# Patient Record
Sex: MALE | Race: WHITE | NOT HISPANIC OR LATINO | Employment: OTHER | URBAN - METROPOLITAN AREA
[De-identification: names, ages, dates, MRNs, and addresses within clinical notes are randomized per-mention and may not be internally consistent; named-entity substitution may affect disease eponyms.]

---

## 2017-03-01 ENCOUNTER — ALLSCRIPTS OFFICE VISIT (OUTPATIENT)
Dept: OTHER | Facility: OTHER | Age: 62
End: 2017-03-01

## 2017-07-11 ENCOUNTER — GENERIC CONVERSION - ENCOUNTER (OUTPATIENT)
Dept: OTHER | Facility: OTHER | Age: 62
End: 2017-07-11

## 2017-08-22 ENCOUNTER — GENERIC CONVERSION - ENCOUNTER (OUTPATIENT)
Dept: OTHER | Facility: OTHER | Age: 62
End: 2017-08-22

## 2017-08-23 ENCOUNTER — GENERIC CONVERSION - ENCOUNTER (OUTPATIENT)
Dept: OTHER | Facility: OTHER | Age: 62
End: 2017-08-23

## 2017-08-24 ENCOUNTER — ALLSCRIPTS OFFICE VISIT (OUTPATIENT)
Dept: OTHER | Facility: OTHER | Age: 62
End: 2017-08-24

## 2017-08-30 ENCOUNTER — GENERIC CONVERSION - ENCOUNTER (OUTPATIENT)
Dept: OTHER | Facility: OTHER | Age: 62
End: 2017-08-30

## 2017-09-07 ENCOUNTER — GENERIC CONVERSION - ENCOUNTER (OUTPATIENT)
Dept: OTHER | Facility: OTHER | Age: 62
End: 2017-09-07

## 2017-12-01 ENCOUNTER — TRANSCRIBE ORDERS (OUTPATIENT)
Dept: ADMINISTRATIVE | Facility: HOSPITAL | Age: 62
End: 2017-12-01

## 2017-12-01 DIAGNOSIS — Z47.89 ORTHOTIC TRAINING: Primary | ICD-10-CM

## 2017-12-01 DIAGNOSIS — Z47.89 ENCOUNTER FOR OTHER ORTHOPEDIC AFTERCARE (CODE): ICD-10-CM

## 2017-12-28 ENCOUNTER — GENERIC CONVERSION - ENCOUNTER (OUTPATIENT)
Dept: OTHER | Facility: OTHER | Age: 62
End: 2017-12-28

## 2018-01-03 ENCOUNTER — ALLSCRIPTS OFFICE VISIT (OUTPATIENT)
Dept: OTHER | Facility: OTHER | Age: 63
End: 2018-01-03

## 2018-01-10 NOTE — MISCELLANEOUS
Message   Recorded as Task   Date: 03/28/2016 02:47 PM, Created By: Erlin Vicente   Task Name: Medical Complaint Callback   Assigned To: Erlin Vicente   Regarding Patient: Ugo Groves, Status: Active   Comment:    Costenbader,Rondel - 28 Mar 2016 2:47 PM     TASK CREATED  Pt called with c/o R foot "burning and extreme pain", states he was advised by PT to notify office of R foot issues  Pt states since last office visit, R foot pain has increased  Pt notes increased R foot pain upon weight bearing, has been trying to "lay off of walking due to pain and burning"  Occasional n/t of R foot  Pt states he is using vitamin supplements that you suggested, not using narcotics  Please advise, Claudette Harrison - 01 Apr 2016 4:53 PM     TASK REPLIED TO: Previously Assigned To Narayan Mena  Please obtain AP and lateral xrays of the LS spine and schedule a f/u after complted    TY   Costenbader,Rondel - 04 Apr 2016 8:52 AM     TASK EDITED  No voicemail available  Costenbader,Rondel - 04 Apr 2016 9:18 AM     TASK EDITED  Spoke with pt's wife and relayed info  Pt was sleeping  Pt will have xrays done prior to appt scheduled for April 7, 9:30  Script faxed to  [facility] @ [phone]          Signatures   Electronically signed by : Justyna Mooney, ; Apr 4 2016  9:19AM EST                       (Author)

## 2018-01-10 NOTE — MISCELLANEOUS
Message   Recorded as Task   Date: 02/16/2016 02:56 PM, Created By: Hamlet Simon   Task Name: Medical Complaint Callback   Assigned To: Hamlet Simon   Regarding Patient: Abdiel Kincaid, Status: Active   Comment:    Costenbader,Rondel - 16 Feb 2016 2:56 PM     TASK CREATED  Spoke with aMribell Jackson from [facility] stating pt is c/o R knee to R foot pain with pain localized in R foot  Pt notified PCP and was provided with Oxycodone 15 mg, 1 tab q 6 hours and informed to d/c the dilaudid  Pt was on his hands and knees cleaning up the floor after bowel incontinence, when he stood up he developed the pain noted  Pt has f/u 3/3 with xrays to review          Signatures   Electronically signed by : Kelsi Hernandez, ; Feb 16 2016  2:56PM EST                       (Author)

## 2018-01-11 NOTE — MISCELLANEOUS
Message   Recorded as Task   Date: 08/22/2017 07:59 AM, Created By: Jeferson Mejía   Task Name: Follow Up   Assigned To: SPA es clinical,Team   Regarding Patient: Abby Saxena, Status: Active   CommentMeade Last - 22 Aug 2017 7:59 AM     TASK CREATED  Please schedule 4 week f/u OV   Gin Anderson - 22 Aug 2017 11:24 AM     TASK REPLIED TO: Previously Assigned To Ansley Drop  lmovm to cb to sched f-up appt   Kizzy Kilpatrick - 22 Aug 2017 1:16 PM     TASK REASSIGNED: Previously Assigned To Tanya Dukes - 22 Aug 2017 1:17 PM     TASK IN PROGRESS   Vee Meadows - 23 Aug 2017 12:33 PM     TASK EDITED  Pt returned call to schedule appt -- appt scheduled for 8/24/17 at 1:15  Active Problems    1  Aftercare following surgery of the musculoskeletal system (V58 78) (Z47 89)   2  Arthropathy of lumbar facet joint (721 3) (M12 88)   3  BRBPR (bright red blood per rectum) (569 3) (K62 5)   4  Closed fracture of first lumbar vertebra, unspecified fracture morphology, initial   encounter (805 4) (S32 019A)   5  Complex regional pain syndrome type 1 of right lower extremity (337 22) (G90 521)   6  Encounter for screening colonoscopy (V76 51) (Z12 11)   7  Hyperlipidemia (272 4) (E78 5)   8  Hypertension (401 9) (I10)   9  Low back pain (724 2) (M54 5)   10  Lumbar postlaminectomy syndrome (722 83) (M96 1)   11  Need for zoster vaccine (V04 89) (Z23)   12  Pain, neuropathic (729 2) (M79 2)   13  Postoperative examination (V67 00) (Z09)   14  Postoperative pain (338 18) (G89 18)   15  Right lumbar radiculopathy (724 4) (M54 16)   16  Right-sided low back pain with right-sided sciatica (724 3) (M54 41)   17  Screening for prostate cancer (V76 44) (Z12 5)   18  Ulcerative colitis, left sided (556 5) (K51 50)    Current Meds   1  DULoxetine HCl - 30 MG Oral Capsule Delayed Release Particles (Cymbalta); TAKE 1   CAPSULE AT BEDTIME;    Therapy: 24NNE6931 to (Evaluate:01Jun2017)  Requested for: 81GQD9317; Last   Rx:03Mar2017 Ordered   2  Lyrica 75 MG Oral Capsule; TAKE 1 CAPSULE TWICE DAILY; Therapy: 02OUM3434 to (Evaluate:20Oct2017); Last Rx:68Avl0995 Ordered   3  Lyrica 75 MG Oral Capsule; TAKE 1 CAPSULE TWICE DAILY; Therapy: 14WEA4977 to (Evaluate:30Mar2017); Last Rx:23Mar2017 Ordered   4  OxyCODONE HCl - 15 MG Oral Tablet; TAKE 1 TO 2 TABLETS EVERY 6 HOURS AS   NEEDED FOR PAIN;   Therapy: (Recorded:20Mnq5153) to Recorded    Allergies    1   No Known Drug Allergies    Signatures   Electronically signed by : Stephen Pritchett, ; Aug 23 2017 12:45PM EST                       (Author)

## 2018-01-11 NOTE — MISCELLANEOUS
Message   Recorded as Task   Date: 08/29/2017 12:52 PM, Created By: Nory Sandoval   Task Name: Call Back   Assigned To: Nory Sandoval   Regarding Patient: Mamie Boas, Status: Active   CommentKelly Brown - 29 Aug 2017 12:52 PM     TASK CREATED  lmtcb regarding procedure  9-14-17   Paola Fregoso - 29 Aug 2017 1:30 PM     TASK EDITED  pt returning your call  please call pt back at [phone]   Nory Sandoval - 29 Aug 2017 3:49 PM     TASK EDITED  mailbox is full   Nory Sandoval - 29 Aug 2017 3:49 PM     TASK IN PROGRESS   Nory Sandoval - 30 Aug 2017 1:55 PM     TASK EDITED  notified patient as above        Active Problems    1  Aftercare following surgery of the musculoskeletal system (V58 78) (Z47 89)   2  Arthropathy of lumbar facet joint (721 3) (M12 88)   3  BRBPR (bright red blood per rectum) (569 3) (K62 5)   4  Closed fracture of first lumbar vertebra, unspecified fracture morphology, initial   encounter (805 4) (S32 019A)   5  Complex regional pain syndrome type 1 of right lower extremity (337 22) (G90 521)   6  Encounter for screening colonoscopy (V76 51) (Z12 11)   7  Hyperlipidemia (272 4) (E78 5)   8  Hypertension (401 9) (I10)   9  Low back pain (724 2) (M54 5)   10  Lumbar postlaminectomy syndrome (722 83) (M96 1)   11  Need for zoster vaccine (V04 89) (Z23)   12  Pain, neuropathic (729 2) (M79 2)   13  Postoperative examination (V67 00) (Z09)   14  Postoperative pain (338 18) (G89 18)   15  Right lumbar radiculopathy (724 4) (M54 16)   16  Right-sided low back pain with right-sided sciatica (724 3) (M54 41)   17  Screening for prostate cancer (V76 44) (Z12 5)   18  Ulcerative colitis, left sided (556 5) (K51 50)    Current Meds   1  DULoxetine HCl - 30 MG Oral Capsule Delayed Release Particles (Cymbalta); TAKE 1   CAPSULE AT BEDTIME; Therapy: 80HQU1442 to (Evaluate:15Olg4618)  Requested for: 31GEP7319; Last   Rx:03Mar2017 Ordered   2   Lyrica 75 MG Oral Capsule; TAKE 1 CAPSULE TWICE DAILY; Therapy: 35ILI7447 to (Evaluate:20Oct2017); Last Rx:77Kla9598 Ordered   3  Lyrica 75 MG Oral Capsule; TAKE 1 CAPSULE TWICE DAILY; Therapy: 58WIT8302 to (Evaluate:30Mar2017); Last Rx:23Mar2017 Ordered   4  OxyCODONE HCl - 15 MG Oral Tablet; TAKE 1 TO 2 TABLETS EVERY 6 HOURS AS   NEEDED FOR PAIN;   Therapy: (Recorded:06Tqk3200) to Recorded    Allergies    1   No Known Drug Allergies    Signatures   Electronically signed by : Katiuska Flowers, ; Aug 30 2017  1:56PM EST                       (Author)

## 2018-01-11 NOTE — MISCELLANEOUS
Dear  Mr Garay: We missed you for your originally scheduled Neurological Consultation requested by Dr Vidal Killian    Please call at your earliest convenience to reschedule this appointment  Sincerely,     Janelle Benedict      [phone]    cc:  Dr Vidal Killian           Electronically signed Damien Ford   Feb 4 2016 12:11PM EST Author

## 2018-01-12 NOTE — MISCELLANEOUS
Message   Recorded as Task   Date: 11/30/2016 10:06 AM, Created By: Moisés Pedroza   Task Name: Medical Complaint Callback   Assigned To: Moisés Pedroza   Regarding Patient: Vandana Schwartz, Status: Active   Comment:    Erum Ruiz - 30 Nov 2016 10:06 AM     TASK CREATED   Erum Ruiz - 30 Nov 2016 10:10 AM     TASK EDITED  Pt reports continued foot pain and questions coming to see DKO again  Review of last visit indicates no further surgery indicated and other options were discussed such as Dorsal column stim and starting Lyrica  Pt is in the process of trying to have Lyrica approved by his insurance  Will check on the possibility of referral to Pain specialist and contact pt  Erum Ruiz - 01 Dec 2016 11:36 AM     TASK EDITED  Per pt request referral to pain management done          Signatures   Electronically signed by : Amairani Watson, ; Dec  1 2016 11:36AM EST                       (Author)

## 2018-01-13 VITALS
HEART RATE: 76 BPM | BODY MASS INDEX: 32.07 KG/M2 | SYSTOLIC BLOOD PRESSURE: 112 MMHG | HEIGHT: 73 IN | DIASTOLIC BLOOD PRESSURE: 64 MMHG | WEIGHT: 242 LBS

## 2018-01-13 NOTE — MISCELLANEOUS
Message   Recorded as Task   Date: 07/11/2017 12:25 PM, Created By: Clint Stearns   Task Name: Miscellaneous   Assigned To: SPA es clinical,Team   Regarding Patient: Rodrigo Yanes, Status: In Progress   Comment:    Vee Meadows - 11 Jul 2017 12:25 PM     TASK CREATED  Pt called stating he uses a mail order ([facility]) for Lyrica  He said they had a computer mix-up and he only has 2 days of Lyrica left  He is asking if he can have an emergency pack of Lyrica if you have one until he gets them from [facility]  He said it is Lyrica 75mg  Pt can be reached at [phone]  Larisa Frias - 11 Jul 2017 1:35 PM     TASK EDITED    Attempted to contact pt, left message that samples available at office  Will be at    Gogo Fraire to call with any other questions or concerns  **********   Larisa Frias - 11 Jul 2017 1:36 PM     TASK EDITED    Lyrica 75 mg 1 blister pack (14 caps) lot# [id number]   exp date 11/2019  ***********        Active Problems    1  Aftercare following surgery of the musculoskeletal system (V58 78) (Z47 89)   2  Arthropathy of lumbar facet joint (721 3) (M12 88)   3  BRBPR (bright red blood per rectum) (569 3) (K62 5)   4  Closed fracture of first lumbar vertebra, unspecified fracture morphology, initial   encounter (805 4) (S32 019A)   5  Complex regional pain syndrome type 1 of right lower extremity (337 22) (G90 521)   6  Encounter for screening colonoscopy (V76 51) (Z12 11)   7  Hyperlipidemia (272 4) (E78 5)   8  Hypertension (401 9) (I10)   9  Low back pain (724 2) (M54 5)   10  Lumbar postlaminectomy syndrome (722 83) (M96 1)   11  Need for zoster vaccine (V04 89) (Z23)   12  Pain, neuropathic (729 2) (M79 2)   13  Postoperative examination (V67 00) (Z09)   14  Postoperative pain (338 18) (G89 18)   15  Right lumbar radiculopathy (724 4) (M54 16)   16  Right-sided low back pain with right-sided sciatica (724 3) (M54 41)   17   Screening for prostate cancer (V76 44) (Z12 5)   18  Ulcerative colitis, left sided (556 5) (K51 50)    Current Meds   1  DULoxetine HCl - 30 MG Oral Capsule Delayed Release Particles (Cymbalta); TAKE 1   CAPSULE AT BEDTIME; Therapy: 56AGG9782 to (Evaluate:01Jun2017)  Requested for: 97WGW2763; Last   Rx:03Mar2017 Ordered   2  Lyrica 75 MG Oral Capsule; TAKE 1 CAPSULE TWICE DAILY; Therapy: 32INB7026 to (Evaluate:32Dni7137); Last Rx:30May2017 Ordered   3  Lyrica 75 MG Oral Capsule; TAKE 1 CAPSULE TWICE DAILY; Therapy: 48KVT0367 to (Evaluate:30Mar2017); Last Rx:23Mar2017 Ordered   4  OxyCODONE HCl - 15 MG Oral Tablet; TAKE 1 TO 2 TABLETS EVERY 6 HOURS AS   NEEDED FOR PAIN;   Therapy: (Recorded:09Myv9253) to Recorded    Allergies    1  No Known Drug Allergies    Signatures   Electronically signed by :  Madelyn Hale, ; Jul 11 2017  1:37PM EST                       (Author)

## 2018-01-13 NOTE — MISCELLANEOUS
Message   Recorded as Task   Date: 05/19/2016 12:59 PM, Created By: Caroline Mendoza   Task Name: Medical Complaint Callback   Assigned To: Caroline Mendoza   Regarding Patient: Alia Raya, Status: Active   Comment:    CostenmariselJanessa pemberton - 19 May 2016 12:59 PM     TASK CREATED  Spoke with Anna Denton, PT @ [facility]  Pt presented there today with c/o R calf pain, increased upon ambulation  No redness or heat, negative Ruth's  Slight edema present  Pt states he has been laying around due to pain  Anna Denton was advised to have pt see his PCP today or present to the ED today for evaluation of symptoms  She stated an understanding          Signatures   Electronically signed by : Joseph Morton, ; May 19 2016 12:59PM EST                       (Author)

## 2018-01-13 NOTE — MISCELLANEOUS
Message   Recorded as Task   Date: 07/22/2016 08:42 AM, Created By: Chanel Morillo   Task Name: Miscellaneous   Assigned To: Chanel Morillo   Regarding Patient: Raul Rollins, Status: Active   Comment:    AnuradhaemmaErum - 22 Jul 2016 8:42 AM     TASK CREATED  Pt questions an earlier appt  with DKO  He is currently to return early Sept  Pt was to schedule ANIKA which he reports he has not done  He was encouraged to f/u with recommendations from previous visit and keep his current appt  He was informed that here were no earlier appts  at this time          Signatures   Electronically signed by : Mary Tran, ; Jul 22 2016  8:43AM EST                       (Author)

## 2018-01-14 VITALS
HEIGHT: 73 IN | SYSTOLIC BLOOD PRESSURE: 118 MMHG | HEART RATE: 64 BPM | WEIGHT: 250 LBS | BODY MASS INDEX: 33.13 KG/M2 | DIASTOLIC BLOOD PRESSURE: 76 MMHG

## 2018-01-15 NOTE — MISCELLANEOUS
Message   Recorded as Task   Date: 11/30/2016 11:35 AM, Created By: Moisés Yancey   Task Name: Miscellaneous   Assigned To: Erum Ruiz   Regarding Patient: Ugo Groves, Status: Active   Comment:    Erum Ruiz - 30 Nov 2016 11:35 AM     TASK CREATED  Pt requested a copy of the original Lyrica order since he is trying to get approval from his insurance  After review it is noted the Lyrica was not prescribed by this office but at d/c from the [facility]  It was suggested he call the hospital and speak with medical records  Hospital number provided  He then questioned how to stop the gabapentin and start the Lyrica  It was explained that he should not just stop he Gabapentin and he should consult with his PCP to help him wean from the one med in order to start the other  He stated an understanding          Signatures   Electronically signed by : Olga Miller, ; Nov 30 2016 11:35AM EST                       (Author)

## 2018-01-16 NOTE — MISCELLANEOUS
Message   Recorded as Task   Date: 02/19/2016 01:22 PM, Created By: Teresa Brooks   Task Name: Medical Complaint Callback   Assigned To: Teresa Brooks   Regarding Patient: Janusz Busch, Status: Active   Comment:    CostenbadJanessa pemberton - 19 Feb 2016 1:22 PM     TASK CREATED  Spoke with pt who is c/o "throbbing pain in my R foot"  Pt states pain has been present but intensified since Tuesday, pt got down on hands and knees to clean up mess on the floor  Pt states he was out of Lyrica for a day and had it refilled and he believes it is a little better  Reviewed with Ed who advised to continue medications, be cautious with movements  Probably nerve pain from surgery  LMOM explaining the above from Ed and advising pt to contact office if pain increases or if weakness develops          Signatures   Electronically signed by : Leopold Stands, ; Feb 19 2016  1:23PM EST                       (Author)

## 2018-01-17 NOTE — MISCELLANEOUS
Message   Recorded as Task   Date: 02/23/2016 03:28 PM, Created By: Teresa Brooks   Task Name: Medical Complaint Callback   Assigned To: Teresa Brooks   Regarding Patient: Janusz Busch, Status: Active   Comment:    Teresa Brooks - 23 Feb 2016 3:28 PM     TASK CREATED  Spoke with pt who continues with R foot pain, described as burning and throbbing at times  Pt taking Lyrica 75 mg tid and using Oxycodone 15 mg q 4-6 hours prn  Per DAVID Vitale, pt called with above symptoms last evening and she instructed him to use oxy 15 q 4 hours during night and contact office in am     Reviewed with Ed who advised to increase Lyrica to qid and provide Medrol Dosepak  Pt was informed of the above  Medrol dosepak sent to retail  Pt does have f/u with DKO on 3/3  Plan  Postoperative pain    · MethylPREDNISolone (Bala) 4 MG Oral Tablet (Medrol (Bala));  Take as directed on  package until finished    Signatures   Electronically signed by : Leopold Stands, ; Feb 23 2016  3:28PM EST                       (Author)

## 2018-01-17 NOTE — MISCELLANEOUS
Message   Recorded as Task   Date: 08/21/2017 09:24 AM, Created By: Concepcion Byrd   Task Name: Miscellaneous   Assigned To: SPA es clinical,Team   Regarding Patient: Denis Medina, Status: In Progress   Mindi Baez - 21 Aug 2017 9:24 AM     TASK CREATED  Pt called for a refill on his Lyrica 75mg  He gets it from [address] [place] [address] number is [phone]  You may call the pt at [phone]  Bola Lugo - 21 Aug 2017 9:31 AM     TASK EDITED  Last o/v 3/1/17   Usman Chilel - 21 Aug 2017 4:05 PM     TASK REPLIED TO: Previously Assigned To Franklin Pae  can you please call in a month supply to pharmacy  Patient should schedule ov in 4 weeks   Kizzy Kilpatrick - 21 Aug 2017 4:39 PM     TASK EDITED  [facility] states I can't call in a refill  They need an escript from you  Stated you can escribe at [address] - 21 Aug 2017 4:39 PM     TASK EDITED   Franklin Pae - 21 Aug 2017 7:46 PM     TASK REPLIED TO: Previously Assigned To [employer] - can it be faxed over to the pharmacy? Kizzy Kilpatrick - 22 Aug 2017 8:22 AM     TASK IN PROGRESS   Peggy Lugo - 22 Aug 2017 11:17 AM     TASK EDITED  rx faxed        Active Problems    1  Aftercare following surgery of the musculoskeletal system (V58 78) (Z47 89)   2  Arthropathy of lumbar facet joint (721 3) (M12 88)   3  BRBPR (bright red blood per rectum) (569 3) (K62 5)   4  Closed fracture of first lumbar vertebra, unspecified fracture morphology, initial   encounter (805 4) (S32 019A)   5  Complex regional pain syndrome type 1 of right lower extremity (337 22) (G90 521)   6  Encounter for screening colonoscopy (V76 51) (Z12 11)   7  Hyperlipidemia (272 4) (E78 5)   8  Hypertension (401 9) (I10)   9  Low back pain (724 2) (M54 5)   10  Lumbar postlaminectomy syndrome (722 83) (M96 1)   11  Need for zoster vaccine (V04 89) (Z23)   12  Pain, neuropathic (729 2) (M79 2)   13   Postoperative examination (V67 00) (Z09) 14  Postoperative pain (338 18) (G89 18)   15  Right lumbar radiculopathy (724 4) (M54 16)   16  Right-sided low back pain with right-sided sciatica (724 3) (M54 41)   17  Screening for prostate cancer (V76 44) (Z12 5)   18  Ulcerative colitis, left sided (556 5) (K51 50)    Current Meds   1  DULoxetine HCl - 30 MG Oral Capsule Delayed Release Particles (Cymbalta); TAKE 1   CAPSULE AT BEDTIME; Therapy: 71JXS5916 to (Evaluate:01Jun2017)  Requested for: 43AYW2742; Last   Rx:03Mar2017 Ordered   2  Lyrica 75 MG Oral Capsule; TAKE 1 CAPSULE TWICE DAILY; Therapy: 06SBM4371 to (Evaluate:20Oct2017); Last Rx:24Eet3145 Ordered   3  Lyrica 75 MG Oral Capsule; TAKE 1 CAPSULE TWICE DAILY; Therapy: 43OYJ0280 to (Evaluate:30Mar2017); Last Rx:23Mar2017 Ordered   4  OxyCODONE HCl - 15 MG Oral Tablet; TAKE 1 TO 2 TABLETS EVERY 6 HOURS AS   NEEDED FOR PAIN;   Therapy: (Recorded:05Ujw3157) to Recorded    Allergies    1   No Known Drug Allergies    Signatures   Electronically signed by : Severino Holding, ; Aug 22 2017 11:17AM EST                       (Author)

## 2018-01-17 NOTE — MISCELLANEOUS
Message   Recorded as Task   Date: 07/12/2016 04:57 PM, Created By: Bee Marie   Task Name: Medical Complaint Callback   Assigned To: Bee Marie   Regarding Patient: Lovely Villalobos, Status: Active   Comment:    Costenbader,Rondel - 12 Jul 2016 4:57 PM     TASK CREATED  Pt contacted office, refill of gabapentin not at pharmacy from appt on 7/7  Medication was recorded and not sent  Reviewed with DKO, Gabapentin ordered and sent to pharmacy, pt notified  Script for PT to be sent to [facility] in [facility]          Plan  Right lumbar radiculopathy    · Gabapentin 300 MG Oral Capsule; take 2 capsules po tid    Signatures   Electronically signed by : Justine Kitchen, ; Jul 12 2016  4:58PM EST                       (Author)

## 2018-01-18 NOTE — MISCELLANEOUS
Message   Recorded as Task   Date: 2017 01:58 PM, Created By: Reuben Lr   Task Name: Miscellaneous   Assigned To: SPA es clinical,Team   Regarding Patient: Janusz Busch, Status: In Progress   Comment:    Marilyn Mueller - 06 Sep 2017 1:58 PM     TASK CREATED  phone call from patient stating that there is a phone number that needs to be called to refill his Lyrica, the phone number is [phone]  if any questions can reach patient at [phone], or [phone]  Kizzy Kilpatrick - 06 Sep 2017 2:17 PM     TASK IN PROGRESS   Kizzy Kilpatrick - 06 Sep 2017 3:11 PM     TASK EDITED  S/w pt  States his lyrica is usually free but [facility] is telling him $300  Previous tasks show pt is part of [facility]  [facility]  They don't have knowledge of pt getting lyrica thru them or being a member of  that program    Garfield Garcia - 06 Sep 2017 3:30 PM     TASK EDITED  S/w [employer] [facility]  Scripts for lyrica need to be faxed or escribed directly to [address]  Please reorder pts lyrica 75mg 2xdaily  May escribe at [address],   [phone] [address]  Fax # [phone]    Otherwise fax paper script with original signature to [phone]  Must include cover sheet with pt name, address,   [facility] - 06 Sep 2017 4:01 PM     TASK REPLIED TO: Previously Assigned To Kizzy Kilpatrick   pls order thanks   Garfield Garcia - 06 Sep 2017 4:28 PM     TASK REASSIGNED: Previously Assigned To Ted Arroyo - 07 Sep 2017 8:42 AM     TASK EDITED  S/w pt  Informed him med is being ordered and [address] states can take 2 weeks  Pt to come in for samples since he only has a week left  Kizzy Kilpatrick - 07 Sep 2017 9:35 AM     TASK EDITED  Can you print a new script with handwritten signature and I will fax over? Paola Grate states it can take 2 weeks  I have pt coming in for samples to hold him over     [facility] - 07 Sep 2017 11:11 AM     TASK REPLIED TO: Previously Assigned To Miguel Neves - 07 Sep 2017 11:39 AM     TASK IN PROGRESS   Kizzy Kilpatrick - 07 Sep 2017 11:49 AM     TASK EDITED  Script faxed  Active Problems    1  Aftercare following surgery of the musculoskeletal system (V58 78) (Z47 89)   2  Arthropathy of lumbar facet joint (721 3) (M12 88)   3  BRBPR (bright red blood per rectum) (569 3) (K62 5)   4  Closed fracture of first lumbar vertebra, unspecified fracture morphology, initial   encounter (805 4) (S32 019A)   5  Complex regional pain syndrome type 1 of right lower extremity (337 22) (G90 521)   6  Encounter for screening colonoscopy (V76 51) (Z12 11)   7  Hyperlipidemia (272 4) (E78 5)   8  Hypertension (401 9) (I10)   9  Low back pain (724 2) (M54 5)   10  Lumbar postlaminectomy syndrome (722 83) (M96 1)   11  Need for zoster vaccine (V04 89) (Z23)   12  Pain, neuropathic (729 2) (M79 2)   13  Postoperative examination (V67 00) (Z09)   14  Postoperative pain (338 18) (G89 18)   15  Right lumbar radiculopathy (724 4) (M54 16)   16  Right-sided low back pain with right-sided sciatica (724 3) (M54 41)   17  Screening for prostate cancer (V76 44) (Z12 5)   18  Ulcerative colitis, left sided (556 5) (K51 50)    Current Meds   1  DULoxetine HCl - 30 MG Oral Capsule Delayed Release Particles (Cymbalta); TAKE 1   CAPSULE AT BEDTIME; Therapy: 28SYA1968 to (Evaluate:01Jun2017)  Requested for: 37NUV7682; Last   Rx:03Mar2017 Ordered   2  Lyrica 75 MG Oral Capsule; TAKE 1 CAPSULE TWICE DAILY; Therapy: 86WER0967 to (Evaluate:52Nat3356); Last Rx:29Tqr5072; Status: ACTIVE -   Retrospective By Protocol Authorization Ordered   3  Lyrica 75 MG Oral Capsule; TAKE 1 CAPSULE TWICE DAILY; Therapy: 61DEE8201 to (Evaluate:30Mar2017); Last Rx:23Mar2017 Ordered   4  OxyCODONE HCl - 15 MG Oral Tablet; TAKE 1 TO 2 TABLETS EVERY 6 HOURS AS   NEEDED FOR PAIN;   Therapy: (Recorded:50Opb4273) to Recorded    Allergies    1   No Known Drug Allergies    Signatures   Electronically signed by : Tony Rizo, ; Sep  7 2017 11:50AM EST                       (Author)

## 2018-01-23 NOTE — PROCEDURES
Results/Data    Procedure: Electromyogram and Nerve Conduction Study  Indication: Right Lower Extremity   Referred by Dr Cesar Bangura  The procedure's were discussed with the patient  Written consent was obtained prior to the procedure and is detailed in the patient's record  Prior to the start of the procedure a time out was taken and the identity of the patient was confirmed via name and date of birth with the patient  The correct site and the procedure to be performed were confirmed  The correct side was confirmed if applicable  The positioning of the patient was verified  The availability of the correct equipment was verified  Procedure Start Time: 12:30    Technique: A sterile concentric needle electrode was used  The patient tolerated the procedure well  There were no complications  Results :Motor and sensory nerve conduction studies were performed on the peroneal, tibial and sural nerves  The peroneal motor terminal latency was prolonged with a low compound motor action potential amplitude and a slow conduction velocity across the ankle but a normal conduction velocity across the fibular head  The tibial motor terminal latency was prolonged with a low compound motor action potential amplitude and a slow conduction velocity across the ankle  The peroneal F-wave was within normal limits  The tibial F-wave was prolonged  The sural sensory action potential was normal   The bilateral H response was normal     Concentric needle examination was performed on various  proximal and distal muscles including  vastus lateralis, tibialis anterior, medial gastrocnemius, EDB, AH, L4-5 and L5-S1 paraspinal myotomes  There was no evidence of active denervation in any other muscles tested  Mild decreased recruitment of polyphasic motor units was noted in the EDB and AH   Mild decreased recruitment of giant motor units was noted in the vastus lateralis ,  medial gastrocnemius and  tibialis anterior The compound motor unit action potentials were of normal configuration with interference patterns being full or full for effort in the remaining muscles tested  Interpretation: There is electrophysiologic evidence of a:    1  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

## 2018-01-24 VITALS
SYSTOLIC BLOOD PRESSURE: 125 MMHG | DIASTOLIC BLOOD PRESSURE: 82 MMHG | RESPIRATION RATE: 14 BRPM | TEMPERATURE: 97.5 F | WEIGHT: 253 LBS | HEIGHT: 73 IN | HEART RATE: 84 BPM | BODY MASS INDEX: 33.53 KG/M2

## 2018-01-26 ENCOUNTER — DOCUMENTATION (OUTPATIENT)
Dept: NEUROSURGERY | Facility: CLINIC | Age: 63
End: 2018-01-26

## 2018-01-26 ENCOUNTER — TELEPHONE (OUTPATIENT)
Dept: NEUROSURGERY | Facility: CLINIC | Age: 63
End: 2018-01-26

## 2018-01-26 NOTE — TELEPHONE ENCOUNTER
Patient was last seen on 112/28/17 at which point we recommended an EMG nerve conductive study to assess the status of the applicable nerves  I would recommend holding off of an ablative procedure until the completion of the study  Patient called and LM stating he had the EMG but has not heard from Dr Yoav Joy regarding results  I will review with Yoav Joy and have him give patient a call

## 2018-01-31 NOTE — TELEPHONE ENCOUNTER
Results/Data    Procedure: Electromyogram and Nerve Conduction Study  Indication: Right Lower Extremity   Referred by Dr Jaye Keith  The procedure's were discussed with the patient  Written consent was obtained prior to the procedure and is detailed in the patient's record  Prior to the start of the procedure a time out was taken and the identity of the patient was confirmed via name and date of birth with the patient  The correct site and the procedure to be performed were confirmed  The correct side was confirmed if applicable  The positioning of the patient was verified  The availability of the correct equipment was verified  Procedure Start Time: 12:30    Technique: A sterile concentric needle electrode was used  The patient tolerated the procedure well  There were no complications  Results :Motor and sensory nerve conduction studies were performed on the peroneal, tibial and sural nerves  The peroneal motor terminal latency was prolonged with a low compound motor action potential amplitude and a slow conduction velocity across the ankle but a normal conduction velocity across the fibular head  The tibial motor terminal latency was prolonged with a low compound motor action potential amplitude and a slow conduction velocity across the ankle  The peroneal F-wave was within normal limits  The tibial F-wave was prolonged  The sural sensory action potential was normal  The bilateral H response was normal     Concentric needle examination was performed on various proximal and distal muscles including vastus lateralis, tibialis anterior, medial gastrocnemius, EDB, AH, L4-5 and L5-S1 paraspinal myotomes  There was no evidence of active denervation in any other muscles tested  Mild decreased recruitment of polyphasic motor units was noted in the EDB and AH   Mild decreased recruitment of giant motor units was noted in the vastus lateralis , medial gastrocnemius and tibialis anterior The compound motor unit action potentials were of normal configuration with interference patterns being full or full for effort in the remaining muscles tested  Interpretation: There is electrophysiologic evidence of a:    1  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

## 2018-01-31 NOTE — TELEPHONE ENCOUNTER
I called Neurology (dr Eddie Fajardo) asking for another report where there is an impression since the report we have has no description of what the neurologist dictated  They looked for me and he completed a separate dictation from the actual report completed in [facility]  I found it and is now printed to go over it with Dr Edmond Valencia

## 2018-02-06 NOTE — TELEPHONE ENCOUNTER
Patient called stating he is still waiting on phone call from Dr Milton Galdamez regarding name of doctor he is supposed to see  I saw that the last note in this Telephone encounter states that he needs a referral to Neurology  I will speak to Dr Milton Galdamez and get back to the patient

## 2018-02-07 DIAGNOSIS — Z48.89 AFTERCARE FOLLOWING SURGERY: Primary | ICD-10-CM

## 2018-02-07 NOTE — TELEPHONE ENCOUNTER
After reviewing with Dr Grace Leigh he stated that patient would need to return to his original neurologist who completed the EMG for continuation of care  That would be Dr Guy Hernandez  I called and spoke to Jamari Karimi (wife) and explained this information  She verbalized understanding and will relay the message to Hafsa Montalvo

## 2018-05-08 ENCOUNTER — OFFICE VISIT (OUTPATIENT)
Dept: NEUROLOGY | Facility: CLINIC | Age: 63
End: 2018-05-08
Payer: COMMERCIAL

## 2018-05-08 VITALS
DIASTOLIC BLOOD PRESSURE: 72 MMHG | HEART RATE: 90 BPM | SYSTOLIC BLOOD PRESSURE: 118 MMHG | BODY MASS INDEX: 33.43 KG/M2 | WEIGHT: 252.2 LBS | HEIGHT: 73 IN

## 2018-05-08 DIAGNOSIS — G24.9 DYSTONIA OF FOOT: ICD-10-CM

## 2018-05-08 DIAGNOSIS — R20.2 PARESTHESIA: ICD-10-CM

## 2018-05-08 DIAGNOSIS — M79.604 RIGHT LEG PAIN: Primary | ICD-10-CM

## 2018-05-08 PROCEDURE — 99245 OFF/OP CONSLTJ NEW/EST HI 55: CPT | Performed by: PSYCHIATRY & NEUROLOGY

## 2018-05-08 RX ORDER — DULOXETIN HYDROCHLORIDE 60 MG/1
60 CAPSULE, DELAYED RELEASE ORAL DAILY
Refills: 0 | COMMUNITY
Start: 2018-02-14 | End: 2021-11-02

## 2018-05-08 RX ORDER — GABAPENTIN 100 MG/1
CAPSULE ORAL
Qty: 100 CAPSULE | Refills: 5 | Status: ON HOLD | OUTPATIENT
Start: 2018-05-08 | End: 2019-01-24 | Stop reason: ALTCHOICE

## 2018-05-08 RX ORDER — OXYCODONE HYDROCHLORIDE 30 MG/1
30 TABLET ORAL EVERY 6 HOURS
Refills: 0 | COMMUNITY
Start: 2018-04-26 | End: 2019-04-04 | Stop reason: HOSPADM

## 2018-05-08 RX ORDER — ZOLPIDEM TARTRATE 10 MG/1
10 TABLET ORAL
Refills: 0 | Status: ON HOLD | COMMUNITY
Start: 2018-04-13 | End: 2019-01-24 | Stop reason: ALTCHOICE

## 2018-05-08 NOTE — PROGRESS NOTES
Nova Walker is a 63 y o  male who presents with pain in his distal right lower extremity    Assessment:  1  Right leg pain    2  Dystonia of foot    3  Paresthesia        Plan:  EMG bilateral lower extremities  Blood work to rule out treatable etiologies of neuropathy  Trial of gabapentin for neuropathic pain  Follow-up 6 weeks    Discussion:  Kacey Ely has symptoms of hyperpathia and allodynia in the lateral aspect of the distal right lower extremity and dorsal foot which began after back surgery  His exam demonstrates dystonic posturing of the right foot without typical signs of reflex sympathetic dystrophy and demonstrates changes consistent with bilateral peripheral neuropathy  He did have an EMG of the right lower extremity which demonstrated some neuropathic findings  Have recommended EMG of both lower extremities to compare, blood work to rule out treatable etiologies of neuropathy and I have recommended Re initiating gabapentin and hopes to control his neuropathic pain  An MRI of the right foot would be helpful in excluding RSD if it has not yet been done (patient feels that he had it done through way podiatrist in the recent past and will check on this)  I will see him back in follow-up on these are completed      Subjective:    HPI  Kacey Ely presents today with the above complaints  He states that in January of 2016 he had severe pain in his back to the point where he could not walk  He does not recall pain in his right leg but in reviewing neurosurgery note before surgery it does report right radicular pain  Inflammatory changes were noted at L3-4 causing severe neural foraminal narrowing and this was treated surgically  He states that postoperatively has back pain symptoms resolved completely but he recalls waking up postoperatively having pain in his right foot and leg and the pain is progressively gotten worse over time  He reports touching the lateral aspect of his right leg or top of his foot this pain  He also reports that his toes remain curled and his foot turned in  He denies any pain in the proximal right lower extremity or in his back  He has not noticed any color changes to the foot or changes in temperature compared to the other foot  He finds walking on the foot aggravates his pain  He was seen by pain management and thought to possibly have reflex sympathetic dystrophy  He had epidural and sympathetic blocks without relief  He was placed on Lyrica and states this bother to stomach and was too expensive so he discontinued it  He was on gabapentin but does not recall why it was discontinued  He is currently on Cymbalta but does not get adequate relief  Dorsal column stimulator was recommended but he was not sure why this was considered and did not follow through  More recently had an EMG done of his right lower extremity by Dr John Sher which describes peroneal motor neuropathy at the ankle, tibial neuropathy at the ankle and mild chronic L4-5 and L5-S1 radiculopathy changes  Previous records including imaging and neuro surgical records were reviewed as well as pain management records      Past Medical History:   Diagnosis Date    Back pain     Hypertension     Ulcerative colitis ([facility] [address] )        Family History:  Family History   Problem Relation Age of Onset    Parkinsonism Mother     Lung cancer Father        Past Surgical History:  Past Surgical History:   Procedure Laterality Date    LUMBAR FUSION Right 1/20/2016    Procedure: FUSION LUMBAR  POSTERIOR, TLIF L3-4  Right L3-4 Fascet;  Surgeon: Joyce Bal MD;  [facility];  Service:        Social History:   reports that he has never smoked  He has never used smokeless tobacco  He reports that he drinks about 9 6 oz of alcohol per week   He reports that he does not use drugs  Allergies:  Patient has no known allergies        Current Outpatient Prescriptions:     DULoxetine (CYMBALTA) 60 mg delayed release capsule, Take 60 mg by mouth daily, Disp: , Rfl: 0    oxyCODONE (ROXICODONE) 30 MG immediate release tablet, Take 30 mg by mouth every 6 (six) hours, Disp: , Rfl: 0    zolpidem (AMBIEN) 10 mg tablet, Take 10 mg by mouth daily at bedtime as needed, Disp: , Rfl: 0    acetaminophen (TYLENOL) 325 mg tablet, Take 2 tablets (650 mg total) by mouth every 4 (four) hours as needed for mild pain , Disp: 30 tablet, Rfl: 0    gabapentin (NEURONTIN) 100 mg capsule, 1-3 pills 3 times daily, Disp: 100 capsule, Rfl: 5    senna (SENOKOT) 8 6 mg, Take 1 to 2 tablets as needed for constipation  , Disp: 60 tablet, Rfl: 0    I have reviewed the past medical, social and family history, current medications, allergies, vitals, review of systems and updated this information as appropriate today     Objective:    Vitals:  Blood pressure 118/72, pulse 90, height 6' 0 5" (1 842 m), weight 114 kg (252 lb 3 2 oz)  Physical Exam    Neurological Exam    GENERAL:  Cooperative in no acute distress  Well-developed and well-nourished    HEAD and NECK   Head is atraumatic normocephalic with no lesions or masses  Neck is supple with full range of motion    CARDIOVASCULAR  Carotid Arteries-no carotid bruits  NEUROLOGIC:  Mental Status-the patient is awake alert and oriented without aphasia or apraxia  Cranial Nerves: Visual fields are full to confrontation  Discs are flat  Extraocular movements are full without nystagmus  Pupils are 2-1/2 mm and reactive  Face is symmetrical to light touch  Movements of facial expression move symmetrically  Hearing is normal to finger rub bilaterally  Soft palate lifts symmetrically  Shoulder shrug is symmetrical  Tongue is midline without atrophy  Motor: No drift is noted on arm extension  Strength is full in the upper and lower extremities with normal bulk and tone, with exception of inability to dorsiflex the foot completely or extend the toes  His foot is turned in and toes curl down in a dystonic type posture    Sensory: Absent temperature sharp dull and vibratory sensation in the distal lower extremities bilaterally subjectively more prominent on the right than left to the proximal 1/3 of the shin  Cortical function is intact  Coordination: Finger to nose testing is performed accurately  Romberg is negative  Gait reveals a normal base with an antalgic gait pattern  He has difficulty toe walking secondary to pain in his right foot he is able to heel walk with subtle dropping of the right foot  Reflexes:  2/4 in the biceps, triceps, brachioradialis and knee jerk regions, 1 at the ankles  Toes are downgoing  EXTREMITIES:  No obvious temperature change or discoloration noted in the lower extremities  Decreased hair growth is noted in the right shin          ROS:    Review of Systems   Constitutional: Negative for chills, fatigue and fever  HENT: Negative for congestion, ear pain, facial swelling, postnasal drip, sinus pain, sinus pressure, sore throat, tinnitus and trouble swallowing  Eyes: Negative for discharge, redness and visual disturbance  Respiratory: Negative for cough, shortness of breath and wheezing  Cardiovascular: Negative  Gastrointestinal: Negative for abdominal distention, abdominal pain, constipation, nausea and vomiting  Endocrine: Negative for cold intolerance and heat intolerance  Genitourinary: Negative for difficulty urinating, frequency, hematuria and urgency  Musculoskeletal: Positive for arthralgias, back pain and gait problem  Negative for neck pain and neck stiffness  Skin: Negative for rash and wound  Allergic/Immunologic: Negative for environmental allergies and food allergies  Neurological: Negative for dizziness, tremors, seizures, syncope, facial asymmetry, speech difficulty, weakness, light-headedness, numbness and headaches  Hematological: Negative  Psychiatric/Behavioral: Positive for sleep disturbance  Negative for confusion and decreased concentration

## 2018-07-09 ENCOUNTER — TELEPHONE (OUTPATIENT)
Dept: NEUROSURGERY | Facility: CLINIC | Age: 63
End: 2018-07-09

## 2018-07-09 NOTE — TELEPHONE ENCOUNTER
7/6/18 - Patient called stating that he is going to an ortho surgeon and needs all his records sent to them  He will call back with fax #  All his records are in [employer]  - He can summit a request in for records since there is multiple office notes and images  Will call patient to notify  7/9/18 - Called patient at [phone] and [facility] explaining that he can summit a request in to our office and we will have our Medical Records Team work on this for him

## 2018-07-30 ENCOUNTER — TELEPHONE (OUTPATIENT)
Dept: NEUROLOGY | Facility: CLINIC | Age: 63
End: 2018-07-30

## 2018-07-30 NOTE — TELEPHONE ENCOUNTER
Pt calls requesting EMG be faxed to [facility], he has now been referred there Dr Sha Leung in [facility], faxed    Fax: [phone]  Phone: [phone]

## 2018-10-01 ENCOUNTER — TELEPHONE (OUTPATIENT)
Dept: NEUROSURGERY | Facility: CLINIC | Age: 63
End: 2018-10-01

## 2018-10-01 NOTE — TELEPHONE ENCOUNTER
Patient called and LM on my voicemail on 9/28/18 at 12:08pm requesting a copy of his EMG and any information regarding his operation  I called back today and stated I could mail it to him but he is leaving to [place] on Monday 10/10/18  He stated he will come to the office and  records  I will place in  bin for him  Thanks

## 2018-10-12 ENCOUNTER — TELEPHONE (OUTPATIENT)
Dept: PAIN MEDICINE | Facility: CLINIC | Age: 63
End: 2018-10-12

## 2018-10-12 NOTE — TELEPHONE ENCOUNTER
Patient is an established patient with Dr Emily Davis  He was last seen in 2017  He is now calling to ask if he can switch to Dr Sussy Canas? He has been told by friends about him and now he wants to switch  He can be reached at #[phone]

## 2018-12-17 RX ORDER — MORPHINE SULFATE 15 MG/1
15 TABLET, FILM COATED, EXTENDED RELEASE ORAL EVERY 12 HOURS
Refills: 0 | COMMUNITY
Start: 2018-11-21 | End: 2019-04-04 | Stop reason: HOSPADM

## 2018-12-17 RX ORDER — INFLUENZA A VIRUS A/SINGAPORE/GP1908/2015 IVR-180A (H1N1) ANTIGEN (PROPIOLACTONE INACTIVATED), INFLUENZA A VIRUS A/HONG KONG/4801/2014 X-263B (H3N2) ANTIGEN (PROPIOLACTONE INACTIVATED), INFLUENZA B VIRUS B/BRISBANE/46/2015 ANTIGEN (PROPIOLACTONE INACTIVATED), AND INFLUENZA B VIRUS B/PHUKET/3073/2013 BVR-1B ANTIGEN (PROPIOLACTONE INACTIVATED) 15; 15; 15; 15 UG/.5ML; UG/.5ML; UG/.5ML; UG/.5ML
INJECTION, SUSPENSION INTRAMUSCULAR
Refills: 0 | Status: ON HOLD | COMMUNITY
Start: 2018-09-27 | End: 2019-01-24 | Stop reason: ALTCHOICE

## 2018-12-18 ENCOUNTER — TELEPHONE (OUTPATIENT)
Dept: GASTROENTEROLOGY | Facility: CLINIC | Age: 63
End: 2018-12-18

## 2018-12-18 ENCOUNTER — OFFICE VISIT (OUTPATIENT)
Dept: GASTROENTEROLOGY | Facility: CLINIC | Age: 63
End: 2018-12-18
Payer: MEDICARE

## 2018-12-18 VITALS
SYSTOLIC BLOOD PRESSURE: 144 MMHG | HEART RATE: 70 BPM | WEIGHT: 274.4 LBS | BODY MASS INDEX: 36.7 KG/M2 | DIASTOLIC BLOOD PRESSURE: 80 MMHG

## 2018-12-18 DIAGNOSIS — Z11.59 ENCOUNTER FOR SCREENING FOR OTHER VIRAL DISEASES: ICD-10-CM

## 2018-12-18 DIAGNOSIS — Z86.19 HISTORY OF POSITIVE HEPATITIS C: ICD-10-CM

## 2018-12-18 DIAGNOSIS — K51.911 ULCERATIVE COLITIS WITH RECTAL BLEEDING, UNSPECIFIED LOCATION (HCC): Chronic | ICD-10-CM

## 2018-12-18 DIAGNOSIS — R74.8 ELEVATED LIVER ENZYMES: Primary | ICD-10-CM

## 2018-12-18 PROCEDURE — 99204 OFFICE O/P NEW MOD 45 MIN: CPT | Performed by: INTERNAL MEDICINE

## 2018-12-18 NOTE — TELEPHONE ENCOUNTER
----- Message from Nivia Mast PA-C sent at 12/18/2018  9:26 AM EST -----  Reviewed patient's records, and he is due for colonoscopy this year for hx of tubular adenomas in 2015  Please schedule

## 2018-12-18 NOTE — PROGRESS NOTES
[facility]  Kayode Anna 66 y o  male MRN: [de-identified]       CC:  Elevated LFTs    HPI:  Hans Bhandari is a 80-year-old male with history of ulcerative colitis, colon polyps, lumbar radiculopathy on chronic opioids, hepatitis-C and prior history of alcohol abuse  Patient presents today by referral of his PCP for elevated LFTs  Unfortunately, these results are not available to me  Patient tells me that he has known hepatitis C  He was never treated as he was told that this was "dormant " Patient reports that he acquired this from drug use at a young age  Patient reports that there was a time in his life for he was drinking consistently for 15 years  He would have 3-4 beers per day  Patient denies abdominal pain, nausea, vomiting, melena, hematochezia, hematemesis or coffee-ground emesis  Patient's last colonoscopy was in March 2015  At that time, he had several polyps that were removed in the hepatic flexure  As well as a no other small polyp in the rectum  Biopsies consistent with early tubular adenomas  Patient was due for a 3 year recall  He also had a flexible sigmoidoscopy in December of that year for diarrhea and rectal bleeding  He was noted to have severe ulcerative colitis from the rectum to 40 cm  Biopsies at that time consistent with chronic inflammation with cryptitis  Patient reports that he was treated with prednisone and Rowasa enemas at that time  He has had no symptoms of hematochezia or diarrhea  Review of Systems:    CONSTITUTIONAL: Denies any fever, chills, or rigors  Good appetite, and no recent weight loss  HEENT: No earache or tinnitus  Denies hearing loss or visual disturbances  CARDIOVASCULAR: No chest pain or palpitations  RESPIRATORY: Denies any cough, hemoptysis, shortness of breath or dyspnea on exertion  GASTROINTESTINAL: As noted in the History of Present Illness  GENITOURINARY: No problems with urination  Denies any hematuria or dysuria    NEUROLOGIC: No dizziness or vertigo, denies headaches  MUSCULOSKELETAL: Denies any muscle or joint pain  SKIN: Denies skin rashes or itching  ENDOCRINE: Denies excessive thirst  Denies intolerance to heat or cold  PSYCHOSOCIAL: Denies depression or anxiety  Denies any recent memory loss  Current Outpatient Prescriptions   Medication Sig Dispense Refill    DULoxetine (CYMBALTA) 60 mg delayed release capsule Take 60 mg by mouth daily  0    LYRICA 75 MG capsule   0    morphine (MS CONTIN) 15 mg 12 hr tablet Take 15 mg by mouth every 12 (twelve) hours  0    oxyCODONE (ROXICODONE) 30 MG immediate release tablet Take 30 mg by mouth every 6 (six) hours  0    zolpidem (AMBIEN) 10 mg tablet Take 10 mg by mouth daily at bedtime as needed  0    acetaminophen (TYLENOL) 325 mg tablet Take 2 tablets (650 mg total) by mouth every 4 (four) hours as needed for mild pain  (Patient not taking: Reported on 12/18/2018 ) 30 tablet 0    AFLURIA QUADRIVALENT 0 5 ML HAILY As directed  0    gabapentin (NEURONTIN) 100 mg capsule 1-3 pills 3 times daily (Patient not taking: Reported on 12/18/2018 ) 100 capsule 5    senna (SENOKOT) 8 6 mg Take 1 to 2 tablets as needed for constipation  (Patient not taking: Reported on 12/18/2018 ) 60 tablet 0     No current facility-administered medications for this visit        Past Medical History:   Diagnosis Date    Back pain     Hypertension     Previous back surgery     rods in the back    Ulcerative colitis [facility])      Past Surgical History:   Procedure Laterality Date    BACK SURGERY      LUMBAR FUSION Right 1/20/2016    Procedure: FUSION LUMBAR  POSTERIOR, TLIF L3-4  Right L3-4 Fascet;  Surgeon: Sindi Gray MD;  [facility];  Service:      Social History     Social History    Marital status: /Civil Union     Spouse name: N/A    Number of children: N/A    Years of education: N/A     Social History Main Topics    Smoking status: Never Smoker    Smokeless tobacco: Never Used    Alcohol use 9 6 oz/week     2 Cans of beer, 14 Standard drinks or equivalent per week      Comment: 2 drinks/day    Drug use: No    Sexual activity: No     Other Topics Concern    None     Social History Narrative    None     Family History   Problem Relation Age of Onset    Parkinsonism Mother     Lung cancer Father             PHYSICAL EXAM:    Vitals:    12/18/18 0842   BP: 144/80   Pulse: 70   Weight: 124 kg (274 lb 6 4 oz)     General Appearance:   Alert and oriented x 3  Cooperative, and in no respiratory distress  No asterixis  HEENT:   Normocephalic, atraumatic, anicteric      Neck:  Supple, symmetrical, trachea midline   Lungs:   Clear to auscultation bilaterally    Heart[de-identified]   Regular rate and rhythm   Abdomen:   Soft, obese, non-tender, non-distended; normal bowel sounds; no masses, no organomegaly    Genitalia:   Deferred    Rectal:   Deferred    Extremities:  No cyanosis, clubbing or edema    Pulses:  2+ and symmetric all extremities    Skin:  Skin color, texture, turgor normal, no rashes or lesions    Lymph nodes:  No palpable cervical or supraclavicular lymphadenopathy          ASSESSMENT and PLAN:      1) Elevated LFTs - No records of elevated LFTs sent with the patient  Per available blood work, no elevated LFTs appreciated  He does have history of inactive hepatitis C  Patient also reports that he has gained weight recently after having back surgery and being on oxycodone  - Will plan for liver workup ( RUSTY, ASMA, AMA, lipid panel, etc ) with repeat CMP  - Will also check quantitative HCV  - Right upper quadrant ultrasound  - Weight loss      2) Remote history of ulcerative colitis - Patient has not had any symptoms suggestive of UC since he was treated 3 years ago with a prednisone taper and Rowasa enemas  He appears to be in remission  3) History of colon polyps - Tubular adenoma seen in 2015  He was due for 3 year recall    - Colonoscopy as he is due         Follow up in 8 weeks

## 2018-12-18 NOTE — LETTER
December 18, 2018     Kel Denson MD  [address]  [facility]  [address] [place] [id number]    Patient: Mazin Mccauley   YOB: 1955   Date of Visit: 12/18/2018       Dear Dr Ham Scarce: Thank you for referring Mazin Mccauley to me for evaluation  Below are my notes for this consultation  If you have questions, please do not hesitate to call me  I look forward to following your patient along with you  Sincerely,        Manolo Pepe PA-C        CC: No Recipients  Danita Connolly  12/18/2018  9:25 AM  Sign at close encounter  [facility]  Mazin Mccauley 66 y o  male MRN: [de-identified]       CC:  Elevated LFTs    HPI:  Stephy Campbell is a 60-year-old male with history of ulcerative colitis, colon polyps, lumbar radiculopathy on chronic opioids, hepatitis-C and prior history of alcohol abuse  Patient presents today by referral of his PCP for elevated LFTs  Unfortunately, these results are not available to me  Patient tells me that he has known hepatitis C  He was never treated as he was told that this was "dormant " Patient reports that he acquired this from drug use at a young age  Patient reports that there was a time in his life for he was drinking consistently for 15 years  He would have 3-4 beers per day  Patient denies abdominal pain, nausea, vomiting, melena, hematochezia, hematemesis or coffee-ground emesis  Patient's last colonoscopy was in March 2015  At that time, he had several polyps that were removed in the hepatic flexure  As well as a no other small polyp in the rectum  Biopsies consistent with early tubular adenomas  Patient was due for a 3 year recall  He also had a flexible sigmoidoscopy in December of that year for diarrhea and rectal bleeding  He was noted to have severe ulcerative colitis from the rectum to 40 cm  Biopsies at that time consistent with chronic inflammation with cryptitis    Patient reports that he was treated with prednisone and Rowasa enemas at that time  He has had no symptoms of hematochezia or diarrhea  Review of Systems:    CONSTITUTIONAL: Denies any fever, chills, or rigors  Good appetite, and no recent weight loss  HEENT: No earache or tinnitus  Denies hearing loss or visual disturbances  CARDIOVASCULAR: No chest pain or palpitations  RESPIRATORY: Denies any cough, hemoptysis, shortness of breath or dyspnea on exertion  GASTROINTESTINAL: As noted in the History of Present Illness  GENITOURINARY: No problems with urination  Denies any hematuria or dysuria  NEUROLOGIC: No dizziness or vertigo, denies headaches  MUSCULOSKELETAL: Denies any muscle or joint pain  SKIN: Denies skin rashes or itching  ENDOCRINE: Denies excessive thirst  Denies intolerance to heat or cold  PSYCHOSOCIAL: Denies depression or anxiety  Denies any recent memory loss  Current Outpatient Prescriptions   Medication Sig Dispense Refill    DULoxetine (CYMBALTA) 60 mg delayed release capsule Take 60 mg by mouth daily  0    LYRICA 75 MG capsule   0    morphine (MS CONTIN) 15 mg 12 hr tablet Take 15 mg by mouth every 12 (twelve) hours  0    oxyCODONE (ROXICODONE) 30 MG immediate release tablet Take 30 mg by mouth every 6 (six) hours  0    zolpidem (AMBIEN) 10 mg tablet Take 10 mg by mouth daily at bedtime as needed  0    acetaminophen (TYLENOL) 325 mg tablet Take 2 tablets (650 mg total) by mouth every 4 (four) hours as needed for mild pain  (Patient not taking: Reported on 12/18/2018 ) 30 tablet 0    AFLURIA QUADRIVALENT 0 5 ML HAILY As directed  0    gabapentin (NEURONTIN) 100 mg capsule 1-3 pills 3 times daily (Patient not taking: Reported on 12/18/2018 ) 100 capsule 5    senna (SENOKOT) 8 6 mg Take 1 to 2 tablets as needed for constipation  (Patient not taking: Reported on 12/18/2018 ) 60 tablet 0     No current facility-administered medications for this visit        Past Medical History:   Diagnosis Date    Back pain     Hypertension     Previous back surgery     rods in the back    Ulcerative colitis [facility])      Past Surgical History:   Procedure Laterality Date    BACK SURGERY      LUMBAR FUSION Right 1/20/2016    Procedure: FUSION LUMBAR  POSTERIOR, TLIF L3-4  Right L3-4 Fascet;  Surgeon: Kurtis Tong MD;  Location: [facility];  Service:      Social History     Social History    Marital status: /Civil Union     Spouse name: N/A    Number of children: N/A    Years of education: N/A     Social History Main Topics    Smoking status: Never Smoker    Smokeless tobacco: Never Used    Alcohol use 9 6 oz/week     2 Cans of beer, 14 Standard drinks or equivalent per week      Comment: 2 drinks/day    Drug use: No    Sexual activity: No     Other Topics Concern    None     Social History Narrative    None     Family History   Problem Relation Age of Onset    Parkinsonism Mother     Lung cancer Father             PHYSICAL EXAM:    Vitals:    12/18/18 0842   BP: 144/80   Pulse: 70   Weight: 124 kg (274 lb 6 4 oz)     General Appearance:   Alert and oriented x 3  Cooperative, and in no respiratory distress  No asterixis  HEENT:   Normocephalic, atraumatic, anicteric      Neck:  Supple, symmetrical, trachea midline   Lungs:   Clear to auscultation bilaterally    Heart[de-identified]   Regular rate and rhythm   Abdomen:   Soft, obese, non-tender, non-distended; normal bowel sounds; no masses, no organomegaly    Genitalia:   Deferred    Rectal:   Deferred    Extremities:  No cyanosis, clubbing or edema    Pulses:  2+ and symmetric all extremities    Skin:  Skin color, texture, turgor normal, no rashes or lesions    Lymph nodes:  No palpable cervical or supraclavicular lymphadenopathy          ASSESSMENT and PLAN:      1) Elevated LFTs - No records of elevated LFTs sent with the patient  Per available blood work, no elevated LFTs appreciated  He does have history of inactive hepatitis C   Patient also reports that he has gained weight recently after having back surgery and being on oxycodone  - Will plan for liver workup ( RUSTY, ASMA, AMA, lipid panel, etc ) with repeat CMP  - Will also check quantitative HCV  - Right upper quadrant ultrasound  - Weight loss      2) Remote history of ulcerative colitis - Patient has not had any symptoms suggestive of UC since he was treated 3 years ago with a prednisone taper and Rowasa enemas  He appears to be in remission  3) History of colon polyps - Tubular adenoma seen in 2015  He was due for 3 year recall  - Colonoscopy as he is due         Follow up in 8 weeks

## 2018-12-19 PROBLEM — Z86.0101 HX OF ADENOMATOUS POLYP OF COLON: Status: ACTIVE | Noted: 2018-12-19

## 2018-12-19 PROBLEM — Z86.010 HX OF ADENOMATOUS POLYP OF COLON: Status: ACTIVE | Noted: 2018-12-19

## 2018-12-21 ENCOUNTER — HOSPITAL ENCOUNTER (OUTPATIENT)
Dept: ULTRASOUND IMAGING | Facility: HOSPITAL | Age: 63
Discharge: HOME | End: 2018-12-21
Payer: MEDICARE

## 2018-12-21 DIAGNOSIS — R74.8 ELEVATED LIVER ENZYMES: ICD-10-CM

## 2018-12-21 DIAGNOSIS — Z86.19 HISTORY OF POSITIVE HEPATITIS C: ICD-10-CM

## 2018-12-21 PROCEDURE — 76705 ECHO EXAM OF ABDOMEN: CPT

## 2018-12-24 ENCOUNTER — TELEPHONE (OUTPATIENT)
Dept: GASTROENTEROLOGY | Facility: CLINIC | Age: 63
End: 2018-12-24

## 2018-12-24 NOTE — TELEPHONE ENCOUNTER
----- Message from Omar Long PA-C sent at 12/24/2018  8:01 AM EST -----  Please let patient know that his liver is fatty and enlarged  Please remind him to get the blood work we discussed

## 2018-12-26 ENCOUNTER — TELEPHONE (OUTPATIENT)
Dept: GASTROENTEROLOGY | Facility: CLINIC | Age: 63
End: 2018-12-26

## 2018-12-28 ENCOUNTER — TELEPHONE (OUTPATIENT)
Dept: GASTROENTEROLOGY | Facility: CLINIC | Age: 63
End: 2018-12-28

## 2018-12-28 NOTE — TELEPHONE ENCOUNTER
----- Message from Theo Bence, PA-C sent at 12/28/2018 11:26 AM EST -----  Please let patient know that his Hep C is not active  His cholesterol is very high  He needs to follow-up with his PCP regarding treatment for this  Please have him follow-up with me in the next 2-3 weeks

## 2019-01-03 ENCOUNTER — TELEPHONE (OUTPATIENT)
Dept: GASTROENTEROLOGY | Facility: CLINIC | Age: 64
End: 2019-01-03

## 2019-01-03 ENCOUNTER — ANESTHESIA EVENT (OUTPATIENT)
Dept: PERIOP | Facility: HOSPITAL | Age: 64
End: 2019-01-03

## 2019-01-03 NOTE — TELEPHONE ENCOUNTER
Clair Oswald from [facility] called the Hepitis panel for the patient is being denied    Please call Clair Oswald [phone]

## 2019-01-04 ENCOUNTER — ANESTHESIA (OUTPATIENT)
Dept: PERIOP | Facility: HOSPITAL | Age: 64
End: 2019-01-04

## 2019-01-08 ENCOUNTER — TELEPHONE (OUTPATIENT)
Dept: GASTROENTEROLOGY | Facility: CLINIC | Age: 64
End: 2019-01-08

## 2019-01-11 ENCOUNTER — TELEPHONE (OUTPATIENT)
Dept: GASTROENTEROLOGY | Facility: CLINIC | Age: 64
End: 2019-01-11

## 2019-01-14 PROBLEM — K51.911 ULCERATIVE COLITIS WITH RECTAL BLEEDING (HCC): Status: ACTIVE | Noted: 2019-01-14

## 2019-01-22 ENCOUNTER — TELEPHONE (OUTPATIENT)
Dept: GASTROENTEROLOGY | Facility: CLINIC | Age: 64
End: 2019-01-22

## 2019-01-24 ENCOUNTER — ANESTHESIA (OUTPATIENT)
Dept: PERIOP | Facility: HOSPITAL | Age: 64
End: 2019-01-24
Payer: MEDICARE

## 2019-01-24 ENCOUNTER — ANESTHESIA EVENT (OUTPATIENT)
Dept: PERIOP | Facility: HOSPITAL | Age: 64
End: 2019-01-24
Payer: MEDICARE

## 2019-01-24 ENCOUNTER — HOSPITAL ENCOUNTER (OUTPATIENT)
Facility: HOSPITAL | Age: 64
Setting detail: OUTPATIENT SURGERY
Discharge: HOME | End: 2019-01-24
Attending: INTERNAL MEDICINE | Admitting: INTERNAL MEDICINE
Payer: MEDICARE

## 2019-01-24 VITALS
RESPIRATION RATE: 18 BRPM | WEIGHT: 268.1 LBS | HEIGHT: 73 IN | SYSTOLIC BLOOD PRESSURE: 137 MMHG | BODY MASS INDEX: 35.53 KG/M2 | OXYGEN SATURATION: 99 % | DIASTOLIC BLOOD PRESSURE: 80 MMHG | TEMPERATURE: 98.2 F | HEART RATE: 80 BPM

## 2019-01-24 DIAGNOSIS — K51.811 OTHER ULCERATIVE COLITIS WITH RECTAL BLEEDING (HCC): Primary | ICD-10-CM

## 2019-01-24 DIAGNOSIS — K51.911 ULCERATIVE COLITIS WITH RECTAL BLEEDING, UNSPECIFIED LOCATION (HCC): ICD-10-CM

## 2019-01-24 PROCEDURE — 88305 TISSUE EXAM BY PATHOLOGIST: CPT | Performed by: PATHOLOGY

## 2019-01-24 PROCEDURE — 45380 COLONOSCOPY AND BIOPSY: CPT | Performed by: INTERNAL MEDICINE

## 2019-01-24 RX ORDER — MEPERIDINE HYDROCHLORIDE 50 MG/ML
12.5 INJECTION INTRAMUSCULAR; INTRAVENOUS; SUBCUTANEOUS AS NEEDED
Status: DISCONTINUED | OUTPATIENT
Start: 2019-01-24 | End: 2019-01-24 | Stop reason: HOSPADM

## 2019-01-24 RX ORDER — PROPOFOL 10 MG/ML
INJECTION, EMULSION INTRAVENOUS AS NEEDED
Status: DISCONTINUED | OUTPATIENT
Start: 2019-01-24 | End: 2019-01-24 | Stop reason: SURG

## 2019-01-24 RX ORDER — ALBUTEROL SULFATE 2.5 MG/3ML
2.5 SOLUTION RESPIRATORY (INHALATION) ONCE AS NEEDED
Status: DISCONTINUED | OUTPATIENT
Start: 2019-01-24 | End: 2019-01-24 | Stop reason: HOSPADM

## 2019-01-24 RX ORDER — SODIUM CHLORIDE, SODIUM LACTATE, POTASSIUM CHLORIDE, CALCIUM CHLORIDE 600; 310; 30; 20 MG/100ML; MG/100ML; MG/100ML; MG/100ML
125 INJECTION, SOLUTION INTRAVENOUS CONTINUOUS
Status: DISCONTINUED | OUTPATIENT
Start: 2019-01-24 | End: 2019-01-24 | Stop reason: HOSPADM

## 2019-01-24 RX ORDER — MESALAMINE 1.2 G/1
2400 TABLET, DELAYED RELEASE ORAL
Qty: 60 TABLET | Refills: 3 | Status: SHIPPED | OUTPATIENT
Start: 2019-01-24 | End: 2019-01-31

## 2019-01-24 RX ORDER — ONDANSETRON 2 MG/ML
4 INJECTION INTRAMUSCULAR; INTRAVENOUS ONCE AS NEEDED
Status: DISCONTINUED | OUTPATIENT
Start: 2019-01-24 | End: 2019-01-24 | Stop reason: HOSPADM

## 2019-01-24 RX ADMIN — PROPOFOL 50 MG: 10 INJECTION, EMULSION INTRAVENOUS at 12:59

## 2019-01-24 RX ADMIN — PROPOFOL 30 MG: 10 INJECTION, EMULSION INTRAVENOUS at 12:58

## 2019-01-24 RX ADMIN — SODIUM CHLORIDE, SODIUM LACTATE, POTASSIUM CHLORIDE, AND CALCIUM CHLORIDE 125 ML/HR: .6; .31; .03; .02 INJECTION, SOLUTION INTRAVENOUS at 12:29

## 2019-01-24 RX ADMIN — PROPOFOL 50 MG: 10 INJECTION, EMULSION INTRAVENOUS at 13:02

## 2019-01-24 RX ADMIN — PROPOFOL 140 MG: 10 INJECTION, EMULSION INTRAVENOUS at 12:55

## 2019-01-24 RX ADMIN — PROPOFOL 30 MG: 10 INJECTION, EMULSION INTRAVENOUS at 12:57

## 2019-01-24 NOTE — ANESTHESIA PREPROCEDURE EVALUATION
Review of Systems/Medical History  Patient summary reviewed        Cardiovascular  Negative cardio ROS Hypertension ,    Pulmonary  Negative pulmonary ROS        GI/Hepatic  Negative GI/hepatic ROS          Negative  ROS        Endo/Other  Negative endo/other ROS      GYN  Negative gynecology ROS          Hematology  Negative hematology ROS      Musculoskeletal  Negative musculoskeletal ROS Back pain ,        Neurology  Negative neurology ROS      Psychology   Negative psychology ROS              Physical Exam    Airway    Mallampati score: II  TM Distance: >3 FB  Neck ROM: full     Dental   No notable dental hx     Cardiovascular  Comment: Negative ROS,     Pulmonary      Other Findings        Anesthesia Plan  ASA Score- 2     Anesthesia Type- IV sedation with anesthesia with ASA Monitors  Additional Monitors:   Airway Plan:     Comment: I have seen the patient and reviewed the history  Patient to receive IV sedation with full ASA monitors  Risks discussed with the patient, consent signed        Plan Factors-    Induction- intravenous  Postoperative Plan-     Informed Consent- Anesthetic plan and risks discussed with patient  I personally reviewed this patient with the CRNA  Discussed and agreed on the Anesthesia Plan with the CRNA  Sendy Solomon

## 2019-01-24 NOTE — ANESTHESIA POSTPROCEDURE EVALUATION
Post-Op Assessment Note      CV Status:  Stable    Mental Status:  Awake and alert    Hydration Status:  Stable    PONV Controlled:  None    Airway Patency:  Patent    Post Op Vitals Reviewed: Yes          Staff: CRNA           BP   122/74   Temp      Pulse  78   Resp   14   SpO2 93% on 2LNC   Post procedure VS noted above, SV non obstructed

## 2019-01-24 NOTE — DISCHARGE INSTR - AVS FIRST PAGE
COLONOSCOPY    PROCEDURE: Colonoscopy/ Biopsy    INDICATIONS: History of Colon Polyps; last exam in 2015; history of ulcerative colitis    POST-OP DIAGNOSIS: See the impression below    SEDATION: Monitored anesthesia care, check anesthesia records    PHYSICAL EXAM:  Vitals:    01/24/19 1216   BP: 127/79   Pulse: 73   Resp: 16   Temp: 98 2 °F (36 8 °C)   SpO2: 94%     Body mass index is 35 37 kg/m²  General: NAD  Heart: S1 & S2 normal, RRR  Lungs: CTA, No rales or rhonchi  Abdomen: Soft, nontender, nondistended, good bowel sounds    CONSENT:  Informed consent was obtained for the procedure, including sedation after explaining the risks and benefits of the procedure  Risks including but not limited to bleeding, perforation, infection, aspiration were discussed in detail  Also explained about less than 100%$ sensitivity with the exam and other alternatives  PREPARATION:   EKG tracing, pulse oximetry, blood pressure were monitored throughout the procedure  Patient was identified by myself both verbally and by visual inspection of ID band  DESCRIPTION:   Patient was placed in the left lateral decubitus position and was sedated with the above medication  Digital rectal examination was performed  The colonoscope was introduced in to the anal canal and advanced up to terminal ileum  A careful inspection was made as the colonoscope was withdrawn, including a retroflexed view of the rectum; findings and interventions are described below  Appropriate photodocumentation was obtained  The quality of the colonic preparation was excellent  The blood loss was minimal     FINDINGS:  A mild ulcerative colitis Mckenna score 1-2 was noted in the proximal transverse colon  Multiple random biopsies were obtained  Small pseudopolyps were noted in the splenic flexure    Diverticulosis was noted in the ascending colon and sigmoid colon:  The cecum in descending colon and rectum appeared normal          IMPRESSIONS:    Mild ulcerative colitis  Pseudopolyps    RECOMMENDATIONS:  Will give Lialda course 2 pills daily  Await pathology  Repeat colonoscopy in 5 years    COMPLICATIONS:  None; patient tolerated the procedure well    DISPOSITION: PACU  CONDITION: Stable    Jing Keen MD  1/24/2019,1:07 PM

## 2019-01-24 NOTE — OP NOTE
COLONOSCOPY    PROCEDURE: Colonoscopy/ Biopsy    INDICATIONS: History of Colon Polyps; last exam in 2015; history of ulcerative colitis    POST-OP DIAGNOSIS: See the impression below    SEDATION: Monitored anesthesia care, check anesthesia records    PHYSICAL EXAM:  Vitals:    01/24/19 1216   BP: 127/79   Pulse: 73   Resp: 16   Temp: 98 2 °F (36 8 °C)   SpO2: 94%     Body mass index is 35 37 kg/m²  General: NAD  Heart: S1 & S2 normal, RRR  Lungs: CTA, No rales or rhonchi  Abdomen: Soft, nontender, nondistended, good bowel sounds    CONSENT:  Informed consent was obtained for the procedure, including sedation after explaining the risks and benefits of the procedure  Risks including but not limited to bleeding, perforation, infection, aspiration were discussed in detail  Also explained about less than 100%$ sensitivity with the exam and other alternatives  PREPARATION:   EKG tracing, pulse oximetry, blood pressure were monitored throughout the procedure  Patient was identified by myself both verbally and by visual inspection of ID band  DESCRIPTION:   Patient was placed in the left lateral decubitus position and was sedated with the above medication  Digital rectal examination was performed  The colonoscope was introduced in to the anal canal and advanced up to terminal ileum  A careful inspection was made as the colonoscope was withdrawn, including a retroflexed view of the rectum; findings and interventions are described below  Appropriate photodocumentation was obtained  The quality of the colonic preparation was excellent  The blood loss was minimal     FINDINGS:  A mild ulcerative colitis Mckenna score 1-2 was noted in the proximal transverse colon  Multiple random biopsies were obtained  Small pseudopolyps were noted in the splenic flexure    Diverticulosis was noted in the ascending colon and sigmoid colon:  The cecum in descending colon and rectum appeared normal          IMPRESSIONS:    Mild ulcerative colitis  Pseudopolyps    RECOMMENDATIONS:  Will give Lialda course 2 pills daily  Await pathology  Repeat colonoscopy in 5 years    COMPLICATIONS:  None; patient tolerated the procedure well    DISPOSITION: PACU  CONDITION: Stable    Sabiha Cm MD  1/24/2019,1:07 PM

## 2019-01-25 DIAGNOSIS — K51.90 ULCERATIVE COLITIS WITHOUT COMPLICATIONS, UNSPECIFIED LOCATION (HCC): Primary | ICD-10-CM

## 2019-01-25 NOTE — TELEPHONE ENCOUNTER
Pt's insurance doesn't cover the medication that was prescribed for him yesterday after his procedure  The pharmacist said Apriso or Balsalazide is covered  Pt would like to know if one of these could be called in for him

## 2019-01-28 ENCOUNTER — TELEPHONE (OUTPATIENT)
Dept: GASTROENTEROLOGY | Facility: CLINIC | Age: 64
End: 2019-01-28

## 2019-01-28 RX ORDER — BALSALAZIDE DISODIUM 750 MG/1
2250 CAPSULE ORAL 3 TIMES DAILY
Qty: 90 CAPSULE | Refills: 1 | Status: SHIPPED | OUTPATIENT
Start: 2019-01-28 | End: 2019-04-04 | Stop reason: HOSPADM

## 2019-01-28 NOTE — TELEPHONE ENCOUNTER
Patient called  Patient's insurance will cover aprizo and balsalazide only  Can we call in a prescription for either on of these medications    Please call patient at [phone] ty

## 2019-01-28 NOTE — TELEPHONE ENCOUNTER
RX sent to [facility] for Balsalazide, 30 day supply with refills

## 2019-01-28 NOTE — TELEPHONE ENCOUNTER
----- Message from Manish Buenrostro MD sent at 1/28/2019  7:26 AM EST -----  pls tell himm the path shows ulcerative colitis; take the medicine

## 2019-01-28 NOTE — TELEPHONE ENCOUNTER
Have him go on balsalazide 2 25 g TID for now  I believe the tablets come in 750 mg so he will need to take 3 tablets TID

## 2019-01-29 ENCOUNTER — TELEPHONE (OUTPATIENT)
Dept: GASTROENTEROLOGY | Facility: CLINIC | Age: 64
End: 2019-01-29

## 2019-01-29 NOTE — TELEPHONE ENCOUNTER
JAMMIE advising pt that it seemed his wife wanted to cx his appt, and that his medications were sent to the pharmacy  Advised pt to cb if he wanted to keep his appointment  Pt spoke with Wanda after and wanted to keep his OV for 1/31  She advised pt of meds again

## 2019-01-29 NOTE — TELEPHONE ENCOUNTER
Spoke to patients wife to confirm appt on 1/31, she decided to cancel at this time being that they spoke to Dr Harleen Cardenas at length re: dx     She would like either of the approved drugs to be sent to their pharmacy to start and will call for an appointment if necessary after the start of the drugs

## 2019-01-29 NOTE — TELEPHONE ENCOUNTER
Pt called back  hes confused about whats going on with his appointments and what the appointment was for  Can you please call pt to clarify?   [phone]

## 2019-01-30 RX ORDER — ZOLPIDEM TARTRATE 10 MG/1
TABLET ORAL
Status: ON HOLD | COMMUNITY
End: 2019-04-04 | Stop reason: SDUPTHER

## 2019-01-31 ENCOUNTER — TELEPHONE (OUTPATIENT)
Dept: GASTROENTEROLOGY | Facility: CLINIC | Age: 64
End: 2019-01-31

## 2019-01-31 ENCOUNTER — OFFICE VISIT (OUTPATIENT)
Dept: GASTROENTEROLOGY | Facility: CLINIC | Age: 64
End: 2019-01-31
Payer: MEDICARE

## 2019-01-31 VITALS
HEART RATE: 84 BPM | DIASTOLIC BLOOD PRESSURE: 86 MMHG | BODY MASS INDEX: 36.18 KG/M2 | WEIGHT: 274.25 LBS | SYSTOLIC BLOOD PRESSURE: 114 MMHG

## 2019-01-31 DIAGNOSIS — K51.90 ULCERATIVE COLITIS WITHOUT COMPLICATIONS, UNSPECIFIED LOCATION (HCC): Primary | Chronic | ICD-10-CM

## 2019-01-31 DIAGNOSIS — Z09 FOLLOW UP: ICD-10-CM

## 2019-01-31 DIAGNOSIS — R79.89 ELEVATED LFTS: ICD-10-CM

## 2019-01-31 PROCEDURE — 99214 OFFICE O/P EST MOD 30 MIN: CPT | Performed by: PHYSICIAN ASSISTANT

## 2019-01-31 NOTE — TELEPHONE ENCOUNTER
----- Message from Shannan Gorman PA-C sent at 1/31/2019 11:23 AM EST -----  Forgot to include RUSTY  Please mail this to the patient  He needs this done in 3 months

## 2019-01-31 NOTE — PROGRESS NOTES
[facility]  Radha Cuevas 66 y o  male MRN: [de-identified]       CC:  Follow-up after colonoscopy for ulcerative colitis and elevated LFTs    HPI:  Lorin Laughlin is a 45-year-old male with history of ulcerative colitis, colon polyps, lumbar radiculopathy on chronic opioids, hepatitis C and prior history of alcohol abuse  Patient was last seen by me during initial consultation on 12/18  At that time, patient was sent to us for elevated LFTs  Liver workup was performed  This showed markedly elevated lipid panel with a cholesterol 238 and elevated LDL  His RUSTY was very mildly elevated  Because patient was due for colonoscopy, this was performed  Patient was not having symptoms of ulcer colitis  However, mild ulcer colitis was seen in the proximal transverse colon  Biopsies were consistent with this  He was started on Colazal 2 25 g 3 times daily  Patient reports that he has been taking this for about 3 days now  He has intermittent mid abdominal pain which is consistent with his inflammation on colonoscopy  Otherwise, he denies melena or hematochezia  He denies diarrhea or constipation  Review of Systems:    CONSTITUTIONAL: Denies any fever, chills, or rigors  Good appetite, and no recent weight loss  HEENT: No earache or tinnitus  Denies hearing loss or visual disturbances  CARDIOVASCULAR: No chest pain or palpitations  RESPIRATORY: Denies any cough, hemoptysis, shortness of breath or dyspnea on exertion  GASTROINTESTINAL: As noted in the History of Present Illness  GENITOURINARY: No problems with urination  Denies any hematuria or dysuria  NEUROLOGIC: No dizziness or vertigo, denies headaches  MUSCULOSKELETAL: Denies any muscle or joint pain  SKIN: Denies skin rashes or itching  ENDOCRINE: Denies excessive thirst  Denies intolerance to heat or cold  PSYCHOSOCIAL: Denies depression or anxiety  Denies any recent memory loss         Current Outpatient Prescriptions   Medication Sig Dispense Refill    balsalazide (COLAZAL) 750 mg capsule Take 3 capsules (2,250 mg total) by mouth 3 (three) times a day 90 capsule 1    DULoxetine (CYMBALTA) 60 mg delayed release capsule Take 60 mg by mouth daily  0    LYRICA 150 MG capsule Take 150 mg by mouth 3 (three) times a day  0    morphine (MS CONTIN) 15 mg 12 hr tablet Take 15 mg by mouth every 12 (twelve) hours  0    oxyCODONE (ROXICODONE) 30 MG immediate release tablet Take 30 mg by mouth every 6 (six) hours  0    zolpidem (AMBIEN) 10 mg tablet take 1 tablet by mouth at bedtime if needed      acetaminophen (TYLENOL) 325 mg tablet Take 2 tablets (650 mg total) by mouth every 4 (four) hours as needed for mild pain  (Patient not taking: Reported on 12/18/2018 ) 30 tablet 0    LYRICA 75 MG capsule   0    senna (SENOKOT) 8 6 mg Take 1 to 2 tablets as needed for constipation  (Patient not taking: Reported on 12/18/2018 ) 60 tablet 0     No current facility-administered medications for this visit  Past Medical History:   Diagnosis Date    Back pain     Previous back surgery     rods in the back    Ulcerative colitis [facility])      Past Surgical History:   Procedure Laterality Date    BACK SURGERY      LUMBAR FUSION Right 1/20/2016    Procedure: FUSION LUMBAR  POSTERIOR, TLIF L3-4  Right L3-4 Fascet;  Surgeon: Lyn Blanchard MD;  Location:  MAIN OR;  Service:    Michelle Hernandez CO COLONOSCOPY FLX DX W/COLLJ SPEC WHEN PFRMD N/A 1/24/2019    Procedure: COLONOSCOPY;  Surgeon: Zandra Bhatt MD;  Location: [facility];   Service: Gastroenterology     Social History     Social History    Marital status: /Civil Union     Spouse name: N/A    Number of children: N/A    Years of education: N/A     Social History Main Topics    Smoking status: Never Smoker    Smokeless tobacco: Never Used    Alcohol use No      Comment: 2 drinks/day    Drug use: No    Sexual activity: No     Other Topics Concern    None     Social History Narrative    None Family History   Problem Relation Age of Onset    Parkinsonism Mother     Lung cancer Father             PHYSICAL EXAM:    Vitals:    01/31/19 1059   BP: 114/86   Pulse: 84   Weight: 124 kg (274 lb 4 oz)     General Appearance:   Alert and oriented x 3  Cooperative, and in no respiratory distress   HEENT:   Normocephalic, atraumatic, anicteric      Neck:  Supple, symmetrical, trachea midline   Lungs:   Clear to auscultation bilaterally    Heart[de-identified]   Regular rate and rhythm   Abdomen:   Soft, obese, non-tender, non-distended; normal bowel sounds; no masses, no organomegaly    Genitalia:   Deferred    Rectal:   Deferred    Extremities:  No cyanosis, clubbing or edema    Pulses:  2+ and symmetric all extremities    Skin:  Skin color, texture, turgor normal, no rashes or lesions    Lymph nodes:  No palpable cervical or supraclavicular lymphadenopathy            ASSESSMENT and PLAN:      1) Elevated LFTs - AST 60,  on most recent CMP  Likely secondary to NAFLD  He does have markedly elevated cholesterol panel   - Weight loss and diet modification  - He will follow up with his PCP regarding treatment for his cholesterol  - Could also consider vitamin-E supplementation   - Will also repeat RUSTY at the time of follow-up, but likely this is secondary to NAFLD as it was not significantly elevated after discussion with Dr Alexa Sandoval   - If his LFTs do not improve in the future, would consider liver biopsy     2) Mild ulcerative colitis - As noted on colonoscopy  He is on Colazal   He has mild mid abdominal pain as consistent with where he has inflammation in the transverse colon  He denies hematochezia or diarrhea  - Will continue for 12 weeks  - Then, will decrease his dosing to 1 5 g twice daily  - Repeat colonoscopy in 5 years  - Up to date vaccinations         Follow up in 12 weeks

## 2019-02-04 ENCOUNTER — APPOINTMENT (OUTPATIENT)
Dept: LAB | Facility: HOSPITAL | Age: 64
End: 2019-02-04
Payer: MEDICARE

## 2019-02-04 DIAGNOSIS — R79.89 ELEVATED LFTS: ICD-10-CM

## 2019-02-04 LAB
ALBUMIN SERPL BCP-MCNC: 3.5 G/DL (ref 3.5–5)
ALP SERPL-CCNC: 91 U/L (ref 46–116)
ALT SERPL W P-5'-P-CCNC: 82 U/L (ref 12–78)
AST SERPL W P-5'-P-CCNC: 45 U/L (ref 5–45)
BILIRUB DIRECT SERPL-MCNC: 0.05 MG/DL (ref 0–0.2)
BILIRUB SERPL-MCNC: 0.3 MG/DL (ref 0.2–1)
PROT SERPL-MCNC: 7.4 G/DL (ref 6.4–8.2)

## 2019-02-04 PROCEDURE — 36415 COLL VENOUS BLD VENIPUNCTURE: CPT

## 2019-02-04 PROCEDURE — 80076 HEPATIC FUNCTION PANEL: CPT

## 2019-02-05 ENCOUNTER — TELEPHONE (OUTPATIENT)
Dept: GASTROENTEROLOGY | Facility: CLINIC | Age: 64
End: 2019-02-05

## 2019-02-05 NOTE — TELEPHONE ENCOUNTER
----- Message from Berry Zepeda PA-C sent at 2/5/2019 12:26 PM EST -----  Please let patient know that his LFTs are improving as compared to last  Continue to follow-up with PCP regarding cholesterol and focus on diet

## 2019-02-05 NOTE — TELEPHONE ENCOUNTER
Spoke to pt giving LFT results and to contact his PCP  Pt requested copy of LFT to be sent to his PCP  Routed notes to Dr Nga Flannery

## 2019-03-29 ENCOUNTER — APPOINTMENT (EMERGENCY)
Dept: RADIOLOGY | Facility: HOSPITAL | Age: 64
End: 2019-03-29
Payer: MEDICARE

## 2019-03-29 ENCOUNTER — APPOINTMENT (EMERGENCY)
Dept: CT IMAGING | Facility: HOSPITAL | Age: 64
End: 2019-03-29
Payer: MEDICARE

## 2019-03-29 ENCOUNTER — HOSPITAL ENCOUNTER (INPATIENT)
Facility: HOSPITAL | Age: 64
LOS: 6 days | DRG: 565 | End: 2019-04-04
Attending: SURGERY | Admitting: SURGERY
Payer: MEDICARE

## 2019-03-29 ENCOUNTER — HOSPITAL ENCOUNTER (EMERGENCY)
Facility: HOSPITAL | Age: 64
End: 2019-03-29
Attending: EMERGENCY MEDICINE
Payer: MEDICARE

## 2019-03-29 VITALS
DIASTOLIC BLOOD PRESSURE: 94 MMHG | WEIGHT: 279.76 LBS | HEART RATE: 84 BPM | RESPIRATION RATE: 18 BRPM | SYSTOLIC BLOOD PRESSURE: 149 MMHG | OXYGEN SATURATION: 95 % | HEIGHT: 72 IN | BODY MASS INDEX: 37.89 KG/M2 | TEMPERATURE: 99.3 F

## 2019-03-29 DIAGNOSIS — S22.029A CLOSED T2 FRACTURE (HCC): ICD-10-CM

## 2019-03-29 DIAGNOSIS — S42.402A CLOSED FRACTURE OF LEFT ELBOW, INITIAL ENCOUNTER: ICD-10-CM

## 2019-03-29 DIAGNOSIS — S32.020A CLOSED COMPRESSION FRACTURE OF SECOND LUMBAR VERTEBRA, INITIAL ENCOUNTER: ICD-10-CM

## 2019-03-29 DIAGNOSIS — S12.120A CLOSED ODONTOID FRACTURE WITH TYPE III MORPHOLOGY, INITIAL ENCOUNTER (HCC): Primary | ICD-10-CM

## 2019-03-29 DIAGNOSIS — S22.20XA STERNAL FRACTURE: ICD-10-CM

## 2019-03-29 DIAGNOSIS — S42.402A LEFT ELBOW FRACTURE: ICD-10-CM

## 2019-03-29 DIAGNOSIS — S12.100A C2 CERVICAL FRACTURE (HCC): Primary | ICD-10-CM

## 2019-03-29 DIAGNOSIS — S22.020A TRAUMATIC COMPRESSION FRACTURE OF T2 THORACIC VERTEBRA, CLOSED, INITIAL ENCOUNTER (HCC): ICD-10-CM

## 2019-03-29 LAB
ALBUMIN SERPL BCP-MCNC: 3.3 G/DL (ref 3.5–5)
ALP SERPL-CCNC: 70 U/L (ref 46–116)
ALT SERPL W P-5'-P-CCNC: 45 U/L (ref 12–78)
ANION GAP SERPL CALCULATED.3IONS-SCNC: 6 MMOL/L (ref 4–13)
APTT PPP: 25 SECONDS (ref 26–38)
AST SERPL W P-5'-P-CCNC: 54 U/L (ref 5–45)
BILIRUB SERPL-MCNC: 0.67 MG/DL (ref 0.2–1)
BUN SERPL-MCNC: 15 MG/DL (ref 5–25)
CALCIUM SERPL-MCNC: 8.3 MG/DL (ref 8.3–10.1)
CHLORIDE SERPL-SCNC: 103 MMOL/L (ref 100–108)
CO2 SERPL-SCNC: 26 MMOL/L (ref 21–32)
CREAT SERPL-MCNC: 0.87 MG/DL (ref 0.6–1.3)
GFR SERPL CREATININE-BSD FRML MDRD: 93 ML/MIN/1.73SQ M
GLUCOSE SERPL-MCNC: 112 MG/DL (ref 65–140)
INR PPP: 0.99 (ref 0.86–1.17)
PLATELET # BLD AUTO: 212 THOUSANDS/UL (ref 149–390)
PMV BLD AUTO: 10.5 FL (ref 8.9–12.7)
POTASSIUM SERPL-SCNC: 3.9 MMOL/L (ref 3.5–5.3)
PROT SERPL-MCNC: 6.6 G/DL (ref 6.4–8.2)
PROTHROMBIN TIME: 13.2 SECONDS (ref 11.8–14.2)
SODIUM SERPL-SCNC: 135 MMOL/L (ref 136–145)

## 2019-03-29 PROCEDURE — 36415 COLL VENOUS BLD VENIPUNCTURE: CPT | Performed by: EMERGENCY MEDICINE

## 2019-03-29 PROCEDURE — 96374 THER/PROPH/DIAG INJ IV PUSH: CPT

## 2019-03-29 PROCEDURE — 96376 TX/PRO/DX INJ SAME DRUG ADON: CPT

## 2019-03-29 PROCEDURE — 99222 1ST HOSP IP/OBS MODERATE 55: CPT | Performed by: SURGERY

## 2019-03-29 PROCEDURE — 85730 THROMBOPLASTIN TIME PARTIAL: CPT | Performed by: EMERGENCY MEDICINE

## 2019-03-29 PROCEDURE — 96361 HYDRATE IV INFUSION ADD-ON: CPT

## 2019-03-29 PROCEDURE — 93005 ELECTROCARDIOGRAM TRACING: CPT

## 2019-03-29 PROCEDURE — 73080 X-RAY EXAM OF ELBOW: CPT

## 2019-03-29 PROCEDURE — 85610 PROTHROMBIN TIME: CPT | Performed by: EMERGENCY MEDICINE

## 2019-03-29 PROCEDURE — 99285 EMERGENCY DEPT VISIT HI MDM: CPT

## 2019-03-29 PROCEDURE — 72125 CT NECK SPINE W/O DYE: CPT

## 2019-03-29 PROCEDURE — 70450 CT HEAD/BRAIN W/O DYE: CPT

## 2019-03-29 PROCEDURE — 71260 CT THORAX DX C+: CPT

## 2019-03-29 PROCEDURE — 74177 CT ABD & PELVIS W/CONTRAST: CPT

## 2019-03-29 PROCEDURE — 85049 AUTOMATED PLATELET COUNT: CPT | Performed by: EMERGENCY MEDICINE

## 2019-03-29 PROCEDURE — 80053 COMPREHEN METABOLIC PANEL: CPT | Performed by: EMERGENCY MEDICINE

## 2019-03-29 PROCEDURE — 96375 TX/PRO/DX INJ NEW DRUG ADDON: CPT

## 2019-03-29 RX ORDER — FENTANYL CITRATE 50 UG/ML
50 INJECTION, SOLUTION INTRAMUSCULAR; INTRAVENOUS ONCE
Status: COMPLETED | OUTPATIENT
Start: 2019-03-29 | End: 2019-03-29

## 2019-03-29 RX ORDER — FENTANYL CITRATE 50 UG/ML
100 INJECTION, SOLUTION INTRAMUSCULAR; INTRAVENOUS ONCE
Status: COMPLETED | OUTPATIENT
Start: 2019-03-29 | End: 2019-03-29

## 2019-03-29 RX ORDER — OXYCODONE HYDROCHLORIDE 10 MG/1
10 TABLET ORAL EVERY 4 HOURS PRN
Status: DISCONTINUED | OUTPATIENT
Start: 2019-03-29 | End: 2019-03-30

## 2019-03-29 RX ORDER — FENTANYL 50 UG/H
50 PATCH TRANSDERMAL
Status: DISCONTINUED | OUTPATIENT
Start: 2019-03-29 | End: 2019-03-29

## 2019-03-29 RX ORDER — HYDROMORPHONE HCL/PF 1 MG/ML
0.5 SYRINGE (ML) INJECTION
Status: DISCONTINUED | OUTPATIENT
Start: 2019-03-29 | End: 2019-03-30

## 2019-03-29 RX ORDER — OXYCODONE HYDROCHLORIDE 5 MG/1
5 TABLET ORAL EVERY 4 HOURS PRN
Status: DISCONTINUED | OUTPATIENT
Start: 2019-03-29 | End: 2019-03-30

## 2019-03-29 RX ORDER — HYDROMORPHONE HCL/PF 1 MG/ML
1 SYRINGE (ML) INJECTION ONCE
Status: COMPLETED | OUTPATIENT
Start: 2019-03-29 | End: 2019-03-29

## 2019-03-29 RX ORDER — ACETAMINOPHEN 325 MG/1
975 TABLET ORAL EVERY 8 HOURS SCHEDULED
Status: DISCONTINUED | OUTPATIENT
Start: 2019-03-29 | End: 2019-04-01

## 2019-03-29 RX ADMIN — OXYCODONE HYDROCHLORIDE 10 MG: 10 TABLET ORAL at 21:15

## 2019-03-29 RX ADMIN — ACETAMINOPHEN 975 MG: 325 TABLET ORAL at 21:14

## 2019-03-29 RX ADMIN — FENTANYL CITRATE 50 MCG: 50 INJECTION, SOLUTION INTRAMUSCULAR; INTRAVENOUS at 22:29

## 2019-03-29 RX ADMIN — IOHEXOL 100 ML: 350 INJECTION, SOLUTION INTRAVENOUS at 18:00

## 2019-03-29 RX ADMIN — FENTANYL CITRATE 100 MCG: 50 INJECTION, SOLUTION INTRAMUSCULAR; INTRAVENOUS at 17:35

## 2019-03-29 RX ADMIN — FENTANYL CITRATE 100 MCG: 50 INJECTION, SOLUTION INTRAMUSCULAR; INTRAVENOUS at 18:37

## 2019-03-29 RX ADMIN — SODIUM CHLORIDE 1000 ML: 0.9 INJECTION, SOLUTION INTRAVENOUS at 18:19

## 2019-03-29 RX ADMIN — HYDROMORPHONE HYDROCHLORIDE 1 MG: 1 INJECTION, SOLUTION INTRAMUSCULAR; INTRAVENOUS; SUBCUTANEOUS at 19:05

## 2019-03-29 NOTE — ED PROVIDER NOTES
History  Chief Complaint   Patient presents with    Fall     Pt c/o mechanical fall down 11 steps last night  C/o neck pain, lower back pain, chest pain  27-year-old male patient presents for evaluation of fall  Occurred last night  States he was at top of a flight of stairs trying to find a light switch when he stepped on what he thought was flat ground and ended falling down a flight of steps  He fell in the dark  He thinks he struck his head  He is complaining primarily of upper neck pain and headache  He has pain with deep inspiration  He has pain along his sternum  Denies any loss of consciousness  He remembers everything before and afterwards  He was able to get himself up afterwards  He is also complaining of left elbow pain  History provided by:  Patient   used: No    Fall   Mechanism of injury: fall    Injury location: Head, cervical spine, left elbow, chest   Incident location:  Home  Time since incident:  12 hours  Arrived directly from scene: no    Fall:     Fall occurred:  Down stairs    Height of fall:  Eleven steps    Impact surface:  Hard floor and stairs    Point of impact: Head, torso, lower back, left elbow  Entrapped after fall: no    Protective equipment: none    Suspicion of alcohol use: no    Suspicion of drug use: no    Tetanus status:  Up to date  Prior to arrival data:     Bystander interventions:  None  Associated symptoms: back pain, chest pain, headaches and neck pain    Associated symptoms: no abdominal pain, no blindness, no difficulty breathing, no hearing loss, no loss of consciousness, no nausea, no seizures and no vomiting    Risk factors: no anticoagulation therapy      Primary Survey:  A: airway patent  B: breath sounds are present and equal bilaterally throughout all lung fields  C: peripheral pulses intact  D: GCS 15    E: Pt exposed        Prior to Admission Medications   Prescriptions Last Dose Informant Patient Reported? Taking? DULoxetine (CYMBALTA) 60 mg delayed release capsule  Self Yes No   Sig: Take 60 mg by mouth daily   LYRICA 150 MG capsule  Self Yes No   Sig: Take 150 mg by mouth 3 (three) times a day   LYRICA 75 MG capsule  Self Yes No   acetaminophen (TYLENOL) 325 mg tablet  Self No No   Sig: Take 2 tablets (650 mg total) by mouth every 4 (four) hours as needed for mild pain  Patient not taking: Reported on 12/18/2018    balsalazide (COLAZAL) 750 mg capsule  Self No No   Sig: Take 3 capsules (2,250 mg total) by mouth 3 (three) times a day   morphine (MS CONTIN) 15 mg 12 hr tablet  Self Yes No   Sig: Take 15 mg by mouth every 12 (twelve) hours   oxyCODONE (ROXICODONE) 30 MG immediate release tablet  Self Yes No   Sig: Take 30 mg by mouth every 6 (six) hours   senna (SENOKOT) 8 6 mg  Self No No   Sig: Take 1 to 2 tablets as needed for constipation  Patient not taking: Reported on 12/18/2018    zolpidem (AMBIEN) 10 mg tablet  Self Yes No   Sig: take 1 tablet by mouth at bedtime if needed      Facility-Administered Medications: None       Past Medical History:   Diagnosis Date    Back pain     Previous back surgery     rods in the back    Ulcerative colitis [facility])        Past Surgical History:   Procedure Laterality Date    BACK SURGERY      LUMBAR FUSION Right 1/20/2016    Procedure: FUSION LUMBAR  POSTERIOR, TLIF L3-4  Right L3-4 Fascet;  Surgeon: Kwasi Paredes MD;  Location: [facility] OR;  Service:    Eugene LEON COLONOSCOPY FLX DX W/COLLJ SPEC WHEN PFRMD N/A 1/24/2019    Procedure: COLONOSCOPY;  Surgeon: Tammy Graves MD;  Location: [facility]; Service: Gastroenterology       Family History   Problem Relation Age of Onset    Parkinsonism Mother     Lung cancer Father      I have reviewed and agree with the history as documented      Social History     Tobacco Use    Smoking status: Never Smoker    Smokeless tobacco: Never Used   Substance Use Topics    Alcohol use: No     Alcohol/week: 9 6 oz     Types: 2 Cans of beer, 14 Standard drinks or equivalent per week     Comment: 2 drinks/day    Drug use: No        Review of Systems   Constitutional: Negative for activity change, appetite change, chills, diaphoresis, fatigue, fever and unexpected weight change  HENT: Negative for congestion, hearing loss, rhinorrhea, sinus pressure, sore throat and trouble swallowing  Eyes: Negative for blindness, photophobia and visual disturbance  Respiratory: Negative for apnea, cough, choking, chest tightness, shortness of breath, wheezing and stridor  Cardiovascular: Positive for chest pain  Negative for palpitations and leg swelling  Gastrointestinal: Negative for abdominal distention, abdominal pain, blood in stool, constipation, diarrhea, nausea and vomiting  Genitourinary: Negative for decreased urine volume, difficulty urinating, dysuria, enuresis, flank pain, frequency, hematuria and urgency  Musculoskeletal: Positive for back pain and neck pain  Negative for arthralgias, myalgias and neck stiffness  Skin: Negative for color change, pallor, rash and wound  Allergic/Immunologic: Negative  Neurological: Positive for headaches  Negative for dizziness, tremors, seizures, loss of consciousness, syncope, weakness, light-headedness and numbness  Hematological: Negative  Psychiatric/Behavioral: Negative  All other systems reviewed and are negative  Physical Exam  Physical Exam   Constitutional: He is oriented to person, place, and time  He appears well-developed and well-nourished  Non-toxic appearance  He does not have a sickly appearance  He does not appear ill  No distress  Cervical collar in place  HENT:   Head: Normocephalic and atraumatic  Eyes: Pupils are equal, round, and reactive to light  EOM and lids are normal    Neck: Normal range of motion  Neck supple  Spinous process tenderness and muscular tenderness present     Cardiovascular: Normal rate, regular rhythm, S1 normal, S2 normal, normal heart sounds, intact distal pulses and normal pulses  Exam reveals no gallop, no distant heart sounds, no friction rub and no decreased pulses  No murmur heard  Pulses:       Radial pulses are 2+ on the right side, and 2+ on the left side  Pulmonary/Chest: Effort normal and breath sounds normal  No accessory muscle usage  No apnea, no tachypnea and no bradypnea  No respiratory distress  He has no decreased breath sounds  He has no wheezes  He has no rhonchi  He has no rales  Abdominal: Soft  Normal appearance  He exhibits no distension  There is no tenderness  There is no rigidity, no rebound and no guarding  Musculoskeletal: Normal range of motion  He exhibits no edema or deformity  Right shoulder: Normal         Left shoulder: Normal         Right elbow: Normal        Left elbow: He exhibits laceration (superficial abrasion)  He exhibits normal range of motion, no swelling, no effusion and no deformity  Tenderness found  Medial epicondyle, lateral epicondyle and olecranon process tenderness noted  No radial head tenderness noted  Right wrist: Normal         Left wrist: Normal         Right hip: Normal         Left hip: Normal         Right knee: Normal         Left knee: Normal         Cervical back: He exhibits tenderness and bony tenderness  He exhibits normal range of motion, no swelling and no edema  Back:         Left forearm: Normal         Left hand: Normal    Neurological: He is alert and oriented to person, place, and time  No cranial nerve deficit  GCS eye subscore is 4  GCS verbal subscore is 5  GCS motor subscore is 6  GCS 15  AAOx3  No focal neuro deficits  CN II-XII grossly intact  Speech normal, no aphasia or dysarthria  No pronator drift  Cerebellar function normal  Finger to nose normal  PERRL  EOMI  Peripheral vision intact  No nystagmus  Upper and lower extremity strength 5/5 through   strength 5/5 b/l  Gross sensation intact b/l        Skin: Skin is warm, dry and intact  No rash noted  He is not diaphoretic  No erythema  No pallor  Psychiatric: His speech is normal    Nursing note and vitals reviewed  Vital Signs  ED Triage Vitals   Temperature Pulse Respirations Blood Pressure SpO2   03/29/19 1731 03/29/19 1729 03/29/19 1729 03/29/19 1729 03/29/19 1729   99 3 °F (37 4 °C) 104 18 162/92 100 %      Temp Source Heart Rate Source Patient Position - Orthostatic VS BP Location FiO2 (%)   03/29/19 1731 03/29/19 1729 03/29/19 1729 03/29/19 1729 --   Oral Monitor Sitting Left arm       Pain Score       03/29/19 1729       Worst Possible Pain           Vitals:    03/29/19 1729 03/29/19 1741 03/29/19 1812 03/29/19 1839   BP: 162/92 170/99 156/94 149/94   Pulse: 104 87 86 84   Patient Position - Orthostatic VS: Sitting Lying Lying          Visual Acuity  Visual Acuity      Most Recent Value   L Pupil Size (mm)  3   R Pupil Size (mm)  3          ED Medications  Medications   fentanyl citrate (PF) 100 MCG/2ML 100 mcg (100 mcg Intravenous Given 3/29/19 1735)   sodium chloride 0 9 % bolus 1,000 mL (1,000 mL Intravenous New Bag 3/29/19 1819)   iohexol (OMNIPAQUE) 350 MG/ML injection (MULTI-DOSE) 100 mL (100 mL Intravenous Given 3/29/19 1800)   fentanyl citrate (PF) 100 MCG/2ML 100 mcg (100 mcg Intravenous Given 3/29/19 1837)   HYDROmorphone (DILAUDID) injection 1 mg (1 mg Intravenous Given 3/29/19 1905)       Diagnostic Studies  Results Reviewed     Procedure Component Value Units Date/Time    CBC and differential [[id number]] Collected:  03/29/19 1738    Lab Status:  No result Specimen:  Blood from Arm, Left     Comprehensive metabolic panel [[id number]] Collected:  03/29/19 1738    Lab Status:  No result Specimen:  Blood from Arm, Left                  CT head without contrast   Final Result by Marianela Valdes MD (03/29 1836)         1  No acute intracranial hemorrhage, mass effect or extra-axial collection     2   Right frontoparietal and left posterior parietal scalp swelling and contusion  No acute calvarial fracture  Workstation performed: [id number]         CT spine cervical without contrast   Final Result by Jeffry Lowry MD (03/29 1948)      Type III dens fracture  Fracture extends into the left foramen transversarium, raising concern for vertebral artery injury  Recommend CT angiogram of the neck for further evaluation  I personally discussed this study with Armaan Robertson on 3/29/2019 at 6:41 PM                             Workstation performed: [id number]         CT chest abdomen pelvis w contrast   Final Result by Addie Davis MD (03/29 1848)      Acute anterior superior endplate T2 compression fracture deformity  Questionable nondisplaced fracture of the sternal manubrium, correlate clinically  Hepatic steatosis and hepatomegaly  Workstation performed: [id number]         XR elbow 3+ vw LEFT    (Results Pending)              Procedures  CriticalCare Time  Performed by: Missy Green PA-C  Authorized by: Missy Green PA-C     Critical care provider statement:     Critical care time (minutes):  42    Critical care was necessary to treat or prevent imminent or life-threatening deterioration of the following conditions:  Trauma  Comments:      Trauma             Phone Contacts  ED Phone Contact    ED Course  ED Course as of Mar 29 2025   Fri Mar 29, 2019   [address] Spoke with trauma, Dr Joanna Monroe  Requests to await official read of Cspine and transfer if ED wet read of dens fracture is confirmed  [id number] Requested radiology read CT c spine for concern of dens fracture                                    MDM  Number of Diagnoses or Management Options  C2 cervical fracture [facility]): new and requires workup  Closed T2 fracture [facility]): new and requires workup  Left elbow fracture: new and requires workup  Sternal fracture: new and requires workup  Diagnosis management comments: DDx including but not limited to: intracranial injury, concussion, cervical injury, intrathoracic injury, intraabdominal injury, extremity injury  Plan: CT C/A/P  Patient was placed in C-collar upon helping patient out of car  XR left elbow  dispo pending  Amount and/or Complexity of Data Reviewed  Clinical lab tests: reviewed and ordered  Tests in the radiology section of CPT®: ordered and reviewed  Independent visualization of images, tracings, or specimens: yes    Risk of Complications, Morbidity, and/or Mortality  Presenting problems: moderate  Management options: low  General comments: 79-year-old male status post fall down a flight of steps  Found have C2 fracture  He has been in a C-collar from the moment he was taken out of his car and handled with C-spine precautions     He has no neurologic deficit  Also found to have fracture left elbow, fracture sternum, fracture T2  Given the multiple injuries and his primary injury of a C2 fracture we will transfer to trauma center  Discussed with Dr Ramon Hobbs, who agrees with transfer  Patient is stable at the time of transfer  He is in no distress  Patient Progress  Patient progress: stable      Disposition  Final diagnoses:   C2 cervical fracture ([address] )   Sternal fracture   Closed T2 fracture ([address] )   Left elbow fracture     Time reflects when diagnosis was documented in both MDM as applicable and the Disposition within this note     Time User Action Codes Description Comment    3/29/2019  6:58 PM Tavon Auburn L Add [S12 100A] C2 cervical fracture ([address] )     3/29/2019  6:58 PM Pratik Doss Add [S22 20XA] Sternal fracture     3/29/2019  6:58 PM Tavon Auburn L Add [B96 045Y] Closed T2 fracture ([facility] [address] [facility] )     3/29/2019  7:03 PM Pratik Doss Add [S42 402A] Left elbow fracture       ED Disposition     ED Disposition Condition Date/Time Comment    Transfer to Another Facility-In Network  Fri Mar 29, 2019  6:58 PM Tim Kitchen should be transferred out to [facility]          MD Documentation      Most Recent Value   Patient Condition  The patient has been stabilized such that within reasonable medical probability, no material deterioration of the patient condition or the condition of the unborn child(michelle) is likely to result from the transfer   Reason for Transfer  Level of Care needed not available at this facility   Benefits of Transfer  Specialized equipment and/or services available at the receiving facility (Include comment)________________________   Risks of Transfer  Potential for delay in receiving treatment, Potential deterioration of medical condition, Loss of IV   Accepting Physician  Dr Magdalena Almonte Name, [facility]    (Name & Tel number)  [facility]   Sending MD Dr Estrella/Ariel Galaviz PA-C      RN Documentation      Most [facility] Name, [address] [facility]    (Name & Tel number)  [facility]      Follow-up Information    None         Discharge Medication List as of 3/29/2019  7:18 PM      CONTINUE these medications which have NOT CHANGED    Details   acetaminophen (TYLENOL) 325 mg tablet Take 2 tablets (650 mg total) by mouth every 4 (four) hours as needed for mild pain  , Starting Mon 2/8/2016, No Print      balsalazide (COLAZAL) 750 mg capsule Take 3 capsules (2,250 mg total) by mouth 3 (three) times a day, Starting Mon 1/28/2019, Normal      DULoxetine (CYMBALTA) 60 mg delayed release capsule Take 60 mg by mouth daily, Starting Wed 2/14/2018, Historical Med      !! LYRICA 150 MG capsule Take 150 mg by mouth 3 (three) times a day, Starting Thu 1/17/2019, Historical Med      !!  LYRICA 75 MG capsule Starting Thu 12/13/2018, Historical Med      morphine (MS CONTIN) 15 mg 12 hr tablet Take 15 mg by mouth every 12 (twelve) hours, Starting Wed 11/21/2018, Historical Med      oxyCODONE (ROXICODONE) 30 MG immediate release tablet Take 30 mg by mouth every 6 (six) hours, Starting Thu 4/26/2018, Historical Med      senna ([address]) 8 6 mg Take 1 to 2 tablets as needed for constipation  , Normal      zolpidem (AMBIEN) 10 mg tablet take 1 tablet by mouth at bedtime if needed, Historical Med       !! - Potential duplicate medications found  Please discuss with provider  No discharge procedures on file      ED Provider  Electronically Signed by           Vishnu Li PA-C  03/29/19 2024       Vishnu Li PA-C  03/29/19 2026

## 2019-03-29 NOTE — EMTALA/ACUTE CARE TRANSFER
[address] [address]  Dept: [phone]      EMTALA TRANSFER CONSENT    NAME Bhavesh Hu                                         1955                              MRN [id number]    I have been informed of my rights regarding examination, treatment, and transfer   by Dr Joao Doip, *    Benefits: Specialized equipment and/or services available at the receiving facility (Include comment)________________________    Risks: Potential for delay in receiving treatment, Potential deterioration of medical condition, Loss of IV      Consent for Transfer:  I acknowledge that my medical condition has been evaluated and explained to me by the emergency department physician or other qualified medical person and/or my attending physician, who has recommended that I be transferred to the service of  Accepting Physician: Dr Tara Silvestre at [address] [facility] Name, [address] : [facility]  The above potential benefits of such transfer, the potential risks associated with such transfer, and the probable risks of not being transferred have been explained to me, and I fully understand them  The doctor has explained that, in my case, the benefits of transfer outweigh the risks  I agree to be transferred  I authorize the performance of emergency medical procedures and treatments upon me in both transit and upon arrival at the receiving facility  Additionally, I authorize the release of any and all medical records to the receiving facility and request they be transported with me, if possible  I understand that the safest mode of transportation during a medical emergency is an ambulance and that the Hospital advocates the use of this mode of transport   Risks of traveling to the receiving facility by car, including absence of medical control, life sustaining equipment, such as oxygen, and medical personnel has been explained to me and I fully understand them     ([facility])  [ X ]  I consent to the stated transfer and to be transported by ambulance/helicopter  [  ]  I consent to the stated transfer, but refuse transportation by ambulance and accept full responsibility for my transportation by car  I understand the risks of non-ambulance transfers and I exonerate the Hospital and its staff from any deterioration in my condition that results from this refusal     X___________________________________________    DATE  19  TIME________  Signature of patient or legally responsible individual signing on patient behalf           RELATIONSHIP TO PATIENT_________________________          Provider Certification    NAME Lucretia Gonzalez                                         1955                              MRN [id number]    A medical screening exam was performed on the above named patient  Based on the examination:    Condition Necessitating Transfer The primary encounter diagnosis was C2 cervical fracture ([address] )  Diagnoses of Sternal fracture and Closed T2 fracture ([facility] [address] [facility] ) were also pertinent to this visit      Patient Condition: The patient has been stabilized such that within reasonable medical probability, no material deterioration of the patient condition or the condition of the unborn child(michelle) is likely to result from the transfer    Reason for Transfer: Level of Care needed not available at this facility    Transfer Requirements: Facility [facility]   · Space available and qualified personnel available for treatment as acknowledged by [facility]  · Agreed to accept transfer and to provide appropriate medical treatment as acknowledged by       Dr Carmen Nyhan  · Appropriate medical records of the examination and treatment of the patient are provided at the time of transfer   [address] _______  · Transfer will be performed by qualified personnel from    and appropriate transfer equipment as required, including the use of necessary and appropriate life support measures  Provider Certification: I have examined the patient and explained the following risks and benefits of being transferred/refusing transfer to the patient/family:         Based on these reasonable risks and benefits to the patient and/or the unborn child(michelle), and based upon the information available at the time of the patients examination, I certify that the medical benefits reasonably to be expected from the provision of appropriate medical treatments at another medical facility outweigh the increasing risks, if any, to the individuals medical condition, and in the case of labor to the unborn child, from effecting the transfer      X____________________________________________ DATE 03/29/19        TIME_______      ORIGINAL - SEND TO MEDICAL RECORDS   COPY - SEND WITH PATIENT DURING TRANSFER

## 2019-03-29 NOTE — ED NOTES
Pt's wife checked pt into department  Pt's wife requested assistance removing pt from vehicle  Pt to be found sitting upright in back passenger seat of vehicle  Pt c/o neck pain, prior to moving pt c-collar was placed on pt  Pt experienced longer extraction time d/t increased pain  Provider made aware, provider met pt at bedside upon entrance to department        Maribel Banegas RN  03/29/19 7623

## 2019-03-30 ENCOUNTER — APPOINTMENT (INPATIENT)
Dept: RADIOLOGY | Facility: HOSPITAL | Age: 64
DRG: 565 | End: 2019-03-30
Payer: MEDICARE

## 2019-03-30 LAB
ATRIAL RATE: 79 BPM
ATRIAL RATE: 91 BPM
BASOPHILS # BLD AUTO: 0.02 THOUSANDS/ΜL (ref 0–0.1)
BASOPHILS NFR BLD AUTO: 0 % (ref 0–1)
EOSINOPHIL # BLD AUTO: 0.16 THOUSAND/ΜL (ref 0–0.61)
EOSINOPHIL NFR BLD AUTO: 2 % (ref 0–6)
ERYTHROCYTE [DISTWIDTH] IN BLOOD BY AUTOMATED COUNT: 13.5 % (ref 11.6–15.1)
HCT VFR BLD AUTO: 39.5 % (ref 36.5–49.3)
HGB BLD-MCNC: 12.3 G/DL (ref 12–17)
IMM GRANULOCYTES # BLD AUTO: 0.04 THOUSAND/UL (ref 0–0.2)
IMM GRANULOCYTES NFR BLD AUTO: 1 % (ref 0–2)
LYMPHOCYTES # BLD AUTO: 1.31 THOUSANDS/ΜL (ref 0.6–4.47)
LYMPHOCYTES NFR BLD AUTO: 18 % (ref 14–44)
MCH RBC QN AUTO: 27.5 PG (ref 26.8–34.3)
MCHC RBC AUTO-ENTMCNC: 31.1 G/DL (ref 31.4–37.4)
MCV RBC AUTO: 88 FL (ref 82–98)
MONOCYTES # BLD AUTO: 0.76 THOUSAND/ΜL (ref 0.17–1.22)
MONOCYTES NFR BLD AUTO: 10 % (ref 4–12)
NEUTROPHILS # BLD AUTO: 5.15 THOUSANDS/ΜL (ref 1.85–7.62)
NEUTS SEG NFR BLD AUTO: 69 % (ref 43–75)
NRBC BLD AUTO-RTO: 0 /100 WBCS
P AXIS: 22 DEGREES
P AXIS: 31 DEGREES
PLATELET # BLD AUTO: 197 THOUSANDS/UL (ref 149–390)
PMV BLD AUTO: 10.7 FL (ref 8.9–12.7)
PR INTERVAL: 154 MS
PR INTERVAL: 156 MS
QRS AXIS: 41 DEGREES
QRS AXIS: 61 DEGREES
QRSD INTERVAL: 88 MS
QRSD INTERVAL: 90 MS
QT INTERVAL: 332 MS
QT INTERVAL: 352 MS
QTC INTERVAL: 403 MS
QTC INTERVAL: 408 MS
RBC # BLD AUTO: 4.48 MILLION/UL (ref 3.88–5.62)
T WAVE AXIS: -58 DEGREES
T WAVE AXIS: 267 DEGREES
VENTRICULAR RATE: 79 BPM
VENTRICULAR RATE: 91 BPM
WBC # BLD AUTO: 7.44 THOUSAND/UL (ref 4.31–10.16)

## 2019-03-30 PROCEDURE — 93010 ELECTROCARDIOGRAM REPORT: CPT | Performed by: INTERNAL MEDICINE

## 2019-03-30 PROCEDURE — 73560 X-RAY EXAM OF KNEE 1 OR 2: CPT

## 2019-03-30 PROCEDURE — 85025 COMPLETE CBC W/AUTO DIFF WBC: CPT | Performed by: EMERGENCY MEDICINE

## 2019-03-30 PROCEDURE — 73630 X-RAY EXAM OF FOOT: CPT

## 2019-03-30 PROCEDURE — 36415 COLL VENOUS BLD VENIPUNCTURE: CPT | Performed by: EMERGENCY MEDICINE

## 2019-03-30 PROCEDURE — 70498 CT ANGIOGRAPHY NECK: CPT

## 2019-03-30 PROCEDURE — 70496 CT ANGIOGRAPHY HEAD: CPT

## 2019-03-30 PROCEDURE — 99232 SBSQ HOSP IP/OBS MODERATE 35: CPT | Performed by: SURGERY

## 2019-03-30 PROCEDURE — 99222 1ST HOSP IP/OBS MODERATE 55: CPT | Performed by: ORTHOPAEDIC SURGERY

## 2019-03-30 RX ORDER — BALSALAZIDE DISODIUM 750 MG/1
2250 CAPSULE ORAL 3 TIMES DAILY
Status: DISCONTINUED | OUTPATIENT
Start: 2019-03-30 | End: 2019-04-04

## 2019-03-30 RX ORDER — PREGABALIN 75 MG/1
150 CAPSULE ORAL EVERY 12 HOURS
Status: DISCONTINUED | OUTPATIENT
Start: 2019-03-30 | End: 2019-04-04 | Stop reason: HOSPADM

## 2019-03-30 RX ORDER — DULOXETIN HYDROCHLORIDE 60 MG/1
60 CAPSULE, DELAYED RELEASE ORAL DAILY
Status: DISCONTINUED | OUTPATIENT
Start: 2019-03-30 | End: 2019-04-04 | Stop reason: HOSPADM

## 2019-03-30 RX ORDER — HYDROMORPHONE HCL/PF 1 MG/ML
1 SYRINGE (ML) INJECTION
Status: DISCONTINUED | OUTPATIENT
Start: 2019-03-30 | End: 2019-04-01

## 2019-03-30 RX ORDER — SENNOSIDES 8.6 MG
1 TABLET ORAL
Status: DISCONTINUED | OUTPATIENT
Start: 2019-03-30 | End: 2019-04-04 | Stop reason: HOSPADM

## 2019-03-30 RX ORDER — PREGABALIN 75 MG/1
150 CAPSULE ORAL 3 TIMES DAILY
Status: DISCONTINUED | OUTPATIENT
Start: 2019-03-30 | End: 2019-03-30

## 2019-03-30 RX ORDER — PREGABALIN 75 MG/1
150 CAPSULE ORAL 2 TIMES DAILY
Status: DISCONTINUED | OUTPATIENT
Start: 2019-03-31 | End: 2019-03-30

## 2019-03-30 RX ORDER — LANOLIN ALCOHOL/MO/W.PET/CERES
6 CREAM (GRAM) TOPICAL
Status: DISCONTINUED | OUTPATIENT
Start: 2019-03-30 | End: 2019-04-04 | Stop reason: HOSPADM

## 2019-03-30 RX ORDER — ZOLPIDEM TARTRATE 5 MG/1
10 TABLET ORAL
Status: DISCONTINUED | OUTPATIENT
Start: 2019-03-30 | End: 2019-04-04 | Stop reason: HOSPADM

## 2019-03-30 RX ORDER — OXYCODONE HYDROCHLORIDE 10 MG/1
20 TABLET ORAL EVERY 4 HOURS PRN
Status: DISCONTINUED | OUTPATIENT
Start: 2019-03-30 | End: 2019-04-01

## 2019-03-30 RX ORDER — MORPHINE SULFATE 15 MG/1
15 TABLET, FILM COATED, EXTENDED RELEASE ORAL EVERY 12 HOURS
Status: DISCONTINUED | OUTPATIENT
Start: 2019-03-30 | End: 2019-04-03

## 2019-03-30 RX ORDER — OXYCODONE HYDROCHLORIDE 10 MG/1
10 TABLET ORAL EVERY 4 HOURS PRN
Status: DISCONTINUED | OUTPATIENT
Start: 2019-03-30 | End: 2019-03-30

## 2019-03-30 RX ADMIN — MORPHINE SULFATE 15 MG: 15 TABLET, FILM COATED, EXTENDED RELEASE ORAL at 23:26

## 2019-03-30 RX ADMIN — ZOLPIDEM TARTRATE 10 MG: 5 TABLET, COATED ORAL at 23:27

## 2019-03-30 RX ADMIN — SENNOSIDES 8.6 MG: 8.6 TABLET, FILM COATED ORAL at 22:10

## 2019-03-30 RX ADMIN — MELATONIN 6 MG: at 22:10

## 2019-03-30 RX ADMIN — HYDROMORPHONE HYDROCHLORIDE 1 MG: 1 INJECTION, SOLUTION INTRAMUSCULAR; INTRAVENOUS; SUBCUTANEOUS at 13:11

## 2019-03-30 RX ADMIN — HYDROMORPHONE HYDROCHLORIDE 1 MG: 1 INJECTION, SOLUTION INTRAMUSCULAR; INTRAVENOUS; SUBCUTANEOUS at 19:18

## 2019-03-30 RX ADMIN — ACETAMINOPHEN 975 MG: 325 TABLET ORAL at 06:20

## 2019-03-30 RX ADMIN — MORPHINE SULFATE 15 MG: 15 TABLET, FILM COATED, EXTENDED RELEASE ORAL at 11:22

## 2019-03-30 RX ADMIN — PREGABALIN 150 MG: 75 CAPSULE ORAL at 22:10

## 2019-03-30 RX ADMIN — OXYCODONE HYDROCHLORIDE 10 MG: 10 TABLET ORAL at 01:19

## 2019-03-30 RX ADMIN — ACETAMINOPHEN 975 MG: 325 TABLET ORAL at 22:10

## 2019-03-30 RX ADMIN — MELATONIN 6 MG: at 03:27

## 2019-03-30 RX ADMIN — HYDROMORPHONE HYDROCHLORIDE 0.5 MG: 1 INJECTION, SOLUTION INTRAMUSCULAR; INTRAVENOUS; SUBCUTANEOUS at 07:56

## 2019-03-30 RX ADMIN — PREGABALIN 150 MG: 75 CAPSULE ORAL at 11:22

## 2019-03-30 RX ADMIN — IOHEXOL 85 ML: 350 INJECTION, SOLUTION INTRAVENOUS at 12:57

## 2019-03-30 RX ADMIN — DULOXETINE HYDROCHLORIDE 60 MG: 60 CAPSULE, DELAYED RELEASE ORAL at 12:10

## 2019-03-30 RX ADMIN — OXYCODONE HYDROCHLORIDE 10 MG: 10 TABLET ORAL at 06:20

## 2019-03-30 RX ADMIN — OXYCODONE HYDROCHLORIDE 20 MG: 10 TABLET ORAL at 15:44

## 2019-03-30 RX ADMIN — ACETAMINOPHEN 975 MG: 325 TABLET ORAL at 15:43

## 2019-03-30 RX ADMIN — HYDROMORPHONE HYDROCHLORIDE 0.5 MG: 1 INJECTION, SOLUTION INTRAMUSCULAR; INTRAVENOUS; SUBCUTANEOUS at 02:50

## 2019-03-30 RX ADMIN — ENOXAPARIN SODIUM 30 MG: 30 INJECTION SUBCUTANEOUS at 22:10

## 2019-03-31 PROCEDURE — 97163 PT EVAL HIGH COMPLEX 45 MIN: CPT

## 2019-03-31 PROCEDURE — G8978 MOBILITY CURRENT STATUS: HCPCS

## 2019-03-31 PROCEDURE — G8987 SELF CARE CURRENT STATUS: HCPCS

## 2019-03-31 PROCEDURE — 99232 SBSQ HOSP IP/OBS MODERATE 35: CPT | Performed by: SURGERY

## 2019-03-31 PROCEDURE — G8979 MOBILITY GOAL STATUS: HCPCS

## 2019-03-31 PROCEDURE — 97167 OT EVAL HIGH COMPLEX 60 MIN: CPT

## 2019-03-31 PROCEDURE — G8988 SELF CARE GOAL STATUS: HCPCS

## 2019-03-31 RX ADMIN — HYDROMORPHONE HYDROCHLORIDE 1 MG: 1 INJECTION, SOLUTION INTRAMUSCULAR; INTRAVENOUS; SUBCUTANEOUS at 01:50

## 2019-03-31 RX ADMIN — MELATONIN 6 MG: at 21:23

## 2019-03-31 RX ADMIN — ACETAMINOPHEN 975 MG: 325 TABLET ORAL at 21:22

## 2019-03-31 RX ADMIN — ZOLPIDEM TARTRATE 10 MG: 5 TABLET, COATED ORAL at 22:41

## 2019-03-31 RX ADMIN — DULOXETINE HYDROCHLORIDE 60 MG: 60 CAPSULE, DELAYED RELEASE ORAL at 08:30

## 2019-03-31 RX ADMIN — BALSALAZIDE DISODIUM 2250 MG: 750 CAPSULE ORAL at 19:32

## 2019-03-31 RX ADMIN — ENOXAPARIN SODIUM 30 MG: 30 INJECTION SUBCUTANEOUS at 08:31

## 2019-03-31 RX ADMIN — OXYCODONE HYDROCHLORIDE 20 MG: 10 TABLET ORAL at 08:30

## 2019-03-31 RX ADMIN — ACETAMINOPHEN 975 MG: 325 TABLET ORAL at 05:59

## 2019-03-31 RX ADMIN — OXYCODONE HYDROCHLORIDE 20 MG: 10 TABLET ORAL at 13:04

## 2019-03-31 RX ADMIN — SENNOSIDES 8.6 MG: 8.6 TABLET, FILM COATED ORAL at 21:23

## 2019-03-31 RX ADMIN — HYDROMORPHONE HYDROCHLORIDE 1 MG: 1 INJECTION, SOLUTION INTRAMUSCULAR; INTRAVENOUS; SUBCUTANEOUS at 19:32

## 2019-03-31 RX ADMIN — HYDROMORPHONE HYDROCHLORIDE 1 MG: 1 INJECTION, SOLUTION INTRAMUSCULAR; INTRAVENOUS; SUBCUTANEOUS at 06:21

## 2019-03-31 RX ADMIN — HYDROMORPHONE HYDROCHLORIDE 1 MG: 1 INJECTION, SOLUTION INTRAMUSCULAR; INTRAVENOUS; SUBCUTANEOUS at 14:02

## 2019-03-31 RX ADMIN — OXYCODONE HYDROCHLORIDE 20 MG: 10 TABLET ORAL at 17:16

## 2019-03-31 RX ADMIN — ENOXAPARIN SODIUM 30 MG: 30 INJECTION SUBCUTANEOUS at 21:23

## 2019-03-31 RX ADMIN — OXYCODONE HYDROCHLORIDE 20 MG: 10 TABLET ORAL at 22:41

## 2019-03-31 RX ADMIN — PREGABALIN 150 MG: 75 CAPSULE ORAL at 21:22

## 2019-03-31 RX ADMIN — OXYCODONE HYDROCHLORIDE 20 MG: 10 TABLET ORAL at 04:40

## 2019-03-31 RX ADMIN — HYDROMORPHONE HYDROCHLORIDE 1 MG: 1 INJECTION, SOLUTION INTRAMUSCULAR; INTRAVENOUS; SUBCUTANEOUS at 10:34

## 2019-03-31 RX ADMIN — MORPHINE SULFATE 15 MG: 15 TABLET, FILM COATED, EXTENDED RELEASE ORAL at 21:22

## 2019-03-31 RX ADMIN — MORPHINE SULFATE 15 MG: 15 TABLET, FILM COATED, EXTENDED RELEASE ORAL at 11:11

## 2019-03-31 RX ADMIN — PREGABALIN 150 MG: 75 CAPSULE ORAL at 08:30

## 2019-04-01 PROCEDURE — 99232 SBSQ HOSP IP/OBS MODERATE 35: CPT | Performed by: SURGERY

## 2019-04-01 RX ORDER — LORAZEPAM 2 MG/ML
0.5 INJECTION INTRAMUSCULAR EVERY 4 HOURS PRN
Status: DISCONTINUED | OUTPATIENT
Start: 2019-04-01 | End: 2019-04-03

## 2019-04-01 RX ORDER — METHOCARBAMOL 750 MG/1
750 TABLET, FILM COATED ORAL EVERY 6 HOURS SCHEDULED
Status: DISCONTINUED | OUTPATIENT
Start: 2019-04-01 | End: 2019-04-04 | Stop reason: HOSPADM

## 2019-04-01 RX ORDER — ACETAMINOPHEN 325 MG/1
975 TABLET ORAL EVERY 8 HOURS PRN
Status: DISCONTINUED | OUTPATIENT
Start: 2019-04-01 | End: 2019-04-01

## 2019-04-01 RX ORDER — HYDROMORPHONE HCL/PF 1 MG/ML
0.5 SYRINGE (ML) INJECTION
Status: DISCONTINUED | OUTPATIENT
Start: 2019-04-01 | End: 2019-04-03

## 2019-04-01 RX ORDER — LIDOCAINE 50 MG/G
1 PATCH TOPICAL DAILY
Status: DISCONTINUED | OUTPATIENT
Start: 2019-04-01 | End: 2019-04-04 | Stop reason: HOSPADM

## 2019-04-01 RX ORDER — GLYCOPYRROLATE 0.2 MG/ML
0.2 INJECTION INTRAMUSCULAR; INTRAVENOUS EVERY 4 HOURS PRN
Status: DISCONTINUED | OUTPATIENT
Start: 2019-04-01 | End: 2019-04-03

## 2019-04-01 RX ORDER — OXYCODONE HYDROCHLORIDE 10 MG/1
30 TABLET ORAL EVERY 6 HOURS PRN
Status: DISCONTINUED | OUTPATIENT
Start: 2019-04-01 | End: 2019-04-03

## 2019-04-01 RX ORDER — ACETAMINOPHEN 325 MG/1
975 TABLET ORAL EVERY 8 HOURS PRN
Status: DISCONTINUED | OUTPATIENT
Start: 2019-04-01 | End: 2019-04-02

## 2019-04-01 RX ORDER — HALOPERIDOL 5 MG/ML
2 INJECTION INTRAMUSCULAR
Status: DISCONTINUED | OUTPATIENT
Start: 2019-04-01 | End: 2019-04-03

## 2019-04-01 RX ADMIN — BALSALAZIDE DISODIUM 2250 MG: 750 CAPSULE ORAL at 08:31

## 2019-04-01 RX ADMIN — SODIUM CHLORIDE 0.1 MG/KG/HR: 0.9 INJECTION, SOLUTION INTRAVENOUS at 14:21

## 2019-04-01 RX ADMIN — OXYCODONE HYDROCHLORIDE 20 MG: 10 TABLET ORAL at 02:45

## 2019-04-01 RX ADMIN — LIDOCAINE 1 PATCH: 50 PATCH CUTANEOUS at 17:20

## 2019-04-01 RX ADMIN — ACETAMINOPHEN 975 MG: 325 TABLET ORAL at 06:10

## 2019-04-01 RX ADMIN — HYDROMORPHONE HYDROCHLORIDE 0.5 MG: 1 INJECTION, SOLUTION INTRAMUSCULAR; INTRAVENOUS; SUBCUTANEOUS at 23:35

## 2019-04-01 RX ADMIN — MELATONIN 6 MG: at 21:43

## 2019-04-01 RX ADMIN — SENNOSIDES 8.6 MG: 8.6 TABLET, FILM COATED ORAL at 21:43

## 2019-04-01 RX ADMIN — MORPHINE SULFATE 15 MG: 15 TABLET, FILM COATED, EXTENDED RELEASE ORAL at 09:51

## 2019-04-01 RX ADMIN — DULOXETINE HYDROCHLORIDE 60 MG: 60 CAPSULE, DELAYED RELEASE ORAL at 08:30

## 2019-04-01 RX ADMIN — OXYCODONE HYDROCHLORIDE 20 MG: 10 TABLET ORAL at 08:30

## 2019-04-01 RX ADMIN — HYDROMORPHONE HYDROCHLORIDE 1 MG: 1 INJECTION, SOLUTION INTRAMUSCULAR; INTRAVENOUS; SUBCUTANEOUS at 11:40

## 2019-04-01 RX ADMIN — HYDROMORPHONE HYDROCHLORIDE 1 MG: 1 INJECTION, SOLUTION INTRAMUSCULAR; INTRAVENOUS; SUBCUTANEOUS at 06:11

## 2019-04-01 RX ADMIN — BALSALAZIDE DISODIUM 2250 MG: 750 CAPSULE ORAL at 17:19

## 2019-04-01 RX ADMIN — BALSALAZIDE DISODIUM 2250 MG: 750 CAPSULE ORAL at 21:44

## 2019-04-01 RX ADMIN — METHOCARBAMOL 750 MG: 750 TABLET, FILM COATED ORAL at 17:19

## 2019-04-01 RX ADMIN — ENOXAPARIN SODIUM 30 MG: 30 INJECTION SUBCUTANEOUS at 08:31

## 2019-04-01 RX ADMIN — MORPHINE SULFATE 15 MG: 15 TABLET, FILM COATED, EXTENDED RELEASE ORAL at 21:43

## 2019-04-01 RX ADMIN — ACETAMINOPHEN 975 MG: 325 TABLET ORAL at 13:24

## 2019-04-01 RX ADMIN — METHOCARBAMOL 750 MG: 750 TABLET, FILM COATED ORAL at 23:34

## 2019-04-01 RX ADMIN — OXYCODONE HYDROCHLORIDE 30 MG: 10 TABLET ORAL at 18:37

## 2019-04-01 RX ADMIN — PREGABALIN 150 MG: 75 CAPSULE ORAL at 21:44

## 2019-04-01 RX ADMIN — PREGABALIN 150 MG: 75 CAPSULE ORAL at 08:31

## 2019-04-01 RX ADMIN — ENOXAPARIN SODIUM 30 MG: 30 INJECTION SUBCUTANEOUS at 21:43

## 2019-04-01 RX ADMIN — ZOLPIDEM TARTRATE 10 MG: 5 TABLET, COATED ORAL at 23:34

## 2019-04-02 PROCEDURE — 97530 THERAPEUTIC ACTIVITIES: CPT

## 2019-04-02 PROCEDURE — 97116 GAIT TRAINING THERAPY: CPT

## 2019-04-02 PROCEDURE — 97110 THERAPEUTIC EXERCISES: CPT

## 2019-04-02 PROCEDURE — NC001 PR NO CHARGE: Performed by: SURGERY

## 2019-04-02 RX ORDER — ACETAMINOPHEN 325 MG/1
975 TABLET ORAL EVERY 8 HOURS SCHEDULED
Status: DISCONTINUED | OUTPATIENT
Start: 2019-04-02 | End: 2019-04-04 | Stop reason: HOSPADM

## 2019-04-02 RX ORDER — DOCUSATE SODIUM 100 MG/1
100 CAPSULE, LIQUID FILLED ORAL 2 TIMES DAILY
Status: DISCONTINUED | OUTPATIENT
Start: 2019-04-02 | End: 2019-04-04 | Stop reason: HOSPADM

## 2019-04-02 RX ORDER — POLYETHYLENE GLYCOL 3350 17 G/17G
17 POWDER, FOR SOLUTION ORAL DAILY
Status: DISCONTINUED | OUTPATIENT
Start: 2019-04-02 | End: 2019-04-04 | Stop reason: HOSPADM

## 2019-04-02 RX ORDER — POLYETHYLENE GLYCOL 3350 17 G/17G
17 POWDER, FOR SOLUTION ORAL DAILY
Status: DISCONTINUED | OUTPATIENT
Start: 2019-04-03 | End: 2019-04-02

## 2019-04-02 RX ADMIN — MORPHINE SULFATE 15 MG: 15 TABLET, FILM COATED, EXTENDED RELEASE ORAL at 21:15

## 2019-04-02 RX ADMIN — METHOCARBAMOL 750 MG: 750 TABLET, FILM COATED ORAL at 18:58

## 2019-04-02 RX ADMIN — METHOCARBAMOL 750 MG: 750 TABLET, FILM COATED ORAL at 23:23

## 2019-04-02 RX ADMIN — ACETAMINOPHEN 975 MG: 325 TABLET ORAL at 08:03

## 2019-04-02 RX ADMIN — DULOXETINE HYDROCHLORIDE 60 MG: 60 CAPSULE, DELAYED RELEASE ORAL at 08:04

## 2019-04-02 RX ADMIN — LIDOCAINE 1 PATCH: 50 PATCH CUTANEOUS at 08:04

## 2019-04-02 RX ADMIN — HYDROMORPHONE HYDROCHLORIDE 0.5 MG: 1 INJECTION, SOLUTION INTRAMUSCULAR; INTRAVENOUS; SUBCUTANEOUS at 12:13

## 2019-04-02 RX ADMIN — BALSALAZIDE DISODIUM 2250 MG: 750 CAPSULE ORAL at 17:12

## 2019-04-02 RX ADMIN — ACETAMINOPHEN 975 MG: 325 TABLET ORAL at 14:17

## 2019-04-02 RX ADMIN — OXYCODONE HYDROCHLORIDE 30 MG: 10 TABLET ORAL at 23:23

## 2019-04-02 RX ADMIN — MORPHINE SULFATE 15 MG: 15 TABLET, FILM COATED, EXTENDED RELEASE ORAL at 10:00

## 2019-04-02 RX ADMIN — ACETAMINOPHEN 975 MG: 325 TABLET ORAL at 21:15

## 2019-04-02 RX ADMIN — DOCUSATE SODIUM 100 MG: 100 CAPSULE, LIQUID FILLED ORAL at 17:11

## 2019-04-02 RX ADMIN — BALSALAZIDE DISODIUM 2250 MG: 750 CAPSULE ORAL at 21:16

## 2019-04-02 RX ADMIN — ZOLPIDEM TARTRATE 10 MG: 5 TABLET, COATED ORAL at 23:24

## 2019-04-02 RX ADMIN — MELATONIN 6 MG: at 21:16

## 2019-04-02 RX ADMIN — HYDROMORPHONE HYDROCHLORIDE 0.5 MG: 1 INJECTION, SOLUTION INTRAMUSCULAR; INTRAVENOUS; SUBCUTANEOUS at 06:03

## 2019-04-02 RX ADMIN — PREGABALIN 150 MG: 75 CAPSULE ORAL at 21:15

## 2019-04-02 RX ADMIN — METHOCARBAMOL 750 MG: 750 TABLET, FILM COATED ORAL at 12:13

## 2019-04-02 RX ADMIN — OXYCODONE HYDROCHLORIDE 30 MG: 10 TABLET ORAL at 04:43

## 2019-04-02 RX ADMIN — OXYCODONE HYDROCHLORIDE 30 MG: 10 TABLET ORAL at 17:20

## 2019-04-02 RX ADMIN — SODIUM CHLORIDE 0.1 MG/KG/HR: 0.9 INJECTION, SOLUTION INTRAVENOUS at 08:56

## 2019-04-02 RX ADMIN — ENOXAPARIN SODIUM 30 MG: 30 INJECTION SUBCUTANEOUS at 21:15

## 2019-04-02 RX ADMIN — HYDROMORPHONE HYDROCHLORIDE 0.5 MG: 1 INJECTION, SOLUTION INTRAMUSCULAR; INTRAVENOUS; SUBCUTANEOUS at 19:03

## 2019-04-02 RX ADMIN — POLYETHYLENE GLYCOL 3350 17 G: 17 POWDER, FOR SOLUTION ORAL at 17:12

## 2019-04-02 RX ADMIN — PREGABALIN 150 MG: 75 CAPSULE ORAL at 08:04

## 2019-04-02 RX ADMIN — HYDROMORPHONE HYDROCHLORIDE 0.5 MG: 1 INJECTION, SOLUTION INTRAMUSCULAR; INTRAVENOUS; SUBCUTANEOUS at 08:56

## 2019-04-02 RX ADMIN — ENOXAPARIN SODIUM 30 MG: 30 INJECTION SUBCUTANEOUS at 08:05

## 2019-04-02 RX ADMIN — OXYCODONE HYDROCHLORIDE 30 MG: 10 TABLET ORAL at 10:55

## 2019-04-02 RX ADMIN — SENNOSIDES 8.6 MG: 8.6 TABLET, FILM COATED ORAL at 21:15

## 2019-04-02 RX ADMIN — METHOCARBAMOL 750 MG: 750 TABLET, FILM COATED ORAL at 06:02

## 2019-04-02 RX ADMIN — BALSALAZIDE DISODIUM 2250 MG: 750 CAPSULE ORAL at 08:05

## 2019-04-03 PROCEDURE — 99232 SBSQ HOSP IP/OBS MODERATE 35: CPT | Performed by: NURSE PRACTITIONER

## 2019-04-03 PROCEDURE — 99232 SBSQ HOSP IP/OBS MODERATE 35: CPT | Performed by: SURGERY

## 2019-04-03 RX ORDER — OXYCODONE HYDROCHLORIDE 10 MG/1
30 TABLET ORAL EVERY 4 HOURS PRN
Status: DISCONTINUED | OUTPATIENT
Start: 2019-04-03 | End: 2019-04-04 | Stop reason: HOSPADM

## 2019-04-03 RX ORDER — MORPHINE SULFATE 30 MG/1
30 TABLET, FILM COATED, EXTENDED RELEASE ORAL EVERY 12 HOURS
Status: DISCONTINUED | OUTPATIENT
Start: 2019-04-03 | End: 2019-04-04 | Stop reason: HOSPADM

## 2019-04-03 RX ORDER — HYDROMORPHONE HCL/PF 1 MG/ML
0.5 SYRINGE (ML) INJECTION EVERY 6 HOURS PRN
Status: DISCONTINUED | OUTPATIENT
Start: 2019-04-03 | End: 2019-04-03

## 2019-04-03 RX ADMIN — OXYCODONE HYDROCHLORIDE 30 MG: 10 TABLET ORAL at 17:38

## 2019-04-03 RX ADMIN — LIDOCAINE 1 PATCH: 50 PATCH CUTANEOUS at 08:28

## 2019-04-03 RX ADMIN — METHOCARBAMOL 750 MG: 750 TABLET, FILM COATED ORAL at 23:15

## 2019-04-03 RX ADMIN — BALSALAZIDE DISODIUM 2250 MG: 750 CAPSULE ORAL at 08:28

## 2019-04-03 RX ADMIN — MORPHINE SULFATE 30 MG: 30 TABLET, EXTENDED RELEASE ORAL at 21:54

## 2019-04-03 RX ADMIN — OXYCODONE HYDROCHLORIDE 30 MG: 10 TABLET ORAL at 06:09

## 2019-04-03 RX ADMIN — OXYCODONE HYDROCHLORIDE 30 MG: 10 TABLET ORAL at 23:15

## 2019-04-03 RX ADMIN — PREGABALIN 150 MG: 75 CAPSULE ORAL at 08:28

## 2019-04-03 RX ADMIN — BALSALAZIDE DISODIUM 2250 MG: 750 CAPSULE ORAL at 17:33

## 2019-04-03 RX ADMIN — ENOXAPARIN SODIUM 30 MG: 30 INJECTION SUBCUTANEOUS at 08:27

## 2019-04-03 RX ADMIN — MELATONIN 6 MG: at 21:54

## 2019-04-03 RX ADMIN — ACETAMINOPHEN 975 MG: 325 TABLET ORAL at 06:09

## 2019-04-03 RX ADMIN — METHOCARBAMOL 750 MG: 750 TABLET, FILM COATED ORAL at 17:34

## 2019-04-03 RX ADMIN — ACETAMINOPHEN 975 MG: 325 TABLET ORAL at 21:53

## 2019-04-03 RX ADMIN — ZOLPIDEM TARTRATE 10 MG: 5 TABLET, COATED ORAL at 23:15

## 2019-04-03 RX ADMIN — DOCUSATE SODIUM 100 MG: 100 CAPSULE, LIQUID FILLED ORAL at 17:34

## 2019-04-03 RX ADMIN — SENNOSIDES 8.6 MG: 8.6 TABLET, FILM COATED ORAL at 21:54

## 2019-04-03 RX ADMIN — HYDROMORPHONE HYDROCHLORIDE 0.5 MG: 1 INJECTION, SOLUTION INTRAMUSCULAR; INTRAVENOUS; SUBCUTANEOUS at 03:37

## 2019-04-03 RX ADMIN — ACETAMINOPHEN 975 MG: 325 TABLET ORAL at 14:24

## 2019-04-03 RX ADMIN — METHOCARBAMOL 750 MG: 750 TABLET, FILM COATED ORAL at 06:09

## 2019-04-03 RX ADMIN — METHOCARBAMOL 750 MG: 750 TABLET, FILM COATED ORAL at 11:18

## 2019-04-03 RX ADMIN — DULOXETINE HYDROCHLORIDE 60 MG: 60 CAPSULE, DELAYED RELEASE ORAL at 08:28

## 2019-04-03 RX ADMIN — ENOXAPARIN SODIUM 30 MG: 30 INJECTION SUBCUTANEOUS at 21:53

## 2019-04-03 RX ADMIN — OXYCODONE HYDROCHLORIDE 30 MG: 10 TABLET ORAL at 12:27

## 2019-04-03 RX ADMIN — DOCUSATE SODIUM 100 MG: 100 CAPSULE, LIQUID FILLED ORAL at 08:28

## 2019-04-03 RX ADMIN — SODIUM CHLORIDE 0.1 MG/KG/HR: 0.9 INJECTION, SOLUTION INTRAVENOUS at 04:14

## 2019-04-03 RX ADMIN — PREGABALIN 150 MG: 75 CAPSULE ORAL at 21:53

## 2019-04-03 RX ADMIN — MORPHINE SULFATE 15 MG: 15 TABLET, FILM COATED, EXTENDED RELEASE ORAL at 11:18

## 2019-04-03 RX ADMIN — POLYETHYLENE GLYCOL 3350 17 G: 17 POWDER, FOR SOLUTION ORAL at 08:28

## 2019-04-04 ENCOUNTER — HOSPITAL ENCOUNTER (OUTPATIENT)
Facility: HOSPITAL | Age: 64
Discharge: HOME HEALTH | End: 2019-04-12
Attending: PHYSICAL MEDICINE & REHABILITATION | Admitting: PHYSICAL MEDICINE & REHABILITATION

## 2019-04-04 VITALS
HEIGHT: 72 IN | OXYGEN SATURATION: 95 % | DIASTOLIC BLOOD PRESSURE: 88 MMHG | BODY MASS INDEX: 37.89 KG/M2 | HEART RATE: 61 BPM | SYSTOLIC BLOOD PRESSURE: 136 MMHG | WEIGHT: 279.76 LBS | RESPIRATION RATE: 19 BRPM | TEMPERATURE: 98.6 F

## 2019-04-04 PROBLEM — G89.11 ACUTE PAIN DUE TO TRAUMA: Status: ACTIVE | Noted: 2019-04-04
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
## 2019-04-04 PROCEDURE — 99238 HOSP IP/OBS DSCHRG MGMT 30/<: CPT | Performed by: PHYSICIAN ASSISTANT

## 2019-04-04 PROCEDURE — ERR1 ERRONEOUS ENCOUNTER-DISREGARD: Performed by: ORTHOPAEDIC SURGERY

## 2019-04-04 PROCEDURE — 97535 SELF CARE MNGMENT TRAINING: CPT

## 2019-04-04 RX ORDER — LANOLIN ALCOHOL/MO/W.PET/CERES
6 CREAM (GRAM) TOPICAL
Refills: 0
Start: 2019-04-04 | End: 2021-11-02

## 2019-04-04 RX ORDER — ZOLPIDEM TARTRATE 10 MG/1
10 TABLET ORAL
Qty: 3 TABLET | Refills: 0 | Status: SHIPPED | OUTPATIENT
Start: 2019-04-04 | End: 2022-06-29

## 2019-04-04 RX ORDER — METHOCARBAMOL 750 MG/1
750 TABLET, FILM COATED ORAL EVERY 6 HOURS SCHEDULED
Refills: 0
Start: 2019-04-04 | End: 2019-07-16 | Stop reason: ALTCHOICE

## 2019-04-04 RX ORDER — MORPHINE SULFATE 30 MG/1
30 TABLET, FILM COATED, EXTENDED RELEASE ORAL EVERY 12 HOURS
Qty: 6 TABLET | Refills: 0 | Status: SHIPPED | OUTPATIENT
Start: 2019-04-04 | End: 2019-04-14

## 2019-04-04 RX ORDER — ACETAMINOPHEN 325 MG/1
975 TABLET ORAL EVERY 8 HOURS SCHEDULED
Qty: 30 TABLET | Refills: 0 | Status: SHIPPED | OUTPATIENT
Start: 2019-04-04 | End: 2021-11-02

## 2019-04-04 RX ORDER — PREGABALIN 150 MG/1
150 CAPSULE ORAL EVERY 12 HOURS
Qty: 6 CAPSULE | Refills: 0 | Status: SHIPPED | OUTPATIENT
Start: 2019-04-04 | End: 2019-04-14

## 2019-04-04 RX ORDER — POLYETHYLENE GLYCOL 3350 17 G/17G
17 POWDER, FOR SOLUTION ORAL DAILY
Qty: 14 EACH | Refills: 0 | Status: SHIPPED | OUTPATIENT
Start: 2019-04-05 | End: 2021-11-02

## 2019-04-04 RX ORDER — LIDOCAINE 50 MG/G
1 PATCH TOPICAL DAILY
Qty: 30 PATCH | Refills: 0 | Status: SHIPPED | OUTPATIENT
Start: 2019-04-05 | End: 2021-11-02

## 2019-04-04 RX ORDER — DOCUSATE SODIUM 100 MG/1
100 CAPSULE, LIQUID FILLED ORAL 2 TIMES DAILY
Qty: 10 CAPSULE | Refills: 0
Start: 2019-04-04 | End: 2021-11-02

## 2019-04-04 RX ORDER — OXYCODONE HYDROCHLORIDE 30 MG/1
30 TABLET ORAL EVERY 4 HOURS PRN
Qty: 18 TABLET | Refills: 0 | Status: SHIPPED | OUTPATIENT
Start: 2019-04-04 | End: 2019-04-14

## 2019-04-04 RX ORDER — EZETIMIBE 10 MG/1
10 TABLET ORAL DAILY
COMMUNITY

## 2019-04-04 RX ORDER — SENNOSIDES 8.6 MG
1 TABLET ORAL
Qty: 120 EACH | Refills: 0
Start: 2019-04-04 | End: 2021-11-02

## 2019-04-04 RX ORDER — EZETIMIBE 10 MG/1
10 TABLET ORAL DAILY
Status: DISCONTINUED | OUTPATIENT
Start: 2019-04-04 | End: 2019-04-04 | Stop reason: HOSPADM

## 2019-04-04 RX ADMIN — ENOXAPARIN SODIUM 30 MG: 30 INJECTION SUBCUTANEOUS at 08:41

## 2019-04-04 RX ADMIN — ACETAMINOPHEN 975 MG: 325 TABLET ORAL at 05:32

## 2019-04-04 RX ADMIN — ACETAMINOPHEN 975 MG: 325 TABLET ORAL at 13:08

## 2019-04-04 RX ADMIN — EZETIMIBE 10 MG: 10 TABLET ORAL at 11:14

## 2019-04-04 RX ADMIN — OXYCODONE HYDROCHLORIDE 30 MG: 10 TABLET ORAL at 13:08

## 2019-04-04 RX ADMIN — DULOXETINE HYDROCHLORIDE 60 MG: 60 CAPSULE, DELAYED RELEASE ORAL at 08:41

## 2019-04-04 RX ADMIN — MORPHINE SULFATE 30 MG: 30 TABLET, EXTENDED RELEASE ORAL at 10:01

## 2019-04-04 RX ADMIN — METHOCARBAMOL 750 MG: 750 TABLET, FILM COATED ORAL at 05:32

## 2019-04-04 RX ADMIN — METHOCARBAMOL 750 MG: 750 TABLET, FILM COATED ORAL at 11:15

## 2019-04-04 RX ADMIN — POLYETHYLENE GLYCOL 3350 17 G: 17 POWDER, FOR SOLUTION ORAL at 08:45

## 2019-04-04 RX ADMIN — LIDOCAINE 1 PATCH: 50 PATCH CUTANEOUS at 08:43

## 2019-04-04 RX ADMIN — OXYCODONE HYDROCHLORIDE 30 MG: 10 TABLET ORAL at 04:27

## 2019-04-04 RX ADMIN — PREGABALIN 150 MG: 75 CAPSULE ORAL at 08:41

## 2019-04-04 RX ADMIN — DOCUSATE SODIUM 100 MG: 100 CAPSULE, LIQUID FILLED ORAL at 08:41

## 2019-04-04 RX ADMIN — OXYCODONE HYDROCHLORIDE 30 MG: 10 TABLET ORAL at 08:42

## 2019-04-05 LAB
ALBUMIN SERPL BCP-MCNC: 3 G/DL (ref 3.5–5)
ALP SERPL-CCNC: 80 U/L (ref 46–116)
ALT SERPL W P-5'-P-CCNC: 63 U/L (ref 12–78)
ANION GAP SERPL CALCULATED.3IONS-SCNC: 6 MMOL/L (ref 4–13)
AST SERPL W P-5'-P-CCNC: 42 U/L (ref 5–45)
BASOPHILS # BLD AUTO: 0.05 THOUSANDS/ΜL (ref 0–0.1)
BASOPHILS NFR BLD AUTO: 1 % (ref 0–1)
BILIRUB SERPL-MCNC: 0.5 MG/DL (ref 0.2–1)
BUN SERPL-MCNC: 22 MG/DL (ref 5–25)
CALCIUM SERPL-MCNC: 8.8 MG/DL (ref 8.3–10.1)
CHLORIDE SERPL-SCNC: 105 MMOL/L (ref 100–108)
CO2 SERPL-SCNC: 31 MMOL/L (ref 21–32)
CREAT SERPL-MCNC: 0.93 MG/DL (ref 0.6–1.3)
EOSINOPHIL # BLD AUTO: 0.38 THOUSAND/ΜL (ref 0–0.61)
EOSINOPHIL NFR BLD AUTO: 6 % (ref 0–6)
ERYTHROCYTE [DISTWIDTH] IN BLOOD BY AUTOMATED COUNT: 13.8 % (ref 11.6–15.1)
GFR SERPL CREATININE-BSD FRML MDRD: 88 ML/MIN/1.73SQ M
GLUCOSE SERPL-MCNC: 102 MG/DL (ref 65–140)
HCT VFR BLD AUTO: 40.4 % (ref 36.5–49.3)
HGB BLD-MCNC: 13.1 G/DL (ref 12–17)
IMM GRANULOCYTES # BLD AUTO: 0.07 THOUSAND/UL (ref 0–0.2)
IMM GRANULOCYTES NFR BLD AUTO: 1 % (ref 0–2)
LYMPHOCYTES # BLD AUTO: 1.64 THOUSANDS/ΜL (ref 0.6–4.47)
LYMPHOCYTES NFR BLD AUTO: 25 % (ref 14–44)
MCH RBC QN AUTO: 27.8 PG (ref 26.8–34.3)
MCHC RBC AUTO-ENTMCNC: 32.4 G/DL (ref 31.4–37.4)
MCV RBC AUTO: 86 FL (ref 82–98)
MONOCYTES # BLD AUTO: 0.61 THOUSAND/ΜL (ref 0.17–1.22)
MONOCYTES NFR BLD AUTO: 9 % (ref 4–12)
NEUTROPHILS # BLD AUTO: 3.89 THOUSANDS/ΜL (ref 1.85–7.62)
NEUTS SEG NFR BLD AUTO: 58 % (ref 43–75)
NRBC BLD AUTO-RTO: 0 /100 WBCS
PLATELET # BLD AUTO: 269 THOUSANDS/UL (ref 149–390)
PMV BLD AUTO: 10.2 FL (ref 8.9–12.7)
POTASSIUM SERPL-SCNC: 4.4 MMOL/L (ref 3.5–5.3)
PREALB SERPL-MCNC: 25 MG/DL (ref 18–40)
PROT SERPL-MCNC: 6.7 G/DL (ref 6.4–8.2)
RBC # BLD AUTO: 4.71 MILLION/UL (ref 3.88–5.62)
SODIUM SERPL-SCNC: 142 MMOL/L (ref 136–145)
WBC # BLD AUTO: 6.64 THOUSAND/UL (ref 4.31–10.16)

## 2019-04-05 PROCEDURE — 85025 COMPLETE CBC W/AUTO DIFF WBC: CPT | Performed by: PHYSICAL MEDICINE & REHABILITATION

## 2019-04-05 PROCEDURE — 84134 ASSAY OF PREALBUMIN: CPT | Performed by: PHYSICAL MEDICINE & REHABILITATION

## 2019-04-05 PROCEDURE — 80053 COMPREHEN METABOLIC PANEL: CPT | Performed by: PHYSICAL MEDICINE & REHABILITATION

## 2019-04-08 LAB
ALBUMIN SERPL BCP-MCNC: 3.3 G/DL (ref 3.5–5)
ALP SERPL-CCNC: 102 U/L (ref 46–116)
ALT SERPL W P-5'-P-CCNC: 72 U/L (ref 12–78)
ANION GAP SERPL CALCULATED.3IONS-SCNC: 2 MMOL/L (ref 4–13)
AST SERPL W P-5'-P-CCNC: 37 U/L (ref 5–45)
BASOPHILS # BLD AUTO: 0.04 THOUSANDS/ΜL (ref 0–0.1)
BASOPHILS NFR BLD AUTO: 1 % (ref 0–1)
BILIRUB SERPL-MCNC: 0.4 MG/DL (ref 0.2–1)
BUN SERPL-MCNC: 16 MG/DL (ref 5–25)
CALCIUM SERPL-MCNC: 9.1 MG/DL (ref 8.3–10.1)
CHLORIDE SERPL-SCNC: 99 MMOL/L (ref 100–108)
CO2 SERPL-SCNC: 30 MMOL/L (ref 21–32)
CREAT SERPL-MCNC: 0.96 MG/DL (ref 0.6–1.3)
EOSINOPHIL # BLD AUTO: 0.32 THOUSAND/ΜL (ref 0–0.61)
EOSINOPHIL NFR BLD AUTO: 6 % (ref 0–6)
ERYTHROCYTE [DISTWIDTH] IN BLOOD BY AUTOMATED COUNT: 13.7 % (ref 11.6–15.1)
GFR SERPL CREATININE-BSD FRML MDRD: 85 ML/MIN/1.73SQ M
GLUCOSE P FAST SERPL-MCNC: 102 MG/DL (ref 65–99)
GLUCOSE SERPL-MCNC: 102 MG/DL (ref 65–140)
HCT VFR BLD AUTO: 40.9 % (ref 36.5–49.3)
HGB BLD-MCNC: 13.3 G/DL (ref 12–17)
IMM GRANULOCYTES # BLD AUTO: 0.04 THOUSAND/UL (ref 0–0.2)
IMM GRANULOCYTES NFR BLD AUTO: 1 % (ref 0–2)
LYMPHOCYTES # BLD AUTO: 1.54 THOUSANDS/ΜL (ref 0.6–4.47)
LYMPHOCYTES NFR BLD AUTO: 28 % (ref 14–44)
MCH RBC QN AUTO: 27.9 PG (ref 26.8–34.3)
MCHC RBC AUTO-ENTMCNC: 32.5 G/DL (ref 31.4–37.4)
MCV RBC AUTO: 86 FL (ref 82–98)
MONOCYTES # BLD AUTO: 0.64 THOUSAND/ΜL (ref 0.17–1.22)
MONOCYTES NFR BLD AUTO: 12 % (ref 4–12)
NEUTROPHILS # BLD AUTO: 2.99 THOUSANDS/ΜL (ref 1.85–7.62)
NEUTS SEG NFR BLD AUTO: 52 % (ref 43–75)
NRBC BLD AUTO-RTO: 0 /100 WBCS
PLATELET # BLD AUTO: 290 THOUSANDS/UL (ref 149–390)
PMV BLD AUTO: 9.9 FL (ref 8.9–12.7)
POTASSIUM SERPL-SCNC: 4 MMOL/L (ref 3.5–5.3)
PROT SERPL-MCNC: 7 G/DL (ref 6.4–8.2)
RBC # BLD AUTO: 4.76 MILLION/UL (ref 3.88–5.62)
SODIUM SERPL-SCNC: 131 MMOL/L (ref 136–145)
WBC # BLD AUTO: 5.57 THOUSAND/UL (ref 4.31–10.16)

## 2019-04-08 PROCEDURE — 80053 COMPREHEN METABOLIC PANEL: CPT | Performed by: PHYSICAL MEDICINE & REHABILITATION

## 2019-04-08 PROCEDURE — 85025 COMPLETE CBC W/AUTO DIFF WBC: CPT | Performed by: PHYSICAL MEDICINE & REHABILITATION

## 2019-04-11 LAB
ALBUMIN SERPL BCP-MCNC: 3.2 G/DL (ref 3.5–5)
ALP SERPL-CCNC: 114 U/L (ref 46–116)
ALT SERPL W P-5'-P-CCNC: 71 U/L (ref 12–78)
ANION GAP SERPL CALCULATED.3IONS-SCNC: 7 MMOL/L (ref 4–13)
AST SERPL W P-5'-P-CCNC: 33 U/L (ref 5–45)
BASOPHILS # BLD AUTO: 0.04 THOUSANDS/ΜL (ref 0–0.1)
BASOPHILS NFR BLD AUTO: 1 % (ref 0–1)
BILIRUB SERPL-MCNC: 0.3 MG/DL (ref 0.2–1)
BUN SERPL-MCNC: 21 MG/DL (ref 5–25)
CALCIUM SERPL-MCNC: 8.9 MG/DL (ref 8.3–10.1)
CHLORIDE SERPL-SCNC: 107 MMOL/L (ref 100–108)
CO2 SERPL-SCNC: 27 MMOL/L (ref 21–32)
CREAT SERPL-MCNC: 0.94 MG/DL (ref 0.6–1.3)
EOSINOPHIL # BLD AUTO: 0.35 THOUSAND/ΜL (ref 0–0.61)
EOSINOPHIL NFR BLD AUTO: 6 % (ref 0–6)
ERYTHROCYTE [DISTWIDTH] IN BLOOD BY AUTOMATED COUNT: 14.1 % (ref 11.6–15.1)
GFR SERPL CREATININE-BSD FRML MDRD: 87 ML/MIN/1.73SQ M
GLUCOSE P FAST SERPL-MCNC: 101 MG/DL (ref 65–99)
GLUCOSE SERPL-MCNC: 101 MG/DL (ref 65–140)
HCT VFR BLD AUTO: 38.9 % (ref 36.5–49.3)
HGB BLD-MCNC: 12.7 G/DL (ref 12–17)
IMM GRANULOCYTES # BLD AUTO: 0.03 THOUSAND/UL (ref 0–0.2)
IMM GRANULOCYTES NFR BLD AUTO: 1 % (ref 0–2)
LYMPHOCYTES # BLD AUTO: 2.08 THOUSANDS/ΜL (ref 0.6–4.47)
LYMPHOCYTES NFR BLD AUTO: 36 % (ref 14–44)
MCH RBC QN AUTO: 27.9 PG (ref 26.8–34.3)
MCHC RBC AUTO-ENTMCNC: 32.6 G/DL (ref 31.4–37.4)
MCV RBC AUTO: 86 FL (ref 82–98)
MONOCYTES # BLD AUTO: 0.62 THOUSAND/ΜL (ref 0.17–1.22)
MONOCYTES NFR BLD AUTO: 11 % (ref 4–12)
NEUTROPHILS # BLD AUTO: 2.6 THOUSANDS/ΜL (ref 1.85–7.62)
NEUTS SEG NFR BLD AUTO: 45 % (ref 43–75)
NRBC BLD AUTO-RTO: 0 /100 WBCS
PLATELET # BLD AUTO: 303 THOUSANDS/UL (ref 149–390)
PMV BLD AUTO: 10.2 FL (ref 8.9–12.7)
POTASSIUM SERPL-SCNC: 4.3 MMOL/L (ref 3.5–5.3)
PROT SERPL-MCNC: 7 G/DL (ref 6.4–8.2)
RBC # BLD AUTO: 4.55 MILLION/UL (ref 3.88–5.62)
SODIUM SERPL-SCNC: 141 MMOL/L (ref 136–145)
WBC # BLD AUTO: 5.72 THOUSAND/UL (ref 4.31–10.16)

## 2019-04-11 PROCEDURE — 85025 COMPLETE CBC W/AUTO DIFF WBC: CPT | Performed by: PHYSICAL MEDICINE & REHABILITATION

## 2019-04-11 PROCEDURE — 80053 COMPREHEN METABOLIC PANEL: CPT | Performed by: PHYSICAL MEDICINE & REHABILITATION

## 2019-04-17 ENCOUNTER — OFFICE VISIT (OUTPATIENT)
Dept: SURGERY | Facility: CLINIC | Age: 64
End: 2019-04-17
Payer: MEDICARE

## 2019-04-17 VITALS
WEIGHT: 276 LBS | DIASTOLIC BLOOD PRESSURE: 88 MMHG | SYSTOLIC BLOOD PRESSURE: 138 MMHG | TEMPERATURE: 98.5 F | BODY MASS INDEX: 37.38 KG/M2 | RESPIRATION RATE: 17 BRPM | HEIGHT: 72 IN | HEART RATE: 72 BPM

## 2019-04-17 DIAGNOSIS — S22.020A TRAUMATIC COMPRESSION FRACTURE OF T2 THORACIC VERTEBRA, CLOSED, INITIAL ENCOUNTER (HCC): ICD-10-CM

## 2019-04-17 DIAGNOSIS — S12.120A CLOSED ODONTOID FRACTURE WITH TYPE III MORPHOLOGY, INITIAL ENCOUNTER (HCC): ICD-10-CM

## 2019-04-17 DIAGNOSIS — S22.21XD CLOSED FRACTURE OF MANUBRIUM WITH ROUTINE HEALING, SUBSEQUENT ENCOUNTER: ICD-10-CM

## 2019-04-17 DIAGNOSIS — S42.402D CLOSED FRACTURE OF LEFT ELBOW WITH ROUTINE HEALING, SUBSEQUENT ENCOUNTER: ICD-10-CM

## 2019-04-17 DIAGNOSIS — G89.11 ACUTE PAIN DUE TO TRAUMA: ICD-10-CM
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
## 2019-04-17 PROCEDURE — 99214 OFFICE O/P EST MOD 30 MIN: CPT | Performed by: SURGERY

## 2019-04-18 ENCOUNTER — TELEPHONE (OUTPATIENT)
Dept: SURGERY | Facility: CLINIC | Age: 64
End: 2019-04-18

## 2019-04-22 ENCOUNTER — HOSPITAL ENCOUNTER (OUTPATIENT)
Dept: RADIOLOGY | Facility: HOSPITAL | Age: 64
Discharge: HOME | End: 2019-04-22
Attending: ORTHOPAEDIC SURGERY
Payer: MEDICARE

## 2019-04-22 ENCOUNTER — OFFICE VISIT (OUTPATIENT)
Dept: OBGYN CLINIC | Facility: HOSPITAL | Age: 64
End: 2019-04-22

## 2019-04-22 VITALS
BODY MASS INDEX: 37.52 KG/M2 | SYSTOLIC BLOOD PRESSURE: 132 MMHG | WEIGHT: 277 LBS | HEART RATE: 80 BPM | DIASTOLIC BLOOD PRESSURE: 77 MMHG | HEIGHT: 72 IN

## 2019-04-22 DIAGNOSIS — M54.12 CERVICAL RADICULOPATHY: Primary | ICD-10-CM

## 2019-04-22 DIAGNOSIS — S22.020A TRAUMATIC COMPRESSION FRACTURE OF T2 THORACIC VERTEBRA, CLOSED, INITIAL ENCOUNTER (HCC): ICD-10-CM

## 2019-04-22 DIAGNOSIS — M54.12 CERVICAL RADICULOPATHY: ICD-10-CM

## 2019-04-22 DIAGNOSIS — S12.120A CLOSED ODONTOID FRACTURE WITH TYPE III MORPHOLOGY, INITIAL ENCOUNTER (HCC): ICD-10-CM

## 2019-04-22 PROCEDURE — 72070 X-RAY EXAM THORAC SPINE 2VWS: CPT

## 2019-04-22 PROCEDURE — 99024 POSTOP FOLLOW-UP VISIT: CPT | Performed by: ORTHOPAEDIC SURGERY

## 2019-04-22 PROCEDURE — 72040 X-RAY EXAM NECK SPINE 2-3 VW: CPT

## 2019-04-24 ENCOUNTER — TELEPHONE (OUTPATIENT)
Dept: OBGYN CLINIC | Facility: HOSPITAL | Age: 64
End: 2019-04-24

## 2019-04-26 ENCOUNTER — TELEPHONE (OUTPATIENT)
Dept: OBGYN CLINIC | Facility: HOSPITAL | Age: 64
End: 2019-04-26

## 2019-05-02 ENCOUNTER — TELEPHONE (OUTPATIENT)
Dept: OBGYN CLINIC | Facility: HOSPITAL | Age: 64
End: 2019-05-02

## 2019-05-09 ENCOUNTER — TELEPHONE (OUTPATIENT)
Dept: GASTROENTEROLOGY | Facility: CLINIC | Age: 64
End: 2019-05-09

## 2019-05-09 DIAGNOSIS — K76.0 NAFLD (NONALCOHOLIC FATTY LIVER DISEASE): ICD-10-CM

## 2019-05-09 DIAGNOSIS — K51.90 ULCERATIVE COLITIS WITHOUT COMPLICATIONS, UNSPECIFIED LOCATION (HCC): Primary | Chronic | ICD-10-CM

## 2019-05-09 DIAGNOSIS — R79.89 ELEVATED LFTS: Primary | ICD-10-CM

## 2019-05-13 ENCOUNTER — APPOINTMENT (OUTPATIENT)
Dept: LAB | Facility: HOSPITAL | Age: 64
End: 2019-05-13
Payer: MEDICARE

## 2019-05-13 DIAGNOSIS — K51.90 ULCERATIVE COLITIS WITHOUT COMPLICATIONS, UNSPECIFIED LOCATION (HCC): ICD-10-CM

## 2019-05-13 LAB
ALBUMIN SERPL BCP-MCNC: 3.6 G/DL (ref 3.5–5)
ALP SERPL-CCNC: 96 U/L (ref 46–116)
ALT SERPL W P-5'-P-CCNC: 51 U/L (ref 12–78)
ANION GAP SERPL CALCULATED.3IONS-SCNC: 9 MMOL/L (ref 4–13)
AST SERPL W P-5'-P-CCNC: 23 U/L (ref 5–45)
BILIRUB SERPL-MCNC: 0.5 MG/DL (ref 0.2–1)
BUN SERPL-MCNC: 13 MG/DL (ref 5–25)
CALCIUM SERPL-MCNC: 9.2 MG/DL (ref 8.3–10.1)
CHLORIDE SERPL-SCNC: 105 MMOL/L (ref 100–108)
CO2 SERPL-SCNC: 28 MMOL/L (ref 21–32)
CREAT SERPL-MCNC: 0.97 MG/DL (ref 0.6–1.3)
GFR SERPL CREATININE-BSD FRML MDRD: 84 ML/MIN/1.73SQ M
GLUCOSE SERPL-MCNC: 100 MG/DL (ref 65–140)
POTASSIUM SERPL-SCNC: 4.5 MMOL/L (ref 3.5–5.3)
PROT SERPL-MCNC: 7.7 G/DL (ref 6.4–8.2)
SODIUM SERPL-SCNC: 142 MMOL/L (ref 136–145)

## 2019-05-13 PROCEDURE — 80053 COMPREHEN METABOLIC PANEL: CPT

## 2019-05-13 PROCEDURE — 86038 ANTINUCLEAR ANTIBODIES: CPT

## 2019-05-13 PROCEDURE — 36415 COLL VENOUS BLD VENIPUNCTURE: CPT

## 2019-05-16 ENCOUNTER — TELEPHONE (OUTPATIENT)
Dept: GASTROENTEROLOGY | Facility: CLINIC | Age: 64
End: 2019-05-16

## 2019-05-16 LAB — RYE IGE QN: NEGATIVE

## 2019-05-20 ENCOUNTER — TELEPHONE (OUTPATIENT)
Dept: GASTROENTEROLOGY | Facility: CLINIC | Age: 64
End: 2019-05-20

## 2019-05-23 ENCOUNTER — HOSPITAL ENCOUNTER (OUTPATIENT)
Dept: RADIOLOGY | Facility: HOSPITAL | Age: 64
Discharge: HOME | End: 2019-05-23
Attending: ORTHOPAEDIC SURGERY
Payer: MEDICARE

## 2019-05-23 ENCOUNTER — OFFICE VISIT (OUTPATIENT)
Dept: OBGYN CLINIC | Facility: HOSPITAL | Age: 64
End: 2019-05-23
Payer: MEDICARE

## 2019-05-23 VITALS
HEART RATE: 80 BPM | BODY MASS INDEX: 36.44 KG/M2 | DIASTOLIC BLOOD PRESSURE: 75 MMHG | WEIGHT: 269 LBS | SYSTOLIC BLOOD PRESSURE: 148 MMHG | HEIGHT: 72 IN

## 2019-05-23 DIAGNOSIS — S22.020A TRAUMATIC COMPRESSION FRACTURE OF T2 THORACIC VERTEBRA, CLOSED, INITIAL ENCOUNTER (HCC): ICD-10-CM

## 2019-05-23 DIAGNOSIS — M54.12 CERVICAL RADICULOPATHY: ICD-10-CM

## 2019-05-23 DIAGNOSIS — S12.120A CLOSED ODONTOID FRACTURE WITH TYPE III MORPHOLOGY, INITIAL ENCOUNTER (HCC): Primary | ICD-10-CM

## 2019-05-23 PROCEDURE — 72070 X-RAY EXAM THORAC SPINE 2VWS: CPT

## 2019-05-23 PROCEDURE — 99213 OFFICE O/P EST LOW 20 MIN: CPT | Performed by: ORTHOPAEDIC SURGERY

## 2019-05-23 PROCEDURE — 72040 X-RAY EXAM NECK SPINE 2-3 VW: CPT

## 2019-06-24 ENCOUNTER — HOSPITAL ENCOUNTER (OUTPATIENT)
Dept: RADIOLOGY | Facility: HOSPITAL | Age: 64
Discharge: HOME | End: 2019-06-24
Attending: ORTHOPAEDIC SURGERY
Payer: MEDICARE

## 2019-06-24 ENCOUNTER — OFFICE VISIT (OUTPATIENT)
Dept: OBGYN CLINIC | Facility: HOSPITAL | Age: 64
End: 2019-06-24
Payer: MEDICARE

## 2019-06-24 VITALS
WEIGHT: 269 LBS | HEART RATE: 88 BPM | HEIGHT: 72 IN | SYSTOLIC BLOOD PRESSURE: 135 MMHG | BODY MASS INDEX: 36.44 KG/M2 | DIASTOLIC BLOOD PRESSURE: 85 MMHG

## 2019-06-24 DIAGNOSIS — S12.120A CLOSED ODONTOID FRACTURE WITH TYPE III MORPHOLOGY, INITIAL ENCOUNTER (HCC): ICD-10-CM

## 2019-06-24 DIAGNOSIS — S12.120A CLOSED ODONTOID FRACTURE WITH TYPE III MORPHOLOGY, INITIAL ENCOUNTER (HCC): Primary | ICD-10-CM

## 2019-06-24 PROCEDURE — 99213 OFFICE O/P EST LOW 20 MIN: CPT | Performed by: ORTHOPAEDIC SURGERY

## 2019-06-24 PROCEDURE — 72040 X-RAY EXAM NECK SPINE 2-3 VW: CPT

## 2019-06-28 ENCOUNTER — EVALUATION (OUTPATIENT)
Dept: PHYSICAL THERAPY | Facility: CLINIC | Age: 64
End: 2019-06-28
Payer: MEDICARE

## 2019-06-28 DIAGNOSIS — S12.100S CLOSED ODONTOID FRACTURE, SEQUELA: Primary | ICD-10-CM

## 2019-06-28 DIAGNOSIS — S12.120A CLOSED ODONTOID FRACTURE WITH TYPE III MORPHOLOGY, INITIAL ENCOUNTER (HCC): ICD-10-CM

## 2019-06-28 PROCEDURE — 97110 THERAPEUTIC EXERCISES: CPT

## 2019-06-28 PROCEDURE — 97162 PT EVAL MOD COMPLEX 30 MIN: CPT

## 2019-07-03 ENCOUNTER — OFFICE VISIT (OUTPATIENT)
Dept: PHYSICAL THERAPY | Facility: CLINIC | Age: 64
End: 2019-07-03
Payer: MEDICARE

## 2019-07-03 DIAGNOSIS — S12.100S CLOSED ODONTOID FRACTURE, SEQUELA: Primary | ICD-10-CM

## 2019-07-03 PROCEDURE — 97112 NEUROMUSCULAR REEDUCATION: CPT | Performed by: PHYSICAL THERAPIST

## 2019-07-03 PROCEDURE — 97140 MANUAL THERAPY 1/> REGIONS: CPT | Performed by: PHYSICAL THERAPIST

## 2019-07-03 PROCEDURE — 97110 THERAPEUTIC EXERCISES: CPT | Performed by: PHYSICAL THERAPIST

## 2019-07-03 NOTE — PROGRESS NOTES
Daily Note     Today's date: 7/3/2019  Patient name: Nessa Goodwin  : 1955  MRN: [id number]  Referring provider: Chanel Longoria MD  Dx:   Encounter Diagnosis     ICD-10-CM    1  Closed odontoid fracture, sequela S12 100S                   Subjective: Patient states he was stiff following IE, but he notes improved motion  He states he just d/c-ed collar full time on 19  Objective: See treatment diary below      Manual   6/28  7/3                                          Upper trap, scalene/rhomb/supraspina DTMassage KS                     occipital soft tissue release  KS                      STR UT/CSP   15'                                               Exercise Diary  6/28   7/3      C/s flex 10x :05 10x :05      C/s ext 10 x :05 10x :05      C/s Lateral flex 10 x :05 ea 10x :05      C/s Rotation 10 x :05 ea 10x :05      Shoulder shrugs  10 x :05 10x :05      Shoulder circles 10 x :05 PSR x 30      Scapular add 10 x :05 x30      Cervical retraction- seated   x20      Cervical retraction supine  5"x10      Pt Edu- posture   KS                                                                                           Modalities  7/3      HP CSP   5+10'                                  Assessment: Tolerated treatment fair  Difficulty performing AROM in sitting, most likely due to muscular tightness  However, improved ROM in supine  Patient advised to perform exercises through painfree range  Moist hot pack post session successful in partially reducing muscular soreness  Patient would benefit from continued PT      Plan: Continue per plan of care

## 2019-07-10 ENCOUNTER — APPOINTMENT (OUTPATIENT)
Dept: PHYSICAL THERAPY | Facility: CLINIC | Age: 64
End: 2019-07-10
Payer: MEDICARE

## 2019-07-12 ENCOUNTER — OFFICE VISIT (OUTPATIENT)
Dept: PHYSICAL THERAPY | Facility: CLINIC | Age: 64
End: 2019-07-12
Payer: MEDICARE

## 2019-07-12 DIAGNOSIS — S12.100D CLOSED ODONTOID FRACTURE WITH ROUTINE HEALING, SUBSEQUENT ENCOUNTER: Primary | ICD-10-CM

## 2019-07-12 PROCEDURE — 97014 ELECTRIC STIMULATION THERAPY: CPT | Performed by: PHYSICAL THERAPIST

## 2019-07-12 PROCEDURE — 97140 MANUAL THERAPY 1/> REGIONS: CPT | Performed by: PHYSICAL THERAPIST

## 2019-07-12 NOTE — PROGRESS NOTES
Daily Note     Today's date: 2019  Patient name: Shaka Scott  : 1955  MRN: [id number]  Referring provider: Byron Kawasaki, MD  Dx:   Encounter Diagnosis     ICD-10-CM    1  Closed odontoid fracture, sequela S12 100S    2  Closed odontoid fracture with type III morphology, [facility] S12 120A                   Subjective: Patient states he has been more sore since d/c-ing collar, driving, and doing exercises  Objective: See treatment diary below      Manual   6/28  7/3  7/12                                        Upper trap, scalene/rhomb/supraspina DTMassage KS                     occipital soft tissue release  KS                      STR UT/CSP   15'  15'                                             Exercise Diary  6/28   7/3 7/12     C/s flex 10x :05 10x :05      C/s ext 10 x :05 10x :05      C/s Lateral flex 10 x :05 ea 10x :05      C/s Rotation 10 x :05 ea 10x :05      Shoulder shrugs  10 x :05 10x :05      Shoulder circles 10 x :05 PSR x 30      Scapular add 10 x :05 x30      Cervical retraction- seated   x20      Cervical retraction supine  5"x10      Pt Edu- posture   KS                                                                                           Modalities  7/3 7/12     HP CSP   5+10' Pre- 10'     Hwave e-stim B UT   Pre- 10'                         Assessment: Tolerated treatment fair  Inclusion of heat/stim and only soft tissue release this visit with a goal of pain reduction  Held on exercises and patient encouraged to reduce painful activity at home (driving, sitting in recliner with his head pushed forward, etc)  Patient advised to call physician if pain persists; he may benefit from soft collar  Patient would benefit from continued PT  Plan: Continue per plan of care

## 2019-07-16 ENCOUNTER — TELEPHONE (OUTPATIENT)
Dept: OBGYN CLINIC | Facility: CLINIC | Age: 64
End: 2019-07-16

## 2019-07-16 DIAGNOSIS — S12.120A CLOSED ODONTOID FRACTURE WITH TYPE III MORPHOLOGY, INITIAL ENCOUNTER (HCC): Primary | ICD-10-CM

## 2019-07-16 RX ORDER — METHOCARBAMOL 500 MG/1
500 TABLET, FILM COATED ORAL 4 TIMES DAILY PRN
Qty: 30 TABLET | Refills: 0 | Status: SHIPPED | OUTPATIENT
Start: 2019-07-16 | End: 2020-12-24 | Stop reason: ALTCHOICE

## 2019-07-16 NOTE — TELEPHONE ENCOUNTER
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  Best contact # [phone]    Thank you

## 2019-07-16 NOTE — TELEPHONE ENCOUNTER
Spoke with the patient  He states the symptoms feel like muscle spasms in his neck that is limiting his motion  He denies any fall or trauma - he denies any upper extremity radicular symptoms  He will continue with heat as well as OTC anti-inflammatory medication  I also sent Robaxin to his pharmacy to help with the spasms  He is aware - he will keep us updated if this helps   Thanks, no need to call him

## 2019-07-17 ENCOUNTER — OFFICE VISIT (OUTPATIENT)
Dept: PHYSICAL THERAPY | Facility: CLINIC | Age: 64
End: 2019-07-17
Payer: MEDICARE

## 2019-07-17 ENCOUNTER — OFFICE VISIT (OUTPATIENT)
Dept: GASTROENTEROLOGY | Facility: CLINIC | Age: 64
End: 2019-07-17
Payer: MEDICARE

## 2019-07-17 VITALS
WEIGHT: 265.25 LBS | BODY MASS INDEX: 35.97 KG/M2 | SYSTOLIC BLOOD PRESSURE: 128 MMHG | HEART RATE: 79 BPM | DIASTOLIC BLOOD PRESSURE: 88 MMHG

## 2019-07-17 DIAGNOSIS — K76.0 NAFLD (NONALCOHOLIC FATTY LIVER DISEASE): ICD-10-CM

## 2019-07-17 DIAGNOSIS — S12.100D CLOSED ODONTOID FRACTURE WITH ROUTINE HEALING, SUBSEQUENT ENCOUNTER: Primary | ICD-10-CM

## 2019-07-17 DIAGNOSIS — K51.819 OTHER ULCERATIVE COLITIS WITH COMPLICATION (HCC): Primary | ICD-10-CM

## 2019-07-17 PROCEDURE — 97014 ELECTRIC STIMULATION THERAPY: CPT | Performed by: PHYSICAL THERAPIST

## 2019-07-17 PROCEDURE — 99214 OFFICE O/P EST MOD 30 MIN: CPT | Performed by: PHYSICIAN ASSISTANT

## 2019-07-17 PROCEDURE — 97110 THERAPEUTIC EXERCISES: CPT | Performed by: PHYSICAL THERAPIST

## 2019-07-17 PROCEDURE — 97140 MANUAL THERAPY 1/> REGIONS: CPT | Performed by: PHYSICAL THERAPIST

## 2019-07-17 RX ORDER — BALSALAZIDE DISODIUM 750 MG/1
2250 CAPSULE ORAL 3 TIMES DAILY
Qty: 270 CAPSULE | Refills: 2 | Status: SHIPPED | OUTPATIENT
Start: 2019-07-17 | End: 2020-07-16 | Stop reason: SDUPTHER

## 2019-07-17 NOTE — PROGRESS NOTES
Daily Note     Today's date: 2019  Patient name: Nelson Matute  : 1955  MRN: [id number]  Referring provider: Freddie Joel MD  Dx:   Encounter Diagnosis     ICD-10-CM    1  Closed odontoid fracture with routine healing, subsequent encounter S12 100D                   Subjective: Patient states he spoke with referring physician's office due to persistent pain  He was prescribed a muscle relaxer and told to "take it easy"  He does not have a follow up with physician, but one may be scheduled if pain persists following use of muscle relaxer  Objective: See treatment diary below      Manual   6/28  7/3  7/12  7/17                                      Upper trap, scalene/rhomb/supraspina DTMassage KS                     occipital soft tissue release  KS                      STR UT/CSP   15'  15'  15'                                         Increased time spent on patient education and activity modification  Exercise Diary  6/28   7/3 7/12 7/17    C/s flex 10x :05 10x :05  HOLD    C/s ext 10 x :05 10x :05  HOLD    C/s Lateral flex 10 x :05 ea 10x :05  HOLD    C/s Rotation 10 x :05 ea 10x :05  HOLD    Shoulder shrugs  10 x :05 10x :05      Shoulder circles 10 x :05 PSR x 30      Scapular add 10 x :05 x30  x20    Cervical retraction- seated   x20      Cervical retraction supine  5"x10      Pt Edu- posture   KS   KS    Light cervical flexion/extension isometrics    5" x 5 ea    RetroUBE (no resistance)    x2 min                                                                        Modalities  7/3 7/12 7/17    HP CSP   5+10' Pre- 10' Post 10'    Hwave e-stim B UT   Pre- 10' Post 10'                        Assessment: Tolerated treatment fair  Patient is most likely experiencing pain due to too much activity upon coming out of the collar  He has been educated in only painfree activity  He is advised not to drive due to not having enough cervical rotation   He has been educated in posture, as he exhibits forward head/forward shoulder positioning which is increasing cervical stresses  No home exercises prescribed to allow patient to rest  No complaints post session  Patient would benefit from continued PT  Plan: Continue per plan of care  Will re-assess at next visit

## 2019-07-17 NOTE — PROGRESS NOTES
[facility]  Sydnee Orozco 60 y o  male MRN: [de-identified]       CC:  Follow-up for elevated LFTs and mild ulcerative colitis    HPI:  Marva Briggs is a 81-year-old male with history of ulcerative colitis, colon polyps, lumbar radiculopathy on chronic opioids, and hepatitis-C with no viral load  Patient was seen by us for liver workup  At that time, he was found to have hyperlipidemia  Patient did also have a positive RUSTY, which was repeated and was negative  Patient reports that he has lost weight since our last follow-up in January  He has lost about 10 lb since January  His liver enzymes were normal in May  In terms as ulcerative colitis, the patient reports that he has had no hematochezia or diarrhea  He denies abdominal pain  He reports constipation at times since being on chronic opioids  He recently suffered a fall and sustained a cervical spine fracture  Patient's last colonoscopy was in January 2019  Patient was reported to have mild ulcerative colitis in the proximal colon  Otherwise, the remaining colon appeared normal   Diverticulosis noted as well  Review of Systems:    CONSTITUTIONAL: Denies any fever, chills, or rigors  Good appetite, and no recent weight loss  HEENT: No earache or tinnitus  Denies hearing loss or visual disturbances  CARDIOVASCULAR: No chest pain or palpitations  RESPIRATORY: Denies any cough, hemoptysis, shortness of breath or dyspnea on exertion  GASTROINTESTINAL: As noted in the History of Present Illness  GENITOURINARY: No problems with urination  Denies any hematuria or dysuria  NEUROLOGIC: No dizziness or vertigo, denies headaches  MUSCULOSKELETAL: Denies any muscle or joint pain  SKIN: Denies skin rashes or itching  ENDOCRINE: Denies excessive thirst  Denies intolerance to heat or cold  PSYCHOSOCIAL: Denies depression or anxiety  Denies any recent memory loss         Current Outpatient Medications   Medication Sig Dispense Refill    DULoxetine (CYMBALTA) 60 mg delayed release capsule Take 60 mg by mouth daily  0    ezetimibe (ZETIA) 10 mg tablet Take 10 mg by mouth daily      LYRICA 150 MG capsule   0    oxyCODONE (ROXICODONE) 30 MG immediate release tablet oxycodone 30 mg tablet      acetaminophen (TYLENOL) 325 mg tablet Take 3 tablets (975 mg total) by mouth every 8 (eight) hours (Patient not taking: Reported on 7/17/2019) 30 tablet 0    balsalazide (COLAZAL) 750 mg capsule Take 3 capsules (2,250 mg total) by mouth 3 (three) times a day for 60 days 270 capsule 2    docusate sodium (COLACE) 100 mg capsule Take 1 capsule (100 mg total) by mouth 2 (two) times a day (Patient not taking: Reported on 7/17/2019) 10 capsule 0    lidocaine (LIDODERM) 5 % Apply 1 patch topically daily Remove & Discard patch within 12 hours or as directed by MD (Patient not taking: Reported on 7/17/2019) 30 patch 0    melatonin 3 mg Take 2 tablets (6 mg total) by mouth daily at bedtime (Patient not taking: Reported on 7/17/2019)  0    methocarbamol (ROBAXIN) 500 mg tablet Take 1 tablet (500 mg total) by mouth 4 (four) times a day as needed for muscle spasms (Patient not taking: Reported on 7/17/2019) 30 tablet 0    morphine (MS CONTIN) 30 mg 12 hr tablet Take 30 mg by mouth every 12 (twelve) hours  0    polyethylene glycol (MIRALAX) 17 g packet Take 17 g by mouth daily (Patient not taking: Reported on 7/17/2019) 14 each 0    pregabalin (LYRICA) 150 mg capsule Take 1 capsule (150 mg total) by mouth every 12 (twelve) hours for 10 days 6 capsule 0    senna (SENOKOT) 8 6 mg Take 1 tablet (8 6 mg total) by mouth daily at bedtime (Patient not taking: Reported on 7/17/2019) 120 each 0    zolpidem (AMBIEN) 10 mg tablet Take 1 tablet (10 mg total) by mouth daily at bedtime as needed for sleep for up to 10 days 3 tablet 0     No current facility-administered medications for this visit        Past Medical History:   Diagnosis Date    Back pain     Previous back surgery     rods in the back    Ulcerative colitis [facility])      Past Surgical History:   Procedure Laterality Date    BACK SURGERY      LUMBAR FUSION Right 1/20/2016    Procedure: FUSION LUMBAR  POSTERIOR, TLIF L3-4  Right L3-4 Fascet;  Surgeon: Ronald Valle MD;  Location:  MAIN OR;  Service:    [facility] COLONOSCOPY FLX DX W/COLLJ SPEC WHEN PFRMD N/A 1/24/2019    Procedure: COLONOSCOPY;  Surgeon: Sharona Holm MD;  Location: [facility];   Service: Gastroenterology     Social History     Socioeconomic History    Marital status: /Civil Union     Spouse name: None    Number of children: None    Years of education: None    Highest education level: None   Occupational History    None   Social Needs    Financial resource strain: None    Food insecurity:     Worry: None     Inability: None    Transportation needs:     Medical: None     Non-medical: None   Tobacco Use    Smoking status: Never Smoker    Smokeless tobacco: Never Used   Substance and Sexual Activity    Alcohol use: Never     Frequency: Never     Binge frequency: Never     Comment: pt denies    Drug use: No    Sexual activity: Never   Lifestyle    Physical activity:     Days per week: None     Minutes per session: None    Stress: None   Relationships    Social connections:     Talks on phone: None     Gets together: None     Attends Sikh service: None     Active member of club or organization: None     Attends meetings of clubs or organizations: None     Relationship status: None    Intimate partner violence:     Fear of current or ex partner: None     Emotionally abused: None     Physically abused: None     Forced sexual activity: None   Other Topics Concern    None   Social History Narrative    None     Family History   Problem Relation Age of Onset    Parkinsonism Mother     Lung cancer Father             PHYSICAL EXAM:    Vitals:    07/17/19 1040   BP: 128/88   Pulse: 79   Weight: 120 kg (265 lb 4 oz)     General Appearance:   Alert and oriented x 3  Cooperative, and in no respiratory distress   HEENT:   Normocephalic, atraumatic, anicteric      Neck:  Supple, symmetrical, trachea midline   Lungs:   Clear to auscultation bilaterally    Heart[de-identified]   Regular rate and rhythm   Abdomen:   Soft, non-tender, non-distended; normal bowel sounds; no masses, no organomegaly    Genitalia:   Deferred    Rectal:   Deferred    Extremities:  No cyanosis, clubbing or edema    Pulses:  2+ and symmetric all extremities    Skin:  Skin color, texture, turgor normal, no rashes or lesions    Lymph nodes:  No palpable cervical or supraclavicular lymphadenopathy        Lab Results:   Results from last 6 Months   Lab Units 04/11/19  0709   WBC Thousand/uL 5 72   HEMOGLOBIN g/dL 12 7   HEMATOCRIT % 38 9   PLATELETS Thousands/uL 303   NEUTROS PCT % 45   LYMPHS PCT % 36   MONOS PCT % 11   EOS PCT % 6     Results from last 6 Months   Lab Units 05/13/19  1553   POTASSIUM mmol/L 4 5   CHLORIDE mmol/L 105   CO2 mmol/L 28   BUN mg/dL 13   CREATININE mg/dL 0 97   CALCIUM mg/dL 9 2   ALK PHOS U/L 96   ALT U/L 51   AST U/L 23     Results from last 6 Months   Lab Units 03/29/19  2136   INR  0 99           Imaging Studies: I have personally reviewed pertinent imaging studies  Xr Spine Cervical 2 Or 3 Vw Injury    Result Date: 6/30/2019  Impression: In this patient with known type III dens fracture, there is no evidence of associated malalignment or instability in flexion and extension  Workstation performed: [id number]     Xr Spine Cervical 2 Or 3 Vw Injury    Result Date: 6/30/2019  Impression: Type III dens fracture seen on prior CT is not clearly visible on plain films  There is no malalignment  Workstation performed: [id number]       ASSESSMENT and PLAN:      1) NAFLD - Patient with markedly elevated cholesterol panel during liver workup  Ultrasound also revealing hepatic steatosis    Since then, the patient has lost weight and is paying attention to his diet   He now has normal liver enzymes as of May  Repeat RUSTY was negative  - Continue weight loss and diet modification  - Continued follow-up with his PCP for hyperlipidemia  - Repeat LFTs in 6 months    2) Mild ulcerative colitis - Stable at this time  He was noted to have mild disease in the transverse colon  - Given location, would continue Colazal TID  - Repeat colonoscopy in 5 years  - Patient will follow up sooner if he were to develop symptoms of flare, which we went over        Follow up in 6-12 months

## 2019-07-19 ENCOUNTER — OFFICE VISIT (OUTPATIENT)
Dept: PHYSICAL THERAPY | Facility: CLINIC | Age: 64
End: 2019-07-19
Payer: MEDICARE

## 2019-07-19 DIAGNOSIS — S12.100D CLOSED ODONTOID FRACTURE WITH ROUTINE HEALING, SUBSEQUENT ENCOUNTER: Primary | ICD-10-CM

## 2019-07-19 PROCEDURE — 97140 MANUAL THERAPY 1/> REGIONS: CPT | Performed by: PHYSICAL THERAPIST

## 2019-07-19 PROCEDURE — 97014 ELECTRIC STIMULATION THERAPY: CPT | Performed by: PHYSICAL THERAPIST

## 2019-07-19 NOTE — PROGRESS NOTES
Daily Note     Today's date: 2019  Patient name: Noemy Bernal  : 1955  MRN: [id number]  Referring provider: Latoya Ma MD  Dx:   Encounter Diagnosis     ICD-10-CM    1  Closed odontoid fracture with routine healing, subsequent encounter S12 100D                   Subjective: Patient states he is feeling better  "This is the best I've felt today "       Objective: See treatment diary below      Manual   6/28  7/3  7/12  7/17  7/19                                    Upper trap, scalene/rhomb/supraspina DTMassage KS                     occipital soft tissue release  KS                      STR UT/CSP   15'  15'  15'  10'                                       Increased time spent on patient education and activity modification  Exercise Diary  6/28   7/3 7/12 7/17 7/19    C/s flex 10x :05 10x :05  HOLD     C/s ext 10 x :05 10x :05  HOLD     C/s Lateral flex 10 x :05 ea 10x :05  HOLD     C/s Rotation 10 x :05 ea 10x :05  HOLD     Shoulder shrugs  10 x :05 10x :05       Shoulder circles 10 x :05 PSR x 30   x10    Scapular add 10 x :05 x30  x20     Cervical retraction- seated   x20       Cervical retraction supine  5"x10       Pt Edu- posture   KS   KS KS    Light cervical flexion/extension isometrics    5" x 5 ea 5"x 10 ea    RetroUBE (no resistance)    x2 min X2 5 min                                                                                Modalities  7/3 7/12 7/17 7/19   HP CSP   5+10' Pre- 10' Post 10' 10'   Hwave e-stim B UT   Pre- 10' Post 10' 10'                       Assessment: Tolerated treatment fair  Following light exercise, he does note exacerbation of symptoms  Good response to heat/stim and soft tissue release  No complaints post session  Continued to educate patient on activity modification and restriction and performing all ADLs through painfree range  Patient would benefit from continued PT  Plan: Continue per plan of care

## 2019-07-24 ENCOUNTER — OFFICE VISIT (OUTPATIENT)
Dept: PHYSICAL THERAPY | Facility: CLINIC | Age: 64
End: 2019-07-24
Payer: MEDICARE

## 2019-07-24 DIAGNOSIS — S12.100D CLOSED ODONTOID FRACTURE WITH ROUTINE HEALING, SUBSEQUENT ENCOUNTER: Primary | ICD-10-CM

## 2019-07-24 PROCEDURE — 97014 ELECTRIC STIMULATION THERAPY: CPT | Performed by: PHYSICAL THERAPIST

## 2019-07-24 PROCEDURE — 97110 THERAPEUTIC EXERCISES: CPT | Performed by: PHYSICAL THERAPIST

## 2019-07-24 PROCEDURE — 97140 MANUAL THERAPY 1/> REGIONS: CPT | Performed by: PHYSICAL THERAPIST

## 2019-07-24 NOTE — PROGRESS NOTES
Daily Note     Today's date: 2019  Patient name: Guillermo Alcaraz  : 1955  MRN: [id number]  Referring provider: Macey Stevens MD  Dx:   Encounter Diagnosis     ICD-10-CM    1  Closed odontoid fracture with routine healing, subsequent encounter S12 100D                   Subjective: Patient reports, "I've been doing good the past few days " He reports minimal pain and improved cervical ROM  Objective: See treatment diary below      Manual   6/28  7/3  7/12  7/17  7/19  7/24                                  Upper trap, scalene/rhomb/supraspina DTMassage KS                     occipital soft tissue release  KS                      STR UT/CSP   15'  15'  15'  10  15'                                     Increased time spent on patient education and activity modification  Exercise Diary  6/28   7/3 7/12 7/17 7/19 7/24   C/s flex 10x :05 10x :05  HOLD     C/s ext 10 x :05 10x :05  HOLD     C/s Lateral flex 10 x :05 ea 10x :05  HOLD     C/s Rotation 10 x :05 ea 10x :05  HOLD     Shoulder shrugs  10 x :05 10x :05       Shoulder circles 10 x :05 PSR x 30   x10    Scapular add 10 x :05 x30  x20     Cervical retraction- seated   x20       Cervical retraction supine  5"x10       Pt Edu- posture   KS   KS KS KS   Light cervical flexion/extension isometrics    5" x 5 ea 5"x 10 ea 5"x10 ea   RetroUBE (no resistance)    x2 min X2 5 min x2 min                                                                               Modalities  7/3 7/12 7/17 7/19 7/24    HP CSP   5+10' Pre- 10' Post 10' 10' 10'    Hwave e-stim B UT   Pre- 10' Post 10' 10' 10'                          Assessment: Tolerated treatment fair  Continued poor tolerance to light there ex, but STR performed post session to decrease pain and tightness, which was beneficial   Patient continued to be educated in activity modification and performance of ADLs only through painfree range  Patient would benefit from continued PT          Plan: Continue per plan of care

## 2019-07-26 ENCOUNTER — APPOINTMENT (OUTPATIENT)
Dept: PHYSICAL THERAPY | Facility: CLINIC | Age: 64
End: 2019-07-26
Payer: MEDICARE

## 2019-07-31 ENCOUNTER — APPOINTMENT (OUTPATIENT)
Dept: PHYSICAL THERAPY | Facility: CLINIC | Age: 64
End: 2019-07-31
Payer: MEDICARE

## 2019-08-02 ENCOUNTER — TELEPHONE (OUTPATIENT)
Dept: OBGYN CLINIC | Facility: HOSPITAL | Age: 64
End: 2019-08-02

## 2019-08-02 NOTE — TELEPHONE ENCOUNTER
Patient sees Dr Curiel Pulling  He is calling stating that physical therapy is causing more pain to his neck  He took a few weeks off and is feeling better   He is asking if he can stop the PT?

## 2019-08-05 ENCOUNTER — TELEPHONE (OUTPATIENT)
Dept: GASTROENTEROLOGY | Facility: CLINIC | Age: 64
End: 2019-08-05

## 2019-08-05 NOTE — TELEPHONE ENCOUNTER
Tung Cee - Patient called - medication Balsalazide, has given patient a stomach ache since he started   Please call [phone] or [phone] ty

## 2019-08-06 NOTE — TELEPHONE ENCOUNTER
JULIANN: Spoke with patient  H/O UC    Patient c/o "stomach ache" all day  He has been taking pepto with mild relief  He is eating and drinking well  Denies fever, chills, n/v, change in bowels  Advised patient to start zantac 150mg BID, hold off on using pepto, and okay to use mylanta PRN  He will call us in two weeks if his symptoms continue

## 2019-08-22 ENCOUNTER — TELEPHONE (OUTPATIENT)
Dept: GASTROENTEROLOGY | Facility: CLINIC | Age: 64
End: 2019-08-22

## 2019-08-22 NOTE — TELEPHONE ENCOUNTER
Pt  Angela    Pt called his pcp gave him a script for a PPI  Because the zantac did not Clintry Bamberger is concerned because it states that this is something that he should not be taking for long periods of time

## 2019-08-22 NOTE — TELEPHONE ENCOUNTER
Called and spoke to patient he is going to call later today with the medication name, dose and directions      Stated his pcp said his current acid meds have a lot of aspirin which is possibly causing the symptom flares

## 2019-09-30 NOTE — PROGRESS NOTES
PT DISCHARGE:     Patient has stopped attending physical therapy appointments and has failed to reschedule  At this time, patient to be discharged from current treatment cycle

## 2020-01-06 ENCOUNTER — APPOINTMENT (OUTPATIENT)
Dept: LAB | Facility: HOSPITAL | Age: 65
End: 2020-01-06
Payer: MEDICARE

## 2020-01-06 ENCOUNTER — TRANSCRIBE ORDERS (OUTPATIENT)
Dept: ADMINISTRATIVE | Facility: HOSPITAL | Age: 65
End: 2020-01-06

## 2020-01-06 DIAGNOSIS — E78.5 HYPERLIPIDEMIA, UNSPECIFIED HYPERLIPIDEMIA TYPE: ICD-10-CM

## 2020-01-06 DIAGNOSIS — E78.5 HYPERLIPIDEMIA, UNSPECIFIED HYPERLIPIDEMIA TYPE: Primary | ICD-10-CM

## 2020-01-06 LAB
ALBUMIN SERPL BCP-MCNC: 3.5 G/DL (ref 3.5–5)
ALP SERPL-CCNC: 77 U/L (ref 46–116)
ALT SERPL W P-5'-P-CCNC: 38 U/L (ref 12–78)
ANION GAP SERPL CALCULATED.3IONS-SCNC: 8 MMOL/L (ref 4–13)
AST SERPL W P-5'-P-CCNC: 24 U/L (ref 5–45)
BASOPHILS # BLD AUTO: 0.03 THOUSANDS/ΜL (ref 0–0.1)
BASOPHILS NFR BLD AUTO: 0 % (ref 0–1)
BILIRUB SERPL-MCNC: 0.3 MG/DL (ref 0.2–1)
BUN SERPL-MCNC: 19 MG/DL (ref 5–25)
CALCIUM SERPL-MCNC: 8.8 MG/DL (ref 8.3–10.1)
CHLORIDE SERPL-SCNC: 104 MMOL/L (ref 100–108)
CHOLEST SERPL-MCNC: 194 MG/DL (ref 50–200)
CO2 SERPL-SCNC: 30 MMOL/L (ref 21–32)
CREAT SERPL-MCNC: 0.93 MG/DL (ref 0.6–1.3)
EOSINOPHIL # BLD AUTO: 0.26 THOUSAND/ΜL (ref 0–0.61)
EOSINOPHIL NFR BLD AUTO: 4 % (ref 0–6)
ERYTHROCYTE [DISTWIDTH] IN BLOOD BY AUTOMATED COUNT: 13.7 % (ref 11.6–15.1)
GFR SERPL CREATININE-BSD FRML MDRD: 88 ML/MIN/1.73SQ M
GLUCOSE P FAST SERPL-MCNC: 98 MG/DL (ref 65–99)
HCT VFR BLD AUTO: 49.2 % (ref 36.5–49.3)
HDLC SERPL-MCNC: 55 MG/DL
HGB BLD-MCNC: 15.4 G/DL (ref 12–17)
IMM GRANULOCYTES # BLD AUTO: 0.03 THOUSAND/UL (ref 0–0.2)
IMM GRANULOCYTES NFR BLD AUTO: 0 % (ref 0–2)
LDLC SERPL CALC-MCNC: 116 MG/DL (ref 0–100)
LYMPHOCYTES # BLD AUTO: 1.61 THOUSANDS/ΜL (ref 0.6–4.47)
LYMPHOCYTES NFR BLD AUTO: 24 % (ref 14–44)
MCH RBC QN AUTO: 26.8 PG (ref 26.8–34.3)
MCHC RBC AUTO-ENTMCNC: 31.3 G/DL (ref 31.4–37.4)
MCV RBC AUTO: 86 FL (ref 82–98)
MONOCYTES # BLD AUTO: 0.56 THOUSAND/ΜL (ref 0.17–1.22)
MONOCYTES NFR BLD AUTO: 8 % (ref 4–12)
NEUTROPHILS # BLD AUTO: 4.2 THOUSANDS/ΜL (ref 1.85–7.62)
NEUTS SEG NFR BLD AUTO: 64 % (ref 43–75)
NONHDLC SERPL-MCNC: 139 MG/DL
NRBC BLD AUTO-RTO: 0 /100 WBCS
PLATELET # BLD AUTO: 256 THOUSANDS/UL (ref 149–390)
PMV BLD AUTO: 10.6 FL (ref 8.9–12.7)
POTASSIUM SERPL-SCNC: 4.9 MMOL/L (ref 3.5–5.3)
PROT SERPL-MCNC: 7.7 G/DL (ref 6.4–8.2)
PSA SERPL-MCNC: 1.6 NG/ML (ref 0–4)
RBC # BLD AUTO: 5.75 MILLION/UL (ref 3.88–5.62)
SODIUM SERPL-SCNC: 142 MMOL/L (ref 136–145)
TRIGL SERPL-MCNC: 113 MG/DL
WBC # BLD AUTO: 6.69 THOUSAND/UL (ref 4.31–10.16)

## 2020-01-06 PROCEDURE — 80061 LIPID PANEL: CPT

## 2020-01-06 PROCEDURE — 85025 COMPLETE CBC W/AUTO DIFF WBC: CPT

## 2020-01-06 PROCEDURE — 36415 COLL VENOUS BLD VENIPUNCTURE: CPT

## 2020-01-06 PROCEDURE — 80053 COMPREHEN METABOLIC PANEL: CPT

## 2020-01-06 PROCEDURE — 84153 ASSAY OF PSA TOTAL: CPT

## 2020-06-15 ENCOUNTER — OFFICE VISIT (OUTPATIENT)
Dept: OBGYN CLINIC | Facility: HOSPITAL | Age: 65
End: 2020-06-15
Payer: MEDICARE

## 2020-06-15 ENCOUNTER — HOSPITAL ENCOUNTER (OUTPATIENT)
Dept: RADIOLOGY | Facility: HOSPITAL | Age: 65
Discharge: HOME | End: 2020-06-15
Attending: ORTHOPAEDIC SURGERY
Payer: MEDICARE

## 2020-06-15 VITALS
BODY MASS INDEX: 32.23 KG/M2 | DIASTOLIC BLOOD PRESSURE: 91 MMHG | WEIGHT: 238 LBS | HEART RATE: 94 BPM | SYSTOLIC BLOOD PRESSURE: 151 MMHG | HEIGHT: 72 IN

## 2020-06-15 DIAGNOSIS — M54.16 LUMBAR RADICULOPATHY: ICD-10-CM

## 2020-06-15 DIAGNOSIS — M54.42 CHRONIC BILATERAL LOW BACK PAIN WITH BILATERAL SCIATICA: ICD-10-CM

## 2020-06-15 DIAGNOSIS — G89.29 CHRONIC BILATERAL LOW BACK PAIN WITH BILATERAL SCIATICA: ICD-10-CM

## 2020-06-15 DIAGNOSIS — M54.41 CHRONIC BILATERAL LOW BACK PAIN WITH BILATERAL SCIATICA: ICD-10-CM

## 2020-06-15 DIAGNOSIS — S22.020A TRAUMATIC COMPRESSION FRACTURE OF T2 THORACIC VERTEBRA, CLOSED, INITIAL ENCOUNTER (HCC): ICD-10-CM

## 2020-06-15 DIAGNOSIS — S22.020A TRAUMATIC COMPRESSION FRACTURE OF T2 THORACIC VERTEBRA, CLOSED, INITIAL ENCOUNTER (HCC): Primary | ICD-10-CM

## 2020-06-15 PROCEDURE — 72110 X-RAY EXAM L-2 SPINE 4/>VWS: CPT

## 2020-06-15 PROCEDURE — 99214 OFFICE O/P EST MOD 30 MIN: CPT | Performed by: ORTHOPAEDIC SURGERY

## 2020-06-18 ENCOUNTER — APPOINTMENT (OUTPATIENT)
Dept: LAB | Facility: HOSPITAL | Age: 65
End: 2020-06-18
Attending: ORTHOPAEDIC SURGERY
Payer: MEDICARE

## 2020-06-18 DIAGNOSIS — M54.41 CHRONIC BILATERAL LOW BACK PAIN WITH BILATERAL SCIATICA: ICD-10-CM

## 2020-06-18 DIAGNOSIS — S22.020A TRAUMATIC COMPRESSION FRACTURE OF T2 THORACIC VERTEBRA, CLOSED, INITIAL ENCOUNTER (HCC): ICD-10-CM

## 2020-06-18 DIAGNOSIS — M54.42 CHRONIC BILATERAL LOW BACK PAIN WITH BILATERAL SCIATICA: ICD-10-CM

## 2020-06-18 DIAGNOSIS — G89.29 CHRONIC BILATERAL LOW BACK PAIN WITH BILATERAL SCIATICA: ICD-10-CM

## 2020-06-18 LAB
BUN SERPL-MCNC: 15 MG/DL (ref 5–25)
CREAT SERPL-MCNC: 1.04 MG/DL (ref 0.6–1.3)
GFR SERPL CREATININE-BSD FRML MDRD: 76 ML/MIN/1.73SQ M

## 2020-06-18 PROCEDURE — 84520 ASSAY OF UREA NITROGEN: CPT

## 2020-06-18 PROCEDURE — 82565 ASSAY OF CREATININE: CPT

## 2020-06-18 PROCEDURE — 36415 COLL VENOUS BLD VENIPUNCTURE: CPT

## 2020-06-23 ENCOUNTER — HOSPITAL ENCOUNTER (OUTPATIENT)
Dept: MRI IMAGING | Facility: HOSPITAL | Age: 65
Discharge: HOME | End: 2020-06-23
Attending: ORTHOPAEDIC SURGERY
Payer: MEDICARE

## 2020-06-23 DIAGNOSIS — S22.020A TRAUMATIC COMPRESSION FRACTURE OF T2 THORACIC VERTEBRA, CLOSED, INITIAL ENCOUNTER (HCC): ICD-10-CM

## 2020-06-23 DIAGNOSIS — M54.41 CHRONIC BILATERAL LOW BACK PAIN WITH BILATERAL SCIATICA: ICD-10-CM

## 2020-06-23 DIAGNOSIS — G89.29 CHRONIC BILATERAL LOW BACK PAIN WITH BILATERAL SCIATICA: ICD-10-CM

## 2020-06-23 DIAGNOSIS — M54.42 CHRONIC BILATERAL LOW BACK PAIN WITH BILATERAL SCIATICA: ICD-10-CM

## 2020-06-23 PROCEDURE — A9585 GADOBUTROL INJECTION: HCPCS | Performed by: ORTHOPAEDIC SURGERY

## 2020-06-23 PROCEDURE — 72158 MRI LUMBAR SPINE W/O & W/DYE: CPT

## 2020-06-23 RX ADMIN — GADOBUTROL 12 ML: 604.72 INJECTION INTRAVENOUS at 15:53

## 2020-06-25 ENCOUNTER — HOSPITAL ENCOUNTER (OUTPATIENT)
Dept: ULTRASOUND IMAGING | Facility: HOSPITAL | Age: 65
Discharge: HOME | End: 2020-06-25
Payer: MEDICARE

## 2020-06-25 ENCOUNTER — HOSPITAL ENCOUNTER (EMERGENCY)
Facility: HOSPITAL | Age: 65
Discharge: HOME | End: 2020-06-25
Attending: EMERGENCY MEDICINE | Admitting: EMERGENCY MEDICINE
Payer: MEDICARE

## 2020-06-25 ENCOUNTER — TRANSCRIBE ORDERS (OUTPATIENT)
Dept: ADMINISTRATIVE | Facility: HOSPITAL | Age: 65
End: 2020-06-25

## 2020-06-25 VITALS
DIASTOLIC BLOOD PRESSURE: 95 MMHG | TEMPERATURE: 98.9 F | BODY MASS INDEX: 32.25 KG/M2 | SYSTOLIC BLOOD PRESSURE: 184 MMHG | OXYGEN SATURATION: 93 % | HEIGHT: 72 IN | HEART RATE: 96 BPM | RESPIRATION RATE: 18 BRPM | WEIGHT: 238.1 LBS

## 2020-06-25 DIAGNOSIS — I82.409 DVT (DEEP VENOUS THROMBOSIS) (HCC): Primary | ICD-10-CM

## 2020-06-25 DIAGNOSIS — I82.4Y2 DEEP VEIN THROMBOSIS (DVT) OF PROXIMAL VEIN OF LEFT LOWER EXTREMITY, UNSPECIFIED CHRONICITY (HCC): Primary | ICD-10-CM

## 2020-06-25 DIAGNOSIS — I82.4Y2 DEEP VEIN THROMBOSIS (DVT) OF PROXIMAL VEIN OF LEFT LOWER EXTREMITY, UNSPECIFIED CHRONICITY (HCC): ICD-10-CM

## 2020-06-25 LAB
ALBUMIN SERPL BCP-MCNC: 3.2 G/DL (ref 3.5–5)
ALP SERPL-CCNC: 88 U/L (ref 46–116)
ALT SERPL W P-5'-P-CCNC: 60 U/L (ref 12–78)
ANION GAP SERPL CALCULATED.3IONS-SCNC: 6 MMOL/L (ref 4–13)
APTT PPP: 28 SECONDS (ref 23–37)
AST SERPL W P-5'-P-CCNC: 35 U/L (ref 5–45)
BASOPHILS # BLD AUTO: 0.04 THOUSANDS/ΜL (ref 0–0.1)
BASOPHILS NFR BLD AUTO: 0 % (ref 0–1)
BILIRUB DIRECT SERPL-MCNC: 0.17 MG/DL (ref 0–0.2)
BILIRUB SERPL-MCNC: 0.7 MG/DL (ref 0.2–1)
BUN SERPL-MCNC: 17 MG/DL (ref 5–25)
CALCIUM SERPL-MCNC: 8.6 MG/DL (ref 8.3–10.1)
CHLORIDE SERPL-SCNC: 103 MMOL/L (ref 100–108)
CO2 SERPL-SCNC: 29 MMOL/L (ref 21–32)
CREAT SERPL-MCNC: 1.09 MG/DL (ref 0.6–1.3)
EOSINOPHIL # BLD AUTO: 0.38 THOUSAND/ΜL (ref 0–0.61)
EOSINOPHIL NFR BLD AUTO: 4 % (ref 0–6)
ERYTHROCYTE [DISTWIDTH] IN BLOOD BY AUTOMATED COUNT: 13.4 % (ref 11.6–15.1)
GFR SERPL CREATININE-BSD FRML MDRD: 72 ML/MIN/1.73SQ M
GLUCOSE SERPL-MCNC: 103 MG/DL (ref 65–140)
HCT VFR BLD AUTO: 43.7 % (ref 36.5–49.3)
HGB BLD-MCNC: 13.9 G/DL (ref 12–17)
IMM GRANULOCYTES # BLD AUTO: 0.03 THOUSAND/UL (ref 0–0.2)
IMM GRANULOCYTES NFR BLD AUTO: 0 % (ref 0–2)
INR PPP: 0.99 (ref 0.84–1.19)
LYMPHOCYTES # BLD AUTO: 1.47 THOUSANDS/ΜL (ref 0.6–4.47)
LYMPHOCYTES NFR BLD AUTO: 14 % (ref 14–44)
MCH RBC QN AUTO: 27.4 PG (ref 26.8–34.3)
MCHC RBC AUTO-ENTMCNC: 31.8 G/DL (ref 31.4–37.4)
MCV RBC AUTO: 86 FL (ref 82–98)
MONOCYTES # BLD AUTO: 1.16 THOUSAND/ΜL (ref 0.17–1.22)
MONOCYTES NFR BLD AUTO: 11 % (ref 4–12)
NEUTROPHILS # BLD AUTO: 7.64 THOUSANDS/ΜL (ref 1.85–7.62)
NEUTS SEG NFR BLD AUTO: 71 % (ref 43–75)
NRBC BLD AUTO-RTO: 0 /100 WBCS
PLATELET # BLD AUTO: 253 THOUSANDS/UL (ref 149–390)
PMV BLD AUTO: 10.1 FL (ref 8.9–12.7)
POTASSIUM SERPL-SCNC: 4.2 MMOL/L (ref 3.5–5.3)
PROT SERPL-MCNC: 7.3 G/DL (ref 6.4–8.2)
PROTHROMBIN TIME: 13 SECONDS (ref 11.6–14.5)
RBC # BLD AUTO: 5.08 MILLION/UL (ref 3.88–5.62)
SODIUM SERPL-SCNC: 138 MMOL/L (ref 136–145)
TROPONIN I SERPL-MCNC: <0.02 NG/ML
WBC # BLD AUTO: 10.72 THOUSAND/UL (ref 4.31–10.16)

## 2020-06-25 PROCEDURE — 36415 COLL VENOUS BLD VENIPUNCTURE: CPT | Performed by: EMERGENCY MEDICINE

## 2020-06-25 PROCEDURE — 80076 HEPATIC FUNCTION PANEL: CPT | Performed by: EMERGENCY MEDICINE

## 2020-06-25 PROCEDURE — 99285 EMERGENCY DEPT VISIT HI MDM: CPT | Performed by: EMERGENCY MEDICINE

## 2020-06-25 PROCEDURE — 85025 COMPLETE CBC W/AUTO DIFF WBC: CPT | Performed by: EMERGENCY MEDICINE

## 2020-06-25 PROCEDURE — 96374 THER/PROPH/DIAG INJ IV PUSH: CPT

## 2020-06-25 PROCEDURE — 85730 THROMBOPLASTIN TIME PARTIAL: CPT | Performed by: EMERGENCY MEDICINE

## 2020-06-25 PROCEDURE — 99284 EMERGENCY DEPT VISIT MOD MDM: CPT

## 2020-06-25 PROCEDURE — 80048 BASIC METABOLIC PNL TOTAL CA: CPT | Performed by: EMERGENCY MEDICINE

## 2020-06-25 PROCEDURE — 84484 ASSAY OF TROPONIN QUANT: CPT | Performed by: EMERGENCY MEDICINE

## 2020-06-25 PROCEDURE — 93005 ELECTROCARDIOGRAM TRACING: CPT

## 2020-06-25 PROCEDURE — 93970 EXTREMITY STUDY: CPT

## 2020-06-25 PROCEDURE — 85610 PROTHROMBIN TIME: CPT | Performed by: EMERGENCY MEDICINE

## 2020-06-25 RX ORDER — HYDROMORPHONE HCL/PF 1 MG/ML
1 SYRINGE (ML) INJECTION ONCE
Status: COMPLETED | OUTPATIENT
Start: 2020-06-25 | End: 2020-06-25

## 2020-06-25 RX ADMIN — HYDROMORPHONE HYDROCHLORIDE 1 MG: 1 INJECTION, SOLUTION INTRAMUSCULAR; INTRAVENOUS; SUBCUTANEOUS at 20:09

## 2020-06-25 RX ADMIN — APIXABAN 10 MG: 5 TABLET, FILM COATED ORAL at 20:14

## 2020-06-26 PROCEDURE — 93970 EXTREMITY STUDY: CPT | Performed by: SURGERY

## 2020-06-29 ENCOUNTER — TELEPHONE (OUTPATIENT)
Dept: OBGYN CLINIC | Facility: CLINIC | Age: 65
End: 2020-06-29

## 2020-06-29 NOTE — TELEPHONE ENCOUNTER
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

## 2020-07-01 LAB
ATRIAL RATE: 97 BPM
P AXIS: 24 DEGREES
PR INTERVAL: 150 MS
QRS AXIS: 72 DEGREES
QRSD INTERVAL: 88 MS
QT INTERVAL: 358 MS
QTC INTERVAL: 454 MS
T WAVE AXIS: -29 DEGREES
VENTRICULAR RATE: 97 BPM

## 2020-07-01 PROCEDURE — 93010 ELECTROCARDIOGRAM REPORT: CPT | Performed by: INTERNAL MEDICINE

## 2020-07-16 DIAGNOSIS — K51.819 OTHER ULCERATIVE COLITIS WITH COMPLICATION (HCC): Primary | ICD-10-CM

## 2020-07-16 RX ORDER — BALSALAZIDE DISODIUM 750 MG/1
2250 CAPSULE ORAL 3 TIMES DAILY
Qty: 270 CAPSULE | Refills: 2 | Status: SHIPPED | OUTPATIENT
Start: 2020-07-16 | End: 2021-09-10

## 2020-07-16 RX ORDER — BALSALAZIDE DISODIUM 750 MG/1
2250 CAPSULE ORAL 3 TIMES DAILY
Qty: 270 CAPSULE | Refills: 2 | Status: CANCELLED | OUTPATIENT
Start: 2020-07-16 | End: 2020-09-14

## 2020-07-16 NOTE — TELEPHONE ENCOUNTER
GI Physician: DAVID Guidry      Refill Request for Medication: Balsalazid 750 mg     Dose: 3 times a day     Quantity: 270 capsules     Pharmacy and Location: [facility] on [address] in [address] [place] [address]

## 2020-10-19 ENCOUNTER — OFFICE VISIT (OUTPATIENT)
Dept: GASTROENTEROLOGY | Facility: CLINIC | Age: 65
End: 2020-10-19
Payer: MEDICARE

## 2020-10-19 VITALS
HEART RATE: 83 BPM | BODY MASS INDEX: 39.31 KG/M2 | HEIGHT: 72 IN | WEIGHT: 290.2 LBS | TEMPERATURE: 96.8 F | DIASTOLIC BLOOD PRESSURE: 90 MMHG | SYSTOLIC BLOOD PRESSURE: 148 MMHG

## 2020-10-19 DIAGNOSIS — R11.2 NAUSEA AND VOMITING, INTRACTABILITY OF VOMITING NOT SPECIFIED, UNSPECIFIED VOMITING TYPE: Primary | ICD-10-CM

## 2020-10-19 DIAGNOSIS — T40.2X5A THERAPEUTIC OPIOID INDUCED CONSTIPATION: ICD-10-CM

## 2020-10-19 DIAGNOSIS — K59.03 THERAPEUTIC OPIOID INDUCED CONSTIPATION: ICD-10-CM

## 2020-10-19 DIAGNOSIS — K51.90 ULCERATIVE COLITIS WITHOUT COMPLICATIONS, UNSPECIFIED LOCATION (HCC): Chronic | ICD-10-CM

## 2020-10-19 PROCEDURE — 99214 OFFICE O/P EST MOD 30 MIN: CPT | Performed by: PHYSICIAN ASSISTANT

## 2020-10-19 RX ORDER — ONDANSETRON 4 MG/1
4 TABLET, ORALLY DISINTEGRATING ORAL EVERY 6 HOURS PRN
Qty: 60 TABLET | Refills: 2 | Status: SHIPPED | OUTPATIENT
Start: 2020-10-19 | End: 2021-04-19 | Stop reason: SDUPTHER

## 2020-10-19 RX ORDER — PANTOPRAZOLE SODIUM 40 MG/1
40 TABLET, DELAYED RELEASE ORAL DAILY
COMMUNITY
End: 2021-04-19 | Stop reason: SDUPTHER

## 2020-12-14 ENCOUNTER — OFFICE VISIT (OUTPATIENT)
Dept: GASTROENTEROLOGY | Facility: CLINIC | Age: 65
End: 2020-12-14
Payer: MEDICARE

## 2020-12-14 VITALS
DIASTOLIC BLOOD PRESSURE: 94 MMHG | BODY MASS INDEX: 38.95 KG/M2 | WEIGHT: 287.6 LBS | HEIGHT: 72 IN | HEART RATE: 52 BPM | SYSTOLIC BLOOD PRESSURE: 138 MMHG

## 2020-12-14 DIAGNOSIS — K51.90 ULCERATIVE COLITIS WITHOUT COMPLICATIONS, UNSPECIFIED LOCATION (HCC): ICD-10-CM

## 2020-12-14 DIAGNOSIS — R10.13 EPIGASTRIC PAIN: Primary | ICD-10-CM

## 2020-12-14 PROCEDURE — 99214 OFFICE O/P EST MOD 30 MIN: CPT | Performed by: PHYSICIAN ASSISTANT

## 2020-12-14 RX ORDER — PANTOPRAZOLE SODIUM 40 MG/1
40 TABLET, DELAYED RELEASE ORAL DAILY
Qty: 30 TABLET | Refills: 2 | Status: SHIPPED | OUTPATIENT
Start: 2020-12-14 | End: 2020-12-24 | Stop reason: SDUPTHER

## 2020-12-15 ENCOUNTER — TELEPHONE (OUTPATIENT)
Dept: GASTROENTEROLOGY | Facility: CLINIC | Age: 65
End: 2020-12-15

## 2020-12-23 ENCOUNTER — ANESTHESIA EVENT (OUTPATIENT)
Dept: PERIOP | Facility: HOSPITAL | Age: 65
End: 2020-12-23

## 2020-12-24 ENCOUNTER — ANESTHESIA (OUTPATIENT)
Dept: PERIOP | Facility: HOSPITAL | Age: 65
End: 2020-12-24

## 2020-12-24 ENCOUNTER — HOSPITAL ENCOUNTER (OUTPATIENT)
Dept: PERIOP | Facility: HOSPITAL | Age: 65
Setting detail: OUTPATIENT SURGERY
Discharge: HOME | End: 2020-12-24
Attending: INTERNAL MEDICINE
Payer: MEDICARE

## 2020-12-24 VITALS
SYSTOLIC BLOOD PRESSURE: 117 MMHG | TEMPERATURE: 97.8 F | OXYGEN SATURATION: 94 % | BODY MASS INDEX: 38.97 KG/M2 | WEIGHT: 287.7 LBS | RESPIRATION RATE: 20 BRPM | HEIGHT: 72 IN | DIASTOLIC BLOOD PRESSURE: 80 MMHG | HEART RATE: 55 BPM

## 2020-12-24 VITALS — HEART RATE: 61 BPM

## 2020-12-24 DIAGNOSIS — R10.13 EPIGASTRIC PAIN: ICD-10-CM

## 2020-12-24 PROCEDURE — 43239 EGD BIOPSY SINGLE/MULTIPLE: CPT | Performed by: INTERNAL MEDICINE

## 2020-12-24 PROCEDURE — 88305 TISSUE EXAM BY PATHOLOGIST: CPT | Performed by: PATHOLOGY

## 2020-12-24 RX ORDER — SODIUM CHLORIDE, SODIUM LACTATE, POTASSIUM CHLORIDE, CALCIUM CHLORIDE 600; 310; 30; 20 MG/100ML; MG/100ML; MG/100ML; MG/100ML
INJECTION, SOLUTION INTRAVENOUS CONTINUOUS PRN
Status: DISCONTINUED | OUTPATIENT
Start: 2020-12-24 | End: 2020-12-24

## 2020-12-24 RX ORDER — PROPOFOL 10 MG/ML
INJECTION, EMULSION INTRAVENOUS AS NEEDED
Status: DISCONTINUED | OUTPATIENT
Start: 2020-12-24 | End: 2020-12-24

## 2020-12-24 RX ORDER — SODIUM CHLORIDE, SODIUM LACTATE, POTASSIUM CHLORIDE, CALCIUM CHLORIDE 600; 310; 30; 20 MG/100ML; MG/100ML; MG/100ML; MG/100ML
125 INJECTION, SOLUTION INTRAVENOUS CONTINUOUS
Status: DISCONTINUED | OUTPATIENT
Start: 2020-12-24 | End: 2020-12-28 | Stop reason: HOSPADM

## 2020-12-24 RX ORDER — LIDOCAINE HYDROCHLORIDE 10 MG/ML
0.5 INJECTION, SOLUTION EPIDURAL; INFILTRATION; INTRACAUDAL; PERINEURAL ONCE AS NEEDED
Status: DISCONTINUED | OUTPATIENT
Start: 2020-12-24 | End: 2020-12-28 | Stop reason: HOSPADM

## 2020-12-24 RX ORDER — LIDOCAINE HYDROCHLORIDE 10 MG/ML
INJECTION, SOLUTION EPIDURAL; INFILTRATION; INTRACAUDAL; PERINEURAL AS NEEDED
Status: DISCONTINUED | OUTPATIENT
Start: 2020-12-24 | End: 2020-12-24

## 2020-12-24 RX ORDER — DICYCLOMINE HCL 20 MG
20 TABLET ORAL 3 TIMES DAILY PRN
Qty: 60 TABLET | Refills: 2 | Status: SHIPPED | OUTPATIENT
Start: 2020-12-24 | End: 2021-07-21 | Stop reason: SDUPTHER

## 2020-12-24 RX ADMIN — LIDOCAINE HYDROCHLORIDE 50 MG: 10 INJECTION, SOLUTION EPIDURAL; INFILTRATION; INTRACAUDAL; PERINEURAL at 11:50

## 2020-12-24 RX ADMIN — PROPOFOL 100 MG: 10 INJECTION, EMULSION INTRAVENOUS at 11:50

## 2020-12-24 RX ADMIN — PROPOFOL 20 MG: 10 INJECTION, EMULSION INTRAVENOUS at 11:52

## 2020-12-24 RX ADMIN — PROPOFOL 20 MG: 10 INJECTION, EMULSION INTRAVENOUS at 11:54

## 2020-12-24 RX ADMIN — SODIUM CHLORIDE, SODIUM LACTATE, POTASSIUM CHLORIDE, AND CALCIUM CHLORIDE: .6; .31; .03; .02 INJECTION, SOLUTION INTRAVENOUS at 11:45

## 2020-12-24 RX ADMIN — SODIUM CHLORIDE, SODIUM LACTATE, POTASSIUM CHLORIDE, AND CALCIUM CHLORIDE 125 ML/HR: .6; .31; .03; .02 INJECTION, SOLUTION INTRAVENOUS at 10:48

## 2021-01-04 ENCOUNTER — OFFICE VISIT (OUTPATIENT)
Dept: OBGYN CLINIC | Facility: HOSPITAL | Age: 66
End: 2021-01-04
Payer: MEDICARE

## 2021-01-04 VITALS
WEIGHT: 287 LBS | SYSTOLIC BLOOD PRESSURE: 119 MMHG | HEIGHT: 72 IN | DIASTOLIC BLOOD PRESSURE: 76 MMHG | BODY MASS INDEX: 38.87 KG/M2 | HEART RATE: 70 BPM

## 2021-01-04 DIAGNOSIS — M54.16 LUMBAR RADICULOPATHY: Primary | ICD-10-CM

## 2021-01-04 DIAGNOSIS — G89.29 CHRONIC BILATERAL LOW BACK PAIN WITH RIGHT-SIDED SCIATICA: ICD-10-CM

## 2021-01-04 DIAGNOSIS — M54.41 CHRONIC BILATERAL LOW BACK PAIN WITH RIGHT-SIDED SCIATICA: ICD-10-CM

## 2021-01-04 PROCEDURE — 99214 OFFICE O/P EST MOD 30 MIN: CPT | Performed by: ORTHOPAEDIC SURGERY

## 2021-01-04 NOTE — PROGRESS NOTES
Assessment/Plan:    Pain appears to be neuropathic to the right leg  Patient reports that it started after his TLIF surgery    Recent MRI of the lumbar spine demonstrates no significant stenosis on the right side and fact improved status post laminectomy decompression at L3-4    Recommend continued conservative management  No problem-specific Assessment & Plan notes found for this encounter  Chronic low back pain  · S/p TLIF at L3-L4 in 2016, Dr Nielson  · Lumbar MRI reviewed  · Right foot pain does not correlate with recent lumbar MRI  · Surgery is not recommended at this time  · Follow up as needed        Problem List Items Addressed This Visit        Nervous and Auditory    Lumbar radiculopathy - Primary      Other Visit Diagnoses     Chronic bilateral low back pain with right-sided sciatica                Subjective:      Patient ID: Mark Woods is a 63 y o  male  HPI   The patient presents for follow up of lumbar spine and lumbar MRI review  Today he complains of low back pain with right foot pain  He rates his pain at 8/10  Walking aggravates while rest alleviates  He last had lumbar injections 2+ years ago  He has history of TLIF at L3-L4 in 2016, Dr Nielson  The following portions of the patient's history were reviewed and updated as appropriate: allergies, current medications, past family history, past medical history, past social history, past surgical history and problem list     Review of Systems   Constitutional: Negative for chills, fever and unexpected weight change  HENT: Negative for hearing loss, nosebleeds and sore throat  Eyes: Negative for pain, redness and visual disturbance  Respiratory: Negative for cough, shortness of breath and wheezing  Cardiovascular: Negative for chest pain, palpitations and leg swelling  Gastrointestinal: Negative for abdominal pain, nausea and vomiting  Endocrine: Negative for polydipsia and polyuria     Genitourinary: Negative for difficulty urinating and hematuria  Skin: Negative for rash and wound  Psychiatric/Behavioral: Negative for decreased concentration and suicidal ideas  The patient is not nervous/anxious  Objective:      /76   Pulse 70   Ht 6' (1 829 m)   Wt 130 kg (287 lb)   BMI 38 92 kg/m²          Physical Exam  Constitutional:       Appearance: He is well-developed  HENT:      Right Ear: External ear normal       Left Ear: External ear normal       Nose: Nose normal    Eyes:      Conjunctiva/sclera: Conjunctivae normal    Neck:      Musculoskeletal: Normal range of motion  Pulmonary:      Effort: Pulmonary effort is normal    Skin:     General: Skin is warm and dry  Neurological:      Mental Status: He is alert and oriented to person, place, and time  Psychiatric:         Behavior: Behavior normal          Thought Content: Thought content normal          Judgment: Judgment normal          Patient ambulates without assistance   Modified straight leg raise negative bilaterall   Strength L2-S1 5/5 bilaterally   Sensation L2-S1 intact bilaterally     Imaging:  Lumbar MRI:  S/p L3-L4 fusion  Mild multilevel degenerative changes        Scribe Attestation    I,:  Jaiden Aden am acting as a scribe while in the presence of the attending physician :       I,:  Arvind Mujica MD personally performed the services described in this documentation    as scribed in my presence :

## 2021-01-07 ENCOUNTER — TELEPHONE (OUTPATIENT)
Dept: GASTROENTEROLOGY | Facility: CLINIC | Age: 66
End: 2021-01-07

## 2021-01-07 NOTE — TELEPHONE ENCOUNTER
Esteban/Angela - patient called to find out what is the next test patient should have  Patient thinks it is to be an Ultra Sound?  Please call Toney at [phone] ty

## 2021-01-13 ENCOUNTER — HOSPITAL ENCOUNTER (OUTPATIENT)
Dept: ULTRASOUND IMAGING | Facility: HOSPITAL | Age: 66
Discharge: HOME | End: 2021-01-13
Attending: INTERNAL MEDICINE
Payer: MEDICARE

## 2021-01-13 DIAGNOSIS — R10.13 EPIGASTRIC PAIN: ICD-10-CM

## 2021-01-13 PROCEDURE — 76705 ECHO EXAM OF ABDOMEN: CPT

## 2021-01-14 ENCOUNTER — TELEPHONE (OUTPATIENT)
Dept: GASTROENTEROLOGY | Facility: CLINIC | Age: 66
End: 2021-01-14

## 2021-01-14 NOTE — TELEPHONE ENCOUNTER
----- Message from Maria C Nguyễn MD sent at 1/14/2021  9:29 AM EST -----  Please tell him that the gallbladder normal   Please tell him that he has fatty liver and he needs to lose weight and exercise more

## 2021-01-15 ENCOUNTER — LAB (OUTPATIENT)
Dept: LAB | Facility: HOSPITAL | Age: 66
End: 2021-01-15
Payer: MEDICARE

## 2021-01-15 ENCOUNTER — TRANSCRIBE ORDERS (OUTPATIENT)
Dept: ADMINISTRATIVE | Facility: HOSPITAL | Age: 66
End: 2021-01-15

## 2021-01-15 DIAGNOSIS — G89.29 OTHER CHRONIC PAIN: Primary | ICD-10-CM

## 2021-01-15 DIAGNOSIS — G89.29 OTHER CHRONIC PAIN: ICD-10-CM

## 2021-01-15 LAB
ALBUMIN SERPL BCP-MCNC: 3.5 G/DL (ref 3.5–5)
ALP SERPL-CCNC: 61 U/L (ref 46–116)
ALT SERPL W P-5'-P-CCNC: 28 U/L (ref 12–78)
ANION GAP SERPL CALCULATED.3IONS-SCNC: 6 MMOL/L (ref 4–13)
AST SERPL W P-5'-P-CCNC: 17 U/L (ref 5–45)
BILIRUB SERPL-MCNC: 0.6 MG/DL (ref 0.2–1)
BUN SERPL-MCNC: 19 MG/DL (ref 5–25)
CALCIUM SERPL-MCNC: 8.9 MG/DL (ref 8.3–10.1)
CHLORIDE SERPL-SCNC: 104 MMOL/L (ref 100–108)
CHOLEST SERPL-MCNC: 176 MG/DL (ref 50–200)
CO2 SERPL-SCNC: 29 MMOL/L (ref 21–32)
CREAT SERPL-MCNC: 1.14 MG/DL (ref 0.6–1.3)
GFR SERPL CREATININE-BSD FRML MDRD: 68 ML/MIN/1.73SQ M
GLUCOSE P FAST SERPL-MCNC: 103 MG/DL (ref 65–99)
HDLC SERPL-MCNC: 52 MG/DL
LDLC SERPL CALC-MCNC: 103 MG/DL (ref 0–100)
NONHDLC SERPL-MCNC: 124 MG/DL
POTASSIUM SERPL-SCNC: 4.7 MMOL/L (ref 3.5–5.3)
PROT SERPL-MCNC: 7.4 G/DL (ref 6.4–8.2)
SODIUM SERPL-SCNC: 139 MMOL/L (ref 136–145)
TRIGL SERPL-MCNC: 105 MG/DL

## 2021-01-15 PROCEDURE — 36415 COLL VENOUS BLD VENIPUNCTURE: CPT

## 2021-01-15 PROCEDURE — 80053 COMPREHEN METABOLIC PANEL: CPT

## 2021-01-15 PROCEDURE — 80061 LIPID PANEL: CPT

## 2021-01-26 ENCOUNTER — TELEPHONE (OUTPATIENT)
Dept: GASTROENTEROLOGY | Facility: CLINIC | Age: 66
End: 2021-01-26

## 2021-01-26 NOTE — TELEPHONE ENCOUNTER
Left a voicemail advising of normal liver numbers and kidney fuctions and if he has a question he should contact the office

## 2021-01-28 ENCOUNTER — OFFICE VISIT (OUTPATIENT)
Dept: GASTROENTEROLOGY | Facility: CLINIC | Age: 66
End: 2021-01-28
Payer: MEDICARE

## 2021-01-28 VITALS
DIASTOLIC BLOOD PRESSURE: 78 MMHG | SYSTOLIC BLOOD PRESSURE: 119 MMHG | HEIGHT: 72 IN | WEIGHT: 283.2 LBS | HEART RATE: 77 BPM | BODY MASS INDEX: 38.36 KG/M2

## 2021-01-28 DIAGNOSIS — K62.5 BRBPR (BRIGHT RED BLOOD PER RECTUM): Primary | ICD-10-CM

## 2021-01-28 DIAGNOSIS — K76.0 NAFLD (NONALCOHOLIC FATTY LIVER DISEASE): ICD-10-CM

## 2021-01-28 DIAGNOSIS — K51.90 ULCERATIVE COLITIS WITHOUT COMPLICATIONS, UNSPECIFIED LOCATION (HCC): ICD-10-CM

## 2021-01-28 PROCEDURE — 99214 OFFICE O/P EST MOD 30 MIN: CPT | Performed by: PHYSICIAN ASSISTANT

## 2021-01-28 RX ORDER — HYDROCORTISONE 25 MG/G
CREAM TOPICAL 2 TIMES DAILY
Qty: 28 G | Refills: 0 | Status: SHIPPED | OUTPATIENT
Start: 2021-01-28 | End: 2021-11-02

## 2021-01-28 NOTE — PROGRESS NOTES
[facility]  Brianna Sprain 63 y o  male MRN: [de-identified]       CC:   Follow-up after EGD    HPI:  Mathew Shah is a 63-year-old male with history of ulcerative colitis, colon polyps, lumbar radiculopathy previous on higher dose of opioids, non alcoholic fatty liver disease and hepatitis-C with negative viral load  patient is here to follow-up after he had EGD in December for persistent epigastric pain and nausea  EGD was normal   He had a repeat right upper quadrant ultrasound that was also normal, did not show any gallstones  He has gastric emptying study scheduled for early February  He reports that he has been having less symptoms after he discontinued some of his other maintenance medications as directed by his PCP  He has been utilizing Bentyl for discomfort, and reports that this has been helping him  He is still on pain medication, but has decreased his dosing compared to when I 1st met the patient  He had recent labs including CMP and lipid panel that were within normal limits  Last colonoscopy was in January 2019 revealing mild ulcerative colitis in the proximal colon        Review of Systems:    CONSTITUTIONAL: Denies any fever, chills, or rigors  Good appetite, and no recent weight loss  HEENT: No earache or tinnitus  Denies hearing loss or visual disturbances  CARDIOVASCULAR: No chest pain or palpitations  RESPIRATORY: Denies any cough, hemoptysis, shortness of breath or dyspnea on exertion  GASTROINTESTINAL: As noted in the History of Present Illness  GENITOURINARY: No problems with urination  Denies any hematuria or dysuria  NEUROLOGIC: No dizziness or vertigo, denies headaches  MUSCULOSKELETAL: Denies any muscle or joint pain  SKIN: Denies skin rashes or itching  ENDOCRINE: Denies excessive thirst  Denies intolerance to heat or cold  PSYCHOSOCIAL: Denies depression or anxiety  Denies any recent memory loss         Current Outpatient Medications   Medication Sig Dispense Refill    balsalazide (COLAZAL) 750 mg capsule Take 3 capsules (2,250 mg total) by mouth 3 (three) times a day (Patient taking differently: Take 2,250 mg by mouth 2 (two) times a day ) 270 capsule 2    dicyclomine (BENTYL) 20 mg tablet Take 1 tablet (20 mg total) by mouth 3 (three) times a day as needed (abdominal pain) 60 tablet 2    docusate sodium (COLACE) 100 mg capsule Take 1 capsule (100 mg total) by mouth 2 (two) times a day 10 capsule 0    ezetimibe (ZETIA) 10 mg tablet Take 10 mg by mouth daily      oxyCODONE (ROXICODONE) 30 MG immediate release tablet oxycodone 30 mg tablet      zolpidem (AMBIEN) 10 mg tablet Take 1 tablet (10 mg total) by mouth daily at bedtime as needed for sleep for up to 10 days 3 tablet 0    acetaminophen (TYLENOL) 325 mg tablet Take 3 tablets (975 mg total) by mouth every 8 (eight) hours (Patient not taking: Reported on 1/28/2021) 30 tablet 0    apixaban (Eliquis DVT/PE Starter Pack) 5 mg Take 2 tablets (10 mg total) by mouth 2 (two) times a day for 7 days, THEN 1 tablet (5 mg total) 2 (two) times a day for 23 days   74 tablet 0    DULoxetine (CYMBALTA) 60 mg delayed release capsule Take 60 mg by mouth daily  0    hydrocortisone (ANUSOL-HC) 2 5 % rectal cream Apply topically 2 (two) times a day 28 g 0    lidocaine (LIDODERM) 5 % Apply 1 patch topically daily Remove & Discard patch within 12 hours or as directed by MD (Patient not taking: Reported on 1/28/2021) 30 patch 0    melatonin 3 mg Take 2 tablets (6 mg total) by mouth daily at bedtime (Patient not taking: Reported on 1/28/2021)  0    ondansetron (ZOFRAN-ODT) 4 mg disintegrating tablet Take 1 tablet (4 mg total) by mouth every 6 (six) hours as needed for nausea or vomiting (Patient not taking: Reported on 1/28/2021) 60 tablet 2    pantoprazole (PROTONIX) 40 mg tablet Take 40 mg by mouth daily      polyethylene glycol (MIRALAX) 17 g packet Take 17 g by mouth daily (Patient not taking: Reported on 1/28/2021) 14 each 0    senna (SENOKOT) 8 6 mg Take 1 tablet (8 6 mg total) by mouth daily at bedtime (Patient not taking: Reported on 1/28/2021) 120 each 0     No current facility-administered medications for this visit  Past Medical History:   Diagnosis Date    Back pain     Colon polyp     History of blood clots     Hyperlipidemia     Previous back surgery     rods in the back    Ulcerative colitis [facility])      Past Surgical History:   Procedure Laterality Date    BACK SURGERY      LUMBAR FUSION Right 1/20/2016    Procedure: FUSION LUMBAR  POSTERIOR, TLIF L3-4  Right L3-4 Fascet;  Surgeon: Rogers Boone MD;  Location:  MAIN OR;  Service:    [facility] [place] COLONOSCOPY FLX DX W/COLLJ SPEC WHEN PFRMD N/A 1/24/2019    Procedure: COLONOSCOPY;  Surgeon: Ayde Samuel MD;  [facility];   Service: Gastroenterology    TONSILLECTOMY       Social History     Socioeconomic History    Marital status: /Civil Union     Spouse name: None    Number of children: None    Years of education: None    Highest education level: None   Occupational History    None   Social Needs    Financial resource strain: None    Food insecurity     Worry: None     Inability: None    Transportation needs     Medical: None     Non-medical: None   Tobacco Use    Smoking status: Never Smoker    Smokeless tobacco: Never Used   Substance and Sexual Activity    Alcohol use: Never     Frequency: Never     Binge frequency: Never     Comment: pt denies    Drug use: No    Sexual activity: Never   Lifestyle    Physical activity     Days per week: None     Minutes per session: None    Stress: None   Relationships    Social connections     Talks on phone: None     Gets together: None     Attends Scientologist service: None     Active member of club or organization: None     Attends meetings of clubs or organizations: None     Relationship status: None    Intimate partner violence     Fear of current or ex partner: None Emotionally abused: None     Physically abused: None     Forced sexual activity: None   Other Topics Concern    None   Social History Narrative    None     Family History   Problem Relation Age of Onset    Parkinsonism Mother     Lung cancer Father             PHYSICAL EXAM:    Vitals:    01/28/21 1516   BP: 119/78   Pulse: 77   Weight: 128 kg (283 lb 3 2 oz)   Height: 6' (1 829 m)     General Appearance:   Alert and oriented x 3  Cooperative, and in no respiratory distress   HEENT:   Normocephalic, atraumatic, anicteric      Neck:  Supple, symmetrical, trachea midline   Lungs:   Clear to auscultation bilaterally    Heart[de-identified]   Regular rate and rhythm   Abdomen:   Soft, non-tender, non-distended; normal bowel sounds; no masses, no organomegaly    Genitalia:   Deferred    Rectal:   Deferred    Extremities:  No cyanosis, clubbing or edema    Pulses:  2+ and symmetric all extremities    Skin:  Skin color, texture, turgor normal, no rashes or lesions    Lymph nodes:  No palpable cervical or supraclavicular lymphadenopathy        Lab Results:       Results from last 6 Months   Lab Units 01/15/21  1213   POTASSIUM mmol/L 4 7   CHLORIDE mmol/L 104   CO2 mmol/L 29   BUN mg/dL 19   CREATININE mg/dL 1 14   CALCIUM mg/dL 8 9   ALK PHOS U/L 61   ALT U/L 28   AST U/L 17               Imaging Studies: I have personally reviewed pertinent imaging studies  Us Right Upper Quadrant    Result Date: 1/14/2021  Impression: Hepatomegaly Moderate hepatic steatosis Workstation performed: [id number]       ASSESSMENT and PLAN:      1) Nausea and epigastric pain -  Patient reports that this has slightly improved  No nausea currently  He may have underlying gastroparesis given his history of opioid use  - Will continue to monitor symptoms  - He has gastric emptying study scheduled in February, and I advised the patient to stop his pain medication at least 2 days prior to test if he can    - Continue Bentyl as this has been helping him    2) NAFLD - Continue Vitamin E  Weight loss  Otherwise, LFTs and lipid panel were recently normal      3) UC - Continue Colazal  No diarrhea currently  Scant BRBPR likely hemorrhoidal   - Will given Anusol rectal cream course        Follow up after GES

## 2021-03-24 ENCOUNTER — TELEPHONE (OUTPATIENT)
Dept: OTHER | Facility: OTHER | Age: 66
End: 2021-03-24

## 2021-04-19 ENCOUNTER — OFFICE VISIT (OUTPATIENT)
Dept: GASTROENTEROLOGY | Facility: CLINIC | Age: 66
End: 2021-04-19
Payer: MEDICARE

## 2021-04-19 VITALS
HEART RATE: 74 BPM | WEIGHT: 266.4 LBS | SYSTOLIC BLOOD PRESSURE: 120 MMHG | BODY MASS INDEX: 36.08 KG/M2 | DIASTOLIC BLOOD PRESSURE: 80 MMHG | HEIGHT: 72 IN

## 2021-04-19 DIAGNOSIS — R11.2 NAUSEA AND VOMITING, INTRACTABILITY OF VOMITING NOT SPECIFIED, UNSPECIFIED VOMITING TYPE: ICD-10-CM

## 2021-04-19 DIAGNOSIS — K21.9 GASTROESOPHAGEAL REFLUX DISEASE, UNSPECIFIED WHETHER ESOPHAGITIS PRESENT: Primary | ICD-10-CM

## 2021-04-19 PROCEDURE — 99214 OFFICE O/P EST MOD 30 MIN: CPT | Performed by: PHYSICIAN ASSISTANT

## 2021-04-19 RX ORDER — ONDANSETRON 4 MG/1
4 TABLET, ORALLY DISINTEGRATING ORAL EVERY 6 HOURS PRN
Qty: 60 TABLET | Refills: 2 | Status: SHIPPED | OUTPATIENT
Start: 2021-04-19 | End: 2021-11-02

## 2021-04-19 RX ORDER — PANTOPRAZOLE SODIUM 40 MG/1
40 TABLET, DELAYED RELEASE ORAL DAILY
Qty: 60 TABLET | Refills: 2 | Status: SHIPPED | OUTPATIENT
Start: 2021-04-19 | End: 2022-02-26

## 2021-04-19 NOTE — PROGRESS NOTES
[facility]  Herlinda De Paz 63 y o  male MRN: [de-identified]       CC: Follow-up    HPI:  Saba Mcgovern is a pleasant 77-year-old male with history of ulcerative colitis maintained on Colazal, colon polyps, lumbar radiculopathy previously on higher dose of opioids, ZAMUDIO, hepatitis C with negative viral load and nausea  Patient presents for follow-up  He had recent right upper quadrant ultrasound for nausea that was negative for gallstones  He has known fatty liver disease  He continues to take vitamin E for this  Over the past several months, he has been losing weight both intentionally unintentionally  He continues to have nausea and epigastric pain after meals  Unfortunately, his gastric emptying study was scheduled during a snowstorm  He has a new date on May 10th  Last EGD was in December 2020, which was normal  Last colonoscopy was in January 2019 revealing mild ulcerative colitis in the proximal colon        Review of Systems:    CONSTITUTIONAL: Denies any fever, chills, or rigors  Good appetite, and no recent weight loss  HEENT: No earache or tinnitus  Denies hearing loss or visual disturbances  CARDIOVASCULAR: No chest pain or palpitations  RESPIRATORY: Denies any cough, hemoptysis, shortness of breath or dyspnea on exertion  GASTROINTESTINAL: As noted in the History of Present Illness  GENITOURINARY: No problems with urination  Denies any hematuria or dysuria  NEUROLOGIC: No dizziness or vertigo, denies headaches  MUSCULOSKELETAL: Denies any muscle or joint pain  SKIN: Denies skin rashes or itching  ENDOCRINE: Denies excessive thirst  Denies intolerance to heat or cold  PSYCHOSOCIAL: Denies depression or anxiety  Denies any recent memory loss         Current Outpatient Medications   Medication Sig Dispense Refill    acetaminophen (TYLENOL) 325 mg tablet Take 3 tablets (975 mg total) by mouth every 8 (eight) hours 30 tablet 0    balsalazide (COLAZAL) 750 mg capsule Take 3 capsules (2,250 mg total) by mouth 3 (three) times a day (Patient taking differently: Take 2,250 mg by mouth 2 (two) times a day ) 270 capsule 2    dicyclomine (BENTYL) 20 mg tablet Take 1 tablet (20 mg total) by mouth 3 (three) times a day as needed (abdominal pain) 60 tablet 2    docusate sodium (COLACE) 100 mg capsule Take 1 capsule (100 mg total) by mouth 2 (two) times a day 10 capsule 0    ezetimibe (ZETIA) 10 mg tablet Take 10 mg by mouth daily      hydrocortisone (ANUSOL-HC) 2 5 % rectal cream Apply topically 2 (two) times a day 28 g 0    lidocaine (LIDODERM) 5 % Apply 1 patch topically daily Remove & Discard patch within 12 hours or as directed by MD 30 patch 0    melatonin 3 mg Take 2 tablets (6 mg total) by mouth daily at bedtime  0    ondansetron (ZOFRAN-ODT) 4 mg disintegrating tablet Take 1 tablet (4 mg total) by mouth every 6 (six) hours as needed for nausea or vomiting 60 tablet 2    oxyCODONE (ROXICODONE) 30 MG immediate release tablet oxycodone 30 mg tablet      pantoprazole (PROTONIX) 40 mg tablet Take 1 tablet (40 mg total) by mouth daily 30-60 minutes before breakfast 60 tablet 2    polyethylene glycol (MIRALAX) 17 g packet Take 17 g by mouth daily 14 each 0    senna (SENOKOT) 8 6 mg Take 1 tablet (8 6 mg total) by mouth daily at bedtime 120 each 0    apixaban (Eliquis DVT/PE Starter Pack) 5 mg Take 2 tablets (10 mg total) by mouth 2 (two) times a day for 7 days, THEN 1 tablet (5 mg total) 2 (two) times a day for 23 days  74 tablet 0    DULoxetine (CYMBALTA) 60 mg delayed release capsule Take 60 mg by mouth daily  0    zolpidem (AMBIEN) 10 mg tablet Take 1 tablet (10 mg total) by mouth daily at bedtime as needed for sleep for up to 10 days 3 tablet 0     No current facility-administered medications for this visit        Past Medical History:   Diagnosis Date    Back pain     Colon polyp     History of blood clots     Hyperlipidemia     Previous back surgery     rods in the back  Ulcerative colitis ([facility] [address] )      Past Surgical History:   Procedure Laterality Date    BACK SURGERY      LUMBAR FUSION Right 1/20/2016    Procedure: FUSION LUMBAR  POSTERIOR, TLIF L3-4  Right L3-4 Fascet;  Surgeon: Sindi Gray MD;  Location:  MAIN OR;  Service:    [facility] [place] COLONOSCOPY FLX DX W/COLLJ SPEC WHEN PFRMD N/A 1/24/2019    Procedure: COLONOSCOPY;  Surgeon: Erik Diop MD;  [facility];   Service: Gastroenterology    TONSILLECTOMY       Social History     Socioeconomic History    Marital status: /Civil Union     Spouse name: None    Number of children: None    Years of education: None    Highest education level: None   Occupational History    None   Social Needs    Financial resource strain: None    Food insecurity     Worry: None     Inability: None    Transportation needs     Medical: None     Non-medical: None   Tobacco Use    Smoking status: Never Smoker    Smokeless tobacco: Never Used   Substance and Sexual Activity    Alcohol use: Never     Frequency: Never     Binge frequency: Never     Comment: pt denies    Drug use: No    Sexual activity: Never   Lifestyle    Physical activity     Days per week: None     Minutes per session: None    Stress: None   Relationships    Social connections     Talks on phone: None     Gets together: None     Attends Adventist service: None     Active member of club or organization: None     Attends meetings of clubs or organizations: None     Relationship status: None    Intimate partner violence     Fear of current or ex partner: None     Emotionally abused: None     Physically abused: None     Forced sexual activity: None   Other Topics Concern    None   Social History Narrative    None     Family History   Problem Relation Age of Onset    Parkinsonism Mother     Lung cancer Father             PHYSICAL EXAM:    Vitals:    04/19/21 1509   BP: 120/80   Pulse: 74   Weight: 121 kg (266 lb 6 4 oz)   Height: 6' (1 829 m) General Appearance:   Alert and oriented x 3  Cooperative, and in no respiratory distress   HEENT:   Normocephalic, atraumatic, anicteric      Neck:  Supple, symmetrical, trachea midline   Lungs:   Clear to auscultation bilaterally    Heart[de-identified]   Regular rate and rhythm   Abdomen:   Soft, non-tender, non-distended; normal bowel sounds; no masses, no organomegaly    Genitalia:   Deferred    Rectal:   Deferred    Extremities:  No cyanosis, clubbing or edema    Pulses:  2+ and symmetric all extremities    Skin:  Skin color, texture, turgor normal, no rashes or lesions    Lymph nodes:  No palpable cervical or supraclavicular lymphadenopathy        Lab Results:       Results from last 6 Months   Lab Units 01/15/21  1213   POTASSIUM mmol/L 4 7   CHLORIDE mmol/L 104   CO2 mmol/L 29   BUN mg/dL 19   CREATININE mg/dL 1 14   CALCIUM mg/dL 8 9   ALK PHOS U/L 61   ALT U/L 28   AST U/L 17               Imaging Studies: I have personally reviewed pertinent imaging studies  Us Right Upper Quadrant    Result Date: 1/14/2021  Impression: Hepatomegaly Moderate hepatic steatosis Workstation performed: [id number]       ASSESSMENT and PLAN:      1) Nausea and epigastric pain -  May be secondary to underlying gastroparesis given history of opioid use  For chronic back pain  Unfortunately, he had gastric emptying study scheduled in February but was canceled secondary to snow storm  -  Encourage small frequent meals and avoiding fatty meats and roughage  -  Gastric emptying study scheduled for May 10th  -  Continue PPI once daily  -  Represcribed Zofran to be taken before each meal    2) UC -  In remission  Continue Colazal         Follow up after GES

## 2021-05-18 ENCOUNTER — TELEPHONE (OUTPATIENT)
Dept: GASTROENTEROLOGY | Facility: CLINIC | Age: 66
End: 2021-05-18

## 2021-05-18 NOTE — TELEPHONE ENCOUNTER
Advised pt    He rescheduled his GS in July and will still keep appt since he is having constipation issues'

## 2021-05-18 NOTE — TELEPHONE ENCOUNTER
Sebastian Briceno - patient keeps cancelling his gastric Emptying - Did you still want to see him tomorrow, 05/19/21   Please call Claudia Byrd at [phone]

## 2021-05-18 NOTE — TELEPHONE ENCOUNTER
If he is doing well, I do not need to see him  However, he should still have gastric emptying study done at some point  Thank you

## 2021-05-19 ENCOUNTER — OFFICE VISIT (OUTPATIENT)
Dept: GASTROENTEROLOGY | Facility: CLINIC | Age: 66
End: 2021-05-19
Payer: MEDICARE

## 2021-05-19 VITALS
HEART RATE: 62 BPM | RESPIRATION RATE: 16 BRPM | HEIGHT: 72 IN | BODY MASS INDEX: 36.03 KG/M2 | WEIGHT: 266 LBS | DIASTOLIC BLOOD PRESSURE: 82 MMHG | SYSTOLIC BLOOD PRESSURE: 118 MMHG

## 2021-05-19 DIAGNOSIS — T40.2X5A THERAPEUTIC OPIOID INDUCED CONSTIPATION: ICD-10-CM

## 2021-05-19 DIAGNOSIS — K51.90 ULCERATIVE COLITIS WITHOUT COMPLICATIONS, UNSPECIFIED LOCATION (HCC): Primary | ICD-10-CM

## 2021-05-19 DIAGNOSIS — K51.811 OTHER ULCERATIVE COLITIS WITH RECTAL BLEEDING (HCC): ICD-10-CM

## 2021-05-19 DIAGNOSIS — K59.03 THERAPEUTIC OPIOID INDUCED CONSTIPATION: ICD-10-CM

## 2021-05-19 PROCEDURE — 99214 OFFICE O/P EST MOD 30 MIN: CPT | Performed by: PHYSICIAN ASSISTANT

## 2021-05-19 NOTE — PROGRESS NOTES
[facility]  Eitan Angie 63 y o  male MRN: [de-identified]       CC:    HPI: Dewayne Nicholas is a pleasant 82-year-old male with history of ulcerative colitis maintained on Colazal, colon polyps, lumbar radiculopathy previously on higher dose of opioids, ZAMUDIO, hepatitis C with negative viral load  Patient is here for follow-up, but reports that he has been having worsening constipation for the past 2 weeks  He has been losing weight, and reports that when he increase his exercise he requires more pain medication  Patient reports that previous to this he was going every 3 days, and now even more sparse  Last EGD was in December 2020, which was normal  Last colonoscopy was in January 2019 revealing mild ulcerative colitis in the proximal colon  Review of Systems:    CONSTITUTIONAL: Denies any fever, chills, or rigors  Good appetite, and no recent weight loss  HEENT: No earache or tinnitus  Denies hearing loss or visual disturbances  CARDIOVASCULAR: No chest pain or palpitations  RESPIRATORY: Denies any cough, hemoptysis, shortness of breath or dyspnea on exertion  GASTROINTESTINAL: As noted in the History of Present Illness  GENITOURINARY: No problems with urination  Denies any hematuria or dysuria  NEUROLOGIC: No dizziness or vertigo, denies headaches  MUSCULOSKELETAL: Denies any muscle or joint pain  SKIN: Denies skin rashes or itching  ENDOCRINE: Denies excessive thirst  Denies intolerance to heat or cold  PSYCHOSOCIAL: Denies depression or anxiety  Denies any recent memory loss         Current Outpatient Medications   Medication Sig Dispense Refill    acetaminophen (TYLENOL) 325 mg tablet Take 3 tablets (975 mg total) by mouth every 8 (eight) hours 30 tablet 0    balsalazide (COLAZAL) 750 mg capsule Take 3 capsules (2,250 mg total) by mouth 3 (three) times a day (Patient taking differently: Take 2,250 mg by mouth 2 (two) times a day ) 270 capsule 2    dicyclomine (BENTYL) 20 mg tablet Take 1 tablet (20 mg total) by mouth 3 (three) times a day as needed (abdominal pain) 60 tablet 2    docusate sodium (COLACE) 100 mg capsule Take 1 capsule (100 mg total) by mouth 2 (two) times a day 10 capsule 0    DULoxetine (CYMBALTA) 60 mg delayed release capsule Take 60 mg by mouth daily  0    ezetimibe (ZETIA) 10 mg tablet Take 10 mg by mouth daily      oxyCODONE (ROXICODONE) 30 MG immediate release tablet oxycodone 30 mg tablet      pantoprazole (PROTONIX) 40 mg tablet Take 1 tablet (40 mg total) by mouth daily 30-60 minutes before breakfast 60 tablet 2    polyethylene glycol (MIRALAX) 17 g packet Take 17 g by mouth daily 14 each 0    senna (SENOKOT) 8 6 mg Take 1 tablet (8 6 mg total) by mouth daily at bedtime 120 each 0    apixaban (Eliquis DVT/PE Starter Pack) 5 mg Take 2 tablets (10 mg total) by mouth 2 (two) times a day for 7 days, THEN 1 tablet (5 mg total) 2 (two) times a day for 23 days  (Patient not taking: Reported on 5/19/2021) 74 tablet 0    hydrocortisone (ANUSOL-HC) 2 5 % rectal cream Apply topically 2 (two) times a day (Patient not taking: Reported on 5/19/2021) 28 g 0    lidocaine (LIDODERM) 5 % Apply 1 patch topically daily Remove & Discard patch within 12 hours or as directed by MD (Patient not taking: Reported on 5/19/2021) 30 patch 0    melatonin 3 mg Take 2 tablets (6 mg total) by mouth daily at bedtime (Patient not taking: Reported on 5/19/2021)  0    ondansetron (ZOFRAN-ODT) 4 mg disintegrating tablet Take 1 tablet (4 mg total) by mouth every 6 (six) hours as needed for nausea or vomiting (Patient not taking: Reported on 5/19/2021) 60 tablet 2    zolpidem (AMBIEN) 10 mg tablet Take 1 tablet (10 mg total) by mouth daily at bedtime as needed for sleep for up to 10 days (Patient not taking: Reported on 5/19/2021) 3 tablet 0     No current facility-administered medications for this visit        Past Medical History:   Diagnosis Date    Back pain     Colon polyp     History of blood clots     Hyperlipidemia     Previous back surgery     rods in the back    Ulcerative colitis [facility])      Past Surgical History:   Procedure Laterality Date    BACK SURGERY      LUMBAR FUSION Right 1/20/2016    Procedure: FUSION LUMBAR  POSTERIOR, TLIF L3-4  Right L3-4 Fascet;  Surgeon: Radha Ward MD;  [facility];  Service:    [facility] [place] COLONOSCOPY FLX DX W/COLLJ SPEC WHEN PFRMD N/A 1/24/2019    Procedure: COLONOSCOPY;  Surgeon: Jing Keen MD;  [facility];   Service: Gastroenterology    TONSILLECTOMY       Social History     Socioeconomic History    Marital status: /Civil Union     Spouse name: None    Number of children: None    Years of education: None    Highest education level: None   Occupational History    None   Social Needs    Financial resource strain: None    Food insecurity     Worry: None     Inability: None    Transportation needs     Medical: None     Non-medical: None   Tobacco Use    Smoking status: Never Smoker    Smokeless tobacco: Never Used   Substance and Sexual Activity    Alcohol use: Never     Frequency: Never     Binge frequency: Never     Comment: pt denies    Drug use: No    Sexual activity: Never   Lifestyle    Physical activity     Days per week: None     Minutes per session: None    Stress: None   Relationships    Social connections     Talks on phone: None     Gets together: None     Attends Yarsanism service: None     Active member of club or organization: None     Attends meetings of clubs or organizations: None     Relationship status: None    Intimate partner violence     Fear of current or ex partner: None     Emotionally abused: None     Physically abused: None     Forced sexual activity: None   Other Topics Concern    None   Social History Narrative    None     Family History   Problem Relation Age of Onset    Parkinsonism Mother     Lung cancer Father             PHYSICAL EXAM:    Vitals:    05/19/21 1409 BP: 118/82   BP Location: Left arm   Patient Position: Sitting   Cuff Size: Standard   Pulse: 62   Resp: 16   Weight: 121 kg (266 lb)   Height: 6' (1 829 m)     General Appearance:   Alert and oriented x 3  Cooperative, and in no respiratory distress   HEENT:   Normocephalic, atraumatic, anicteric      Neck:  Supple, symmetrical, trachea midline   Lungs:   Clear to auscultation bilaterally    Heart[de-identified]   Regular rate and rhythm   Abdomen:   Soft, non-tender, non-distended; normal bowel sounds; no masses, no organomegaly    Genitalia:   Deferred    Rectal:   Deferred    Extremities:  No cyanosis, clubbing or edema    Pulses:  2+ and symmetric all extremities    Skin:  Skin color, texture, turgor normal, no rashes or lesions    Lymph nodes:  No palpable cervical or supraclavicular lymphadenopathy        Lab Results:       Results from last 6 Months   Lab Units 01/15/21  1213   POTASSIUM mmol/L 4 7   CHLORIDE mmol/L 104   CO2 mmol/L 29   BUN mg/dL 19   CREATININE mg/dL 1 14   CALCIUM mg/dL 8 9   ALK PHOS U/L 61   ALT U/L 28   AST U/L 17               Imaging Studies: I have personally reviewed pertinent imaging studies  Us Right Upper Quadrant    Result Date: 1/14/2021  Impression: Hepatomegaly Moderate hepatic steatosis Workstation performed: [id number]       ASSESSMENT and PLAN:      1)  Likely opioid induced constipation -  Patient is going every 3 days normally, but now worsened he has required more pain medication   -   Will try Movantik   25 mg daily, patient will call me if he would like a prescription    2)  UC -  Stable on Colazal     3)   Nausea, epigastric pain -  Constipation may be contributing to above  However, I wanted him to have gastric emptying study done  He needed to reschedule this, will be going over the summer  He will continue PPI and Zofran  Follow up after GES

## 2021-07-06 ENCOUNTER — HOSPITAL ENCOUNTER (OUTPATIENT)
Dept: NUCLEAR MEDICINE | Facility: HOSPITAL | Age: 66
Discharge: HOME | End: 2021-07-06
Attending: INTERNAL MEDICINE
Payer: MEDICARE

## 2021-07-06 DIAGNOSIS — R10.13 EPIGASTRIC PAIN: ICD-10-CM

## 2021-07-06 PROCEDURE — 78264 GASTRIC EMPTYING IMG STUDY: CPT

## 2021-07-06 PROCEDURE — A9541 TC99M SULFUR COLLOID: HCPCS

## 2021-07-06 PROCEDURE — G1004 CDSM NDSC: HCPCS

## 2021-07-07 ENCOUNTER — TELEPHONE (OUTPATIENT)
Dept: GASTROENTEROLOGY | Facility: CLINIC | Age: 66
End: 2021-07-07

## 2021-07-19 RX ORDER — CLONAZEPAM 0.5 MG/1
TABLET ORAL
COMMUNITY
Start: 2021-05-27 | End: 2021-11-02

## 2021-07-19 RX ORDER — OXYCODONE 9 MG/1
9 CAPSULE, EXTENDED RELEASE ORAL EVERY 12 HOURS
COMMUNITY
Start: 2021-06-25 | End: 2021-11-02

## 2021-07-21 ENCOUNTER — OFFICE VISIT (OUTPATIENT)
Dept: GASTROENTEROLOGY | Facility: CLINIC | Age: 66
End: 2021-07-21
Payer: MEDICARE

## 2021-07-21 VITALS
SYSTOLIC BLOOD PRESSURE: 126 MMHG | WEIGHT: 264 LBS | BODY MASS INDEX: 35.76 KG/M2 | DIASTOLIC BLOOD PRESSURE: 78 MMHG | RESPIRATION RATE: 16 BRPM | HEART RATE: 74 BPM | HEIGHT: 72 IN

## 2021-07-21 DIAGNOSIS — K51.90 ULCERATIVE COLITIS WITHOUT COMPLICATIONS, UNSPECIFIED LOCATION (HCC): ICD-10-CM

## 2021-07-21 DIAGNOSIS — T40.2X5A THERAPEUTIC OPIOID INDUCED CONSTIPATION: ICD-10-CM

## 2021-07-21 DIAGNOSIS — R10.13 EPIGASTRIC PAIN: ICD-10-CM

## 2021-07-21 DIAGNOSIS — K59.03 THERAPEUTIC OPIOID INDUCED CONSTIPATION: ICD-10-CM

## 2021-07-21 DIAGNOSIS — K76.0 NAFLD (NONALCOHOLIC FATTY LIVER DISEASE): Primary | ICD-10-CM

## 2021-07-21 PROCEDURE — 99214 OFFICE O/P EST MOD 30 MIN: CPT | Performed by: PHYSICIAN ASSISTANT

## 2021-07-21 RX ORDER — DICYCLOMINE HCL 20 MG
20 TABLET ORAL 3 TIMES DAILY PRN
Qty: 60 TABLET | Refills: 3 | Status: SHIPPED | OUTPATIENT
Start: 2021-07-21

## 2021-07-21 NOTE — PROGRESS NOTES
[facility]  Aminta Garcia 61 y o  male MRN: [de-identified]       CC: Continued follow-up    HPI:  Ally Montano is a 81-year-old male known to us for history of ulcerative colitis maintained on Colazal, colon polyps, lumbar radiculopathy on opioids, nonalcoholic fatty liver disease, and hepatitis-C with negative viral load  Patient is here to follow-up, I last saw him in May  He had gastric emptying study for epigastric pain and nausea  Interestingly, his gastric emptying study was normal, actually showed rapid emptying  In terms of his bowel movements, I wanted the patient to trial Movantik, but instead he has been taking MiraLax on a daily basis which has been regulating his bowel movements  Last EGD was in December 2020, which was normal  Last colonoscopy was in January 2019 revealing mild ulcerative colitis in the proximal colon  Review of Systems:    CONSTITUTIONAL: Denies any fever, chills, or rigors  Good appetite, and no recent weight loss  HEENT: No earache or tinnitus  Denies hearing loss or visual disturbances  CARDIOVASCULAR: No chest pain or palpitations  RESPIRATORY: Denies any cough, hemoptysis, shortness of breath or dyspnea on exertion  GASTROINTESTINAL: As noted in the History of Present Illness  GENITOURINARY: No problems with urination  Denies any hematuria or dysuria  NEUROLOGIC: No dizziness or vertigo, denies headaches  MUSCULOSKELETAL: Denies any muscle or joint pain  SKIN: Denies skin rashes or itching  ENDOCRINE: Denies excessive thirst  Denies intolerance to heat or cold  PSYCHOSOCIAL: Denies depression or anxiety  Denies any recent memory loss         Current Outpatient Medications   Medication Sig Dispense Refill    balsalazide (COLAZAL) 750 mg capsule Take 3 capsules (2,250 mg total) by mouth 3 (three) times a day (Patient taking differently: Take 2,250 mg by mouth 2 (two) times a day ) 270 capsule 2    dicyclomine (BENTYL) 20 mg tablet Take 1 tablet (20 mg total) by mouth 3 (three) times a day as needed (abdominal pain) 60 tablet 3    ezetimibe (ZETIA) 10 mg tablet Take 10 mg by mouth daily      oxyCODONE ER 9 MG C12A Take 9 mg by mouth every 12 (twelve) hours      pantoprazole (PROTONIX) 40 mg tablet Take 1 tablet (40 mg total) by mouth daily 30-60 minutes before breakfast 60 tablet 2    Xtampza ER 9 MG C12A Take 9 mg by mouth every 12 (twelve) hours      acetaminophen (TYLENOL) 325 mg tablet Take 3 tablets (975 mg total) by mouth every 8 (eight) hours (Patient not taking: Reported on 7/21/2021) 30 tablet 0    apixaban (Eliquis DVT/PE Starter Pack) 5 mg Take 2 tablets (10 mg total) by mouth 2 (two) times a day for 7 days, THEN 1 tablet (5 mg total) 2 (two) times a day for 23 days   (Patient not taking: Reported on 5/19/2021) 74 tablet 0    clonazePAM (KlonoPIN) 0 5 mg tablet take 1 tablet by mouth nightly if needed for anxiety (Patient not taking: Reported on 7/21/2021)      docusate sodium (COLACE) 100 mg capsule Take 1 capsule (100 mg total) by mouth 2 (two) times a day (Patient not taking: Reported on 7/21/2021) 10 capsule 0    DULoxetine (CYMBALTA) 60 mg delayed release capsule Take 60 mg by mouth daily (Patient not taking: Reported on 7/21/2021)  0    hydrocortisone (ANUSOL-HC) 2 5 % rectal cream Apply topically 2 (two) times a day (Patient not taking: Reported on 5/19/2021) 28 g 0    lidocaine (LIDODERM) 5 % Apply 1 patch topically daily Remove & Discard patch within 12 hours or as directed by MD (Patient not taking: Reported on 5/19/2021) 30 patch 0    melatonin 3 mg Take 2 tablets (6 mg total) by mouth daily at bedtime (Patient not taking: Reported on 5/19/2021)  0    ondansetron (ZOFRAN-ODT) 4 mg disintegrating tablet Take 1 tablet (4 mg total) by mouth every 6 (six) hours as needed for nausea or vomiting (Patient not taking: Reported on 5/19/2021) 60 tablet 2    oxyCODONE (ROXICODONE) 30 MG immediate release tablet oxycodone 30 mg tablet (Patient not taking: Reported on 7/21/2021)      polyethylene glycol (MIRALAX) 17 g packet Take 17 g by mouth daily (Patient not taking: Reported on 7/21/2021) 14 each 0    senna (SENOKOT) 8 6 mg Take 1 tablet (8 6 mg total) by mouth daily at bedtime (Patient not taking: Reported on 7/21/2021) 120 each 0    zolpidem (AMBIEN) 10 mg tablet Take 1 tablet (10 mg total) by mouth daily at bedtime as needed for sleep for up to 10 days (Patient not taking: Reported on 5/19/2021) 3 tablet 0     No current facility-administered medications for this visit  Past Medical History:   Diagnosis Date    Back pain     Colon polyp     History of blood clots     Hyperlipidemia     Previous back surgery     rods in the back    Ulcerative colitis [facility])      Past Surgical History:   Procedure Laterality Date    BACK SURGERY      LUMBAR FUSION Right 1/20/2016    Procedure: FUSION LUMBAR  POSTERIOR, TLIF L3-4  Right L3-4 Fascet;  Surgeon: Boy Sewell MD;  Location:  MAIN OR;  Service:    Travis LEON COLONOSCOPY FLX DX W/COLLJ SPEC WHEN PFRMD N/A 1/24/2019    Procedure: COLONOSCOPY;  Surgeon: Mariano Brooks MD;  Location: [facility];   Service: Gastroenterology    TONSILLECTOMY       Social History     Socioeconomic History    Marital status: /Civil Union     Spouse name: None    Number of children: None    Years of education: None    Highest education level: None   Occupational History    None   Tobacco Use    Smoking status: Never Smoker    Smokeless tobacco: Never Used   Vaping Use    Vaping Use: Never used   Substance and Sexual Activity    Alcohol use: Never     Comment: pt denies    Drug use: No    Sexual activity: Never   Other Topics Concern    None   Social History Narrative    None     Social Determinants of Health     Financial Resource Strain:     Difficulty of Paying Living Expenses:    Food Insecurity:     Worried About Running Out of Food in the Last Year:     Trinity of Food in the Last Year:    Transportation Needs:     Lack of Transportation (Medical):  Lack of Transportation (Non-Medical):    Physical Activity:     Days of Exercise per Week:     Minutes of Exercise per Session:    Stress:     Feeling of Stress :    Social Connections:     Frequency of Communication with Friends and Family:     Frequency of Social Gatherings with Friends and Family:     Attends [employer] Services:     Active Member of Clubs or Organizations:     Attends Club or Organization Meetings:     Marital Status:    Intimate Partner Violence:     Fear of Current or Ex-Partner:     Emotionally Abused:     Physically Abused:     Sexually Abused:      Family History   Problem Relation Age of Onset    Parkinsonism Mother     Lung cancer Father             PHYSICAL EXAM:    Vitals:    07/21/21 1432   BP: 126/78   BP Location: Left arm   Patient Position: Sitting   Cuff Size: Standard   Pulse: 74   Resp: 16   Weight: 120 kg (264 lb)   Height: 6' (1 829 m)     General Appearance:   Alert and oriented x 3  Cooperative, and in no respiratory distress   HEENT:   Normocephalic, atraumatic, anicteric      Neck:  Supple, symmetrical, trachea midline   Lungs:   Clear to auscultation bilaterally    Heart[de-identified]   Regular rate and rhythm   Abdomen:   Soft, non-tender, non-distended; normal bowel sounds; no masses, no organomegaly    Genitalia:   Deferred    Rectal:   Deferred    Extremities:  No cyanosis, clubbing or edema    Pulses:  2+ and symmetric all extremities    Skin:  Skin color, texture, turgor normal, no rashes or lesions    Lymph nodes:  No palpable cervical or supraclavicular lymphadenopathy        Lab Results:             Invalid input(s): LABALBU            Imaging Studies: I have personally reviewed pertinent imaging studies  NM gastric emptying    Result Date: 7/6/2021  Impression: Increased rate of gastric emptying   Workstation performed: [id number]       ASSESSMENT and PLAN:      1)   Likely opioid induced constipation -  Patient is currently doing well on MiraLax on a daily basis, which he may continue to take  If his constipation becomes worse while on MiraLax, I would recommend trialing Movantik  Patient agrees with this plan  2) UC - Continue Colazal      3) NAFLD - Check LFTs  Otherwise, has been normal since he lost 20 pounds this year  Follow up in  6 months

## 2021-07-27 ENCOUNTER — APPOINTMENT (OUTPATIENT)
Dept: LAB | Facility: HOSPITAL | Age: 66
End: 2021-07-27
Payer: MEDICARE

## 2021-07-27 DIAGNOSIS — K76.0 NAFLD (NONALCOHOLIC FATTY LIVER DISEASE): ICD-10-CM

## 2021-07-27 LAB
ALBUMIN SERPL BCP-MCNC: 3.6 G/DL (ref 3.5–5)
ALP SERPL-CCNC: 66 U/L (ref 46–116)
ALT SERPL W P-5'-P-CCNC: 18 U/L (ref 12–78)
AST SERPL W P-5'-P-CCNC: 18 U/L (ref 5–45)
BILIRUB DIRECT SERPL-MCNC: 0.06 MG/DL (ref 0–0.2)
BILIRUB SERPL-MCNC: 0.45 MG/DL (ref 0.2–1)
PROT SERPL-MCNC: 7.6 G/DL (ref 6.4–8.2)

## 2021-07-27 PROCEDURE — 36415 COLL VENOUS BLD VENIPUNCTURE: CPT

## 2021-07-27 PROCEDURE — 80076 HEPATIC FUNCTION PANEL: CPT

## 2021-07-28 ENCOUNTER — TELEPHONE (OUTPATIENT)
Dept: GASTROENTEROLOGY | Facility: CLINIC | Age: 66
End: 2021-07-28

## 2021-07-28 NOTE — TELEPHONE ENCOUNTER
----- Message from Veronica Mar PA-C sent at 7/28/2021  7:58 AM EDT -----  Please let patient know that his liver enzymes are completely normal,  And to keep up the good work  Thank you  Metered blood glucose: 59

## 2021-08-24 ENCOUNTER — APPOINTMENT (OUTPATIENT)
Dept: LAB | Facility: HOSPITAL | Age: 66
End: 2021-08-24
Payer: MEDICARE

## 2021-08-24 ENCOUNTER — HOSPITAL ENCOUNTER (OUTPATIENT)
Dept: RADIOLOGY | Facility: HOSPITAL | Age: 66
Discharge: HOME | End: 2021-08-24
Payer: MEDICARE

## 2021-08-24 DIAGNOSIS — M25.572 ARTHRALGIA OF LEFT FOOT: ICD-10-CM

## 2021-08-24 LAB — URATE SERPL-MCNC: 8.5 MG/DL (ref 4.2–8)

## 2021-08-24 PROCEDURE — 73630 X-RAY EXAM OF FOOT: CPT

## 2021-08-24 PROCEDURE — 36415 COLL VENOUS BLD VENIPUNCTURE: CPT

## 2021-08-24 PROCEDURE — 84550 ASSAY OF BLOOD/URIC ACID: CPT

## 2021-09-27 ENCOUNTER — OFFICE VISIT (OUTPATIENT)
Dept: NEUROSURGERY | Facility: CLINIC | Age: 66
End: 2021-09-27
Payer: MEDICARE

## 2021-09-27 VITALS
SYSTOLIC BLOOD PRESSURE: 119 MMHG | WEIGHT: 239 LBS | DIASTOLIC BLOOD PRESSURE: 80 MMHG | HEART RATE: 70 BPM | RESPIRATION RATE: 16 BRPM | TEMPERATURE: 96.7 F | BODY MASS INDEX: 32.37 KG/M2 | HEIGHT: 72 IN

## 2021-09-27 DIAGNOSIS — G89.4 CHRONIC PAIN SYNDROME: ICD-10-CM

## 2021-09-27 DIAGNOSIS — M54.16 LUMBAR RADICULOPATHY: Primary | ICD-10-CM

## 2021-09-27 DIAGNOSIS — M79.604 RIGHT LEG PAIN: ICD-10-CM

## 2021-09-27 DIAGNOSIS — Z98.1 STATUS POST LUMBAR SPINAL FUSION: ICD-10-CM

## 2021-09-27 DIAGNOSIS — M25.551 PAIN IN RIGHT HIP: ICD-10-CM

## 2021-09-27 PROBLEM — M79.671 CHRONIC FOOT PAIN, RIGHT: Status: ACTIVE | Noted: 2021-09-27

## 2021-09-27 PROBLEM — G89.29 CHRONIC FOOT PAIN, RIGHT: Status: ACTIVE | Noted: 2021-09-27

## 2021-09-27 PROCEDURE — 99203 OFFICE O/P NEW LOW 30 MIN: CPT | Performed by: PHYSICIAN ASSISTANT

## 2021-09-27 RX ORDER — ASPIRIN 81 MG/1
81 TABLET, CHEWABLE ORAL DAILY
COMMUNITY

## 2021-09-27 RX ORDER — FENTANYL 25 UG/H
1 PATCH TRANSDERMAL
COMMUNITY

## 2021-09-27 NOTE — PROGRESS NOTES
Patient ID: Josephine Boyce is a 61 y o  male  Diagnoses and all orders for this visit:    Lumbar radiculopathy  -     MRI lumbar spine with and without contrast; Future  -     XR spine lumbar complete w bending minimum 6 views; Future    Right leg pain  -     MRI lumbar spine with and without contrast; Future  -     XR spine lumbar complete w bending minimum 6 views; Future    Pain in right hip  -     MRI lumbar spine with and without contrast; Future  -     XR spine lumbar complete w bending minimum 6 views; Future    Status post lumbar spinal fusion  -     MRI lumbar spine with and without contrast; Future  -     XR spine lumbar complete w bending minimum 6 views; Future    Chronic pain syndrome  -     XR spine lumbar complete w bending minimum 6 views; Future          Assessment/Plan:    Pleasant 72-year-old male, self-referral, for chronic low back pain, chronic right leg pain and in particular chronic right foot pain  He reports the pain is ongoing since his surgery, L3-L4 TLIF 1/20/16 by Dr Jennifer Baker, although he reports it is worsening, in particular in his right foot  He reports his back pain is from 6-10 on a 1-10, depending on activity, his right leg pain is 6-10, and his right foot pain is 410 with any activity particularly walking, and standing his pain is significantly increased  He does report is better at night when he sleeping  He denies bowel or bladder incontinence, motor or sensory difficulties in the lower extremities, gait or balance issues although he reports he has shortened ambulatory distance now secondary to his right foot pain in particular    He does follow with PM&R at [facility], he has had a spinal cord stimulator trial which he reported gave him no significant relief relief (PM&R note, 2/13/20 reports 50% improvement with trial)  He continues on oxycodone and fentanyl  He has also had consultations relative to the symptoms with Dr Prudence High 1/4/21      He was also seen at the [facility] in [place] relative to his right foot symptoms  There is no recent imaging for review,  he had MRI of the lumbar spine 6/23/20, as well as lumbar spine study 6/15/20  On examination today he is awake, alert, oriented x3, in a standing position he can easily lift on his heel and toes, he can bend at the waist to greater than 90°  Seated straight leg raise is 90° bilaterally, Tino's test is negative bilaterally  Motor exam of the lower extremities is 5 x 5 for power, reflexes patella Achilles are +2 and symmetric, sensation is grossly intact throughout  There is no ankle clonus appreciated  At this juncture updated imaging as requested, including MRI of the lumbar spine with without contrast as well as flexion-extension films of the lumbar spine  Further follow-up is planned after imaging is complete with Dr Sweta Callejas  He understands to continue to follow with pain management per their protocols  These findings, impressions and recommendations were reviewed in great detail with the patient his questions are answered complained to his satisfaction  Return in about 6 weeks (around 11/8/2021) for Review MRI lumbar spine and flexion-extension studies       Chief Complaint  Low back pain, right leg pain, right foot pain  HPI       The following portions of the patient's history were reviewed and updated as appropriate: allergies, current medications, past family history, past medical history, past social history, past surgical history and problem list     Review of Systems   Constitutional: Negative  HENT: Negative  Eyes: Negative  Respiratory: Negative  Cardiovascular: Negative for leg swelling (hx of leg sweeling 8 months ago but managed with medication in the past)  Gastrointestinal: Negative for constipation (due to medication)  Endocrine: Negative for heat intolerance     Genitourinary: Positive for urgency (occasional mild bladder incontinence )  Musculoskeletal: Positive for arthralgias (R>L foot joint pain), back pain (constant and occasionally becomes severe LBP, located midline of lower back and radiates to right leg to the top of the right foot; pain is alleviated with prolong sitting) and gait problem (limps when walking and has a cane but refuses to use it)  Skin: Negative  Allergic/Immunologic: Negative  Neurological: Negative for dizziness (side effect from medication) and light-headedness (side effect from medication)  Hematological: Negative  Psychiatric/Behavioral: Positive for sleep disturbance (due to pain)  Objective:    Physical Exam  Constitutional:       Appearance: He is well-developed  HENT:      Head: Normocephalic and atraumatic  Eyes:      Pupils: Pupils are equal, round, and reactive to light  Cardiovascular:      Rate and Rhythm: Normal rate  Pulmonary:      Effort: Pulmonary effort is normal       Breath sounds: Normal breath sounds  Skin:     General: Skin is warm and dry  Neurological:      Mental Status: He is alert and oriented to person, place, and time  Neurologic Exam     Mental Status   Oriented to person, place, and time  Cranial Nerves     CN III, IV, VI   Pupils are equal, round, and reactive to light

## 2021-09-27 NOTE — PATIENT INSTRUCTIONS
Proceed for MRI lumbar spine with without contrast as discussed  Proceed for flexion-extension films lumbar spine as discussed  Continue to follow with pain management per their protocols  Further follow-up with Neurosurgery after imaging to complete with Dr Coarl Black

## 2021-10-14 ENCOUNTER — HOSPITAL ENCOUNTER (OUTPATIENT)
Dept: MRI IMAGING | Facility: HOSPITAL | Age: 66
Discharge: HOME | End: 2021-10-14
Payer: MEDICARE

## 2021-10-14 DIAGNOSIS — M25.551 PAIN IN RIGHT HIP: ICD-10-CM

## 2021-10-14 DIAGNOSIS — M79.604 RIGHT LEG PAIN: ICD-10-CM

## 2021-10-14 DIAGNOSIS — Z98.1 STATUS POST LUMBAR SPINAL FUSION: ICD-10-CM

## 2021-10-14 DIAGNOSIS — M54.16 LUMBAR RADICULOPATHY: ICD-10-CM

## 2021-10-14 PROCEDURE — 72158 MRI LUMBAR SPINE W/O & W/DYE: CPT

## 2021-10-14 PROCEDURE — A9585 GADOBUTROL INJECTION: HCPCS | Performed by: PHYSICIAN ASSISTANT

## 2021-10-14 RX ADMIN — GADOBUTROL 10 ML: 604.72 INJECTION INTRAVENOUS at 14:58

## 2021-10-28 ENCOUNTER — HOSPITAL ENCOUNTER (OUTPATIENT)
Dept: RADIOLOGY | Facility: HOSPITAL | Age: 66
Discharge: HOME | End: 2021-10-28
Payer: MEDICARE

## 2021-10-28 ENCOUNTER — TELEPHONE (OUTPATIENT)
Dept: NEUROSURGERY | Facility: CLINIC | Age: 66
End: 2021-10-28

## 2021-10-28 DIAGNOSIS — M54.16 LUMBAR RADICULOPATHY: ICD-10-CM

## 2021-10-28 DIAGNOSIS — G89.4 CHRONIC PAIN SYNDROME: ICD-10-CM

## 2021-10-28 DIAGNOSIS — Z98.1 STATUS POST LUMBAR SPINAL FUSION: ICD-10-CM

## 2021-10-28 DIAGNOSIS — M79.604 RIGHT LEG PAIN: ICD-10-CM

## 2021-10-28 DIAGNOSIS — M25.551 PAIN IN RIGHT HIP: ICD-10-CM

## 2021-10-28 PROCEDURE — 72114 X-RAY EXAM L-S SPINE BENDING: CPT

## 2021-11-02 ENCOUNTER — OFFICE VISIT (OUTPATIENT)
Dept: NEUROSURGERY | Facility: CLINIC | Age: 66
End: 2021-11-02
Payer: MEDICARE

## 2021-11-02 VITALS
RESPIRATION RATE: 16 BRPM | HEIGHT: 72 IN | WEIGHT: 264 LBS | DIASTOLIC BLOOD PRESSURE: 80 MMHG | BODY MASS INDEX: 35.76 KG/M2 | SYSTOLIC BLOOD PRESSURE: 128 MMHG | TEMPERATURE: 98.1 F | HEART RATE: 73 BPM

## 2021-11-02 DIAGNOSIS — G57.41 RIGHT TIBIAL NEUROPATHY: ICD-10-CM

## 2021-11-02 DIAGNOSIS — G62.9 NEUROPATHY: Primary | ICD-10-CM

## 2021-11-02 DIAGNOSIS — G57.30 PERONEAL NEUROPATHY: ICD-10-CM

## 2021-11-02 PROCEDURE — 99213 OFFICE O/P EST LOW 20 MIN: CPT | Performed by: NEUROLOGICAL SURGERY

## 2021-11-04 ENCOUNTER — TELEPHONE (OUTPATIENT)
Dept: NEUROSURGERY | Facility: CLINIC | Age: 66
End: 2021-11-04

## 2022-01-27 ENCOUNTER — TELEPHONE (OUTPATIENT)
Dept: NEUROLOGY | Facility: CLINIC | Age: 67
End: 2022-01-27

## 2022-01-27 NOTE — TELEPHONE ENCOUNTER
Called and spoke to patient  Patient confirmed upcoming apt with Dr Argenis Prather on 02/07/22 @ 9:30 am   Patient has no issues or concerns at this time

## 2022-01-28 NOTE — TELEPHONE ENCOUNTER
Called and spoke with patient all of his testing was done at [facility] which will be in [address]  He never saw any other Neurologist of any other physician's   He confirmed his appt for 2/7/22

## 2022-02-07 ENCOUNTER — CONSULT (OUTPATIENT)
Dept: NEUROLOGY | Facility: CLINIC | Age: 67
End: 2022-02-07
Payer: MEDICARE

## 2022-02-07 VITALS
BODY MASS INDEX: 35.89 KG/M2 | HEIGHT: 72 IN | RESPIRATION RATE: 18 BRPM | WEIGHT: 265 LBS | TEMPERATURE: 96.2 F | DIASTOLIC BLOOD PRESSURE: 86 MMHG | OXYGEN SATURATION: 98 % | HEART RATE: 76 BPM | SYSTOLIC BLOOD PRESSURE: 132 MMHG

## 2022-02-07 DIAGNOSIS — G57.30 PERONEAL NEUROPATHY: ICD-10-CM

## 2022-02-07 DIAGNOSIS — G62.9 NEUROPATHY: ICD-10-CM

## 2022-02-07 DIAGNOSIS — M21.371 RIGHT FOOT DROP: Primary | ICD-10-CM

## 2022-02-07 DIAGNOSIS — G57.41 RIGHT TIBIAL NEUROPATHY: ICD-10-CM

## 2022-02-07 PROCEDURE — 99204 OFFICE O/P NEW MOD 45 MIN: CPT | Performed by: PSYCHIATRY & NEUROLOGY

## 2022-02-07 NOTE — ASSESSMENT & PLAN NOTE
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

## 2022-02-07 NOTE — ASSESSMENT & PLAN NOTE
Oliver Dickinson is a 33-year-old patient who underwent surgery in 2016  He reports since surgery developing right footdrop as well as pain in the right foot  Today's examination does demonstrate a Tinel sign at the right fibular head with that dysesthesias radiating down from the fibular head to the foot  He has weakness of the EHL, the eversion at  dysesthesias in the right foot with pain  He did undergo an EMG study in 2018 but the symptoms have been persistent  This EMG did show abnormalities in the right peroneal nerve  Plan    He does require repeat EMG study of the right lower extremity with comparison to the left  Localization of the peroneal nerve is required  The tibial nerve was abnormal slightly the popliteal fossa but this can be seen in patients with a radicular pattern and were as a normal variant  2  I have provided him with a prescription for diclofenac cream tibial placed on that area  He has tried oral agents such as gabapentin and Lyrica which have been ineffective 3  He is seen by pain management who treats him with pain meds  3  I have ordered a physical therapy for his right footdrop  Explained to him that this can cause of falls which he  has experienced    He may need to be fitted for a  afo brace     He can return to our offices in several months after the study

## 2022-02-07 NOTE — PROGRESS NOTES
Patient ID: Shena Florez is a 61 y o  male  Assessment/Plan:    Right foot drop  Deepali Garcia is a 73-year-old patient who underwent surgery in 2016  He reports since surgery developing right footdrop as well as pain in the right foot  Today's examination does demonstrate a Tinel sign at the right fibular head with that dysesthesias radiating down from the fibular head to the foot  He has weakness of the EHL, the eversion at  dysesthesias in the right foot with pain  He did undergo an EMG study in 2018 but the symptoms have been persistent  This EMG did show abnormalities in the right peroneal nerve  Plan    He does require repeat EMG study of the right lower extremity with comparison to the left  Localization of the peroneal nerve is required  The tibial nerve was abnormal slightly the popliteal fossa but this can be seen in patients with a radicular pattern and were as a normal variant  2  I have provided him with a prescription for diclofenac cream tibial placed on that area  He has tried oral agents such as gabapentin and Lyrica which have been ineffective 3  He is seen by pain management who treats him with pain meds  3  I have ordered a physical therapy for his right footdrop  Explained to him that this can cause of falls which he  has experienced  He may need to be fitted for a  afo brace     He can return to our offices in several months after the study      Peroneal neuropathy  Today's examination is suggestive of a peroneal neuropathy  This does require EMG confirmation which he did agree to       Diagnoses and all orders for this visit:    Right foot drop    Neuropathy  -     Ambulatory referral to Neurology  -     EMG 2 limb lower extremity; Future  -     Ambulatory Referral to Physical Therapy; Future  -     Diclofenac Sodium (VOLTAREN) 1 %; Apply to right foot and leg twice a day    Peroneal neuropathy  -     Ambulatory referral to Neurology  -     EMG 2 limb lower extremity;  Future  - Ambulatory Referral to Physical Therapy; Future  -     Diclofenac Sodium (VOLTAREN) 1 %; Apply to right foot and leg twice a day    Right tibial neuropathy  -     Ambulatory referral to Neurology         Subjective:        HPI  this is a 74-year-old male who underwent a transverse lumbar interbody fusion at L3-4 in 2016  He was improved after this operation but complained of pain in his right foot  He has tried gabapentin and Lyrica neither of which worked  He has tried a dorsal column stimulator trial which did not produce numbness in this distribution   He complains of intermittent back pain  Reports the back the foot pain occurs on the right foot  This occurs in the dorsum of the right foot he also notices dysesthesias on the lateral aspect of the right foot  He reports a throbbing pain  There is no tingling or numbness in the foot but on further questioning he does describe it on the lateral aspect of the right leg  He has no radicular pain but reports chronic lower back pain  He is followed by pain management     He does utilize a cane to ambulate  He did undergo EMG testing in 2018 which did demonstrate 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  However since that time his symptoms have progressed  He does report a right foot drop  He has fallen and did fracture several  bones last year    He denies previously having any physical therapy or any  braces in his right foot          He denies any use of tobacco or alcohol  He is retired  He previously was a printer         10/31/21 - Lumbar XR  Stable postoperative appearance after L3-4 discectomy with posterior fusion without hardware complication   Degenerative change and levoscoliosis noted  10/19/21 - Lumbar MRI  Postoperative and degenerative changes of the lumbar spine, as described above  Moderate right foraminal narrowing is noted at the L2-L3 transitional level, progressed since the prior examination       Severe left foraminal narrowing at L5-S1 results in impingement of the exiting left L5 nerve root  The following portions of the patient's history were reviewed and updated as appropriate:   He  has a past medical history of Back pain, Colon polyp, History of blood clots, Hyperlipidemia, Lumbar fracture with cord injury ([facility] [address] ), Neck fracture ([facility] [address] ), Previous back surgery, and Ulcerative colitis ([facility] [address] )  He  has a past surgical history that includes Lumbar fusion (Right, 1/20/2016); Back surgery; pr colonoscopy flx dx w/collj spec when pfrmd (N/A, 1/24/2019); and Tonsillectomy  His family history includes Lung cancer in his father; Parkinsonism in his mother  He  reports that he has never smoked  He has never used smokeless tobacco  He reports current alcohol use  He reports that he does not use drugs  Current Outpatient Medications   Medication Sig Dispense Refill    aspirin 81 mg chewable tablet Chew 81 mg daily      balsalazide (COLAZAL) 750 mg capsule Take 3 capsules (2,250 mg total) by mouth 2 (two) times a day 180 capsule 5    ezetimibe (ZETIA) 10 mg tablet Take 10 mg by mouth daily      oxyCODONE (ROXICODONE) 30 MG immediate release tablet oxycodone 30 mg tablet      pantoprazole (PROTONIX) 40 mg tablet Take 1 tablet (40 mg total) by mouth daily 30-60 minutes before breakfast 60 tablet 2    zolpidem (AMBIEN) 10 mg tablet Take 1 tablet (10 mg total) by mouth daily at bedtime as needed for sleep for up to 10 days 3 tablet 0    Diclofenac Sodium (VOLTAREN) 1 % Apply to right foot and leg twice a day 4 g 1    dicyclomine (BENTYL) 20 mg tablet Take 1 tablet (20 mg total) by mouth 3 (three) times a day as needed (abdominal pain) (Patient not taking: Reported on 2/7/2022 ) 60 tablet 3    fentaNYL (DURAGESIC) 25 mcg/hr Place 1 patch on the skin every third day (Patient not taking: Reported on 2/7/2022 )       No current facility-administered medications for this visit       He is allergic to no active allergies            Objective:    Blood pressure 132/86, pulse 76, temperature (!) 96 2 °F (35 7 °C), temperature source Tympanic, resp  rate 18, height 6' (1 829 m), weight 120 kg (265 lb), SpO2 98 %  Physical Exam  Eyes:      General: Lids are normal       Extraocular Movements: Extraocular movements intact  Pupils: Pupils are equal, round, and reactive to light  Cardiovascular:      Rate and Rhythm: Normal rate and regular rhythm  Pulses: Normal pulses  Heart sounds: Normal heart sounds  Pulmonary:      Effort: Pulmonary effort is normal       Breath sounds: Normal breath sounds  Musculoskeletal:      Cervical back: Normal range of motion  Neurological:      Mental Status: He is alert  Deep Tendon Reflexes:      Reflex Scores:       Tricep reflexes are 1+ on the right side and 1+ on the left side  Bicep reflexes are 2+ on the right side and 2+ on the left side  Patellar reflexes are 2+ on the right side and 2+ on the left side  Achilles reflexes are 1+ on the right side and 1+ on the left side  Neurological Exam  Mental Status  Alert  Oriented to person, place and time  Language is fluent with no aphasia  Cranial Nerves  CN II: Visual acuity is normal  Visual fields full to confrontation  CN III, IV, VI: Extraocular movements intact bilaterally  Normal lids and orbits bilaterally  Pupils equal round and reactive to light bilaterally  CN V: Facial sensation is normal   CN VII: Full and symmetric facial movement  CN VIII: Hearing is normal   CN IX, X: Palate elevates symmetrically  Normal gag reflex  CN XI: Shoulder shrug strength is normal   CN XII: Tongue midline without atrophy or fasciculations  Motor    He had normal strength in the upper extremities  The left lower extremity had normal strength and normal tone and bulk  In the right lower extremity hip flexion was a 5-knee flexion and knee extension were 5-    He had a Tinel sign at the right fibular head  And it dorsiflexion was a three he had atrophy of the ED B on the right foot  Inversion was a three eversion was a four  Toe flexion was a three  There is no atrophy of the ED B on the left       Sensory  Positive Tinel sign at the right fibular head  There is also dysesthesias and tingling in the right side of his leg  On the left side it was normal   The right lateral sensation was 20% compared to 100% medially  He had normal sensation  in the left lower extremity and in the bilateral upper extremities  Reflexes                                           Right                      Left  Biceps                                 2+                         2+  Triceps                                1+                         1+  Patellar                                2+                         2+  Achilles                                1+                         1+  Plantar                           Downgoing                Downgoing    Coordination  Right: Finger-to-nose normal   Left: Finger-to-nose normal     Gait Able to rise from chair without using arms  Did however have an antilagic  gait  He was unable to tandem       Review of systems obtained from the medical assistant as below was reviewed with the patient at today's visit  ROS:    Review of Systems   Constitutional: Negative  Negative for appetite change and fever  HENT: Negative  Negative for hearing loss, tinnitus, trouble swallowing and voice change  Eyes: Negative  Negative for photophobia and pain  Respiratory: Negative  Negative for shortness of breath  Cardiovascular: Negative  Negative for palpitations  Gastrointestinal: Negative  Negative for nausea and vomiting  Endocrine: Negative  Negative for cold intolerance  Genitourinary: Negative  Negative for dysuria, frequency and urgency     Musculoskeletal: Positive for back pain (lower back pain - right foot pain - walk nwith a cane for balance issue and safety reasons  ) and gait problem  Negative for myalgias and neck pain  Skin: Negative  Negative for rash  Neurological: Negative for dizziness, tremors, seizures, syncope, facial asymmetry, speech difficulty, weakness, light-headedness, numbness and headaches  Hematological: Negative  Does not bruise/bleed easily  Psychiatric/Behavioral: Positive for sleep disturbance  Negative for confusion and hallucinations

## 2022-02-10 NOTE — ED PROVIDER NOTES
History  Chief Complaint   Patient presents with    Leg Pain     PT sent from outpatient radiology for Positive DVT     62 yo male who presents to ED for evaluation of L leg pain and swelling x 2 weeks  Had LLE DVT done today which revealed extensive clot therefore pt was sent to ED for further evaluation  He denies fever, chills, cough, sob, and hemoptysis  He denies chest pain  He denies a h/o DVT but reports that he has limited mobility on account of prior failed back syndrome and is generally sedentary  Pain in LLE constant, aching, worse with ambulation, alleviated w rest            Prior to Admission Medications   Prescriptions Last Dose Informant Patient Reported? Taking?    DULoxetine (CYMBALTA) 60 mg delayed release capsule  Self Yes No   Sig: Take 60 mg by mouth daily   LYRICA 150 MG capsule  Self Yes No   acetaminophen (TYLENOL) 325 mg tablet  Self No No   Sig: Take 3 tablets (975 mg total) by mouth every 8 (eight) hours   balsalazide (COLAZAL) 750 mg capsule   No No   Sig: Take 3 capsules (2,250 mg total) by mouth 3 (three) times a day for 60 days   docusate sodium (COLACE) 100 mg capsule  Self No No   Sig: Take 1 capsule (100 mg total) by mouth 2 (two) times a day   ezetimibe (ZETIA) 10 mg tablet  Self Yes No   Sig: Take 10 mg by mouth daily   lidocaine (LIDODERM) 5 %  Self No No   Sig: Apply 1 patch topically daily Remove & Discard patch within 12 hours or as directed by MD   melatonin 3 mg  Self No No   Sig: Take 2 tablets (6 mg total) by mouth daily at bedtime   methocarbamol (ROBAXIN) 500 mg tablet  Self No No   Sig: Take 1 tablet (500 mg total) by mouth 4 (four) times a day as needed for muscle spasms   morphine (MS CONTIN) 30 mg 12 hr tablet  Self Yes No   Sig: Take 30 mg by mouth every 12 (twelve) hours   oxyCODONE (ROXICODONE) 30 MG immediate release tablet  Self Yes No   Sig: oxycodone 30 mg tablet   polyethylene glycol (MIRALAX) 17 g packet  Self No No   Sig: Take 17 g by mouth daily   senna Requested Prescriptions     Signed Prescriptions Disp Refills    cefdinir (OMNICEF) 300 MG capsule 14 capsule 0     Sig: Take 1 capsule by mouth 2 times daily for 7 days     Authorizing Provider: Ingrid Parker to pharmacy (SENOKOT) 8 6 mg  Self No No   Sig: Take 1 tablet (8 6 mg total) by mouth daily at bedtime   zolpidem (AMBIEN) 10 mg tablet   No No   Sig: Take 1 tablet (10 mg total) by mouth daily at bedtime as needed for sleep for up to 10 days      Facility-Administered Medications: None       Past Medical History:   Diagnosis Date    Back pain     Previous back surgery     rods in the back    Ulcerative colitis [facility])        Past Surgical History:   Procedure Laterality Date    BACK SURGERY      LUMBAR FUSION Right 1/20/2016    Procedure: FUSION LUMBAR  POSTERIOR, TLIF L3-4  Right L3-4 Fascet;  Surgeon: Maura Bermudez MD;  Location:  MAIN OR;  Service:    Rene Hinds [place] COLONOSCOPY FLX DX W/COLLJ SPEC WHEN PFRMD N/A 1/24/2019    Procedure: COLONOSCOPY;  Surgeon: Chanel Beckham MD;  Location: [facility]; Service: Gastroenterology       Family History   Problem Relation Age of Onset    Parkinsonism Mother     Lung cancer Father      I have reviewed and agree with the history as documented  E-Cigarette/Vaping    E-Cigarette Use Never User      E-Cigarette/Vaping Substances     Social History     Tobacco Use    Smoking status: Never Smoker    Smokeless tobacco: Never Used   Substance Use Topics    Alcohol use: Never     Frequency: Never     Binge frequency: Never     Comment: pt denies    Drug use: No       Review of Systems   Respiratory: Negative for chest tightness and shortness of breath  Cardiovascular: Positive for leg swelling  Negative for chest pain  All other systems reviewed and are negative  Physical Exam  Physical Exam   Constitutional: He is oriented to person, place, and time  He appears well-developed and well-nourished  No distress  HENT:   Head: Normocephalic and atraumatic  Eyes: Pupils are equal, round, and reactive to light  Conjunctivae and EOM are normal    Neck: Normal range of motion  Neck supple  No JVD present     Cardiovascular: Normal rate, regular rhythm, normal heart sounds and intact distal pulses  Exam reveals no gallop and no friction rub  No murmur heard  Pulmonary/Chest: Effort normal and breath sounds normal  No stridor  No respiratory distress  He has no wheezes  He has no rales  He exhibits no tenderness  Abdominal: Soft  He exhibits no distension  There is no tenderness  Musculoskeletal: Normal range of motion  He exhibits edema and tenderness  He exhibits no deformity  Mild edema and tenderness L leg  Normal distal perfusion and sensation LLE  Neurological: He is alert and oriented to person, place, and time  No cranial nerve deficit or sensory deficit  He exhibits normal muscle tone  Coordination normal    Skin: Skin is warm and dry  Capillary refill takes less than 2 seconds  He is not diaphoretic  Nursing note and vitals reviewed        Vital Signs  ED Triage Vitals   Temperature Pulse Respirations Blood Pressure SpO2   06/25/20 1853 06/25/20 1847 06/25/20 1853 06/25/20 1853 06/25/20 1853   98 9 °F (37 2 °C) 101 18 (!) 184/95 93 %      Temp Source Heart Rate Source Patient Position - Orthostatic VS BP Location FiO2 (%)   06/25/20 1853 06/25/20 1847 06/25/20 1853 06/25/20 1853 --   Oral Monitor Lying Right arm       Pain Score       06/25/20 1853       9           Vitals:    06/25/20 1847 06/25/20 1853   BP:  (!) 184/95   Pulse: 101 96   Patient Position - Orthostatic VS:  Lying         Visual Acuity      ED Medications  Medications   apixaban (ELIQUIS) tablet 10 mg (10 mg Oral Given 6/25/20 2014)   HYDROmorphone (DILAUDID) injection 1 mg (1 mg Intravenous Given 6/25/20 2009)       Diagnostic Studies  Results Reviewed     Procedure Component Value Units Date/Time    Troponin I [[id number]]  (Normal) Collected:  06/25/20 1921    Lab Status:  Final result Specimen:  Blood from Arm, Left Updated:  06/25/20 1951     Troponin I <0 02 ng/mL     Basic metabolic panel [[id number]] Collected:  06/25/20 1921    Lab Status:  Final result Specimen:  Blood from Arm, Left Updated:  06/25/20 1950     Sodium 138 mmol/L      Potassium 4 2 mmol/L      Chloride 103 mmol/L      CO2 29 mmol/L      ANION GAP 6 mmol/L      BUN 17 mg/dL      Creatinine 1 09 mg/dL      Glucose 103 mg/dL      Calcium 8 6 mg/dL      eGFR 72 ml/min/1 73sq m     Narrative:       [place] guidelines for Chronic Kidney Disease (CKD):     Stage 1 with normal or high GFR (GFR > 90 mL/min/1 73 square meters)    Stage 2 Mild CKD (GFR = 60-89 mL/min/1 73 square meters)    Stage 3A Moderate CKD (GFR = 45-59 mL/min/1 73 square meters)    Stage 3B Moderate CKD (GFR = 30-44 mL/min/1 73 square meters)    Stage 4 Severe CKD (GFR = 15-29 mL/min/1 73 square meters)    Stage 5 End Stage CKD (GFR <15 mL/min/1 73 square meters)  Note: GFR calculation is accurate only with a steady state creatinine    Hepatic function panel [[id number]]  (Abnormal) Collected:  06/25/20 1921    Lab Status:  Final result Specimen:  Blood from Arm, Left Updated:  06/25/20 1950     Total Bilirubin 0 70 mg/dL      Bilirubin, Direct 0 17 mg/dL      Alkaline Phosphatase 88 U/L      AST 35 U/L      ALT 60 U/L      Total Protein 7 3 g/dL      Albumin 3 2 g/dL     Protime-INR [[id number]]  (Normal) Collected:  06/25/20 1921    Lab Status:  Final result Specimen:  Blood from Arm, Left Updated:  06/25/20 1944     Protime 13 0 seconds      INR 0 99    APTT [[id number]]  (Normal) Collected:  06/25/20 1921    Lab Status:  Final result Specimen:  Blood from Arm, Left Updated:  06/25/20 1944     PTT 28 seconds     CBC and differential [[id number]]  (Abnormal) Collected:  06/25/20 1921    Lab Status:  Final result Specimen:  Blood from Arm, Left Updated:  06/25/20 1939     WBC 10 72 Thousand/uL      RBC 5 08 Million/uL      Hemoglobin 13 9 g/dL      Hematocrit 43 7 %      MCV 86 fL      MCH 27 4 pg      MCHC 31 8 g/dL      RDW 13 4 %      MPV 10 1 fL      Platelets 636 Thousands/uL      nRBC 0 /100 WBCs      Neutrophils Relative 71 % Immat GRANS % 0 %      Lymphocytes Relative 14 %      Monocytes Relative 11 %      Eosinophils Relative 4 %      Basophils Relative 0 %      Neutrophils Absolute 7 64 Thousands/µL      Immature Grans Absolute 0 03 Thousand/uL      Lymphocytes Absolute 1 47 Thousands/µL      Monocytes Absolute 1 16 Thousand/µL      Eosinophils Absolute 0 38 Thousand/µL      Basophils Absolute 0 04 Thousands/µL                  No orders to display              Procedures  ECG 12 Lead Documentation Only  Date/Time: 6/25/2020 7:33 PM  Performed by: Eileen Houser MD  Authorized by: Eileen Houser MD     Indications / Diagnosis:  Dvt  ECG reviewed by me, the ED Provider: yes    Patient location:  ED  Previous ECG:     Previous ECG:  Compared to current    Comparison ECG info:  29 mar 2019    Similarity:  No change  Interpretation:     Interpretation: abnormal    Rate:     ECG rate:  97  Comments:      NSR  Anterior ST depression and T wave abnormality, not significantly changed as compared to prior  Impression - abnormal ekg unchanged from baseline  ED Course       US AUDIT      Most Recent Value   Initial Alcohol Screen: US AUDIT-C    1  How often do you have a drink containing alcohol? 2 Filed at: 06/25/2020 1848   2  How many drinks containing alcohol do you have on a typical day you are drinking? 1 Filed at: 06/25/2020 1848   3a  Male UNDER 65: How often do you have five or more drinks on one occasion? 1 Filed at: 06/25/2020 1848   3b  FEMALE Any Age, or MALE 65+: How often do you have 4 or more drinks on one occassion? 1 Filed at: 06/25/2020 1848   Audit-C Score  5 Filed at: 06/25/2020 1848                  AYESHA/DAST-10      Most Recent Value   How many times in the past year have you    Used an illegal drug or used a prescription medication for non-medical reasons?   Never Filed at: 06/25/2020 1849                                MDM      Disposition  Final diagnoses:   DVT (deep venous thrombosis) ([facility] [address] )     Time reflects when diagnosis was documented in both MDM as applicable and the Disposition within this note     Time User Action Codes Description Comment    6/25/2020  8:02 PM Fawn Sandoval Add [I82 [id number]] DVT (deep venous thrombosis) [facility])       ED Disposition     ED Disposition Condition Date/Time Comment    Discharge Stable Thu Jun 25, 2020  8:02 PM Clayton Wiley discharge to home/self care  Follow-up Information     Follow up With Specialties Details Why Contact Info    Catalina Romero MD Family Medicine In 1 week  [address]  [address] [facility] [address]  [phone]            Discharge Medication List as of 6/25/2020  8:03 PM      START taking these medications    Details   apixaban (Eliquis DVT/PE Starter Pack) 5 mg Multiple Dosages:Starting Thu 6/25/2020, Last dose on Wed 7/1/2020, THEN Starting Thu 7/2/2020, Last dose on Fri 7/24/2020Take 2 tablets (10 mg total) by mouth 2 (two) times a day for 7 days, THEN 1 tablet (5 mg total) 2 (two) times a day for 23 days   , Print         CONTINUE these medications which have NOT CHANGED    Details   acetaminophen (TYLENOL) 325 mg tablet Take 3 tablets (975 mg total) by mouth every 8 (eight) hours, Starting Thu 4/4/2019, Normal      balsalazide (COLAZAL) 750 mg capsule Take 3 capsules (2,250 mg total) by mouth 3 (three) times a day for 60 days, Starting Wed 7/17/2019, Until Sun 9/15/2019, Normal      docusate sodium (COLACE) 100 mg capsule Take 1 capsule (100 mg total) by mouth 2 (two) times a day, Starting Thu 4/4/2019, No Print      DULoxetine (CYMBALTA) 60 mg delayed release capsule Take 60 mg by mouth daily, Starting Wed 2/14/2018, Historical Med      ezetimibe (ZETIA) 10 mg tablet Take 10 mg by mouth daily, Historical Med      lidocaine (LIDODERM) 5 % Apply 1 patch topically daily Remove & Discard patch within 12 hours or as directed by MD, Starting Fri 4/5/2019, Normal      LYRICA 150 MG capsule Starting Fri 5/10/2019, Historical Med      melatonin 3 mg Take 2 tablets (6 mg total) by mouth daily at bedtime, Starting Thu 4/4/2019, No Print      methocarbamol (ROBAXIN) 500 mg tablet Take 1 tablet (500 mg total) by mouth 4 (four) times a day as needed for muscle spasms, Starting Tue 7/16/2019, Normal      morphine (MS CONTIN) 30 mg 12 hr tablet Take 30 mg by mouth every 12 (twelve) hours, Starting Mon 5/6/2019, Historical Med      oxyCODONE (ROXICODONE) 30 MG immediate release tablet oxycodone 30 mg tablet, Historical Med      polyethylene glycol (MIRALAX) 17 g packet Take 17 g by mouth daily, Starting Fri 4/5/2019, Normal      senna (SENOKOT) 8 6 mg Take 1 tablet (8 6 mg total) by mouth daily at bedtime, Starting Thu 4/4/2019, No Print      zolpidem (AMBIEN) 10 mg tablet Take 1 tablet (10 mg total) by mouth daily at bedtime as needed for sleep for up to 10 days, Starting Thu 4/4/2019, Until Sun 4/14/2019, Print           No discharge procedures on file      PDMP Review     None          ED Provider  Electronically Signed by           Mylene Romo MD  06/25/20 7713

## 2022-02-14 ENCOUNTER — EVALUATION (OUTPATIENT)
Dept: PHYSICAL THERAPY | Facility: CLINIC | Age: 67
End: 2022-02-14
Payer: MEDICARE

## 2022-02-14 DIAGNOSIS — G62.9 NEUROPATHY: ICD-10-CM

## 2022-02-14 DIAGNOSIS — G57.31 NEUROPATHY OF RIGHT PERONEAL NERVE: ICD-10-CM

## 2022-02-14 PROCEDURE — 97112 NEUROMUSCULAR REEDUCATION: CPT

## 2022-02-14 PROCEDURE — 97161 PT EVAL LOW COMPLEX 20 MIN: CPT

## 2022-02-14 NOTE — PROGRESS NOTES
PT Evaluation     Today's date: 2022  Patient name: Karissa Overton  : 1955  MRN: [id number]  Referring provider: Arleth Oleary DO  Dx:   Encounter Diagnosis     ICD-10-CM    1  Neuropathy  G62 9 Ambulatory Referral to Physical Therapy   2  Neuropathy of right peroneal nerve  G57 31 Ambulatory Referral to Physical Therapy       Start Time: 1632  Stop Time: 1710  Total time in clinic (min): 38 minutes    Assessment  Assessment details: Karissa Overton is a pleasant 61 y o  male who was referred for R foot drop and peroneal neuropathy that started following a lumbar spinal fusion  He presents with the chief complaint along all 5 MTP joints with no movement of the lateral 4 toes  He notes that he does trip on his toes and has resulted in falls in the past, but not recently  PT examination findings include: observed atrophy of all ankle muscles and foot intrinsics; slight decrease in R ankle ROM of DF, inversion, and eversion; no lateral 4 digit active motion with maintained PROM; decreased ankle strength in all R muscles; and hypersensitivity and allodynia to light touch  He was educated on passively stretching his toes to maintain PROM as well as begin desensitizing his foot by rubbing the skin with various textures a few minutes each day  He verbalized understanding  The patient would benefit from skilled physical therapy to provide exercises, neuromuscular reeducation, manual techniques, and modalities including NMES to ankle musculature as deemed necessary in order to help him achieve him goals and maximize his safety and decrease pain with function  Impairments: abnormal or restricted ROM, impaired balance, impaired physical strength, lacks appropriate home exercise program and pain with function  Other impairment: abnormal sensation  Understanding of Dx/Px/POC: good   Prognosis: fair  Prognosis details: Chronic nerve damage    Goals  STGs in 4 weeks  1  Patient will be independent with HEP    2  Patient will increase R ankle ROM by at least 5 degrees in all deficient planes  3  Patient will report no more than 7/10 pain in R toes  LTGs in 8 weeks  1  Patient will report tripping over his toes at least 25% less than initially  2  Patient will increase his R ankle strength to at least 4+/5 in all muscle groups  3  Patient will report being able to wear socks and shoes without as much discomfort as on evaluation by at least 50%  Plan  Patient would benefit from: skilled physical therapy and PT eval  Planned modality interventions: unattended electrical stimulation  Planned therapy interventions: joint mobilization, manual therapy, activity modification, neuromuscular re-education, body mechanics training, patient education, strengthening, stretching, therapeutic activities, therapeutic exercise, gait training and home exercise program  Frequency: 2x week  Duration in weeks: 8  Plan of Care beginning date: 2/14/2022  Plan of Care expiration date: 4/11/2022  Treatment plan discussed with: patient        Subjective Evaluation    History of Present Illness  Mechanism of injury: [place] states that he had a fusion in his lower back about 5-6 years ago  He notes that since then, he has been having issues with the R foot  He says that he is experiencing a lot of pain in the joint of his R big toe, going all the way across  He reports that he does find it to be weak and drag his toes at time while walking as he finds that his toes want to curl, interfering with his push off  He has been to all different neurosurgeons and they all think that it is a result of nerve damage after the surgery  He reports balance issues as well which has resulted in multiple falls over the years  He denies any falls over the past year, but did have a substantial fall down the stairs 2 years ago, resulting in broken neck, back, and sternum  He does use a SPC if he knows that he is going to do a lot of walking   He does try not to use it around the house if he can avoid  The increased sensitivity of his foot makes it difficult to wear socks and shoes comfortably  Pain  Current pain ratin  At best pain ratin  At worst pain rating: 10  Location: R toes  Quality: dull ache and sharp  Relieving factors: rest and medications  Aggravating factors: standing, walking and stair climbing  Progression: worsening    Social Support  Steps to enter house: no  Stairs in house: yes (14 w/ L HR going up)   Lives in: multiple-level home  Lives with: spouse      Diagnostic Tests  EMG: abnormal  Patient Goals  Patient goals for therapy: decreased pain          Objective     Observations     Right Ankle/Foot   Positive for atrophy  Neurological Testing     Sensation     Ankle/Foot   Left Ankle/Foot   Intact: light touch    Right Ankle/Foot   Hypersensation: light touch    Comments   Right light touch: allodynia distal to malleoli       Active Range of Motion   Left Ankle/Foot   Dorsiflexion (ke): 12 degrees   Plantar flexion: 35 degrees   Inversion: 18 degrees   Eversion: 10 degrees     Right Ankle/Foot   Dorsiflexion (ke): 5 degrees   Plantar flexion: 28 degrees   Inversion: 15 degrees   Eversion: 5 degrees     Additional Active Range of Motion Details  No muscle activation of lesser toes    Strength/Myotome Testing     Left Ankle/Foot   Dorsiflexion: 5  Plantar flexion: 5  Inversion: 5  Eversion: 5    Right Ankle/Foot   Dorsiflexion: 4  Plantar flexion: 4  Inversion: 4  Eversion: 4      Flowsheet Rows      Most Recent Value   PT/OT G-Codes    Current Score 45   Projected Score 54             Precautions: hx of lumbar fusion with nerve damage      Manuals             Desensitization Edu                                                   Neuro Re-Ed             NMES DF             Toe scrunches             Ankle 4 way ecc                                                                 Ther Ex             Bike              Ankle 4 way TB Ankle/toe PROM             Toe ext stretch                                                                 Ther Activity                                       Gait Training                                       Modalities

## 2022-02-14 NOTE — LETTER
February 15, 2022    Nick Nelson DO  [address], [address] [place] [address],[address]    Patient: Leann Almonte   YOB: 1955   Date of Visit: 2022     Encounter Diagnosis     ICD-10-CM    1  Neuropathy  G62 9 Ambulatory Referral to Physical Therapy   2  Neuropathy of right peroneal nerve  G57 31 Ambulatory Referral to Physical Therapy       Dear Dr Morgan Mahoney:    Thank you for your recent referral of Leann Almonte  Please review the attached evaluation summary from [facility] recent visit  Please verify that you agree with the plan of care by signing the attached order  If you have any questions or concerns, please do not hesitate to call  I sincerely appreciate the opportunity to share in the care of one of your patients and hope to have another opportunity to work with you in the near future  Sincerely,    Serafin Phelan, PT      Referring Provider:      I certify that I have read the below Plan of Care and certify the need for these services furnished under this plan of treatment while under my care  Nick Nelson DO  [address], [address] [place] [address]  Via Fax: [phone]          PT Evaluation     Today's date: 2022  Patient name: Leann Almonte  : 1955  MRN: [id number]  Referring provider: Cait Deluna DO  Dx:   Encounter Diagnosis     ICD-10-CM    1  Neuropathy  G62 9 Ambulatory Referral to Physical Therapy   2  Neuropathy of right peroneal nerve  G57 31 Ambulatory Referral to Physical Therapy       Start Time: 1632  Stop Time: 1710  Total time in clinic (min): 38 minutes    Assessment  Assessment details: Leann Almonte is a pleasant 61 y o  male who was referred for R foot drop and peroneal neuropathy that started following a lumbar spinal fusion  He presents with the chief complaint along all 5 MTP joints with no movement of the lateral 4 toes   He notes that he does trip on his toes and has resulted in falls in the past, but not recently  PT examination findings include: observed atrophy of all ankle muscles and foot intrinsics; slight decrease in R ankle ROM of DF, inversion, and eversion; no lateral 4 digit active motion with maintained PROM; decreased ankle strength in all R muscles; and hypersensitivity and allodynia to light touch  He was educated on passively stretching his toes to maintain PROM as well as begin desensitizing his foot by rubbing the skin with various textures a few minutes each day  He verbalized understanding  The patient would benefit from skilled physical therapy to provide exercises, neuromuscular reeducation, manual techniques, and modalities including NMES to ankle musculature as deemed necessary in order to help him achieve him goals and maximize his safety and decrease pain with function  Impairments: abnormal or restricted ROM, impaired balance, impaired physical strength, lacks appropriate home exercise program and pain with function  Other impairment: abnormal sensation  Understanding of Dx/Px/POC: good   Prognosis: fair  Prognosis details: Chronic nerve damage    Goals  STGs in 4 weeks  1  Patient will be independent with HEP  2  Patient will increase R ankle ROM by at least 5 degrees in all deficient planes  3  Patient will report no more than 7/10 pain in R toes  LTGs in 8 weeks  1  Patient will report tripping over his toes at least 25% less than initially  2  Patient will increase his R ankle strength to at least 4+/5 in all muscle groups  3  Patient will report being able to wear socks and shoes without as much discomfort as on evaluation by at least 50%        Plan  Patient would benefit from: skilled physical therapy and PT eval  Planned modality interventions: unattended electrical stimulation  Planned therapy interventions: joint mobilization, manual therapy, activity modification, neuromuscular re-education, body mechanics training, patient education, strengthening, stretching, therapeutic activities, therapeutic exercise, gait training and home exercise program  Frequency: 2x week  Duration in weeks: 8  Plan of Care beginning date: 2022  Plan of Care expiration date: 2022  Treatment plan discussed with: patient        Subjective Evaluation    History of Present Illness  Mechanism of injury: Donya Luis states that he had a fusion in his lower back about 5-6 years ago  He notes that since then, he has been having issues with the R foot  He says that he is experiencing a lot of pain in the joint of his R big toe, going all the way across  He reports that he does find it to be weak and drag his toes at time while walking as he finds that his toes want to curl, interfering with his push off  He has been to all different neurosurgeons and they all think that it is a result of nerve damage after the surgery  He reports balance issues as well which has resulted in multiple falls over the years  He denies any falls over the past year, but did have a substantial fall down the stairs 2 years ago, resulting in broken neck, back, and sternum  He does use a SPC if he knows that he is going to do a lot of walking  He does try not to use it around the house if he can avoid  The increased sensitivity of his foot makes it difficult to wear socks and shoes comfortably  Pain  Current pain ratin  At best pain ratin  At worst pain rating: 10  Location: R toes  Quality: dull ache and sharp  Relieving factors: rest and medications  Aggravating factors: standing, walking and stair climbing  Progression: worsening    Social Support  Steps to enter house: no  Stairs in house: yes (14 w/ L HR going up)   Lives in: multiple-level home  Lives with: spouse      Diagnostic Tests  EMG: abnormal  Patient Goals  Patient goals for therapy: decreased pain          Objective     Observations     Right Ankle/Foot   Positive for atrophy       Neurological Testing     Sensation Ankle/Foot   Left Ankle/Foot   Intact: light touch    Right Ankle/Foot   Hypersensation: light touch    Comments   Right light touch: allodynia distal to malleoli       Active Range of Motion   Left Ankle/Foot   Dorsiflexion (ke): 12 degrees   Plantar flexion: 35 degrees   Inversion: 18 degrees   Eversion: 10 degrees     Right Ankle/Foot   Dorsiflexion (ke): 5 degrees   Plantar flexion: 28 degrees   Inversion: 15 degrees   Eversion: 5 degrees     Additional Active Range of Motion Details  No muscle activation of lesser toes    Strength/Myotome Testing     Left Ankle/Foot   Dorsiflexion: 5  Plantar flexion: 5  Inversion: 5  Eversion: 5    Right Ankle/Foot   Dorsiflexion: 4  Plantar flexion: 4  Inversion: 4  Eversion: 4      Flowsheet Rows      Most Recent Value   PT/OT G-Codes    Current Score 45   Projected Score 54             Precautions: hx of lumbar fusion with nerve damage      Manuals 2/14            Desensitization Edu                                                   Neuro Re-Ed             NMES DF             Toe scrunches             Ankle 4 way ecc                                                                 Ther Ex             Bike              Ankle 4 way TB             Ankle/toe PROM             Toe ext stretch                                                                 Ther Activity                                       Gait Training                                       Modalities

## 2022-02-16 ENCOUNTER — APPOINTMENT (OUTPATIENT)
Dept: LAB | Facility: HOSPITAL | Age: 67
End: 2022-02-16
Payer: MEDICARE

## 2022-02-16 DIAGNOSIS — E78.5 HYPERLIPIDEMIA, UNSPECIFIED HYPERLIPIDEMIA TYPE: ICD-10-CM

## 2022-02-16 LAB
ALBUMIN SERPL BCP-MCNC: 3.5 G/DL (ref 3.5–5)
ALP SERPL-CCNC: 67 U/L (ref 46–116)
ALT SERPL W P-5'-P-CCNC: 24 U/L (ref 12–78)
ANION GAP SERPL CALCULATED.3IONS-SCNC: 5 MMOL/L (ref 4–13)
AST SERPL W P-5'-P-CCNC: 21 U/L (ref 5–45)
BILIRUB SERPL-MCNC: 0.55 MG/DL (ref 0.2–1)
BUN SERPL-MCNC: 12 MG/DL (ref 5–25)
CALCIUM SERPL-MCNC: 9 MG/DL (ref 8.3–10.1)
CHLORIDE SERPL-SCNC: 103 MMOL/L (ref 100–108)
CHOLEST SERPL-MCNC: 198 MG/DL
CO2 SERPL-SCNC: 30 MMOL/L (ref 21–32)
CREAT SERPL-MCNC: 1.03 MG/DL (ref 0.6–1.3)
ERYTHROCYTE [DISTWIDTH] IN BLOOD BY AUTOMATED COUNT: 13.4 % (ref 11.6–15.1)
GFR SERPL CREATININE-BSD FRML MDRD: 76 ML/MIN/1.73SQ M
GLUCOSE P FAST SERPL-MCNC: 99 MG/DL (ref 65–99)
HCT VFR BLD AUTO: 49.3 % (ref 36.5–49.3)
HDLC SERPL-MCNC: 65 MG/DL
HGB BLD-MCNC: 15.5 G/DL (ref 12–17)
LDLC SERPL CALC-MCNC: 113 MG/DL (ref 0–100)
MCH RBC QN AUTO: 26.8 PG (ref 26.8–34.3)
MCHC RBC AUTO-ENTMCNC: 31.4 G/DL (ref 31.4–37.4)
MCV RBC AUTO: 85 FL (ref 82–98)
NONHDLC SERPL-MCNC: 133 MG/DL
PLATELET # BLD AUTO: 244 THOUSANDS/UL (ref 149–390)
PMV BLD AUTO: 11.3 FL (ref 8.9–12.7)
POTASSIUM SERPL-SCNC: 4.6 MMOL/L (ref 3.5–5.3)
PROT SERPL-MCNC: 7.6 G/DL (ref 6.4–8.2)
RBC # BLD AUTO: 5.78 MILLION/UL (ref 3.88–5.62)
SODIUM SERPL-SCNC: 138 MMOL/L (ref 136–145)
TRIGL SERPL-MCNC: 102 MG/DL
TSH SERPL DL<=0.05 MIU/L-ACNC: 2.5 UIU/ML (ref 0.36–3.74)
WBC # BLD AUTO: 6.76 THOUSAND/UL (ref 4.31–10.16)

## 2022-02-16 PROCEDURE — 84443 ASSAY THYROID STIM HORMONE: CPT

## 2022-02-16 PROCEDURE — 36415 COLL VENOUS BLD VENIPUNCTURE: CPT

## 2022-02-16 PROCEDURE — 80053 COMPREHEN METABOLIC PANEL: CPT

## 2022-02-16 PROCEDURE — 80061 LIPID PANEL: CPT

## 2022-02-16 PROCEDURE — 85027 COMPLETE CBC AUTOMATED: CPT

## 2022-02-17 ENCOUNTER — OFFICE VISIT (OUTPATIENT)
Dept: PHYSICAL THERAPY | Facility: CLINIC | Age: 67
End: 2022-02-17
Payer: MEDICARE

## 2022-02-17 DIAGNOSIS — G62.9 NEUROPATHY: Primary | ICD-10-CM

## 2022-02-17 DIAGNOSIS — G57.31 NEUROPATHY OF RIGHT PERONEAL NERVE: ICD-10-CM

## 2022-02-17 PROCEDURE — 97110 THERAPEUTIC EXERCISES: CPT

## 2022-02-17 PROCEDURE — 97140 MANUAL THERAPY 1/> REGIONS: CPT

## 2022-02-17 PROCEDURE — 97014 ELECTRIC STIMULATION THERAPY: CPT

## 2022-02-17 NOTE — PROGRESS NOTES
Daily Note     Today's date: 2022  Patient name: Korin Bazan  : 1955  MRN: [id number]  Referring provider: Sixto Dill DO  Dx:   Encounter Diagnosis     ICD-10-CM    1  Neuropathy  G62 9    2  Neuropathy of right peroneal nerve  G57 31                   Subjective: Selena Hammonds states that he is doing ok today  He reports that the pain in his toes is bad and thinks its partly due to the weather  Objective: See treatment diary below      Assessment: Toney tolerated treatment fair with minor increase in toe pain by end of session  Discussed various mechanisms of his pain being neural as light touch is painful as well as mechanical due to abnormal forces on the toes given muscle weaknesses and imbalances  NMES was used to get activation of dorsiflexors and toe extensions with only minor recruitment of toe extensors observed  Performed densensitization via vibration today with no change over the duration of treatment  He was encouraged to continue trying to activate the toes even if no movement is seen as this is trying to form the connection between brain and body  He verbalized understanding but vocalized skepticism that this will help  He would benefit from continued skilled PT  Plan: Continue per plan of care        Precautions: hx of lumbar fusion with nerve damage      Manuals            Desensitization Edu vibration 4'           MTP A/P glides  Gr I/II DF 4'                                     Neuro Re-Ed             NMES DF  10' AROM           Toe scrunches  Towel x10           Ankle 4 way ecc             Spider walks  YTB x8                                                  Ther Ex             Bike   8'           Ankle 4 way TB  YTB 2x10           Ankle/toe PROM  DF 5'           Toe ext stretch             AROM toe ext  x10                                                  Ther Activity                                       Gait Training                                       Modalities

## 2022-02-21 ENCOUNTER — OFFICE VISIT (OUTPATIENT)
Dept: PHYSICAL THERAPY | Facility: CLINIC | Age: 67
End: 2022-02-21
Payer: MEDICARE

## 2022-02-21 DIAGNOSIS — G57.31 NEUROPATHY OF RIGHT PERONEAL NERVE: ICD-10-CM

## 2022-02-21 DIAGNOSIS — G62.9 NEUROPATHY: Primary | ICD-10-CM

## 2022-02-21 PROCEDURE — 97110 THERAPEUTIC EXERCISES: CPT

## 2022-02-21 PROCEDURE — 97014 ELECTRIC STIMULATION THERAPY: CPT

## 2022-02-21 PROCEDURE — 97112 NEUROMUSCULAR REEDUCATION: CPT

## 2022-02-21 NOTE — PROGRESS NOTES
Daily Note     Today's date: 2022  Patient name: Romeo Salinas  : 1955  MRN: [id number]  Referring provider: Pema Sainz DO  Dx:   Encounter Diagnosis     ICD-10-CM    1  Neuropathy  G62 9    2  Neuropathy of right peroneal nerve  G57 31                   Subjective: [facility] states that his foot is hurting him more  He is not sure if PT is just irritating it  Objective: See treatment diary below      Assessment: Toney tolerated treatment well with no overall change to discomfort in his foot and toes  He continues to be highly sensitive to touch and mobilizations, only able to tolerate low grade mobilizations and gentle pressure with a towel  Minimal recruitment of toe extensors was observed with NMES or any strengthening exercises  Discussed that an increase in discomfort is not unexpected as his tissues are being put in different stresses with exercises than what they normally do  He will continue to monitor his response  Plan: Continue per plan of care  Progress treatment as tolerated         Precautions: hx of lumbar fusion with nerve damage      Manuals           Desensitization Edu vibration 4'           MTP A/P glides  Gr I/II DF 4' Gr I/II Df 4'                                    Neuro Re-Ed             NMES DF  10' AROM 10' AROM          Toe scrunches  Towel x10 Towel x20          Ankle 4 way ecc             Spider walks  YTB x8 YTB x10 ea                                                 Ther Ex             Bike   8' 8'          Ankle 4 way TB  YTB 2x10 YTB x10 ea          Ankle/toe PROM  DF 5' DF 5'          Toe ext stretch             AROM toe ext  x10           ABCs   x2                                    Ther Activity                                       Gait Training                                       Modalities

## 2022-02-23 ENCOUNTER — TELEPHONE (OUTPATIENT)
Dept: NEUROLOGY | Facility: CLINIC | Age: 67
End: 2022-02-23

## 2022-02-24 ENCOUNTER — APPOINTMENT (OUTPATIENT)
Dept: PHYSICAL THERAPY | Facility: CLINIC | Age: 67
End: 2022-02-24
Payer: MEDICARE

## 2022-02-26 DIAGNOSIS — K21.9 GASTROESOPHAGEAL REFLUX DISEASE, UNSPECIFIED WHETHER ESOPHAGITIS PRESENT: ICD-10-CM

## 2022-02-26 RX ORDER — PANTOPRAZOLE SODIUM 40 MG/1
TABLET, DELAYED RELEASE ORAL
Qty: 60 TABLET | Refills: 2 | Status: SHIPPED | OUTPATIENT
Start: 2022-02-26 | End: 2022-05-30

## 2022-02-28 ENCOUNTER — APPOINTMENT (OUTPATIENT)
Dept: PHYSICAL THERAPY | Facility: CLINIC | Age: 67
End: 2022-02-28
Payer: MEDICARE

## 2022-03-21 NOTE — PROGRESS NOTES
PT Discharge    Today's date: 3/21/2022  Patient name: Rozina Sotomayor  : 1955  MRN: [id number]  Referring provider: Fawad Dawn DO  Dx:   Encounter Diagnosis     ICD-10-CM    1  Neuropathy  G62 9    2  Neuropathy of right peroneal nerve  G57 31        Stephy Luck has not attended skilled PT since 2022  At the time of his last appointment, he wanted to hold off on PT until he saw his MD  He called following appointment to cancel all appointments stating that his MD told him the pain is from nerve damage  He reports that PT just seemed to be irritating it more  Unable to assess any progress towards goals  He is discharged from skilled PT at this time

## 2022-05-30 DIAGNOSIS — K21.9 GASTROESOPHAGEAL REFLUX DISEASE, UNSPECIFIED WHETHER ESOPHAGITIS PRESENT: ICD-10-CM

## 2022-05-30 RX ORDER — PANTOPRAZOLE SODIUM 40 MG/1
TABLET, DELAYED RELEASE ORAL
Qty: 60 TABLET | Refills: 2 | Status: SHIPPED | OUTPATIENT
Start: 2022-05-30 | End: 2022-06-07

## 2022-06-02 ENCOUNTER — TELEPHONE (OUTPATIENT)
Dept: NEUROLOGY | Facility: CLINIC | Age: 67
End: 2022-06-02

## 2022-06-07 ENCOUNTER — OFFICE VISIT (OUTPATIENT)
Dept: GASTROENTEROLOGY | Facility: CLINIC | Age: 67
End: 2022-06-07
Payer: MEDICARE

## 2022-06-07 VITALS
OXYGEN SATURATION: 98 % | WEIGHT: 264.4 LBS | DIASTOLIC BLOOD PRESSURE: 64 MMHG | SYSTOLIC BLOOD PRESSURE: 130 MMHG | HEIGHT: 72 IN | BODY MASS INDEX: 35.81 KG/M2 | HEART RATE: 76 BPM

## 2022-06-07 DIAGNOSIS — R11.0 NAUSEA: ICD-10-CM

## 2022-06-07 DIAGNOSIS — K21.9 GASTROESOPHAGEAL REFLUX DISEASE, UNSPECIFIED WHETHER ESOPHAGITIS PRESENT: Primary | ICD-10-CM

## 2022-06-07 DIAGNOSIS — R10.13 EPIGASTRIC PAIN: ICD-10-CM

## 2022-06-07 DIAGNOSIS — K76.0 NAFLD (NONALCOHOLIC FATTY LIVER DISEASE): ICD-10-CM

## 2022-06-07 DIAGNOSIS — R09.89 GLOBUS SENSATION: ICD-10-CM

## 2022-06-07 PROCEDURE — 99214 OFFICE O/P EST MOD 30 MIN: CPT | Performed by: PHYSICIAN ASSISTANT

## 2022-06-07 RX ORDER — SUCRALFATE 1 G/1
1 TABLET ORAL DAILY
COMMUNITY
Start: 2022-05-27

## 2022-06-07 RX ORDER — ASPIRIN 81 MG/1
81 TABLET ORAL DAILY
COMMUNITY

## 2022-06-07 RX ORDER — OMEPRAZOLE 40 MG/1
40 CAPSULE, DELAYED RELEASE ORAL DAILY
Qty: 30 CAPSULE | Refills: 2 | OUTPATIENT
Start: 2022-06-07 | End: 2022-06-29

## 2022-06-07 NOTE — PATIENT INSTRUCTIONS
Scheduled date of EGD(as of today):6/29/22  Physician performing EGD:Esteban  Location of EGD:[place]  Instructions reviewed with patient by:Milagro CUBA  Clearances:  none

## 2022-06-07 NOTE — H&P (VIEW-ONLY)
[facility]  Alexis Peter 61 y o  male MRN: [de-identified]       CC:  Upper abdominal pain    HPI: Corrinne Ina is a 63-year-old male who is known to us for history of  Ulcerative colitis maintained on Colazal, colon polyps, lumbar radiculopathy on opioids, and NAFLD  Patient presents today for epigastric pain that has worsened over the past 1-2 months  He reports that he has been on pantoprazole, but sees no difference in his symptoms  He reports that he continues to have nausea symptoms without vomiting  He is on chronic opioids, but he had a negative gastric emptying study and right upper quadrant ultrasound last year  He also reports globus sensation, which is new for him  For his bowel movements, he reports that they are regular as long as he takes MiraLax  He denies rectal bleeding or hematochezia  Last EGD was in December 2020, which was normal  Last colonoscopy was in January 2019 revealing mild ulcerative colitis in the proximal colon  Review of Systems:    CONSTITUTIONAL: Denies any fever, chills, or rigors  Good appetite, and no recent weight loss  HEENT: No earache or tinnitus  Denies hearing loss or visual disturbances  CARDIOVASCULAR: No chest pain or palpitations  RESPIRATORY: Denies any cough, hemoptysis, shortness of breath or dyspnea on exertion  GASTROINTESTINAL: As noted in the History of Present Illness  GENITOURINARY: No problems with urination  Denies any hematuria or dysuria  NEUROLOGIC: No dizziness or vertigo, denies headaches  MUSCULOSKELETAL: Denies any muscle or joint pain  SKIN: Denies skin rashes or itching  ENDOCRINE: Denies excessive thirst  Denies intolerance to heat or cold  PSYCHOSOCIAL: Denies depression or anxiety  Denies any recent memory loss         Current Outpatient Medications   Medication Sig Dispense Refill    ezetimibe (ZETIA) 10 mg tablet Take 10 mg by mouth daily      omeprazole (PriLOSEC) 40 MG capsule Take 1 capsule (40 mg total) by mouth daily 30 capsule 2    oxyCODONE (ROXICODONE) 30 MG immediate release tablet oxycodone 30 mg tablet      sucralfate (CARAFATE) 1 g tablet Take 1 g by mouth daily      aspirin (ECOTRIN LOW STRENGTH) 81 mg EC tablet Take 81 mg by mouth daily (Patient not taking: Reported on 6/7/2022)      aspirin 81 mg chewable tablet Chew 81 mg daily (Patient not taking: Reported on 6/7/2022)      balsalazide (COLAZAL) 750 mg capsule Take 3 capsules (2,250 mg total) by mouth 2 (two) times a day 180 capsule 5    Diclofenac Sodium (VOLTAREN) 1 % Apply to right foot and leg twice a day (Patient not taking: Reported on 6/7/2022) 4 g 1    dicyclomine (BENTYL) 20 mg tablet Take 1 tablet (20 mg total) by mouth 3 (three) times a day as needed (abdominal pain) (Patient not taking: No sig reported) 60 tablet 3    fentaNYL (DURAGESIC) 25 mcg/hr Place 1 patch on the skin every third day (Patient not taking: No sig reported)      zolpidem (AMBIEN) 10 mg tablet Take 1 tablet (10 mg total) by mouth daily at bedtime as needed for sleep for up to 10 days 3 tablet 0     No current facility-administered medications for this visit  Past Medical History:   Diagnosis Date    Back pain     Colon polyp     History of blood clots     Hyperlipidemia     Lumbar fracture with cord injury ([address] )     Neck fracture (HCC)     Previous back surgery     rods in the back    Ulcerative colitis [facility])      Past Surgical History:   Procedure Laterality Date    BACK SURGERY      LUMBAR FUSION Right 1/20/2016    Procedure: FUSION LUMBAR  POSTERIOR, TLIF L3-4  Right L3-4 Fascet;  Surgeon: Thaddeus Servin MD;  [facility];  Service:    [facility] COLONOSCOPY FLX DX W/COLLJ SPEC WHEN PFRMD N/A 1/24/2019    Procedure: COLONOSCOPY;  Surgeon: Jimbo Dobbs MD;  [facility]: [facility];   Service: Gastroenterology    TONSILLECTOMY       Social History     Socioeconomic History    Marital status: /Civil Union     Spouse name: None    Number of children: None    Years of education: None    Highest education level: None   Occupational History    None   Tobacco Use    Smoking status: Never Smoker    Smokeless tobacco: Never Used   Vaping Use    Vaping Use: Never used   Substance and Sexual Activity    Alcohol use: Not Currently     Comment: occasional    Drug use: No    Sexual activity: Never   Other Topics Concern    None   Social History Narrative    None     Social Determinants of Health     Financial Resource Strain: Not on file   Food Insecurity: Not on file   Transportation Needs: Not on file   Physical Activity: Not on file   Stress: Not on file   Social Connections: Not on file   Intimate Partner Violence: Not on file   Housing Stability: Not on file     Family History   Problem Relation Age of Onset    Parkinsonism Mother     Lung cancer Father             PHYSICAL EXAM:    Vitals:    06/07/22 1506   BP: 130/64   BP Location: Left arm   Patient Position: Sitting   Cuff Size: Standard   Pulse: 76   SpO2: 98%   Weight: 120 kg (264 lb 6 4 oz)   Height: 6' (1 829 m)     General Appearance:   Alert and oriented x 3   Cooperative, and in no respiratory distress   HEENT:   Normocephalic, atraumatic, anicteric      Neck:  Supple, symmetrical, trachea midline   Lungs:   Clear to auscultation bilaterally    Heart[de-identified]   Regular rate and rhythm   Abdomen:   Soft, non-tender, non-distended; normal bowel sounds; no masses, no organomegaly    Genitalia:   Deferred    Rectal:   Deferred    Extremities:  No cyanosis, clubbing or edema    Pulses:  2+ and symmetric all extremities    Skin:  Skin color, texture, turgor normal, no rashes or lesions    Lymph nodes:  No palpable cervical or supraclavicular lymphadenopathy        Lab Results:   Results from last 6 Months   Lab Units 02/16/22  1615   WBC Thousand/uL 6 76   HEMOGLOBIN g/dL 15 5   HEMATOCRIT % 49 3   PLATELETS Thousands/uL 244     Results from last 6 Months   Lab Units 02/16/22  1615 POTASSIUM mmol/L 4 6   CHLORIDE mmol/L 103   CO2 mmol/L 30   BUN mg/dL 12   CREATININE mg/dL 1 03   CALCIUM mg/dL 9 0   ALK PHOS U/L 67   ALT U/L 24   AST U/L 21               Imaging Studies: I have personally reviewed pertinent imaging studies  XR spine lumbar complete w bending minimum 6 views    Result Date: 10/31/2021  Impression: Stable postoperative appearance after L3-4 discectomy with posterior fusion without hardware complication  Degenerative change and levoscoliosis noted  Workstation performed: [id number]       ASSESSMENT and PLAN:      1)   Nausea, epigastric discomfort and globus sensation -  In the past, he had negative gastric emptying study and right upper quadrant ultrasound for gallstones  He reports that stress increases his symptoms   -   EGD to investigate  -  Will switch PPI to omeprazole  -  If EGD is negative, we will plan for a HIDA scan with [facility]  However, the patient will have to stop his opiates for 2 days which he believes he can do    2) NAFLD -  Patient had LFTs checked by his PCP this year, this continues to be normal   His LFTs have been normal since he lost 20 lb at 1 point  We will send him for a Fibrosure test         Follow up after testing

## 2022-06-07 NOTE — PROGRESS NOTES
[facility]  Adolfo Stephen 61 y o  male MRN: [de-identified]       CC:  Upper abdominal pain    HPI: Mathew Shah is a 72-year-old male who is known to us for history of  Ulcerative colitis maintained on Colazal, colon polyps, lumbar radiculopathy on opioids, and NAFLD  Patient presents today for epigastric pain that has worsened over the past 1-2 months  He reports that he has been on pantoprazole, but sees no difference in his symptoms  He reports that he continues to have nausea symptoms without vomiting  He is on chronic opioids, but he had a negative gastric emptying study and right upper quadrant ultrasound last year  He also reports globus sensation, which is new for him  For his bowel movements, he reports that they are regular as long as he takes MiraLax  He denies rectal bleeding or hematochezia  Last EGD was in December 2020, which was normal  Last colonoscopy was in January 2019 revealing mild ulcerative colitis in the proximal colon  Review of Systems:    CONSTITUTIONAL: Denies any fever, chills, or rigors  Good appetite, and no recent weight loss  HEENT: No earache or tinnitus  Denies hearing loss or visual disturbances  CARDIOVASCULAR: No chest pain or palpitations  RESPIRATORY: Denies any cough, hemoptysis, shortness of breath or dyspnea on exertion  GASTROINTESTINAL: As noted in the History of Present Illness  GENITOURINARY: No problems with urination  Denies any hematuria or dysuria  NEUROLOGIC: No dizziness or vertigo, denies headaches  MUSCULOSKELETAL: Denies any muscle or joint pain  SKIN: Denies skin rashes or itching  ENDOCRINE: Denies excessive thirst  Denies intolerance to heat or cold  PSYCHOSOCIAL: Denies depression or anxiety  Denies any recent memory loss         Current Outpatient Medications   Medication Sig Dispense Refill    ezetimibe (ZETIA) 10 mg tablet Take 10 mg by mouth daily      omeprazole (PriLOSEC) 40 MG capsule Take 1 capsule (40 mg total) by mouth daily 30 capsule 2    oxyCODONE (ROXICODONE) 30 MG immediate release tablet oxycodone 30 mg tablet      sucralfate (CARAFATE) 1 g tablet Take 1 g by mouth daily      aspirin (ECOTRIN LOW STRENGTH) 81 mg EC tablet Take 81 mg by mouth daily (Patient not taking: Reported on 6/7/2022)      aspirin 81 mg chewable tablet Chew 81 mg daily (Patient not taking: Reported on 6/7/2022)      balsalazide (COLAZAL) 750 mg capsule Take 3 capsules (2,250 mg total) by mouth 2 (two) times a day 180 capsule 5    Diclofenac Sodium (VOLTAREN) 1 % Apply to right foot and leg twice a day (Patient not taking: Reported on 6/7/2022) 4 g 1    dicyclomine (BENTYL) 20 mg tablet Take 1 tablet (20 mg total) by mouth 3 (three) times a day as needed (abdominal pain) (Patient not taking: No sig reported) 60 tablet 3    fentaNYL (DURAGESIC) 25 mcg/hr Place 1 patch on the skin every third day (Patient not taking: No sig reported)      zolpidem (AMBIEN) 10 mg tablet Take 1 tablet (10 mg total) by mouth daily at bedtime as needed for sleep for up to 10 days 3 tablet 0     No current facility-administered medications for this visit  Past Medical History:   Diagnosis Date    Back pain     Colon polyp     History of blood clots     Hyperlipidemia     Lumbar fracture with cord injury ([address] )     Neck fracture (HCC)     Previous back surgery     rods in the back    Ulcerative colitis [facility])      Past Surgical History:   Procedure Laterality Date    BACK SURGERY      LUMBAR FUSION Right 1/20/2016    Procedure: FUSION LUMBAR  POSTERIOR, TLIF L3-4  Right L3-4 Fascet;  Surgeon: Lazarus Overton MD;  [facility];  Service:    [facility] COLONOSCOPY FLX DX W/COLLJ SPEC WHEN PFRMD N/A 1/24/2019    Procedure: COLONOSCOPY;  Surgeon: Henrik Kearns MD;  [facility];   Service: Gastroenterology    TONSILLECTOMY       Social History     Socioeconomic History    Marital status: /Civil Union     Spouse name: None    Number of children: None    Years of education: None    Highest education level: None   Occupational History    None   Tobacco Use    Smoking status: Never Smoker    Smokeless tobacco: Never Used   Vaping Use    Vaping Use: Never used   Substance and Sexual Activity    Alcohol use: Not Currently     Comment: occasional    Drug use: No    Sexual activity: Never   Other Topics Concern    None   Social History Narrative    None     Social Determinants of Health     Financial Resource Strain: Not on file   Food Insecurity: Not on file   Transportation Needs: Not on file   Physical Activity: Not on file   Stress: Not on file   Social Connections: Not on file   Intimate Partner Violence: Not on file   Housing Stability: Not on file     Family History   Problem Relation Age of Onset    Parkinsonism Mother     Lung cancer Father             PHYSICAL EXAM:    Vitals:    06/07/22 1506   BP: 130/64   BP Location: Left arm   Patient Position: Sitting   Cuff Size: Standard   Pulse: 76   SpO2: 98%   Weight: 120 kg (264 lb 6 4 oz)   Height: 6' (1 829 m)     General Appearance:   Alert and oriented x 3   Cooperative, and in no respiratory distress   HEENT:   Normocephalic, atraumatic, anicteric      Neck:  Supple, symmetrical, trachea midline   Lungs:   Clear to auscultation bilaterally    Heart[de-identified]   Regular rate and rhythm   Abdomen:   Soft, non-tender, non-distended; normal bowel sounds; no masses, no organomegaly    Genitalia:   Deferred    Rectal:   Deferred    Extremities:  No cyanosis, clubbing or edema    Pulses:  2+ and symmetric all extremities    Skin:  Skin color, texture, turgor normal, no rashes or lesions    Lymph nodes:  No palpable cervical or supraclavicular lymphadenopathy        Lab Results:   Results from last 6 Months   Lab Units 02/16/22  1615   WBC Thousand/uL 6 76   HEMOGLOBIN g/dL 15 5   HEMATOCRIT % 49 3   PLATELETS Thousands/uL 244     Results from last 6 Months   Lab Units 02/16/22  1615 POTASSIUM mmol/L 4 6   CHLORIDE mmol/L 103   CO2 mmol/L 30   BUN mg/dL 12   CREATININE mg/dL 1 03   CALCIUM mg/dL 9 0   ALK PHOS U/L 67   ALT U/L 24   AST U/L 21               Imaging Studies: I have personally reviewed pertinent imaging studies  XR spine lumbar complete w bending minimum 6 views    Result Date: 10/31/2021  Impression: Stable postoperative appearance after L3-4 discectomy with posterior fusion without hardware complication  Degenerative change and levoscoliosis noted  Workstation performed: [id number]       ASSESSMENT and PLAN:      1)   Nausea, epigastric discomfort and globus sensation -  In the past, he had negative gastric emptying study and right upper quadrant ultrasound for gallstones  He reports that stress increases his symptoms   -   EGD to investigate  -  Will switch PPI to omeprazole  -  If EGD is negative, we will plan for a HIDA scan with [facility]  However, the patient will have to stop his opiates for 2 days which he believes he can do    2) NAFLD -  Patient had LFTs checked by his PCP this year, this continues to be normal   His LFTs have been normal since he lost 20 lb at 1 point  We will send him for a Fibrosure test         Follow up after testing

## 2022-06-08 ENCOUNTER — APPOINTMENT (OUTPATIENT)
Dept: LAB | Facility: HOSPITAL | Age: 67
End: 2022-06-08
Payer: MEDICARE

## 2022-06-08 DIAGNOSIS — K76.0 NAFLD (NONALCOHOLIC FATTY LIVER DISEASE): ICD-10-CM

## 2022-06-08 PROCEDURE — 83883 ASSAY NEPHELOMETRY NOT SPEC: CPT

## 2022-06-08 PROCEDURE — 82947 ASSAY GLUCOSE BLOOD QUANT: CPT

## 2022-06-08 PROCEDURE — 82465 ASSAY BLD/SERUM CHOLESTEROL: CPT

## 2022-06-08 PROCEDURE — 83010 ASSAY OF HAPTOGLOBIN QUANT: CPT

## 2022-06-08 PROCEDURE — 84460 ALANINE AMINO (ALT) (SGPT): CPT

## 2022-06-08 PROCEDURE — 84450 TRANSFERASE (AST) (SGOT): CPT

## 2022-06-08 PROCEDURE — 82247 BILIRUBIN TOTAL: CPT

## 2022-06-08 PROCEDURE — 82172 ASSAY OF APOLIPOPROTEIN: CPT

## 2022-06-08 PROCEDURE — 36415 COLL VENOUS BLD VENIPUNCTURE: CPT

## 2022-06-08 PROCEDURE — 84478 ASSAY OF TRIGLYCERIDES: CPT

## 2022-06-08 PROCEDURE — 82977 ASSAY OF GGT: CPT

## 2022-06-10 ENCOUNTER — TELEPHONE (OUTPATIENT)
Dept: GASTROENTEROLOGY | Facility: MEDICAL CENTER | Age: 67
End: 2022-06-10

## 2022-06-10 LAB
A2 MACROGLOB SERPL-MCNC: 138 MG/DL (ref 110–276)
ALT SERPL W P-5'-P-CCNC: 20 IU/L (ref 0–55)
APO A-I SERPL-MCNC: 147 MG/DL (ref 101–178)
AST SERPL W P-5'-P-CCNC: 24 IU/L (ref 0–40)
BILIRUB SERPL-MCNC: 0.3 MG/DL (ref 0–1.2)
CHOLEST SERPL-MCNC: 158 MG/DL (ref 100–199)
FIBROSIS SCORING:: ABNORMAL
FIBROSIS STAGE SERPL QL: ABNORMAL
GGT SERPL-CCNC: 19 IU/L (ref 0–65)
GLUCOSE SERPL-MCNC: 102 MG/DL (ref 65–99)
HAPTOGLOB SERPL-MCNC: 163 MG/DL (ref 32–363)
LABORATORY COMMENT REPORT: ABNORMAL
LIVER FIBR SCORE SERPL CALC.FIBROSURE: 0.09 (ref 0–0.21)
NECROINFLAMMATORY ACT GRADE SERPL QL: ABNORMAL
NECROINFLAMMATORY ACT SCORE SERPL: 0.5
SERVICE CMNT-IMP: ABNORMAL
SL AMB INTERPRETATION: ABNORMAL
SL AMB NASH SCORING: ABNORMAL
SL AMB STEATOSIS GRADE: ABNORMAL
SL AMB STEATOSIS SCORE: 0.48 (ref 0–0.3)
STEATOSIS GRADING: ABNORMAL
TRIGL SERPL-MCNC: 97 MG/DL (ref 0–149)

## 2022-06-10 NOTE — TELEPHONE ENCOUNTER
----- Message from Kandy Candelario PA-C sent at 6/10/2022  8:46 AM EDT -----    Please let patient know that because he lost weight,  his fatty liver has improved and there is no scarring according to the blood work which is great news

## 2022-06-22 ENCOUNTER — TELEPHONE (OUTPATIENT)
Dept: GASTROENTEROLOGY | Facility: CLINIC | Age: 67
End: 2022-06-22

## 2022-06-22 NOTE — TELEPHONE ENCOUNTER
Nakita Boland - patient called he read about the balsalzide can cause stomach problems, and joint problem  This is want patient is experiencing  Should patient continue with this medication?  Please call vincent at [phone] ty

## 2022-06-22 NOTE — TELEPHONE ENCOUNTER
JULIANN: Spoke with patient  History of ulcerative colitis     Patient continues to have nausea, epigastric pain, and joint pains  Although he feels his joint pain is more from his back surgery  Patient wasn't to verify the balsalazide is not causing this discomfort  Patient understands he has been on this medication for years and his UC has been in remission  Patient understands he has GERD, and will continue his new PPI, and follow-up with the EGD

## 2022-06-29 ENCOUNTER — HOSPITAL ENCOUNTER (OUTPATIENT)
Dept: GASTROENTEROLOGY | Facility: HOSPITAL | Age: 67
Setting detail: OUTPATIENT SURGERY
Discharge: HOME | End: 2022-06-29
Payer: MEDICARE

## 2022-06-29 ENCOUNTER — ANESTHESIA EVENT (OUTPATIENT)
Dept: GASTROENTEROLOGY | Facility: HOSPITAL | Age: 67
End: 2022-06-29

## 2022-06-29 ENCOUNTER — ANESTHESIA (OUTPATIENT)
Dept: GASTROENTEROLOGY | Facility: HOSPITAL | Age: 67
End: 2022-06-29

## 2022-06-29 VITALS
HEART RATE: 58 BPM | RESPIRATION RATE: 16 BRPM | OXYGEN SATURATION: 96 % | SYSTOLIC BLOOD PRESSURE: 141 MMHG | TEMPERATURE: 97.6 F | DIASTOLIC BLOOD PRESSURE: 69 MMHG

## 2022-06-29 DIAGNOSIS — R10.13 EPIGASTRIC PAIN: ICD-10-CM

## 2022-06-29 DIAGNOSIS — R11.0 NAUSEA: ICD-10-CM

## 2022-06-29 DIAGNOSIS — R09.89 GLOBUS SENSATION: ICD-10-CM

## 2022-06-29 DIAGNOSIS — K21.9 GASTROESOPHAGEAL REFLUX DISEASE, UNSPECIFIED WHETHER ESOPHAGITIS PRESENT: ICD-10-CM

## 2022-06-29 PROCEDURE — 88305 TISSUE EXAM BY PATHOLOGIST: CPT | Performed by: PATHOLOGY

## 2022-06-29 PROCEDURE — 43239 EGD BIOPSY SINGLE/MULTIPLE: CPT | Performed by: INTERNAL MEDICINE

## 2022-06-29 RX ORDER — SODIUM CHLORIDE, SODIUM LACTATE, POTASSIUM CHLORIDE, CALCIUM CHLORIDE 600; 310; 30; 20 MG/100ML; MG/100ML; MG/100ML; MG/100ML
INJECTION, SOLUTION INTRAVENOUS CONTINUOUS PRN
Status: DISCONTINUED | OUTPATIENT
Start: 2022-06-29 | End: 2022-06-29

## 2022-06-29 RX ORDER — METOCLOPRAMIDE 10 MG/1
10 TABLET ORAL
Qty: 30 TABLET | Refills: 2 | Status: SHIPPED | OUTPATIENT
Start: 2022-06-29

## 2022-06-29 RX ORDER — SODIUM CHLORIDE, SODIUM LACTATE, POTASSIUM CHLORIDE, CALCIUM CHLORIDE 600; 310; 30; 20 MG/100ML; MG/100ML; MG/100ML; MG/100ML
125 INJECTION, SOLUTION INTRAVENOUS CONTINUOUS
Status: CANCELLED | OUTPATIENT
Start: 2022-06-29

## 2022-06-29 RX ORDER — PANTOPRAZOLE SODIUM 40 MG/1
40 TABLET, DELAYED RELEASE ORAL 2 TIMES DAILY
Qty: 60 TABLET | Refills: 2 | Status: SHIPPED | OUTPATIENT
Start: 2022-06-29 | End: 2022-09-22

## 2022-06-29 RX ORDER — PROPOFOL 10 MG/ML
INJECTION, EMULSION INTRAVENOUS AS NEEDED
Status: DISCONTINUED | OUTPATIENT
Start: 2022-06-29 | End: 2022-06-29

## 2022-06-29 RX ORDER — LIDOCAINE HYDROCHLORIDE 20 MG/ML
INJECTION, SOLUTION EPIDURAL; INFILTRATION; INTRACAUDAL; PERINEURAL AS NEEDED
Status: DISCONTINUED | OUTPATIENT
Start: 2022-06-29 | End: 2022-06-29

## 2022-06-29 RX ADMIN — PROPOFOL 200 MG: 10 INJECTION, EMULSION INTRAVENOUS at 13:15

## 2022-06-29 RX ADMIN — SODIUM CHLORIDE, SODIUM LACTATE, POTASSIUM CHLORIDE, AND CALCIUM CHLORIDE: .6; .31; .03; .02 INJECTION, SOLUTION INTRAVENOUS at 12:39

## 2022-06-29 RX ADMIN — LIDOCAINE HYDROCHLORIDE 100 MG: 20 INJECTION, SOLUTION EPIDURAL; INFILTRATION; INTRACAUDAL; PERINEURAL at 13:15

## 2022-06-29 NOTE — ANESTHESIA POSTPROCEDURE EVALUATION
Post-Op Assessment Note    CV Status:  Stable    Pain management: adequate     Mental Status:  Sleepy and arousable   Hydration Status:  Euvolemic   PONV Controlled:  Controlled   Airway Patency:  Patent      Post Op Vitals Reviewed: Yes            No complications documented      BP      Temp      Pulse     Resp      SpO2

## 2022-06-29 NOTE — INTERVAL H&P NOTE
H&P reviewed  After examining the patient I find no changes in the patients condition since the H&P had been written      Vitals:    06/29/22 1132   BP: 128/84   Pulse: 68   Resp: 16   Temp: 97 6 °F (36 4 °C)   SpO2: 97%

## 2022-06-29 NOTE — ANESTHESIA PREPROCEDURE EVALUATION
Procedure:  EGD    CC:  Upper abdominal pain     HPI: Luci Luo is a 62-year-old male who is known to us for history of  Ulcerative colitis maintained on Colazal, colon polyps, lumbar radiculopathy on opioids, and NAFLD  Patient presents today for epigastric pain that has worsened over the past 1-2 months  He reports that he has been on pantoprazole, but sees no difference in his symptoms  He reports that he continues to have nausea symptoms without vomiting  He is on chronic opioids, but he had a negative gastric emptying study and right upper quadrant ultrasound last year  He also reports globus sensation, which is new for him  For his bowel movements, he reports that they are regular as long as he takes MiraLax    He denies rectal bleeding or hematochezia      Last EGD was in December 2020, which was normal  Last colonoscopy was in January 2019 revealing mild ulcerative colitis in the proximal colon          Relevant Problems   CARDIO   (+) Hypertension      GI/HEPATIC   (+) NAFLD (nonalcoholic fatty liver disease)      MUSCULOSKELETAL   (+) Chronic bilateral low back pain with bilateral sciatica      NEURO/PSYCH   (+) Chronic bilateral low back pain with bilateral sciatica   (+) Chronic foot pain, right   (+) Chronic pain syndrome   (+) Hx of adenomatous polyp of colon        Physical Exam    Airway    Mallampati score: III  TM Distance: >3 FB  Neck ROM: full     Dental       Cardiovascular  Cardiovascular exam normal    Pulmonary  Pulmonary exam normal     Other Findings       Ulcerative colitis ([address] )   Hypertension   Impaired mobility and activities of daily living   Facet arthropathy, lumbar   Status post lumbar spinal fusion   Hx of adenomatous polyp of colon   Ulcerative colitis with rectal bleeding (HCC)   Type III fracture of odontoid process (HCC)   Traumatic compression fracture of T2 thoracic vertebra (HCC)   Left elbow fracture   Sternal fracture   Fall   Acute pain due to trauma   NAFLD (nonalcoholic fatty liver disease)   Chronic bilateral low back pain with bilateral sciatica   Lumbar radiculopathy   Therapeutic opioid induced constipation   Chronic foot pain, right   Right leg pain   Chronic pain syndrome   Neuropathy   Peroneal neuropathy   Right foot drop       Anesthesia Plan  ASA Score- 3     Anesthesia Type- IV sedation with anesthesia with ASA Monitors  Additional Monitors:   Airway Plan:           Plan Factors-Exercise tolerance (METS): >4 METS  Chart reviewed  EKG reviewed  Imaging results reviewed  Existing labs reviewed  Patient summary reviewed  Patient is not a current smoker  Induction- intravenous  Postoperative Plan-     Informed Consent- Anesthetic plan and risks discussed with patient  I personally reviewed this patient with the CRNA  Discussed and agreed on the Anesthesia Plan with the CRNA  Halima Gutiérrez

## 2022-07-18 ENCOUNTER — TELEPHONE (OUTPATIENT)
Dept: GASTROENTEROLOGY | Facility: CLINIC | Age: 67
End: 2022-07-18

## 2022-07-18 NOTE — TELEPHONE ENCOUNTER
----- Message from Nathaly Albright MD sent at 7/18/2022 11:44 AM EDT -----  Please tell him that the biopsies of the small intestine and stomach were normal

## 2022-09-22 DIAGNOSIS — R10.13 EPIGASTRIC PAIN: ICD-10-CM

## 2022-09-22 RX ORDER — PANTOPRAZOLE SODIUM 40 MG/1
TABLET, DELAYED RELEASE ORAL
Qty: 60 TABLET | Refills: 2 | Status: SHIPPED | OUTPATIENT
Start: 2022-09-22

## 2022-10-28 ENCOUNTER — APPOINTMENT (OUTPATIENT)
Dept: LAB | Facility: HOSPITAL | Age: 67
End: 2022-10-28
Payer: MEDICARE

## 2022-10-28 DIAGNOSIS — E78.5 HYPERLIPIDEMIA, UNSPECIFIED HYPERLIPIDEMIA TYPE: ICD-10-CM

## 2022-10-28 LAB
ALBUMIN SERPL BCP-MCNC: 3.5 G/DL (ref 3.5–5)
ALP SERPL-CCNC: 62 U/L (ref 46–116)
ALT SERPL W P-5'-P-CCNC: 23 U/L (ref 12–78)
ANION GAP SERPL CALCULATED.3IONS-SCNC: 5 MMOL/L (ref 4–13)
AST SERPL W P-5'-P-CCNC: 15 U/L (ref 5–45)
BILIRUB SERPL-MCNC: 0.66 MG/DL (ref 0.2–1)
BUN SERPL-MCNC: 22 MG/DL (ref 5–25)
CALCIUM SERPL-MCNC: 9 MG/DL (ref 8.3–10.1)
CHLORIDE SERPL-SCNC: 106 MMOL/L (ref 96–108)
CHOLEST SERPL-MCNC: 176 MG/DL
CO2 SERPL-SCNC: 27 MMOL/L (ref 21–32)
CREAT SERPL-MCNC: 1.18 MG/DL (ref 0.6–1.3)
EST. AVERAGE GLUCOSE BLD GHB EST-MCNC: 120 MG/DL
GFR SERPL CREATININE-BSD FRML MDRD: 64 ML/MIN/1.73SQ M
GLUCOSE P FAST SERPL-MCNC: 109 MG/DL (ref 65–99)
HBA1C MFR BLD: 5.8 %
HDLC SERPL-MCNC: 61 MG/DL
LDLC SERPL CALC-MCNC: 91 MG/DL (ref 0–100)
NONHDLC SERPL-MCNC: 115 MG/DL
POTASSIUM SERPL-SCNC: 4.5 MMOL/L (ref 3.5–5.3)
PROT SERPL-MCNC: 7.4 G/DL (ref 6.4–8.4)
SODIUM SERPL-SCNC: 138 MMOL/L (ref 135–147)
TRIGL SERPL-MCNC: 121 MG/DL

## 2022-10-28 PROCEDURE — 80053 COMPREHEN METABOLIC PANEL: CPT

## 2022-10-28 PROCEDURE — 80061 LIPID PANEL: CPT

## 2022-10-28 PROCEDURE — 36415 COLL VENOUS BLD VENIPUNCTURE: CPT

## 2022-10-28 PROCEDURE — 83036 HEMOGLOBIN GLYCOSYLATED A1C: CPT

## 2022-10-30 DIAGNOSIS — K51.819 OTHER ULCERATIVE COLITIS WITH COMPLICATION (HCC): ICD-10-CM

## 2022-10-31 RX ORDER — BALSALAZIDE DISODIUM 750 MG/1
CAPSULE ORAL
Qty: 180 CAPSULE | Refills: 5 | Status: SHIPPED | OUTPATIENT
Start: 2022-10-31

## 2022-12-03 NOTE — TELEPHONE ENCOUNTER
APPT CANCELLED FOR 6/8     Patient called back after receiving voicemail regarding an upcoming appt chet/ Juan Clayton  Patient called to cancel the appt  Patient stated he does not want to waste time/money since there is nothing he feels we can do  Patient also stated that he does not wish to reschedule at this time 
Called and left a voicemail for patient - Please call back to confirm upcoming appointment with Rosanna Mcgarry  Provided patient with apt date, time and location  Informed patient that check in is at least 15 minutes prior to apt time 
No
show

## 2022-12-27 ENCOUNTER — TELEPHONE (OUTPATIENT)
Dept: GASTROENTEROLOGY | Facility: CLINIC | Age: 67
End: 2022-12-27

## 2022-12-27 NOTE — TELEPHONE ENCOUNTER
Spoke with patient  History of UC, OIC, NAFLD    Patient c/o intermittent upper abdominal discomfort, continue constipation, and mid lower abdominal "trapped gas pain" for 1 month  Normal EGD  Patient has not followed-up with TERESA ALFONSO  He states "I'm gonna use the sucralfate first and if it continues then I will schedule that"  Patient recently lowered his daily Miralax to 1/2 cap full daily  Taking pantoprazole 40mg bid, colzal daily  Advised patient start gas-x OTC PRN, and restart miralax 1 cap full daily  He will follow-up in OV

## 2022-12-27 NOTE — TELEPHONE ENCOUNTER
Patients GI provider:  Dr Luna [facility]    Number to return call: [phone]    Reason for call: Pt calling stating he has been experiencing lower abd pain  Scheduled procedure/appointment date if applicable: Appt   1/25/23

## 2023-01-25 ENCOUNTER — APPOINTMENT (OUTPATIENT)
Dept: LAB | Facility: HOSPITAL | Age: 68
End: 2023-01-25

## 2023-01-25 ENCOUNTER — OFFICE VISIT (OUTPATIENT)
Dept: GASTROENTEROLOGY | Facility: CLINIC | Age: 68
End: 2023-01-25

## 2023-01-25 VITALS
BODY MASS INDEX: 35.87 KG/M2 | SYSTOLIC BLOOD PRESSURE: 136 MMHG | HEART RATE: 64 BPM | HEIGHT: 72 IN | WEIGHT: 264.8 LBS | DIASTOLIC BLOOD PRESSURE: 84 MMHG

## 2023-01-25 DIAGNOSIS — Z13.9 SCREENING DUE: ICD-10-CM

## 2023-01-25 DIAGNOSIS — K51.819 OTHER ULCERATIVE COLITIS WITH COMPLICATION (HCC): ICD-10-CM

## 2023-01-25 DIAGNOSIS — T40.2X5A THERAPEUTIC OPIOID INDUCED CONSTIPATION: Primary | ICD-10-CM

## 2023-01-25 DIAGNOSIS — K59.03 THERAPEUTIC OPIOID INDUCED CONSTIPATION: Primary | ICD-10-CM

## 2023-01-25 RX ORDER — BALSALAZIDE DISODIUM 750 MG/1
2250 CAPSULE ORAL 2 TIMES DAILY
Qty: 180 CAPSULE | Refills: 5 | Status: SHIPPED | OUTPATIENT
Start: 2023-01-25

## 2023-01-25 NOTE — PROGRESS NOTES
[address] [facility]  Brooke David 74 y o  male MRN: [de-identified]       CC: Constipation     HPI: Shelli Savage is a 79-year-old male known to us for history of ulcerative colitis on balsalazide colon polyps, lumbar radiculopathy on opioids and nonalcoholic fatty liver disease  Patient presents to the office for increased gas and abdominal discomfort  Patient reports that after he started taking MiraLAX more regularly again, his bowel movements became slightly more frequent and his gas symptoms were not as severe  He reports 80% improvement since he called about a month ago to the office  We did offer him samples of Movantik in the past, but the patient is reluctant to try any new medications  He continues on chronic opioids, but has had negative gastric emptying study and right upper quadrant ultrasound in the past   The upper abdominal pain he was reporting during her last visit in June has improved with PPI  He denies signs or GI bleeding or unintentional weight loss  His recent labs showed normalization of liver enzymes and normal lipid panel  Last EGD was in June 2022 revealing LA Class B esophagitis and erosive gastritis  Biopsies were negative for H pylori  Last colonoscopy was in January 2019 revealing mild ulcerative colitis in the proximal colon  He is likely due for a recall in 2024, unless he has bleeding or diarrhea which we discussed  Review of Systems:    CONSTITUTIONAL: Denies any fever, chills, or rigors  Good appetite, and no recent weight loss  HEENT: No earache or tinnitus  Denies hearing loss or visual disturbances  CARDIOVASCULAR: No chest pain or palpitations  RESPIRATORY: Denies any cough, hemoptysis, shortness of breath or dyspnea on exertion  GASTROINTESTINAL: As noted in the History of Present Illness  GENITOURINARY: No problems with urination  Denies any hematuria or dysuria  NEUROLOGIC: No dizziness or vertigo, denies headaches     MUSCULOSKELETAL: Denies any muscle or joint pain  SKIN: Denies skin rashes or itching  ENDOCRINE: Denies excessive thirst  Denies intolerance to heat or cold  PSYCHOSOCIAL: Denies depression or anxiety  Denies any recent memory loss  Current Outpatient Medications   Medication Sig Dispense Refill   • balsalazide (COLAZAL) 750 mg capsule take 3 capsules by mouth twice a day 180 capsule 5   • Diclofenac Sodium (VOLTAREN) 1 % Apply to right foot and leg twice a day 4 g 1   • dicyclomine (BENTYL) 20 mg tablet Take 1 tablet (20 mg total) by mouth 3 (three) times a day as needed (abdominal pain) 60 tablet 3   • ezetimibe (ZETIA) 10 mg tablet Take 10 mg by mouth daily     • oxyCODONE (ROXICODONE) 30 MG immediate release tablet oxycodone 30 mg tablet     • pantoprazole (PROTONIX) 40 mg tablet take 1 tablet by mouth twice a day 60 tablet 2   • aspirin (ECOTRIN LOW STRENGTH) 81 mg EC tablet Take 81 mg by mouth daily (Patient not taking: Reported on 1/25/2023)     • aspirin 81 mg chewable tablet Chew 81 mg daily (Patient not taking: Reported on 6/7/2022)     • fentaNYL (DURAGESIC) 25 mcg/hr Place 1 patch on the skin every third day (Patient not taking: Reported on 1/25/2023)     • indomethacin (INDOCIN) 25 mg capsule Take 25 mg by mouth 2 (two) times a day as needed (Patient not taking: Reported on 1/25/2023)     • metoclopramide (Reglan) 10 mg tablet Take 1 tablet (10 mg total) by mouth daily after dinner (Patient not taking: Reported on 1/25/2023) 30 tablet 2   • sucralfate (CARAFATE) 1 g tablet Take 1 g by mouth daily (Patient not taking: Reported on 1/25/2023)     • zolpidem (AMBIEN) 10 mg tablet Take 1 tablet (10 mg total) by mouth daily at bedtime as needed for sleep for up to 10 days 3 tablet 0     No current facility-administered medications for this visit       Past Medical History:   Diagnosis Date   • Back pain    • Colon polyp    • History of blood clots    • Hyperlipidemia    • Lumbar fracture with cord injury [facility])    • Neck fracture (HCC) • Previous back surgery     rods in the back   • Ulcerative colitis [facility])      Past Surgical History:   Procedure Laterality Date   • BACK SURGERY     • LUMBAR FUSION Right 1/20/2016    Procedure: FUSION LUMBAR  POSTERIOR, TLIF L3-4  Right L3-4 Fascet;  Surgeon: Sandro Morrell MD;  Location:  MAIN OR;  Service:    • PA COLONOSCOPY FLX DX W/COLLJ SPEC WHEN PFRMD N/A 1/24/2019    Procedure: COLONOSCOPY;  Surgeon: Bill Whaley MD;  Location: [facility]; Service: Gastroenterology   • TONSILLECTOMY       Social History     Socioeconomic History   • Marital status: /Civil Union     Spouse name: None   • Number of children: None   • Years of education: None   • Highest education level: None   Occupational History   • None   Tobacco Use   • Smoking status: Never   • Smokeless tobacco: Never   Vaping Use   • Vaping Use: Never used   Substance and Sexual Activity   • Alcohol use: Not Currently     Comment: occasional   • Drug use: No   • Sexual activity: Never   Other Topics Concern   • None   Social History Narrative   • None     Social Determinants of Health     Financial Resource Strain: Not on file   Food Insecurity: Not on file   Transportation Needs: Not on file   Physical Activity: Not on file   Stress: Not on file   Social Connections: Not on file   Intimate Partner Violence: Not on file   Housing Stability: Not on file     Family History   Problem Relation Age of Onset   • Parkinsonism Mother    • Lung cancer Father             PHYSICAL EXAM:    Vitals:    01/25/23 1417   BP: 136/84   Pulse: 64   Weight: 120 kg (264 lb 12 8 oz)   Height: 6' (1 829 m)     General Appearance:   Alert and oriented x 3   Cooperative, and in no respiratory distress   HEENT:   Normocephalic, atraumatic, anicteric      Neck:  Supple, symmetrical, trachea midline   Lungs:   Clear to auscultation bilaterally    Heart[de-identified]   Regular rate and rhythm   Abdomen:   Soft, non-tender, non-distended; normal bowel sounds; no masses, no organomegaly    Genitalia:   Deferred    Rectal:   Deferred    Extremities:  No cyanosis, clubbing or edema    Pulses:  2+ and symmetric all extremities    Skin:  Skin color, texture, turgor normal, no rashes or lesions    Lymph nodes:  No palpable cervical or supraclavicular lymphadenopathy        Lab Results:       Results from last 6 Months   Lab Units 10/28/22  1355   POTASSIUM mmol/L 4 5   CHLORIDE mmol/L 106   CO2 mmol/L 27   BUN mg/dL 22   CREATININE mg/dL 1 18   CALCIUM mg/dL 9 0   ALK PHOS U/L 62   ALT U/L 23   AST U/L 15               Imaging Studies: I have personally reviewed pertinent imaging studies  EGD    Result Date: 6/29/2022  Impression: LA grade B reflux esophagitis Mild erosive gastritis RECOMMENDATION: PPI b i d  For 3 months then q day Reglan 10 mg p o  Q h s  Await pathology Weight loss Reflux precautions   Gabe Burgos MD       ASSESSMENT and PLAN:      1) Chronic opioid induced constipation, history of ulcerative colitis - Patient has not had ulcerative colitis flare symptoms in several years  He has been maintained on balsalazide chronically  Patient's symptoms of constipation likely related to opioid use  Patient is reluctant to try Movantik which is approved for opioid-induced constipation  We went over symptoms that would be suggestive of UC flare   - Continue MiraLAX 1-2 capfuls daily  - Consider Movantik in the future if bowel movements become less frequent  - We will hold off on HIDA scan at this time      Follow up in April  Courage patient to call between now and his next follow-up for any further assistance

## 2023-01-26 LAB — PSA SERPL-MCNC: 1.3 NG/ML (ref 0–4)

## 2023-03-29 ENCOUNTER — OFFICE VISIT (OUTPATIENT)
Dept: GASTROENTEROLOGY | Facility: CLINIC | Age: 68
End: 2023-03-29

## 2023-03-29 VITALS
DIASTOLIC BLOOD PRESSURE: 72 MMHG | HEIGHT: 72 IN | OXYGEN SATURATION: 98 % | WEIGHT: 261.6 LBS | HEART RATE: 68 BPM | SYSTOLIC BLOOD PRESSURE: 118 MMHG | BODY MASS INDEX: 35.43 KG/M2

## 2023-03-29 DIAGNOSIS — R10.30 LOWER ABDOMINAL PAIN: ICD-10-CM

## 2023-03-29 DIAGNOSIS — K51.819 OTHER ULCERATIVE COLITIS WITH COMPLICATION (HCC): Primary | ICD-10-CM

## 2023-03-29 DIAGNOSIS — K59.03 THERAPEUTIC OPIOID INDUCED CONSTIPATION: ICD-10-CM

## 2023-03-29 DIAGNOSIS — T40.2X5A THERAPEUTIC OPIOID INDUCED CONSTIPATION: ICD-10-CM

## 2023-03-29 DIAGNOSIS — K76.0 NAFLD (NONALCOHOLIC FATTY LIVER DISEASE): ICD-10-CM

## 2023-03-29 RX ORDER — POLYETHYLENE GLYCOL-3350 AND ELECTROLYTES WITH FLAVOR PACK 240; 5.84; 2.98; 6.72; 22.72 G/278.26G; G/278.26G; G/278.26G; G/278.26G; G/278.26G
4000 POWDER, FOR SOLUTION ORAL ONCE
Qty: 4000 ML | Refills: 0 | Status: SHIPPED | OUTPATIENT
Start: 2023-03-29 | End: 2023-03-29

## 2023-03-29 RX ORDER — CEFUROXIME AXETIL 250 MG/1
250 TABLET ORAL EVERY 12 HOURS
COMMUNITY
Start: 2023-01-30

## 2023-03-29 RX ORDER — OSELTAMIVIR PHOSPHATE 75 MG/1
75 CAPSULE ORAL 2 TIMES DAILY
COMMUNITY
Start: 2023-01-30

## 2023-03-29 RX ORDER — BENZONATATE 200 MG/1
200 CAPSULE ORAL 3 TIMES DAILY
COMMUNITY
Start: 2023-01-30

## 2023-03-29 NOTE — PATIENT INSTRUCTIONS
Scheduled date of colonoscopy (as of today):6/28/23  Physician performing colonoscopy: Esteban  Location of colonoscopy:[facility]  Bowel prep reviewed with patient:tasha  Instructions reviewed with patient by:Milagro CUBA  Clearances:  none

## 2023-03-29 NOTE — PROGRESS NOTES
[address] [facility]  Oliverio Johnsonar 74 y o  male MRN: [de-identified]       CC: Chronic constipation    HPI: Oliverio Lynn is a 79-year-old male known to us for history of ulcerative colitis on balsalazide, colon polyps, lumbar radiculopathy on chronic opioids, and nonalcoholic fatty liver disease  Patient is here to follow-up for chronic constipation  He reports that since he started MiraLAX again, he had increase in bowel frequency  However, is interested in a pill option for maintaining his bowel movements  He denies rectal bleeding or diarrhea  Patient reports that when his back flares up with pain, he will have lower abdominal cramping  Sometimes, it is difficult for him to decipher between his back pain and GI symptoms  Last EGD was in June 2022 revealing LA Class B esophagitis and erosive gastritis  Biopsies were negative for H pylori  Last colonoscopy was in January 2019 revealing mild ulcerative colitis in the proximal colon        Review of Systems:    CONSTITUTIONAL: Denies any fever, chills, or rigors  Good appetite, and no recent weight loss  HEENT: No earache or tinnitus  Denies hearing loss or visual disturbances  CARDIOVASCULAR: No chest pain or palpitations  RESPIRATORY: Denies any cough, hemoptysis, shortness of breath or dyspnea on exertion  GASTROINTESTINAL: As noted in the History of Present Illness  GENITOURINARY: No problems with urination  Denies any hematuria or dysuria  NEUROLOGIC: No dizziness or vertigo, denies headaches  MUSCULOSKELETAL: Denies any muscle or joint pain  SKIN: Denies skin rashes or itching  ENDOCRINE: Denies excessive thirst  Denies intolerance to heat or cold  PSYCHOSOCIAL: Denies depression or anxiety  Denies any recent memory loss         Current Outpatient Medications   Medication Sig Dispense Refill   • balsalazide (COLAZAL) 750 mg capsule Take 3 capsules (2,250 mg total) by mouth 2 (two) times a day 180 capsule 5   • benzonatate (TESSALON) 200 MG capsule Take 200 mg by mouth 3 (three) times a day     • Diclofenac Sodium (VOLTAREN) 1 % Apply to right foot and leg twice a day 4 g 1   • dicyclomine (BENTYL) 20 mg tablet Take 1 tablet (20 mg total) by mouth 3 (three) times a day as needed (abdominal pain) 60 tablet 3   • ezetimibe (ZETIA) 10 mg tablet Take 10 mg by mouth daily     • oxyCODONE (ROXICODONE) 30 MG immediate release tablet oxycodone 30 mg tablet     • pantoprazole (PROTONIX) 40 mg tablet take 1 tablet by mouth twice a day 60 tablet 2   • aspirin (ECOTRIN LOW STRENGTH) 81 mg EC tablet Take 81 mg by mouth daily (Patient not taking: Reported on 1/25/2023)     • cefuroxime (CEFTIN) 250 mg tablet Take 250 mg by mouth every 12 (twelve) hours (Patient not taking: Reported on 3/29/2023)     • fentaNYL (DURAGESIC) 25 mcg/hr Place 1 patch on the skin every third day (Patient not taking: Reported on 1/25/2023)     • indomethacin (INDOCIN) 25 mg capsule Take 25 mg by mouth 2 (two) times a day as needed (Patient not taking: Reported on 1/25/2023)     • metoclopramide (Reglan) 10 mg tablet Take 1 tablet (10 mg total) by mouth daily after dinner (Patient not taking: Reported on 1/25/2023) 30 tablet 2   • oseltamivir (TAMIFLU) 75 mg capsule Take 75 mg by mouth 2 (two) times a day (Patient not taking: Reported on 3/29/2023)     • sucralfate (CARAFATE) 1 g tablet Take 1 g by mouth daily (Patient not taking: Reported on 1/25/2023)     • zolpidem (AMBIEN) 10 mg tablet Take 1 tablet (10 mg total) by mouth daily at bedtime as needed for sleep for up to 10 days 3 tablet 0     No current facility-administered medications for this visit       Past Medical History:   Diagnosis Date   • Back pain    • Colon polyp    • History of blood clots    • Hyperlipidemia    • Lumbar fracture with cord injury ([facility] [address] )    • Neck fracture (HCC)    • Previous back surgery     rods in the back   • Ulcerative colitis [facility])      Past Surgical History:   Procedure Laterality Date   • BACK SURGERY • LUMBAR FUSION Right 1/20/2016    Procedure: FUSION LUMBAR  POSTERIOR, TLIF L3-4  Right L3-4 Fascet;  Surgeon: Annika Amato MD;  [facility]: [facility];  Service:    • [place] COLONOSCOPY FLX DX W/COLLJ SPEC WHEN PFRMD N/A 1/24/2019    Procedure: COLONOSCOPY;  Surgeon: Mikael Morgan MD;  Location: [facility]; Service: Gastroenterology   • TONSILLECTOMY       Social History     Socioeconomic History   • Marital status: /Civil Union     Spouse name: None   • Number of children: None   • Years of education: None   • Highest education level: None   Occupational History   • None   Tobacco Use   • Smoking status: Never   • Smokeless tobacco: Never   Vaping Use   • Vaping Use: Never used   Substance and Sexual Activity   • Alcohol use: Not Currently     Comment: occasional   • Drug use: No   • Sexual activity: Never   Other Topics Concern   • None   Social History Narrative   • None     Social Determinants of Health     Financial Resource Strain: Not on file   Food Insecurity: Not on file   Transportation Needs: Not on file   Physical Activity: Not on file   Stress: Not on file   Social Connections: Not on file   Intimate Partner Violence: Not on file   Housing Stability: Not on file     Family History   Problem Relation Age of Onset   • Parkinsonism Mother    • Lung cancer Father             PHYSICAL EXAM:    Vitals:    03/29/23 1413   BP: 118/72   BP Location: Right arm   Patient Position: Sitting   Cuff Size: Standard   Pulse: 68   SpO2: 98%   Weight: 119 kg (261 lb 9 6 oz)   Height: 6' (1 829 m)     General Appearance:   Alert and oriented x 3   Cooperative, and in no respiratory distress   HEENT:   Normocephalic, atraumatic, anicteric      Neck:  Supple, symmetrical, trachea midline   Lungs:   Clear to auscultation bilaterally    Heart[de-identified]   Regular rate and rhythm   Abdomen:   Soft, non-tender, non-distended; normal bowel sounds; no masses, no organomegaly    Genitalia:   Deferred    Rectal:   Deferred  Extremities:  No cyanosis, clubbing or edema    Pulses:  2+ and symmetric all extremities    Skin:  Skin color, texture, turgor normal, no rashes or lesions    Lymph nodes:  No palpable cervical or supraclavicular lymphadenopathy        Lab Results:       Results from last 6 Months   Lab Units 10/28/22  1355   POTASSIUM mmol/L 4 5   CHLORIDE mmol/L 106   CO2 mmol/L 27   BUN mg/dL 22   CREATININE mg/dL 1 18   CALCIUM mg/dL 9 0   ALK PHOS U/L 62   ALT U/L 23   AST U/L 15               Imaging Studies: I have personally reviewed pertinent imaging studies  EGD    Result Date: 6/29/2022  Impression: LA grade B reflux esophagitis Mild erosive gastritis RECOMMENDATION: PPI b i d  For 3 months then q day Reglan 10 mg p o  Q h s  Await pathology Weight loss Reflux precautions   Courtney Askew MD       ASSESSMENT and PLAN:      1) Opioid-induced constipation, lower abdominal cramping and chronic back pain - Patient reports that generally MiraLAX does work to regulate his bowel habits  However, we did discuss alternative of Movantik  Patient is interested in a pill option to maintain his bowel habits since he is fearful that when his back pain is worse we will take more opioids, which will then cause more constipation symptoms in the near future  - Provided him samples of Movantik 25 mg to take daily  - Colonoscopy to investigate    2) History of UC - On balsalazide for many years  His last colonoscopy was in 2019  See plan above  3) NAFLD - LFTs are within normal limits  Patient has lost 30 pounds since 2020 and his weight is stable around 260  Yarelis Shantelle FibroSure testing showing no fibrosis  Follow up after colonoscopy

## 2023-06-08 ENCOUNTER — HOSPITAL ENCOUNTER (EMERGENCY)
Facility: HOSPITAL | Age: 68
Discharge: HOME | End: 2023-06-08
Attending: EMERGENCY MEDICINE
Payer: MEDICARE

## 2023-06-08 ENCOUNTER — APPOINTMENT (EMERGENCY)
Dept: RADIOLOGY | Facility: HOSPITAL | Age: 68
End: 2023-06-08
Payer: MEDICARE

## 2023-06-08 VITALS
RESPIRATION RATE: 16 BRPM | WEIGHT: 265 LBS | BODY MASS INDEX: 35.94 KG/M2 | OXYGEN SATURATION: 95 % | DIASTOLIC BLOOD PRESSURE: 73 MMHG | TEMPERATURE: 98.4 F | SYSTOLIC BLOOD PRESSURE: 136 MMHG | HEART RATE: 85 BPM

## 2023-06-08 DIAGNOSIS — S49.91XA INJURY OF RIGHT SHOULDER, INITIAL ENCOUNTER: Primary | ICD-10-CM

## 2023-06-08 PROCEDURE — 99284 EMERGENCY DEPT VISIT MOD MDM: CPT

## 2023-06-08 PROCEDURE — 73030 X-RAY EXAM OF SHOULDER: CPT

## 2023-06-08 RX ORDER — OXYCODONE HYDROCHLORIDE 5 MG/1
5 TABLET ORAL ONCE
Status: COMPLETED | OUTPATIENT
Start: 2023-06-08 | End: 2023-06-08

## 2023-06-08 RX ORDER — ACETAMINOPHEN 325 MG/1
975 TABLET ORAL ONCE
Status: COMPLETED | OUTPATIENT
Start: 2023-06-08 | End: 2023-06-08

## 2023-06-08 RX ADMIN — OXYCODONE HYDROCHLORIDE 5 MG: 5 TABLET ORAL at 06:10

## 2023-06-08 RX ADMIN — ACETAMINOPHEN 975 MG: 325 TABLET, FILM COATED ORAL at 06:10

## 2023-06-08 NOTE — ED PROVIDER NOTES
History  Chief Complaint   Patient presents with   • Fall   • Shoulder Injury     Pt reports tripping and falling onto right shoulder approx an hour ago  Tripped over bar of heater  No head strike or LOC  No thinners  Reports limited ROM and pain  72yo right hand dominant male with a history of ulcerative colitis, chronic back pain on opioids, hyperlipidemia, and obesity presenting for evaluation after a fall 1 hour ago  Patient moved something with his foot and his foot got caught causing him to trip  He fell directly onto his right shoulder  He denies any head strike or LOC  He was able to ambulate after the fall  His only complaint is right shoulder pain and he is having difficulty with range of motion  No paresthesias  No prior injuries to the right shoulder  No thinners  History provided by:  Patient   used: No    Shoulder Injury  Location:  Shoulder  Shoulder location:  R shoulder  Injury: yes    Time since incident:  1 hour  Pain details:     Radiates to:  Does not radiate    Pain severity now: 8/10  Onset quality:  Sudden    Duration:  1 hour    Timing:  Constant    Progression:  Unchanged  Handedness:  Right-handed  Dislocation: no    Relieved by:  Nothing  Worsened by: Movement  Ineffective treatments:  None tried  Associated symptoms: decreased range of motion    Associated symptoms: no fever, no neck pain, no numbness, no stiffness, no swelling and no tingling        Prior to Admission Medications   Prescriptions Last Dose Informant Patient Reported? Taking?    Diclofenac Sodium (VOLTAREN) 1 %  Self No No   Sig: Apply to right foot and leg twice a day   aspirin (ECOTRIN LOW STRENGTH) 81 mg EC tablet  Self Yes No   Sig: Take 81 mg by mouth daily   Patient not taking: Reported on 1/25/2023   balsalazide (COLAZAL) 750 mg capsule  Self No No   Sig: Take 3 capsules (2,250 mg total) by mouth 2 (two) times a day   benzonatate (TESSALON) 200 MG capsule  Self Yes No   Sig: Take 200 mg by mouth 3 (three) times a day   cefuroxime (CEFTIN) 250 mg tablet  Self Yes No   Sig: Take 250 mg by mouth every 12 (twelve) hours   Patient not taking: Reported on 3/29/2023   dicyclomine (BENTYL) 20 mg tablet  Self No No   Sig: Take 1 tablet (20 mg total) by mouth 3 (three) times a day as needed (abdominal pain)   ezetimibe (ZETIA) 10 mg tablet  Self Yes No   Sig: Take 10 mg by mouth daily   fentaNYL (DURAGESIC) 25 mcg/hr  Self Yes No   Sig: Place 1 patch on the skin every third day   Patient not taking: Reported on 1/25/2023   indomethacin (INDOCIN) 25 mg capsule  Self Yes No   Sig: Take 25 mg by mouth 2 (two) times a day as needed   Patient not taking: Reported on 1/25/2023   metoclopramide (Reglan) 10 mg tablet  Self No No   Sig: Take 1 tablet (10 mg total) by mouth daily after dinner   Patient not taking: Reported on 1/25/2023   oseltamivir (TAMIFLU) 75 mg capsule  Self Yes No   Sig: Take 75 mg by mouth 2 (two) times a day   Patient not taking: Reported on 3/29/2023   oxyCODONE (ROXICODONE) 30 MG immediate release tablet  Self Yes No   Sig: oxycodone 30 mg tablet   pantoprazole (PROTONIX) 40 mg tablet  Self No No   Sig: take 1 tablet by mouth twice a day   polyethylene glycol (GaviLyte-C) 4000 mL solution   No No   Sig: Take 4,000 mL by mouth once for 1 dose   sucralfate (CARAFATE) 1 g tablet  Self Yes No   Sig: Take 1 g by mouth daily   Patient not taking: Reported on 1/25/2023   zolpidem (AMBIEN) 10 mg tablet  Self No No   Sig: Take 1 tablet (10 mg total) by mouth daily at bedtime as needed for sleep for up to 10 days      Facility-Administered Medications: None       Past Medical History:   Diagnosis Date   • Back pain    • Colon polyp    • History of blood clots    • Hyperlipidemia    • Lumbar fracture with cord injury ([facility] [address] )    • Neck fracture (HCC)    • Previous back surgery     rods in the back   • Ulcerative colitis [facility])        Past Surgical History:   Procedure Laterality Date   • BACK SURGERY • LUMBAR FUSION Right 1/20/2016    Procedure: FUSION LUMBAR  POSTERIOR, TLIF L3-4  Right L3-4 Fascet;  Surgeon: Ibrahima Damon MD;  [facility];  Service:    • OK COLONOSCOPY FLX DX W/COLLJ SPEC WHEN PFRMD N/A 1/24/2019    Procedure: COLONOSCOPY;  Surgeon: Anders Saab MD;  Location: [facility]; Service: Gastroenterology   • TONSILLECTOMY         Family History   Problem Relation Age of Onset   • Parkinsonism Mother    • Lung cancer Father      I have reviewed and agree with the history as documented  E-Cigarette/Vaping   • E-Cigarette Use Never User      E-Cigarette/Vaping Substances     Social History     Tobacco Use   • Smoking status: Never   • Smokeless tobacco: Never   Vaping Use   • Vaping Use: Never used   Substance Use Topics   • Alcohol use: Not Currently     Comment: occasional   • Drug use: No       Review of Systems   Constitutional: Negative for chills and fever  Eyes: Negative for discharge and redness  Musculoskeletal: Positive for arthralgias  Negative for neck pain and stiffness  Skin: Negative for color change and wound  Neurological: Negative for weakness and numbness  Psychiatric/Behavioral: Negative for confusion  The patient is not nervous/anxious  All other systems reviewed and are negative  Physical Exam  Physical Exam  Vitals and nursing note reviewed  Constitutional:       General: He is not in acute distress  Appearance: Normal appearance  He is not toxic-appearing  HENT:      Head: Normocephalic and atraumatic  Right Ear: External ear normal       Left Ear: External ear normal    Eyes:      General: No scleral icterus  Right eye: No discharge  Left eye: No discharge  Conjunctiva/sclera: Conjunctivae normal    Cardiovascular:      Rate and Rhythm: Normal rate  Pulmonary:      Effort: Pulmonary effort is normal  No respiratory distress  Breath sounds: No stridor     Musculoskeletal:         General: No deformity  Right shoulder: Bony tenderness present  No swelling or deformity  Decreased range of motion  Normal pulse  Cervical back: Normal range of motion  Comments: Right shoulder: Normal to inspection  No deformity, ecchymosis, or swelling  +Tenderness to lateral joint  ROM decreased 2/2 pain  2+ radial pulse  Sensation intact in axillary region  Skin:     General: Skin is warm and dry  Neurological:      General: No focal deficit present  Mental Status: He is alert  Mental status is at baseline  Psychiatric:         Mood and Affect: Mood normal          Behavior: Behavior normal          Vital Signs  ED Triage Vitals [06/08/23 0554]   Temperature Pulse Respirations Blood Pressure SpO2   98 4 °F (36 9 °C) 78 17 138/74 100 %      Temp Source Heart Rate Source Patient Position - Orthostatic VS BP Location FiO2 (%)   Oral Monitor Sitting Left arm --      Pain Score       8           Vitals:    06/08/23 0554 06/08/23 0600   BP: 138/74 136/73   Pulse: 78 85   Patient Position - Orthostatic VS: Sitting Sitting         Visual Acuity      ED Medications  Medications   oxyCODONE (ROXICODONE) IR tablet 5 mg (5 mg Oral Given 6/8/23 0610)   acetaminophen (TYLENOL) tablet 975 mg (975 mg Oral Given 6/8/23 0610)       Diagnostic Studies  Results Reviewed     None                 XR shoulder 2+ views RIGHT   Final Result by Enzo Mcneil DO (06/08 9167)      No acute osseous abnormality is seen  Other findings as above  Workstation performed: [id number]                    Procedures  Procedures         ED Course                       SBIRT 22yo+    Flowsheet Row Most Recent Value   Initial Alcohol Screen: US AUDIT-C     1  How often do you have a drink containing alcohol? 1 Filed at: 06/08/2023 0555   2  How many drinks containing alcohol do you have on a typical day you are drinking? 0 Filed at: 06/08/2023 0555   3a  Male UNDER 65:  How often do you have five or more drinks on one occasion? 0 Filed at: 06/08/2023 0555   3b  FEMALE Any Age, or MALE 65+: How often do you have 4 or more drinks on one occassion? 0 Filed at: 06/08/2023 0555   Audit-C Score 1 Filed at: 06/08/2023 0882   AYESHA: How many times in the past year have you    Used an illegal drug or used a prescription medication for non-medical reasons? Never Filed at: 06/08/2023 0555                    Medical Decision Making  67yoM presenting for R shoulder pain after a mechanical fall 1 hour ago  Fell directly on R shoulder  No other injuries  No deformity on exam  ROM is decreased  RUE is neurovascularly intact  X-rays obtained which are negative for fracture and dislocation  He was provided with a sling for comfort  He was advised to only wear the sling for 3 days and to do gentle range of motion of the shoulder every hour to avoid adhesive capsulitis  Advised f/u with orthopedics with persistent symptoms  Injury of right shoulder, initial encounter: acute illness or injury  Amount and/or Complexity of Data Reviewed  Radiology: ordered  Risk  OTC drugs  Prescription drug management  Disposition  Final diagnoses:   Injury of right shoulder, initial encounter     Time reflects when diagnosis was documented in both MDM as applicable and the Disposition within this note     Time User Action Codes Description Comment    6/8/2023  6:32 AM Fam Hernandez Add [S49 [id number]] Injury of right shoulder, initial encounter       ED Disposition     ED Disposition   Discharge    Condition   Stable    Date/Time   Thu Jun 8, 2023  6:32 AM    Comment   Mary Jane Motley discharge to home/self care                 Follow-up Information     Follow up With Specialties Details Why Contact Info Additional [address] [facility] Schedule an appointment as soon as possible for a visit   [address] [address] [facility], [address] [address] [place], [place], [address]    [facility] Emergency Department Emergency Medicine  If symptoms worsen [address] [facility] [address] [facility] Emergency Department, [address], [place], [address]          Discharge Medication List as of 6/8/2023  6:34 AM      CONTINUE these medications which have NOT CHANGED    Details   aspirin (ECOTRIN LOW STRENGTH) 81 mg EC tablet Take 81 mg by mouth daily, Historical Med      balsalazide (COLAZAL) 750 mg capsule Take 3 capsules (2,250 mg total) by mouth 2 (two) times a day, Starting Wed 1/25/2023, Normal      benzonatate (TESSALON) 200 MG capsule Take 200 mg by mouth 3 (three) times a day, Starting Mon 1/30/2023, Historical Med      cefuroxime (CEFTIN) 250 mg tablet Take 250 mg by mouth every 12 (twelve) hours, Starting Mon 1/30/2023, Historical Med      Diclofenac Sodium (VOLTAREN) 1 % Apply to right foot and leg twice a day, Normal      dicyclomine (BENTYL) 20 mg tablet Take 1 tablet (20 mg total) by mouth 3 (three) times a day as needed (abdominal pain), Starting Wed 7/21/2021, Normal      ezetimibe (ZETIA) 10 mg tablet Take 10 mg by mouth daily, Historical Med      fentaNYL (DURAGESIC) 25 mcg/hr Place 1 patch on the skin every third day, Historical Med      indomethacin (INDOCIN) 25 mg capsule Take 25 mg by mouth 2 (two) times a day as needed, Starting u 10/27/2022, Historical Med      metoclopramide (Reglan) 10 mg tablet Take 1 tablet (10 mg total) by mouth daily after dinner, Starting Wed 6/29/2022, Normal      oseltamivir (TAMIFLU) 75 mg capsule Take 75 mg by mouth 2 (two) times a day, Starting Mon 1/30/2023, Historical Med      oxyCODONE (ROXICODONE) 30 MG immediate release tablet oxycodone 30 mg tablet, Historical Med      pantoprazole (PROTONIX) 40 mg tablet take 1 tablet by mouth twice a day, Normal      polyethylene glycol (GaviLyte-C) 4000 mL solution Take 4,000 mL by mouth once for 1 dose, Starting Wed 3/29/2023, Normal      sucralfate (CARAFATE) 1 g tablet Take 1 g by mouth daily, Starting Fri 5/27/2022, Historical Med      zolpidem (AMBIEN) 10 mg tablet Take 1 tablet (10 mg total) by mouth daily at bedtime as needed for sleep for up to 10 days, Starting Thu 4/4/2019, Until Wed 6/29/2022 at 2359, Print             No discharge procedures on file      PDMP Review     None          ED Provider  Electronically Signed by           Tracy Chang PA-C  06/08/23 0700

## 2023-06-08 NOTE — DISCHARGE INSTRUCTIONS
Wear sling for 3 days max  Do not wear at nighttime and do gentle range of motion exercises once an hour while wearing the sling  Apply ice to affected area  Take Tylenol and ibuprofen for pain      Please follow-up with orthopedics if your symptoms persist

## 2023-06-15 ENCOUNTER — OFFICE VISIT (OUTPATIENT)
Dept: OBGYN CLINIC | Facility: CLINIC | Age: 68
End: 2023-06-15
Payer: MEDICARE

## 2023-06-15 VITALS
DIASTOLIC BLOOD PRESSURE: 80 MMHG | WEIGHT: 268 LBS | SYSTOLIC BLOOD PRESSURE: 147 MMHG | HEART RATE: 69 BPM | BODY MASS INDEX: 36.3 KG/M2 | HEIGHT: 72 IN

## 2023-06-15 DIAGNOSIS — M25.511 ACUTE PAIN OF RIGHT SHOULDER: Primary | ICD-10-CM

## 2023-06-15 PROCEDURE — 99214 OFFICE O/P EST MOD 30 MIN: CPT | Performed by: FAMILY MEDICINE

## 2023-06-15 RX ORDER — ALPRAZOLAM 0.5 MG/1
0.5 TABLET ORAL
Qty: 2 TABLET | Refills: 0 | Status: SHIPPED | OUTPATIENT
Start: 2023-06-15

## 2023-06-15 NOTE — PROGRESS NOTES
Subjective:  Chief Complaint   Patient presents with   • Right Shoulder - Pain       Kiki Blanco is a 74 y o  male complains of right shoulder pain  Onset of the symptoms was a week ago  Mechanism of injury: tripped over item at home resulting in fall directly onto his right shoulder  Aggravating factors: use of the right shoulder, any motion   Treatment to date: rest and is on chronic long term pain medication for back pain, has bene taking this for shoulder injury without relief  Symptoms have gradually worsened  The following portions of the patient's history were reviewed and updated as appropriate: allergies, current medications, past family history, past medical history, past social history, past surgical history and problem list     Occupation:      Review of Systems   Constitutional: Negative for fever  HENT: Negative for dental problem and headaches  Eyes: Negative for vision loss  Respiratory: Negative for cough and shortness of breath  Cardiovascular: Negative for leg swelling and palpitations  Gastrointestinal: Negative for constipation and diarrhea  Genitourinary: Negative for bladder incontinence and difficulty urinating  Musculoskeletal: Negative for back pain and difficulty walking  Skin: Negative for rash and ulcer  Neurological: Negative for dizziness and headaches  Hem/Lymph/Immuno: Negative for blood clots  Does not bruise/bleed easily  Psychiatric/Behavioral: Negative for confusion  Objective:  /80   Pulse 69   Ht 6' (1 829 m)   Wt 122 kg (268 lb)   BMI 36 35 kg/m²     Skin: no rashes, lesions, skin discolorations, lacerations  Vasculature: normal radial and ulnar pulse,  normal skin color, normal capillary refill in extremity, no upper extremity edema  Neurologic: Neurologic exam is normal throughout upper extremities, Awake, alert, and oriented x3, no apparent distress  Musculoskeletal:   right SHOULDER EXAM    Intact skin    No erythema, no induration, no signs of infection  No gross deformity    AROM FF: 70 degrees  AROM ER with arm at its side: 60  AROM IR: lower thoracic    Grind test: negative    Supraspinatus strength testing:3/5  Infraspinatus strength testing:3/5    Belly press: negative      Empty can: positive  Biceps TTP: positive  Zelaya psotiive       Tenderness to palpation of AC joint: negative  Pain with cross-body adduction: negative          Imaging:    XR shoulder 2+ views RIGHT    Result Date: 6/8/2023  Narrative: RIGHT SHOULDER INDICATION:   fall  COMPARISON:  None VIEWS:  XR SHOULDER 2+ VW RIGHT FINDINGS: There is no acute fracture or dislocation  Mild degenerative changes of the acromioclavicular joint  No suspicious appearing osseous lesions are seen  The visualized soft tissues appear grossly unremarkable  Impression: No acute osseous abnormality is seen  Other findings as above  Workstation performed: [id number]        Assessment/Plan:  1  Acute pain of right shoulder    > 35 min devoted to review of previous, pertinent medical records, imaging, discussion of treatment options, counseling and documentation  Clinical concern for possible rotator cuff injury given weakness with supraspinatus and infraspinatus testing  Injury occurred approximately 1 week ago and has been worsening in severity     Directed to ice the area daily for 20 minutes at a time using a barrier to protect the skin- stressed specifically icing after activity to address inflammation  Advised to discuss with primary doctor and if oral anti-inflammatories acceptable he can begin  Patient will be referred for an MRI of the right shoulder to evaluate for rotator cuff injury  Follow-up once results return  Advised he can continue in the sling for comfort use  Should sx's worsen or any concerns arise, they were advised to follow up sooner or seek more immediate medical attention  All of the patient's concerns were addressed and questions answered   They verbalized agreement with and understanding of the treatment plan          - MRI shoulder right wo contrast; Future

## 2023-06-20 ENCOUNTER — TELEPHONE (OUTPATIENT)
Dept: GASTROENTEROLOGY | Facility: CLINIC | Age: 68
End: 2023-06-20

## 2023-06-28 ENCOUNTER — HOSPITAL ENCOUNTER (OUTPATIENT)
Dept: GASTROENTEROLOGY | Facility: HOSPITAL | Age: 68
Setting detail: OUTPATIENT SURGERY
Discharge: HOME | End: 2023-06-28
Payer: MEDICARE

## 2023-06-28 ENCOUNTER — TELEPHONE (OUTPATIENT)
Dept: OBGYN CLINIC | Facility: HOSPITAL | Age: 68
End: 2023-06-28

## 2023-06-28 ENCOUNTER — ANESTHESIA (OUTPATIENT)
Dept: GASTROENTEROLOGY | Facility: HOSPITAL | Age: 68
End: 2023-06-28

## 2023-06-28 ENCOUNTER — ANESTHESIA EVENT (OUTPATIENT)
Dept: GASTROENTEROLOGY | Facility: HOSPITAL | Age: 68
End: 2023-06-28

## 2023-06-28 VITALS
TEMPERATURE: 98.4 F | SYSTOLIC BLOOD PRESSURE: 139 MMHG | WEIGHT: 268.3 LBS | HEIGHT: 72 IN | OXYGEN SATURATION: 95 % | HEART RATE: 71 BPM | BODY MASS INDEX: 36.34 KG/M2 | RESPIRATION RATE: 22 BRPM | DIASTOLIC BLOOD PRESSURE: 85 MMHG

## 2023-06-28 DIAGNOSIS — K59.03 THERAPEUTIC OPIOID INDUCED CONSTIPATION: ICD-10-CM

## 2023-06-28 DIAGNOSIS — T40.2X5A THERAPEUTIC OPIOID INDUCED CONSTIPATION: ICD-10-CM

## 2023-06-28 DIAGNOSIS — K51.819 OTHER ULCERATIVE COLITIS WITH COMPLICATION (HCC): ICD-10-CM

## 2023-06-28 DIAGNOSIS — R10.30 LOWER ABDOMINAL PAIN: ICD-10-CM

## 2023-06-28 PROCEDURE — 45378 DIAGNOSTIC COLONOSCOPY: CPT | Performed by: INTERNAL MEDICINE

## 2023-06-28 RX ORDER — PROPOFOL 10 MG/ML
INJECTION, EMULSION INTRAVENOUS AS NEEDED
Status: DISCONTINUED | OUTPATIENT
Start: 2023-06-28 | End: 2023-06-28

## 2023-06-28 RX ORDER — MELOXICAM 15 MG/1
15 TABLET ORAL DAILY
COMMUNITY
Start: 2023-06-22 | End: 2023-07-06

## 2023-06-28 RX ORDER — SODIUM CHLORIDE, SODIUM LACTATE, POTASSIUM CHLORIDE, CALCIUM CHLORIDE 600; 310; 30; 20 MG/100ML; MG/100ML; MG/100ML; MG/100ML
INJECTION, SOLUTION INTRAVENOUS CONTINUOUS PRN
Status: DISCONTINUED | OUTPATIENT
Start: 2023-06-28 | End: 2023-06-28

## 2023-06-28 RX ADMIN — PROPOFOL 150 MG: 10 INJECTION, EMULSION INTRAVENOUS at 12:21

## 2023-06-28 RX ADMIN — PROPOFOL 30 MG: 10 INJECTION, EMULSION INTRAVENOUS at 12:23

## 2023-06-28 RX ADMIN — PROPOFOL 30 MG: 10 INJECTION, EMULSION INTRAVENOUS at 12:25

## 2023-06-28 RX ADMIN — PROPOFOL 30 MG: 10 INJECTION, EMULSION INTRAVENOUS at 12:27

## 2023-06-28 RX ADMIN — SODIUM CHLORIDE, SODIUM LACTATE, POTASSIUM CHLORIDE, AND CALCIUM CHLORIDE: .6; .31; .03; .02 INJECTION, SOLUTION INTRAVENOUS at 12:04

## 2023-06-28 NOTE — H&P
History and Physical - [facility]  Nadeem Moon 79 y.o. male MRN: [de-identified]                  HPI: Nadeem Moon is a 79y.o. year old male who presents for colonoscopy for abdominal pain constipation and rectal bleeding and ulcerative colitis      REVIEW OF SYSTEMS: Per the HPI, and otherwise unremarkable. Historical Information   Past Medical History:   Diagnosis Date   • Back pain    • Colon polyp    • History of blood clots    • Hyperlipidemia    • Lumbar fracture with cord injury ([address])    • Neck fracture (HCC)    • Previous back surgery     rods in the back   • Ulcerative colitis [facility])      Past Surgical History:   Procedure Laterality Date   • BACK SURGERY     • LUMBAR FUSION Right 1/20/2016    Procedure: FUSION LUMBAR  POSTERIOR, TLIF L3-4  Right L3-4 Fascet;  Surgeon: Negar Ribera MD;  [facility];  Service:    • UT COLONOSCOPY FLX DX W/COLLJ SPEC WHEN PFRMD N/A 1/24/2019    Procedure: COLONOSCOPY;  Surgeon: Cj Parekh MD;  [facility]; Service: Gastroenterology   • TONSILLECTOMY       Social History   Social History     Substance and Sexual Activity   Alcohol Use Never    Comment: occasional     Social History     Substance and Sexual Activity   Drug Use No     Social History     Tobacco Use   Smoking Status Never   Smokeless Tobacco Never     Family History   Problem Relation Age of Onset   • Parkinsonism Mother    • Lung cancer Father        Meds/Allergies     (Not in a hospital admission)      Allergies   Allergen Reactions   • No Active Allergies        Objective     Blood pressure 127/82, pulse 73, temperature 97.5 °F (36.4 °C), temperature source Temporal, resp. rate 16, height 6' (1.829 m), weight 122 kg (268 lb 4.8 oz), SpO2 96 %.       PHYSICAL EXAM    /82   Pulse 73   Temp 97.5 °F (36.4 °C) (Temporal)   Resp 16   Ht 6' (1.829 m)   Wt 122 kg (268 lb 4.8 oz)   SpO2 96%   BMI 36.39 kg/m²       Gen: NAD  CV: RRR  CHEST: Clear  ABD: soft, NT/ND  EXT: no edema      ASSESSMENT/PLAN:  This is a 79y.o. year old male here for colonoscopy, and he is stable and optimized for his procedure.

## 2023-06-28 NOTE — TELEPHONE ENCOUNTER
Called and spoke w/pt that it is usually advised to take antianxiety medication 1/2 hour prior to MRI and to have someone drive him to and from MRI since taking this medication  Pt states he understands

## 2023-06-28 NOTE — ANESTHESIA POSTPROCEDURE EVALUATION
Post-Op Assessment Note    CV Status:  Stable    Pain management: adequate     Mental Status:  Alert and awake   Hydration Status:  Euvolemic   PONV Controlled:  Controlled   Airway Patency:  Patent   Two or more mitigation strategies used for obstructive sleep apnea   Post Op Vitals Reviewed: Yes      Staff: CRNA, Anesthesiologist         No notable events documented      BP   139/62   Temp   98 5   Pulse  68   Resp   14   SpO2   98

## 2023-06-28 NOTE — TELEPHONE ENCOUNTER
Call tot his patient no answer left a message to relay Dr Chanda Becerra message to take the medication he ordered  Given call back number if he has any other questions

## 2023-06-28 NOTE — ANESTHESIA PREPROCEDURE EVALUATION
Procedure:  COLONOSCOPY    Relevant Problems   CARDIO   (+) Hypertension      GI/HEPATIC   (+) NAFLD (nonalcoholic fatty liver disease)      MUSCULOSKELETAL   (+) Chronic bilateral low back pain with bilateral sciatica      NEURO/PSYCH   (+) Chronic bilateral low back pain with bilateral sciatica   (+) Chronic foot pain, right   (+) Chronic pain syndrome        Physical Exam    Airway    Mallampati score: III  TM Distance: >3 FB  Neck ROM: full     Dental       Cardiovascular  Cardiovascular exam normal    Pulmonary  Pulmonary exam normal     Other Findings        Anesthesia Plan  ASA Score- 3     Anesthesia Type- IV sedation with anesthesia with ASA Monitors  Additional Monitors:   Airway Plan:           Plan Factors-Exercise tolerance (METS): >4 METS  Chart reviewed  Existing labs reviewed  Patient summary reviewed  Patient is not a current smoker  Induction- intravenous  Postoperative Plan-     Informed Consent- Anesthetic plan and risks discussed with patient  I personally reviewed this patient with the CRNA  Discussed and agreed on the Anesthesia Plan with the CRNA  Pepper Campbell            Acute pain due to trauma   Chronic bilateral low back pain with bilateral sciatica   Chronic foot pain, right   Chronic pain syndrome   Facet arthropathy, lumbar   Fall   Hx of adenomatous polyp of colon   Hypertension   Impaired mobility and activities of daily living   Left elbow fracture   Lumbar radiculopathy   NAFLD (nonalcoholic fatty liver disease)   Neuropathy   Pain in right hip   Peroneal neuropathy   Right foot drop   Right leg pain   Status post lumbar spinal fusion   Sternal fracture   Therapeutic opioid induced constipation   Traumatic compression fracture of T2 thoracic vertebra (HCC)   Type III fracture of odontoid process (HCC)   Ulcerative colitis ([address] )   Ulcerative colitis with rectal bleeding ([address] )

## 2023-06-28 NOTE — TELEPHONE ENCOUNTER
Caller: Patient- Jhonatan Ricketts    Doctor: Dr Ronnie Jeffers    Reason for call: Patient is calling in stating that he is schedule for his MRI tomorrow 6/29 and is asking when he should take the Xanax medication before the testing? He is asking for a call back relating this      Call back#: [phone]

## 2023-06-29 ENCOUNTER — HOSPITAL ENCOUNTER (OUTPATIENT)
Dept: MRI IMAGING | Facility: CLINIC | Age: 68
Discharge: HOME | End: 2023-06-29

## 2023-06-29 ENCOUNTER — HOSPITAL ENCOUNTER (OUTPATIENT)
Dept: MRI IMAGING | Facility: HOSPITAL | Age: 68
End: 2023-06-29
Attending: FAMILY MEDICINE
Payer: MEDICARE

## 2023-06-29 DIAGNOSIS — M25.511 ACUTE PAIN OF RIGHT SHOULDER: ICD-10-CM

## 2023-06-29 PROCEDURE — 73221 MRI JOINT UPR EXTREM W/O DYE: CPT

## 2023-06-29 PROCEDURE — G1004 CDSM NDSC: HCPCS

## 2023-07-06 ENCOUNTER — OFFICE VISIT (OUTPATIENT)
Dept: OBGYN CLINIC | Facility: CLINIC | Age: 68
End: 2023-07-06
Payer: MEDICARE

## 2023-07-06 VITALS
RESPIRATION RATE: 18 BRPM | SYSTOLIC BLOOD PRESSURE: 131 MMHG | OXYGEN SATURATION: 98 % | HEART RATE: 71 BPM | DIASTOLIC BLOOD PRESSURE: 84 MMHG | BODY MASS INDEX: 36.57 KG/M2 | HEIGHT: 72 IN | WEIGHT: 270 LBS

## 2023-07-06 DIAGNOSIS — M25.511 ACUTE PAIN OF RIGHT SHOULDER: ICD-10-CM

## 2023-07-06 DIAGNOSIS — M75.101 TEAR OF RIGHT SUPRASPINATUS TENDON: Primary | ICD-10-CM

## 2023-07-06 PROCEDURE — 99213 OFFICE O/P EST LOW 20 MIN: CPT | Performed by: FAMILY MEDICINE

## 2023-07-06 RX ORDER — NAPROXEN 500 MG/1
500 TABLET ORAL 2 TIMES DAILY WITH MEALS
Qty: 60 TABLET | Refills: 0 | Status: SHIPPED | OUTPATIENT
Start: 2023-07-06

## 2023-07-06 NOTE — PROGRESS NOTES
Subjective:  Chief Complaint   Patient presents with   • Right Shoulder - Clicking, Pain, Follow-up, Locking       Rex Pulliam is a 79 y.o. male complains of right shoulder pain. Onset of the symptoms was several weeks ago. Mechanism of injury: tripped over item at home resulting in fall directly onto his right shoulder. Aggravating factors: use of the right shoulder, any motion . Treatment to date: rest and is on chronic long term pain medication for back pain, has bene taking this for shoulder injury without relief. Symptoms have gradually worsened. Patient was referred for an MRI of the right shoulder to evaluate for possible rotator cuff injury. MRI results revealed a large full-thickness tear of the supraspinatus with retraction to the level of the glenohumeral joint. Associated tendinosis of the infraspinatus biceps and subscapularis was noted. The following portions of the patient's history were reviewed and updated as appropriate: allergies, current medications, past family history, past medical history, past social history, past surgical history and problem list.    Occupation:        Objective:  /84   Pulse 71   Resp 18   Ht 6' (1.829 m)   Wt 122 kg (270 lb)   SpO2 98%   BMI 36.62 kg/m²     Skin: no rashes, lesions, skin discolorations, lacerations  Vasculature: normal radial and ulnar pulse,  normal skin color, normal capillary refill in extremity, no upper extremity edema  Neurologic: Neurologic exam is normal throughout upper extremities, Awake, alert, and oriented x3, no apparent distress. Musculoskeletal:   right SHOULDER EXAM    Intact skin.   No erythema, no induration, no signs of infection  No gross deformity    AROM FF: 70 degrees  AROM ER with arm at its side: 60  AROM IR: lower thoracic    Grind test: negative    Supraspinatus strength testing:3/5  Infraspinatus strength testing:3/5    Belly press: negative            Imaging:    XR shoulder 2+ views RIGHT    Result Date: 6/8/2023  Narrative: RIGHT SHOULDER INDICATION:   fall. COMPARISON:  None VIEWS:  XR SHOULDER 2+ VW RIGHT FINDINGS: There is no acute fracture or dislocation. Mild degenerative changes of the acromioclavicular joint. No suspicious appearing osseous lesions are seen. The visualized soft tissues appear grossly unremarkable. Impression: No acute osseous abnormality is seen. Other findings as above. Workstation performed: [id number]        Assessment/Plan:  1. Tear of right supraspinatus tendon    - Ambulatory Referral to Orthopedic Surgery; Future  - naproxen (Naprosyn) 500 mg tablet; Take 1 tablet (500 mg total) by mouth 2 (two) times a day with meals  Dispense: 60 tablet; Refill: 0    2. Acute pain of right shoulder    - Ambulatory Referral to Orthopedic Surgery; Future  - naproxen (Naprosyn) 500 mg tablet; Take 1 tablet (500 mg total) by mouth 2 (two) times a day with meals  Dispense: 60 tablet; Refill: 0    MRI results were reviewed with the patient independently. MRI did reveal a full-thickness tear of the supraspinatus and associated tendinosis of the infraspinatus and biceps. Given MRI findings and patient's limitation with strength and motion with abduction of the shoulder I am recommending surgical consultation for possible rotator cuff repair. Referral was placed for Dr. Sandra Spence. Patient was provided with naproxen medication and stopped meloxicam medication for pain relief.   He can continue with icing of the affected shoulder    - MRI shoulder right wo contrast; Future

## 2023-07-12 ENCOUNTER — OFFICE VISIT (OUTPATIENT)
Dept: OBGYN CLINIC | Facility: MEDICAL CENTER | Age: 68
End: 2023-07-12
Payer: MEDICARE

## 2023-07-12 ENCOUNTER — TELEPHONE (OUTPATIENT)
Dept: OBGYN CLINIC | Facility: CLINIC | Age: 68
End: 2023-07-12

## 2023-07-12 VITALS
BODY MASS INDEX: 36.57 KG/M2 | DIASTOLIC BLOOD PRESSURE: 105 MMHG | HEIGHT: 72 IN | HEART RATE: 71 BPM | WEIGHT: 270 LBS | SYSTOLIC BLOOD PRESSURE: 156 MMHG

## 2023-07-12 DIAGNOSIS — M75.101 TEAR OF RIGHT SUPRASPINATUS TENDON: Primary | ICD-10-CM

## 2023-07-12 PROCEDURE — 99214 OFFICE O/P EST MOD 30 MIN: CPT | Performed by: ORTHOPAEDIC SURGERY

## 2023-07-12 NOTE — TELEPHONE ENCOUNTER
Caller: Patient     Doctor/Office: lillian     Call regarding :  Lost and need help with directions      Call was transferred to: [facility]

## 2023-07-12 NOTE — PROGRESS NOTES
Ortho Sports Medicine Shoulder New Patient Visit     Assesment:   79 y.o. male right massive acute traumatic rotator cuff tear    Plan:    After obtaining a thorough history, orthopedic exam, and reviewing imaging I believe his symptoms are consistent with a tear of the supraspinatus tendon. At this time, I am referring him to continue with his previously scheduled appointment in 1 week with my colleague Dr. Oren Menon to determine if a rotator cuff repair or superior capsule reconstruction surgery is warranted. He lives closer to that area and would prefer to have surgery in that area. I also discussed with him that I feel his tear is so large that he may need a allograft augmentation versus superior capsular reconstruction. I told him that I would only be comfortable doing primary rotator cuff repairs, and although it may be possible to repair his rotator cuff, I explained that if he needs the allograft augmentation or superior capsular reconstruction I would defer him to one of my partners who performs this option in case the rotator cuff is not repairable. Conservative treatment:    Continue with OTC pain medications as needed for pain  Activity as tolerated    Imaging: All imaging from today was reviewed by myself and explained to the patient. Injection:    No Injection planned at this time. Surgery:     I have referred him to my colleague, Dr. Oren Menon to determine RTC repair vs. SCR surgery    Follow up:    No follow-ups on file. Chief Complaint   Patient presents with   • Right Shoulder - Pain       History of Present Illness: The patient is a 79 y.o., right hand dominant male who is retired, referred to me by Dr. Kellie Starkey, seen in clinic for consultation of right shoulder pain. The patient denies a history of diabetes. The patient denies a history of thyroid disorder. Pain is located posterolateral.  The patient rates the pain as a 8/10.   The pain has been present for approximately one month. The patient sustained an injury on 6/8/2023. The mechanism of injury was a fall directly on the shoulder. The pain is characterized as throbbing and sharp. The throbbing pain is present constantly. The sharp pain is present occasionally with certain movements      Pain is improved by mineral ice gel and Voltaren gel. Pain is aggravated by movement, ice, and wearing a sling. Symptoms include reduced range of motion, weakness, sharp pain. The patient has weakness. The patient denies numbness and tingling. The patient has tried Oxycodon that is prescribed for his back, Voltaren gel, and wearing a sling. Shoulder Surgical History:  None    Past Medical, Social and Family History:  Past Medical History:   Diagnosis Date   • Back pain    • Colon polyp    • History of blood clots    • Hyperlipidemia    • Lumbar fracture with cord injury ([address])    • Neck fracture (HCC)    • Previous back surgery     rods in the back   • Ulcerative colitis [facility])      Past Surgical History:   Procedure Laterality Date   • BACK SURGERY     • LUMBAR FUSION Right 1/20/2016    Procedure: FUSION LUMBAR  POSTERIOR, TLIF L3-4  Right L3-4 Fascet;  Surgeon: Ana María Fish MD;  [facility];  Service:    • [place] COLONOSCOPY FLX DX W/COLLJ SPEC WHEN PFRMD N/A 1/24/2019    Procedure: COLONOSCOPY;  Surgeon: Josie Beckman MD;  Location: [facility];   Service: Gastroenterology   • TONSILLECTOMY       Allergies   Allergen Reactions   • No Active Allergies      Current Outpatient Medications on File Prior to Visit   Medication Sig Dispense Refill   • balsalazide (COLAZAL) 750 mg capsule Take 3 capsules (2,250 mg total) by mouth 2 (two) times a day 180 capsule 5   • Diclofenac Sodium (VOLTAREN) 1 % Apply 2 g topically 4 (four) times a day     • dicyclomine (BENTYL) 20 mg tablet Take 1 tablet (20 mg total) by mouth 3 (three) times a day as needed (abdominal pain) 60 tablet 3   • ezetimibe (ZETIA) 10 mg tablet Take 10 mg by mouth daily     • naproxen (Naprosyn) 500 mg tablet Take 1 tablet (500 mg total) by mouth 2 (two) times a day with meals 60 tablet 0   • oxyCODONE (ROXICODONE) 30 MG immediate release tablet oxycodone 30 mg tablet     • zolpidem (AMBIEN) 10 mg tablet Take 1 tablet (10 mg total) by mouth daily at bedtime as needed for sleep for up to 10 days 3 tablet 0   • ALPRAZolam (XANAX) 0.5 mg tablet Take 1 tablet (0.5 mg total) by mouth once in imaging for anxiety for up to 1 dose (Patient not taking: Reported on 7/12/2023) 2 tablet 0   • metoclopramide (Reglan) 10 mg tablet Take 1 tablet (10 mg total) by mouth daily after dinner (Patient not taking: Reported on 6/15/2023) 30 tablet 2   • polyethylene glycol (GaviLyte-C) 4000 mL solution Take 4,000 mL by mouth once for 1 dose 4000 mL 0   • [DISCONTINUED] omeprazole (PriLOSEC) 40 MG capsule Take 1 capsule (40 mg total) by mouth daily 30 capsule 2     No current facility-administered medications on file prior to visit.      Social History     Socioeconomic History   • Marital status: /Civil Union     Spouse name: Not on file   • Number of children: Not on file   • Years of education: Not on file   • Highest education level: Not on file   Occupational History   • Not on file   Tobacco Use   • Smoking status: Never   • Smokeless tobacco: Never   Vaping Use   • Vaping Use: Never used   Substance and Sexual Activity   • Alcohol use: Never     Comment: occasional   • Drug use: No   • Sexual activity: Never   Other Topics Concern   • Not on file   Social History Narrative   • Not on file     Social Determinants of Health     Financial Resource Strain: Not on file   Food Insecurity: Not on file   Transportation Needs: Not on file   Physical Activity: Not on file   Stress: Not on file   Social Connections: Not on file   Intimate Partner Violence: Not on file   Housing Stability: Not on file     I have reviewed the past medical, surgical, social and family history, medications and allergies as documented in the EMR. Review of systems: ROS is negative other than that noted in the HPI. Constitutional: Negative for fatigue and fever. HENT: Negative for sore throat. Respiratory: Negative for shortness of breath. Cardiovascular: Negative for chest pain. Gastrointestinal: Negative for abdominal pain. Endocrine: Negative for cold intolerance and heat intolerance. Genitourinary: Negative for flank pain. Musculoskeletal: Negative for back pain. Skin: Negative for rash. Allergic/Immunologic: Negative for immunocompromised state. Neurological: Negative for dizziness. Psychiatric/Behavioral: Negative for agitation. Physical Exam:    Blood pressure (!) 156/105, pulse 71, height 6' (1.829 m), weight 122 kg (270 lb).     General/Constitutional: NAD, well developed, well nourished  HENT: Normocephalic, atraumatic  CV: Intact distal pulses, regular rate  Resp: No respiratory distress or labored breathing  GI: Soft and non-tender   Lymphatic: No lymphadenopathy palpated  Neuro: Alert and Oriented x 3, no focal deficits  Psych: Normal mood, normal affect, normal judgement, normal behavior  Skin: Warm, dry, no rashes, no erythema    Shoulder focused exam:       RIGHT   Scapula Atrophy Not Applicable    Winging Not Applicable    Protraction Not Applicable   Rotator cuff SS 4/5    IS 5/5    SubS 4/5   ROM FF 85       ER0 25              IRb Deferred due to pain      TTP: AC Negative    Biceps Negative    Coracoid Negative   Special Tests: O'Briens Not Applicable    Mckenna-shear Not Applicable    Cross body Adduction Not Applicable    Speeds  Not Applicable    Maeve's Not Applicable    Whipple Positive    Neer Not Applicable    Zelaya Not Applicable   Instability: Apprehension & relocation Not tested    Load & shift Not tested   Other: Crank Not tested       UE NV Exam: +2 Radial pulses bilaterally  Sensation intact to light touch C5-T1 bilaterally, Radial/median/ulnar nerve motor intact      Bilateral elbow, wrist, and and forearm ROM full, painless with passive ROM, no ttp or crepitance throughout extremities below shoulder joint    Cervical ROM is full without pain, numbness or tingling    Shoulder Imaging    MRI of the right shoulder were reviewed, which demonstrate a full thickness tear of the supraspinatus tendon with significant retraction and tendon fraying. I have reviewed the radiology report and agree with their impression.       Scribe Attestation    I,:  Hugo Ko am acting as a scribe while in the presence of the attending physician.:       I,:  Lynnette Castillo, DO personally performed the services described in this documentation    as scribed in my presence.:

## 2023-07-20 ENCOUNTER — APPOINTMENT (OUTPATIENT)
Dept: LAB | Facility: HOSPITAL | Age: 68
End: 2023-07-20
Payer: MEDICARE

## 2023-07-20 ENCOUNTER — OFFICE VISIT (OUTPATIENT)
Dept: OBGYN CLINIC | Facility: CLINIC | Age: 68
End: 2023-07-20
Payer: MEDICARE

## 2023-07-20 ENCOUNTER — TELEPHONE (OUTPATIENT)
Dept: OBGYN CLINIC | Facility: CLINIC | Age: 68
End: 2023-07-20

## 2023-07-20 VITALS
SYSTOLIC BLOOD PRESSURE: 133 MMHG | HEIGHT: 72 IN | DIASTOLIC BLOOD PRESSURE: 87 MMHG | BODY MASS INDEX: 36.57 KG/M2 | HEART RATE: 76 BPM | WEIGHT: 270 LBS

## 2023-07-20 DIAGNOSIS — Z01.818 OTHER SPECIFIED PRE-OPERATIVE EXAMINATION: ICD-10-CM

## 2023-07-20 DIAGNOSIS — M25.511 ACUTE PAIN OF RIGHT SHOULDER: ICD-10-CM

## 2023-07-20 DIAGNOSIS — M75.101 TEAR OF RIGHT SUPRASPINATUS TENDON: Primary | ICD-10-CM

## 2023-07-20 LAB
APTT PPP: 28 SECONDS (ref 23–37)
INR PPP: 0.91 (ref 0.84–1.19)
PROTHROMBIN TIME: 12 SECONDS (ref 11.6–14.5)

## 2023-07-20 PROCEDURE — 99214 OFFICE O/P EST MOD 30 MIN: CPT | Performed by: ORTHOPAEDIC SURGERY

## 2023-07-20 PROCEDURE — 36415 COLL VENOUS BLD VENIPUNCTURE: CPT

## 2023-07-20 PROCEDURE — 85610 PROTHROMBIN TIME: CPT

## 2023-07-20 PROCEDURE — 85730 THROMBOPLASTIN TIME PARTIAL: CPT

## 2023-07-20 RX ORDER — ONDANSETRON 4 MG/1
TABLET, ORALLY DISINTEGRATING ORAL
COMMUNITY
End: 2023-07-26

## 2023-07-20 RX ORDER — METHOCARBAMOL 500 MG/1
TABLET, FILM COATED ORAL
COMMUNITY
Start: 2023-07-14

## 2023-07-20 RX ORDER — CHLORHEXIDINE GLUCONATE 4 G/100ML
SOLUTION TOPICAL DAILY PRN
Status: CANCELLED | OUTPATIENT
Start: 2023-07-20

## 2023-07-20 NOTE — H&P (VIEW-ONLY)
Patient Name:  Edilberto Chino  MRN:  [[id number]]    [id number] [address] [facility]     1. Tear of right supraspinatus tendon  -     Ambulatory Referral to Orthopedic Surgery  -     Ambulatory Referral to Orthopedic Surgery  -     Case request operating room: ARTHROSCOPY SHOULDER- Right shoulder arthroscopic rotator cuff repair, possible open subpectoral biceps tenodesis vs biceps tenotomy; Standing  -     Case request operating room: ARTHROSCOPY SHOULDER- Right shoulder arthroscopic rotator cuff repair, possible open subpectoral biceps tenodesis vs biceps tenotomy    2. Acute pain of right shoulder  -     Ambulatory Referral to Orthopedic Surgery        Traumatic full-thickness supraspinatus tear  · X-rays reviewed in office today with patient. · He was advised to discontinue his sling. · I reviewed and discussed the patient's Right shoulder MRI displaying full thickness rotator cuff tearing. We discussed the patient's diagnosis and associated treatment options including conservative and surgical treatment. Conservative management would include formal physical therapy and a physician directed home exercise program, activity modification, injection therapy, and oral analgesics. Risks of conservative management include:  Progression of tear size, persistent pain, increased weakness, increased tendon retraction, increased fatty infiltration, decreased potential for repair and healing in the future due to tear progression/retraction/fatty infiltration/increasing age, and future development of rotator cuff arthropathy. · Surgical intervention was also discussed in the form of right shoulder arthroscopy with rotator cuff repair with possible biceps tenotomy vs tenodesis.   Risks of surgery, including but not limited to, anesthesia complications, infection, damage to nerves and blood vessels, blood clots, postoperative stiffness, residual pain, inability to repair the torn tendon, failure of tendon healing, residual weakness, cosmetic deformity and cramping with tenotomy, need for subsequent surgery,  and even death were discussed at length. The patient's MRI does display a rotator cuff tear which corresponds to the patient's symptoms and physical exam findings. · The patient has failed non-operative management including over the counter analgesics, activity modification. The patient understands the risks and benefits of all mentioned treatment options and has no further questions. · The patient has elected to proceed forward with right shoulder arthroscopy with rotator cuff repair and possible biceps tenotomy vs biceps tenodesis. · Consent for surgery was obtained today. · I will see Cj Maza on the day of surgery which is tentatively scheduled for 7/26/23. · Follow-up post-operatively. Chief Complaint     Right shoulder pain    History of the Present Illness     Patricia Mayer is a RHD 79 y.o. male with Right shoulder pain status post trip and fall directly onto his right shoulder around the beginning of June. He was closing his wood stove door with his right foot and his shoe lace was caught on the stove causing him to fall onto his right shoulder. He is retired and was a printer. The patient is referred by Dr. Mikayla Yadav. The patient was evaluated by Dr. Jd Yates on 7/12/2023 where it was discussed the patient would benefit from surgical intervention by means of arthroscopy with allograft augmentation versus superior capsular reconstruction. Following his appointment with Dr. Jd Yates, the patient saw Dr. Miguel Zambrano with [facility] who also recommended surgery. The patient is scheduled for right shoulder arthroscopy with rotator cuff repair, subacromial decompression and possible biceps tenotomy on 7/24/2023 with Dr. Miguel Zambrano at [facility]. The patient notes he has not been sleeping due to the pain. He is prescribed oxycodone by Dr. Pauly Oneill. He takes 30 mg 4 times daily.  He would prefer Dr. Feli Jerry to perform surgery but is unsure if he would like to wait until a surgical date becomes available. Review of Systems     Review of Systems   Constitutional: Negative for appetite change and unexpected weight change. HENT: Negative for congestion and trouble swallowing. Eyes: Negative for visual disturbance. Respiratory: Negative for cough and shortness of breath. Cardiovascular: Negative for chest pain and palpitations. Gastrointestinal: Negative for nausea and vomiting. Endocrine: Negative for cold intolerance and heat intolerance. Musculoskeletal: Positive for arthralgias. Negative for joint swelling and myalgias. Skin: Negative for rash. Neurological: Negative for numbness. Physical Exam     /87   Pulse 76   Ht 6' (1.829 m)   Wt 122 kg (270 lb)   BMI 36.62 kg/m²     Right Shoulder: Active range of motion   80 degrees forward flexion  90 degrees abduction  40 degrees external rotation   SI joint internal rotation    Passive range of motion   160 degrees of forward flexion     There is tenderness present over the greater tuberosity. There is 3/5 strength with supraspinatus testing. There is 3/5 strength with infraspinatus testing. There is 4+/5 strength with subscapularis testing. Zelaya test is positive  Speed's test is negative  The patient is neurovascularly intact distally in the extremity. Eyes:  Anicteric sclerae. Neck:  Supple. Lungs:  Normal respiratory effort. Clear to auscultation  Cardiovascular:  Capillary refill is less than 2 seconds. Skin:  Intact without erythema. Neurologic:  Sensation grossly intact to light touch. Psychiatric:  Mood and affect are appropriate. Heart: Normal rhythm and rate. Data Review     I have personally reviewed pertinent films in PACS, and my interpretation follows:     X-rays taken 6/8/23 of Right shoulder demonstrate mild degenerative changes of the acromioclavicular joint. No acute fracture.     MRI taken 6/29/23 of Right shoulder demonstrate full thickness tearing of the supraspinatus with tendinosis and retraction. Partial tear of he subscapularis. Tendinosis of the infraspinatus and biceps tendon. Past Medical History:   Diagnosis Date   • Back pain    • Colon polyp    • History of blood clots    • Hyperlipidemia    • Lumbar fracture with cord injury ([address])    • Neck fracture (HCC)    • Previous back surgery     rods in the back   • Ulcerative colitis [facility])        Past Surgical History:   Procedure Laterality Date   • BACK SURGERY     • LUMBAR FUSION Right 1/20/2016    Procedure: FUSION LUMBAR  POSTERIOR, TLIF L3-4  Right L3-4 Fascet;  Surgeon: Alexa Reveles MD;  [facility];  Service:    • [place] COLONOSCOPY FLX DX W/COLLJ SPEC WHEN PFRMD N/A 1/24/2019    Procedure: COLONOSCOPY;  Surgeon: Keke Leone MD;  Location: [facility];   Service: Gastroenterology   • TONSILLECTOMY         Allergies   Allergen Reactions   • No Active Allergies        Current Outpatient Medications on File Prior to Visit   Medication Sig Dispense Refill   • balsalazide (COLAZAL) 750 mg capsule Take 3 capsules (2,250 mg total) by mouth 2 (two) times a day 180 capsule 5   • dicyclomine (BENTYL) 20 mg tablet Take 1 tablet (20 mg total) by mouth 3 (three) times a day as needed (abdominal pain) 60 tablet 3   • ezetimibe (ZETIA) 10 mg tablet Take 10 mg by mouth daily     • methocarbamol (ROBAXIN) 500 mg tablet take 1 tablet by mouth four times a day if needed for muscle spasm     • oxyCODONE (ROXICODONE) 30 MG immediate release tablet oxycodone 30 mg tablet     • ALPRAZolam (XANAX) 0.5 mg tablet Take 1 tablet (0.5 mg total) by mouth once in imaging for anxiety for up to 1 dose (Patient not taking: Reported on 7/12/2023) 2 tablet 0   • Diclofenac Sodium (VOLTAREN) 1 % Apply 2 g topically 4 (four) times a day (Patient not taking: Reported on 7/20/2023)     • metoclopramide (Reglan) 10 mg tablet Take 1 tablet (10 mg total) by mouth daily after dinner (Patient not taking: Reported on 6/15/2023) 30 tablet 2   • naproxen (Naprosyn) 500 mg tablet Take 1 tablet (500 mg total) by mouth 2 (two) times a day with meals (Patient not taking: Reported on 7/20/2023) 60 tablet 0   • ondansetron (ZOFRAN-ODT) 4 mg disintegrating tablet  (Patient not taking: Reported on 7/20/2023)     • polyethylene glycol (GaviLyte-C) 4000 mL solution Take 4,000 mL by mouth once for 1 dose 4000 mL 0   • zolpidem (AMBIEN) 10 mg tablet Take 1 tablet (10 mg total) by mouth daily at bedtime as needed for sleep for up to 10 days 3 tablet 0   • [DISCONTINUED] omeprazole (PriLOSEC) 40 MG capsule Take 1 capsule (40 mg total) by mouth daily 30 capsule 2     No current facility-administered medications on file prior to visit. Social History     Tobacco Use   • Smoking status: Never   • Smokeless tobacco: Never   Vaping Use   • Vaping Use: Never used   Substance Use Topics   • Alcohol use: Never     Comment: occasional   • Drug use: No       Family History   Problem Relation Age of Onset   • Parkinsonism Mother    • Lung cancer Father              Procedures Performed     Procedures  None performed.        Cyndy Cardoso DO   Scribe Attestation    I,:  Rodrigo Cardenas am acting as a scribe while in the presence of the attending physician.:       I,:  Cyndy Cardoso DO personally performed the services described in this documentation    as scribed in my presence.:

## 2023-07-20 NOTE — PROGRESS NOTES
Patient Name:  Giovanni Chisholm  MRN:  [[id number]]    [id number] [address] [facility]     1. Tear of right supraspinatus tendon  -     Ambulatory Referral to Orthopedic Surgery  -     Ambulatory Referral to Orthopedic Surgery  -     Case request operating room: ARTHROSCOPY SHOULDER- Right shoulder arthroscopic rotator cuff repair, possible open subpectoral biceps tenodesis vs biceps tenotomy; Standing  -     Case request operating room: ARTHROSCOPY SHOULDER- Right shoulder arthroscopic rotator cuff repair, possible open subpectoral biceps tenodesis vs biceps tenotomy    2. Acute pain of right shoulder  -     Ambulatory Referral to Orthopedic Surgery        Traumatic full-thickness supraspinatus tear  · X-rays reviewed in office today with patient. · He was advised to discontinue his sling. · I reviewed and discussed the patient's Right shoulder MRI displaying full thickness rotator cuff tearing. We discussed the patient's diagnosis and associated treatment options including conservative and surgical treatment. Conservative management would include formal physical therapy and a physician directed home exercise program, activity modification, injection therapy, and oral analgesics. Risks of conservative management include:  Progression of tear size, persistent pain, increased weakness, increased tendon retraction, increased fatty infiltration, decreased potential for repair and healing in the future due to tear progression/retraction/fatty infiltration/increasing age, and future development of rotator cuff arthropathy. · Surgical intervention was also discussed in the form of right shoulder arthroscopy with rotator cuff repair with possible biceps tenotomy vs tenodesis.   Risks of surgery, including but not limited to, anesthesia complications, infection, damage to nerves and blood vessels, blood clots, postoperative stiffness, residual pain, inability to repair the torn tendon, failure of tendon healing, residual weakness, cosmetic deformity and cramping with tenotomy, need for subsequent surgery,  and even death were discussed at length. The patient's MRI does display a rotator cuff tear which corresponds to the patient's symptoms and physical exam findings. · The patient has failed non-operative management including over the counter analgesics, activity modification. The patient understands the risks and benefits of all mentioned treatment options and has no further questions. · The patient has elected to proceed forward with right shoulder arthroscopy with rotator cuff repair and possible biceps tenotomy vs biceps tenodesis. · Consent for surgery was obtained today. · I will see Aleah Miller on the day of surgery which is tentatively scheduled for 7/26/23. · Follow-up post-operatively. Chief Complaint     Right shoulder pain    History of the Present Illness     Norman Grandchild is a RHD 79 y.o. male with Right shoulder pain status post trip and fall directly onto his right shoulder around the beginning of June. He was closing his wood stove door with his right foot and his shoe lace was caught on the stove causing him to fall onto his right shoulder. He is retired and was a printer. The patient is referred by Dr. Dusty Gloria. The patient was evaluated by Dr. Darshan Albright on 7/12/2023 where it was discussed the patient would benefit from surgical intervention by means of arthroscopy with allograft augmentation versus superior capsular reconstruction. Following his appointment with Dr. Darshan Albright, the patient saw Dr. Chelo Doss with [facility] who also recommended surgery. The patient is scheduled for right shoulder arthroscopy with rotator cuff repair, subacromial decompression and possible biceps tenotomy on 7/24/2023 with Dr. Chelo Doss at [facility]. The patient notes he has not been sleeping due to the pain. He is prescribed oxycodone by Dr. Bri Truong. He takes 30 mg 4 times daily.  He would prefer Dr. Manju Adame to perform surgery but is unsure if he would like to wait until a surgical date becomes available. Review of Systems     Review of Systems   Constitutional: Negative for appetite change and unexpected weight change. HENT: Negative for congestion and trouble swallowing. Eyes: Negative for visual disturbance. Respiratory: Negative for cough and shortness of breath. Cardiovascular: Negative for chest pain and palpitations. Gastrointestinal: Negative for nausea and vomiting. Endocrine: Negative for cold intolerance and heat intolerance. Musculoskeletal: Positive for arthralgias. Negative for joint swelling and myalgias. Skin: Negative for rash. Neurological: Negative for numbness. Physical Exam     /87   Pulse 76   Ht 6' (1.829 m)   Wt 122 kg (270 lb)   BMI 36.62 kg/m²     Right Shoulder: Active range of motion   80 degrees forward flexion  90 degrees abduction  40 degrees external rotation   SI joint internal rotation    Passive range of motion   160 degrees of forward flexion     There is tenderness present over the greater tuberosity. There is 3/5 strength with supraspinatus testing. There is 3/5 strength with infraspinatus testing. There is 4+/5 strength with subscapularis testing. Zelaya test is positive  Speed's test is negative  The patient is neurovascularly intact distally in the extremity. Eyes:  Anicteric sclerae. Neck:  Supple. Lungs:  Normal respiratory effort. Clear to auscultation  Cardiovascular:  Capillary refill is less than 2 seconds. Skin:  Intact without erythema. Neurologic:  Sensation grossly intact to light touch. Psychiatric:  Mood and affect are appropriate. Heart: Normal rhythm and rate. Data Review     I have personally reviewed pertinent films in PACS, and my interpretation follows:     X-rays taken 6/8/23 of Right shoulder demonstrate mild degenerative changes of the acromioclavicular joint. No acute fracture.     MRI taken 6/29/23 of Right shoulder demonstrate full thickness tearing of the supraspinatus with tendinosis and retraction. Partial tear of he subscapularis. Tendinosis of the infraspinatus and biceps tendon. Past Medical History:   Diagnosis Date   • Back pain    • Colon polyp    • History of blood clots    • Hyperlipidemia    • Lumbar fracture with cord injury ([address])    • Neck fracture (HCC)    • Previous back surgery     rods in the back   • Ulcerative colitis [facility])        Past Surgical History:   Procedure Laterality Date   • BACK SURGERY     • LUMBAR FUSION Right 1/20/2016    Procedure: FUSION LUMBAR  POSTERIOR, TLIF L3-4  Right L3-4 Fascet;  Surgeon: Dexter Freitas MD;  Location: [facility];  Service:    • OH COLONOSCOPY FLX DX W/COLLJ SPEC WHEN PFRMD N/A 1/24/2019    Procedure: COLONOSCOPY;  Surgeon: Rosie Davidson MD;  Location: [facility];   Service: Gastroenterology   • TONSILLECTOMY         Allergies   Allergen Reactions   • No Active Allergies        Current Outpatient Medications on File Prior to Visit   Medication Sig Dispense Refill   • balsalazide (COLAZAL) 750 mg capsule Take 3 capsules (2,250 mg total) by mouth 2 (two) times a day 180 capsule 5   • dicyclomine (BENTYL) 20 mg tablet Take 1 tablet (20 mg total) by mouth 3 (three) times a day as needed (abdominal pain) 60 tablet 3   • ezetimibe (ZETIA) 10 mg tablet Take 10 mg by mouth daily     • methocarbamol (ROBAXIN) 500 mg tablet take 1 tablet by mouth four times a day if needed for muscle spasm     • oxyCODONE (ROXICODONE) 30 MG immediate release tablet oxycodone 30 mg tablet     • ALPRAZolam (XANAX) 0.5 mg tablet Take 1 tablet (0.5 mg total) by mouth once in imaging for anxiety for up to 1 dose (Patient not taking: Reported on 7/12/2023) 2 tablet 0   • Diclofenac Sodium (VOLTAREN) 1 % Apply 2 g topically 4 (four) times a day (Patient not taking: Reported on 7/20/2023)     • metoclopramide (Reglan) 10 mg tablet Take 1 tablet (10 mg total) by mouth daily after dinner (Patient not taking: Reported on 6/15/2023) 30 tablet 2   • naproxen (Naprosyn) 500 mg tablet Take 1 tablet (500 mg total) by mouth 2 (two) times a day with meals (Patient not taking: Reported on 7/20/2023) 60 tablet 0   • ondansetron (ZOFRAN-ODT) 4 mg disintegrating tablet  (Patient not taking: Reported on 7/20/2023)     • polyethylene glycol (GaviLyte-C) 4000 mL solution Take 4,000 mL by mouth once for 1 dose 4000 mL 0   • zolpidem (AMBIEN) 10 mg tablet Take 1 tablet (10 mg total) by mouth daily at bedtime as needed for sleep for up to 10 days 3 tablet 0   • [DISCONTINUED] omeprazole (PriLOSEC) 40 MG capsule Take 1 capsule (40 mg total) by mouth daily 30 capsule 2     No current facility-administered medications on file prior to visit. Social History     Tobacco Use   • Smoking status: Never   • Smokeless tobacco: Never   Vaping Use   • Vaping Use: Never used   Substance Use Topics   • Alcohol use: Never     Comment: occasional   • Drug use: No       Family History   Problem Relation Age of Onset   • Parkinsonism Mother    • Lung cancer Father              Procedures Performed     Procedures  None performed.        Marcelo Hayes DO   Scribe Attestation    I,:  Reese Terry am acting as a scribe while in the presence of the attending physician.:       I,:  Marcelo Hayes DO personally performed the services described in this documentation    as scribed in my presence.:

## 2023-07-20 NOTE — TELEPHONE ENCOUNTER
Called Dr. Antonette Díaz office and spoke with Nella Reddy . Message was sent to PCP to see if he could clear the patient for surgery with Dr. Oren Menon per patient's request for different surgeon. Per PCP note 7.19.23. Patient had labs and EKG done. Awaiting chest xray and urinalysis in order to be cleared.

## 2023-07-24 NOTE — TELEPHONE ENCOUNTER
Called Dr. Aron Sow office again requesting surgery clearance, another message was sent to the provider. They also advised that to receive EKG strip to fax request to [facility]. The request was faxed this morning.

## 2023-07-25 ENCOUNTER — TELEPHONE (OUTPATIENT)
Dept: OBGYN CLINIC | Facility: HOSPITAL | Age: 68
End: 2023-07-25

## 2023-07-25 ENCOUNTER — ANESTHESIA EVENT (OUTPATIENT)
Dept: PERIOP | Facility: HOSPITAL | Age: 68
End: 2023-07-25
Payer: MEDICARE

## 2023-07-25 DIAGNOSIS — M75.101 TEAR OF RIGHT SUPRASPINATUS TENDON: Primary | ICD-10-CM

## 2023-07-25 PROBLEM — E66.812 CLASS 2 OBESITY IN ADULT: Status: ACTIVE | Noted: 2023-07-25

## 2023-07-25 PROBLEM — E66.9 CLASS 2 OBESITY IN ADULT: Status: ACTIVE | Noted: 2023-07-25

## 2023-07-25 NOTE — TELEPHONE ENCOUNTER
Caller: Patient    Doctor: Lorri Healy    Reason for call: Patient having sx tomorrow 7/26 and was supposed to stop taking Valsalazide/Disodium 7 days prior to sx and he did not. Please advise.     Call back#: [phone] or  [phone]

## 2023-07-25 NOTE — PRE-PROCEDURE INSTRUCTIONS
Pre-Surgery Instructions:   Medication Instructions   • balsalazide (COLAZAL) 750 mg capsule Stop taking 1 day prior to surgery. • ezetimibe (ZETIA) 10 mg tablet Take day of surgery. • oxyCODONE (ROXICODONE) 30 MG immediate release tablet Uses PRN- OK to take day of surgery   • zolpidem (AMBIEN) 10 mg tablet Take night before surgery   Medication instructions for day surgery reviewed. Please use only a sip of water to take your instructed medications. Avoid all over the counter vitamins, supplements and NSAIDS for one week prior to surgery per anesthesia guidelines. Tylenol is ok to take as needed. You will receive a call one business day prior to surgery with an arrival time and hospital directions. If your surgery is scheduled on a Monday, the hospital will be calling you on the Friday prior to your surgery. If you have not heard from anyone by 8pm, please call the hospital supervisor through the hospital  at [phone]. Mazin Soda [phone]). Do not eat or drink anything after midnight the night before your surgery, including candy, mints, lifesavers, or chewing gum. Do not drink alcohol 24hrs before your surgery. Try not to smoke at least 24hrs before your surgery. Follow the pre surgery showering instructions as listed in the [facility] Surgical Experience Booklet” or otherwise provided by your surgeon's office. Do not shave the surgical area 24 hours before surgery. Do not apply any lotions, creams, including makeup, cologne, deodorant, or perfumes after showering on the day of your surgery. No contact lenses, eye make-up, or artificial eyelashes. Remove nail polish, including gel polish, and any artificial, gel, or acrylic nails if possible. Remove all jewelry including rings and body piercing jewelry. Wear causal clothing that is easy to take on and off. Consider your type of surgery. Keep any valuables, jewelry, piercings at home.  Please bring any specially ordered equipment (sling, braces) if indicated. Arrange for a responsible person to drive you to and from the hospital on the day of your surgery. Visitor Guidelines discussed. Call the surgeon's office with any new illnesses, exposures, or additional questions prior to surgery. Please reference your [facility] Surgical Experience Booklet” for additional information to prepare for your upcoming surgery.

## 2023-07-25 NOTE — TELEPHONE ENCOUNTER
Caller: patient    Doctor: Mason Hoover    Reason for call: patient wondering if after surgery there is an option for a sling that runs cold water through it as recommended to him     Call back#:   [phone] or [phone]

## 2023-07-26 ENCOUNTER — HOSPITAL ENCOUNTER (OUTPATIENT)
Facility: HOSPITAL | Age: 68
Setting detail: OUTPATIENT SURGERY
Discharge: HOME | End: 2023-07-26
Attending: ORTHOPAEDIC SURGERY | Admitting: ORTHOPAEDIC SURGERY
Payer: MEDICARE

## 2023-07-26 ENCOUNTER — ANESTHESIA (OUTPATIENT)
Dept: PERIOP | Facility: HOSPITAL | Age: 68
End: 2023-07-26
Payer: MEDICARE

## 2023-07-26 VITALS
SYSTOLIC BLOOD PRESSURE: 135 MMHG | OXYGEN SATURATION: 93 % | HEIGHT: 72 IN | WEIGHT: 268.08 LBS | TEMPERATURE: 97.8 F | RESPIRATION RATE: 14 BRPM | DIASTOLIC BLOOD PRESSURE: 74 MMHG | BODY MASS INDEX: 36.31 KG/M2 | HEART RATE: 71 BPM

## 2023-07-26 DIAGNOSIS — S46.811D PARTIAL TEAR OF RIGHT SUBSCAPULARIS TENDON, SUBSEQUENT ENCOUNTER: ICD-10-CM

## 2023-07-26 DIAGNOSIS — M75.101 TEAR OF RIGHT SUPRASPINATUS TENDON: Primary | ICD-10-CM

## 2023-07-26 PROCEDURE — C1713 ANCHOR/SCREW BN/BN,TIS/BN: HCPCS | Performed by: ORTHOPAEDIC SURGERY

## 2023-07-26 PROCEDURE — 29823 SHO ARTHRS SRG XTNSV DBRDMT: CPT | Performed by: ORTHOPAEDIC SURGERY

## 2023-07-26 PROCEDURE — 29823 SHO ARTHRS SRG XTNSV DBRDMT: CPT | Performed by: PHYSICIAN ASSISTANT

## 2023-07-26 PROCEDURE — C9290 INJ, BUPIVACAINE LIPOSOME: HCPCS | Performed by: ANESTHESIOLOGY

## 2023-07-26 PROCEDURE — 29827 SHO ARTHRS SRG RT8TR CUF RPR: CPT | Performed by: PHYSICIAN ASSISTANT

## 2023-07-26 PROCEDURE — 29827 SHO ARTHRS SRG RT8TR CUF RPR: CPT | Performed by: ORTHOPAEDIC SURGERY

## 2023-07-26 DEVICE — 4.75MM BC KNOTLESS SWIVELOCK
Type: IMPLANTABLE DEVICE | Site: SHOULDER | Status: FUNCTIONAL
Brand: ARTHREX®

## 2023-07-26 DEVICE — CORKSCREW FT, BC, SUTURETAPE, 4.75MM
Type: IMPLANTABLE DEVICE | Site: SHOULDER | Status: FUNCTIONAL
Brand: ARTHREX®

## 2023-07-26 RX ORDER — SODIUM CHLORIDE, SODIUM LACTATE, POTASSIUM CHLORIDE, AND CALCIUM CHLORIDE .6; .31; .03; .02 G/100ML; G/100ML; G/100ML; G/100ML
IRRIGANT IRRIGATION AS NEEDED
Status: DISCONTINUED | OUTPATIENT
Start: 2023-07-26 | End: 2023-07-26 | Stop reason: HOSPADM

## 2023-07-26 RX ORDER — OXYCODONE AND ACETAMINOPHEN 10; 325 MG/1; MG/1
1 TABLET ORAL EVERY 4 HOURS PRN
Qty: 30 TABLET | Refills: 0 | Status: SHIPPED | OUTPATIENT
Start: 2023-07-26

## 2023-07-26 RX ORDER — MIDAZOLAM HYDROCHLORIDE 2 MG/2ML
INJECTION, SOLUTION INTRAMUSCULAR; INTRAVENOUS AS NEEDED
Status: DISCONTINUED | OUTPATIENT
Start: 2023-07-26 | End: 2023-07-26

## 2023-07-26 RX ORDER — KETAMINE HCL IN NACL, ISO-OSM 100MG/10ML
SYRINGE (ML) INJECTION AS NEEDED
Status: DISCONTINUED | OUTPATIENT
Start: 2023-07-26 | End: 2023-07-26

## 2023-07-26 RX ORDER — ONDANSETRON 2 MG/ML
4 INJECTION INTRAMUSCULAR; INTRAVENOUS EVERY 6 HOURS PRN
Status: CANCELLED | OUTPATIENT
Start: 2023-07-26

## 2023-07-26 RX ORDER — PROPOFOL 10 MG/ML
INJECTION, EMULSION INTRAVENOUS AS NEEDED
Status: DISCONTINUED | OUTPATIENT
Start: 2023-07-26 | End: 2023-07-26

## 2023-07-26 RX ORDER — BUPIVACAINE HYDROCHLORIDE 5 MG/ML
INJECTION, SOLUTION EPIDURAL; INTRACAUDAL
Status: COMPLETED | OUTPATIENT
Start: 2023-07-26 | End: 2023-07-26

## 2023-07-26 RX ORDER — ONDANSETRON 2 MG/ML
4 INJECTION INTRAMUSCULAR; INTRAVENOUS ONCE AS NEEDED
Status: DISCONTINUED | OUTPATIENT
Start: 2023-07-26 | End: 2023-07-26 | Stop reason: HOSPADM

## 2023-07-26 RX ORDER — OXYCODONE HYDROCHLORIDE AND ACETAMINOPHEN 5; 325 MG/1; MG/1
2 TABLET ORAL ONCE
Status: COMPLETED | OUTPATIENT
Start: 2023-07-26 | End: 2023-07-26

## 2023-07-26 RX ORDER — FENTANYL CITRATE 50 UG/ML
INJECTION, SOLUTION INTRAMUSCULAR; INTRAVENOUS AS NEEDED
Status: DISCONTINUED | OUTPATIENT
Start: 2023-07-26 | End: 2023-07-26

## 2023-07-26 RX ORDER — CEFAZOLIN SODIUM 2 G/50ML
2000 SOLUTION INTRAVENOUS ONCE
Status: COMPLETED | OUTPATIENT
Start: 2023-07-26 | End: 2023-07-26

## 2023-07-26 RX ORDER — ONDANSETRON 4 MG/1
4 TABLET, ORALLY DISINTEGRATING ORAL EVERY 6 HOURS PRN
Qty: 20 TABLET | Refills: 0 | Status: SHIPPED | OUTPATIENT
Start: 2023-07-26

## 2023-07-26 RX ORDER — EPHEDRINE SULFATE 50 MG/ML
INJECTION INTRAVENOUS AS NEEDED
Status: DISCONTINUED | OUTPATIENT
Start: 2023-07-26 | End: 2023-07-26

## 2023-07-26 RX ORDER — ROCURONIUM BROMIDE 10 MG/ML
INJECTION, SOLUTION INTRAVENOUS AS NEEDED
Status: DISCONTINUED | OUTPATIENT
Start: 2023-07-26 | End: 2023-07-26

## 2023-07-26 RX ORDER — IBUPROFEN 800 MG/1
800 TABLET ORAL EVERY 8 HOURS PRN
Qty: 30 TABLET | Refills: 0 | Status: SHIPPED | OUTPATIENT
Start: 2023-07-26

## 2023-07-26 RX ORDER — FENTANYL CITRATE/PF 50 MCG/ML
50 SYRINGE (ML) INJECTION
Status: DISCONTINUED | OUTPATIENT
Start: 2023-07-26 | End: 2023-07-26 | Stop reason: HOSPADM

## 2023-07-26 RX ORDER — SODIUM CHLORIDE, SODIUM LACTATE, POTASSIUM CHLORIDE, CALCIUM CHLORIDE 600; 310; 30; 20 MG/100ML; MG/100ML; MG/100ML; MG/100ML
125 INJECTION, SOLUTION INTRAVENOUS CONTINUOUS
Status: DISCONTINUED | OUTPATIENT
Start: 2023-07-26 | End: 2023-07-26 | Stop reason: HOSPADM

## 2023-07-26 RX ORDER — DEXAMETHASONE SODIUM PHOSPHATE 10 MG/ML
INJECTION, SOLUTION INTRAMUSCULAR; INTRAVENOUS AS NEEDED
Status: DISCONTINUED | OUTPATIENT
Start: 2023-07-26 | End: 2023-07-26

## 2023-07-26 RX ORDER — ONDANSETRON 2 MG/ML
INJECTION INTRAMUSCULAR; INTRAVENOUS AS NEEDED
Status: DISCONTINUED | OUTPATIENT
Start: 2023-07-26 | End: 2023-07-26

## 2023-07-26 RX ORDER — CHLORHEXIDINE GLUCONATE 4 G/100ML
SOLUTION TOPICAL DAILY PRN
Status: DISCONTINUED | OUTPATIENT
Start: 2023-07-26 | End: 2023-07-26 | Stop reason: HOSPADM

## 2023-07-26 RX ORDER — KETOROLAC TROMETHAMINE 30 MG/ML
INJECTION, SOLUTION INTRAMUSCULAR; INTRAVENOUS AS NEEDED
Status: DISCONTINUED | OUTPATIENT
Start: 2023-07-26 | End: 2023-07-26

## 2023-07-26 RX ORDER — HYDROMORPHONE HCL/PF 1 MG/ML
0.5 SYRINGE (ML) INJECTION
Status: DISCONTINUED | OUTPATIENT
Start: 2023-07-26 | End: 2023-07-26 | Stop reason: HOSPADM

## 2023-07-26 RX ORDER — GLYCOPYRROLATE 0.2 MG/ML
INJECTION INTRAMUSCULAR; INTRAVENOUS AS NEEDED
Status: DISCONTINUED | OUTPATIENT
Start: 2023-07-26 | End: 2023-07-26

## 2023-07-26 RX ORDER — DEXMEDETOMIDINE HYDROCHLORIDE 100 UG/ML
INJECTION, SOLUTION INTRAVENOUS AS NEEDED
Status: DISCONTINUED | OUTPATIENT
Start: 2023-07-26 | End: 2023-07-26

## 2023-07-26 RX ADMIN — SUGAMMADEX 200 MG: 100 INJECTION, SOLUTION INTRAVENOUS at 13:07

## 2023-07-26 RX ADMIN — DEXMEDETOMIDINE HYDROCHLORIDE 0.2 MCG/KG/HR: 100 INJECTION, SOLUTION INTRAVENOUS at 10:19

## 2023-07-26 RX ADMIN — PROPOFOL 50 MG: 10 INJECTION, EMULSION INTRAVENOUS at 13:08

## 2023-07-26 RX ADMIN — ROCURONIUM BROMIDE 10 MG: 10 INJECTION, SOLUTION INTRAVENOUS at 12:41

## 2023-07-26 RX ADMIN — EPHEDRINE SULFATE 5 MG: 50 INJECTION, SOLUTION INTRAVENOUS at 11:10

## 2023-07-26 RX ADMIN — FENTANYL CITRATE 50 MCG: 50 INJECTION INTRAMUSCULAR; INTRAVENOUS at 09:14

## 2023-07-26 RX ADMIN — EPHEDRINE SULFATE 5 MG: 50 INJECTION, SOLUTION INTRAVENOUS at 11:22

## 2023-07-26 RX ADMIN — PROPOFOL 100 MG: 10 INJECTION, EMULSION INTRAVENOUS at 10:20

## 2023-07-26 RX ADMIN — MIDAZOLAM 2 MG: 1 INJECTION INTRAMUSCULAR; INTRAVENOUS at 09:14

## 2023-07-26 RX ADMIN — DEXMEDETOMIDINE HCL 8 MCG: 100 INJECTION INTRAVENOUS at 10:23

## 2023-07-26 RX ADMIN — ONDANSETRON 4 MG: 2 INJECTION INTRAMUSCULAR; INTRAVENOUS at 13:00

## 2023-07-26 RX ADMIN — ROCURONIUM BROMIDE 20 MG: 10 INJECTION, SOLUTION INTRAVENOUS at 10:59

## 2023-07-26 RX ADMIN — FENTANYL CITRATE 50 MCG: 50 INJECTION INTRAMUSCULAR; INTRAVENOUS at 09:17

## 2023-07-26 RX ADMIN — BUPIVACAINE 20 ML: 13.3 INJECTION, SUSPENSION, LIPOSOMAL INFILTRATION at 09:20

## 2023-07-26 RX ADMIN — BUPIVACAINE HYDROCHLORIDE 10 ML: 5 INJECTION, SOLUTION EPIDURAL; INTRACAUDAL; PERINEURAL at 09:20

## 2023-07-26 RX ADMIN — CEFAZOLIN SODIUM 2000 MG: 2 SOLUTION INTRAVENOUS at 10:35

## 2023-07-26 RX ADMIN — DEXAMETHASONE SODIUM PHOSPHATE 10 MG: 10 INJECTION, SOLUTION INTRAMUSCULAR; INTRAVENOUS at 10:20

## 2023-07-26 RX ADMIN — KETOROLAC TROMETHAMINE 30 MG: 30 INJECTION, SOLUTION INTRAMUSCULAR at 13:00

## 2023-07-26 RX ADMIN — SODIUM CHLORIDE, SODIUM LACTATE, POTASSIUM CHLORIDE, AND CALCIUM CHLORIDE: .6; .31; .03; .02 INJECTION, SOLUTION INTRAVENOUS at 10:13

## 2023-07-26 RX ADMIN — SODIUM CHLORIDE, SODIUM LACTATE, POTASSIUM CHLORIDE, AND CALCIUM CHLORIDE: .6; .31; .03; .02 INJECTION, SOLUTION INTRAVENOUS at 12:00

## 2023-07-26 RX ADMIN — PROPOFOL 200 MG: 10 INJECTION, EMULSION INTRAVENOUS at 10:19

## 2023-07-26 RX ADMIN — Medication 30 MG: at 10:19

## 2023-07-26 RX ADMIN — ROCURONIUM BROMIDE 50 MG: 10 INJECTION, SOLUTION INTRAVENOUS at 10:20

## 2023-07-26 RX ADMIN — OXYCODONE HYDROCHLORIDE AND ACETAMINOPHEN 2 TABLET: 5; 325 TABLET ORAL at 14:21

## 2023-07-26 RX ADMIN — GLYCOPYRROLATE 0.1 MG: 0.2 INJECTION, SOLUTION INTRAMUSCULAR; INTRAVENOUS at 10:43

## 2023-07-26 RX ADMIN — ROCURONIUM BROMIDE 20 MG: 10 INJECTION, SOLUTION INTRAVENOUS at 12:05

## 2023-07-26 RX ADMIN — MIDAZOLAM 2 MG: 1 INJECTION INTRAMUSCULAR; INTRAVENOUS at 10:13

## 2023-07-26 NOTE — INTERVAL H&P NOTE
H&P reviewed. After examining the patient I find no changes in the patients condition since the H&P had been written. Patient was seen and evaluated in the office where continued nonoperative vs surgical management of Right shoulder rotator cuff teaser and biceps tendonitis was discussed. In light of patients persistent shoulder pain, difficulty with range of motion, daily activities and function, patient would like to move forward with surgical intervention. Risks of surgical intervention discussed in the office with patient and detailed consent obtained. Patient will go to OR on 07/26/2023 with Dr Joslyn Corral for Right shoulder arthroscopic rotator cuff repair, possible open subpectoral biceps tenodesis vs tenotomy. 14 point ROS in office   Musculoskeletal Right shoulder pain      Heart RRR  Lungs CTA BL  Abdomen Present, nontender      Right Shoulder: Active range of motion   80 degrees forward flexion with pain  90 degrees abduction with pain  40 degrees external rotation with pain  SI joint internal rotation    Passive range of motion   160 degrees of forward flexion      There is tenderness present over the greater tuberosity. There is 3/5 strength with supraspinatus testing. There is 3/5 strength with infraspinatus testing. There is 4+/5 strength with subscapularis testing. Zelaya test is positive  Speed's test is negative  The patient is neurovascularly intact distally in the extremity.         Vitals:    07/26/23 0817   BP: 132/84   Pulse: 76   Resp: 18   Temp: (!) 97 °F (36.1 °C)   SpO2: 96%

## 2023-07-26 NOTE — ANESTHESIA POSTPROCEDURE EVALUATION
Post-Op Assessment Note    CV Status:  Stable  Pain Score: 0    Pain management: adequate     Mental Status:  Awake and sleepy   Hydration Status:  Euvolemic   PONV Controlled:  Controlled   Airway Patency:  Patent and adequate   Two or more mitigation strategies used for obstructive sleep apnea   Post Op Vitals Reviewed: Yes      Staff: CRNA         No notable events documented.     BP   97/62   Temp 96.6   Pulse 62   Resp 20   SpO2 94

## 2023-07-26 NOTE — DISCHARGE INSTR - AVS FIRST PAGE
Shoulder Surgery: Postoperative Instructions  Dr. David Olmos may resume your regular diet as soon as possible  Medication    Take the pain medication as prescribed   Take pain medication with food   While taking pain medications, you may NOT operate a vehicle, heavy machinery, or appliances   While taking pain medication, you may NOT drink alcoholic beverages   If you have any reactions to your medications, stop taking them and call my office   Please keep in mind that constipation is a very common side effect of taking narcotic pain medication. Take precautions to prevent constipation:  o Drink plenty of water (6-8 glasses of 8 oz. a day)  o Avoid alcohol, caffeine, and dairy products  o Eat plenty of fiber (fruits, vegetables, and whole grains)  o Take an over the counter stool softener (Colace or Dulcolax)  o Patients that have had upper extremity surgery should take frequent walks  Activity    Minimize activity the day of surgery    DO NOT USE HEAT    Please keep ice applied to the shoulder for at least the first 72 hours or as long as pain or swelling persists. Do not apply ice directly to skin, or allow water to leak on your dressing. Place a pillow behind your elbow while lying down or sleeping. Sleeping in a more upright position (recliner) may be more comfortable initially. You should NOT roll onto the operative shoulder. You are in an immobilizer or sling and it should remain on at all times except when showering until your first postoperative visit   Open and close your hand 10 times every day for about 1 minute. You may also flex and extend your elbow each day when your sling is removed for showering. Be sure to keep your elbow at your side. DO NOT actively (on your own) lift your operative arm away from the side of your body or rotate it out away from your body. DO NOT use exercise equipment unless otherwise instructed.   Sling/ Immobilizer    Use the sling/immobilizer at all times and while sleeping until your next office appointment. Showering    You may shower 4 days after surgery unless told otherwise. DO NOT immerse the shoulder under water and DO NOT rub the incision. Place new Band-Aids over the sutures after showering. You may sponge bathe for the first 72 hours, taking care to keep the dressing clean and dry. Dressing Care    It is normal to get some bloody wound seepage through the bandage. DO NOT BE ALARMED. If the dressing gets soaked with wound seepage, place more gauze over the dressing and secure with tape. If this soaks through remove the entire dressing and replace with STERILE gauze and tape    Remove all dressings at 72 hours after surgery. If there is still some wound seepage, apply a fresh STERILE gauze over the incisions and secure with tape. DO NOT TOUCH OR REMOVE THE SUTURES!! Emergency/Follow-Up   Please notify my office at [phone] if you develop any fever (101 deg or above), unexpected warmth, redness or swelling. Please call if your fingers become cold, purple, numb, or there is excessive bleeding. Please call the office within 24 hours at [phone] to schedule a follow up appt within 7-10 days from surgery.

## 2023-07-26 NOTE — ANESTHESIA PROCEDURE NOTES
Peripheral Block    Patient location during procedure: holding area  Start time: 7/26/2023 9:20 AM  Reason for block: at surgeon's request and post-op pain management  Staffing  Performed by: Zoë Trujillo MD  Authorized by: Zoë Trujillo MD    Preanesthetic Checklist  Completed: patient identified, IV checked, site marked, risks and benefits discussed, surgical consent, monitors and equipment checked, pre-op evaluation and timeout performed  Peripheral Block  Patient position: sitting  Prep: ChloraPrep  Patient monitoring: cardiac monitor, continuous pulse ox and frequent blood pressure checks  Block type: interscalene  Laterality: right  Injection technique: single-shot  Procedures: ultrasound guided, Ultrasound guidance required for the procedure to increase accuracy and safety of medication placement and decrease risk of complications.   Ultrasound permanent image savedbupivacaine (PF) (MARCAINE) injection 0.5 % - Perineural   10 mL - 7/26/2023 9:20:00 AM  Needle  Needle type: Stimuplex   Needle gauge: 20 G  Needle length: 5 cm  Needle localization: ultrasound guidance  Test dose: negative  Assessment  Injection assessment: incremental injection, negative aspiration for heme and no paresthesia on injection  Paresthesia pain: none  Heart rate change: no  Post-procedure:  site cleaned  patient tolerated the procedure well with no immediate complications

## 2023-07-26 NOTE — OP NOTE
OPERATIVE REPORT  PATIENT NAME: Rex Pulliam    :  1955  MRN: [id number]  Pt Location: [facility] OR ROOM 04    SURGERY DATE: 2023    Surgeon(s) and Role:     * Sophy Castaneda DO - Primary     * Sophy Castaneda PA-C - Assisting    Preop Diagnosis:  Tear of right supraspinatus tendon [M75.101]    Post-Op Diagnosis Codes:     * Tear of right supraspinatus tendon [M75.101]   Full-thickness retracted supraspinatus tear, high-grade partial-thickness tear of superior subscapularis tendon, proximal biceps subluxation, type II SLAP tear, degenerative tearing of anterior labrum, mild glenohumeral arthritis. Procedure(s):  Right shoulder arthroscopy with supraspinatus and subscapularis repairs, biceps tenotomy, extensive debridement. Specimen(s):  * No specimens in log *    Estimated Blood Loss:   5 mL    Drains:  * No LDAs found *    Anesthesia Type:   General w/ Interscalene Block    Operative Indications:  Tear of right supraspinatus tendon [M75.101]  Traumatic full-thickness rotator cuff tear with pain and weakness affecting activities of daily living. Operative Findings:  Full-thickness retracted tear of supraspinatus tendon extending into anterior infraspinatus tendon, high-grade partial-thickness tear of superior subscapularis tendon, subluxation of the biceps tendon, type II SLAP tear, degenerative fraying of anterior and inferior labrum, mild glenohumeral degenerative changes    Complications:   None    Procedure and Technique:    Antibiotics: 2 g Ancef  IV fluids: 1400 cc  Implants: Arthrex 4.75 mm double loaded BioComposite anchor x 3, 4.75 mm bio composite swivel lock anchor x3    The patient was seen in the preoperative holding area and the appropriate site of surgery was confirmed and the patient was marked. Upon bringing the patient back to the operating room he was placed supine on the operating room table and general anesthesia was provided.  The patient was placed in the lateral decubitus position with the right side up. The patient was held in position with a beanbag reinforce with a seat belt and tape. All bony prominences were appropriately padded and the brachial plexus was offloaded with the appropriate ramp pillow functioning as an axillary roll. An exam under anesthesia was performed and displayed full passive range of motion without instability. The right upper extremity was prepped and draped in the usual sterile fashion and placed in 15 lbs of in-line traction. A preoperative time-out was performed to once again confirm the site of surgery and procedure. A posterior arthroscopic viewing portal was made with an 11 blade. The arthroscopic camera was inserted. Upon entering the glenohumeral joint space an anterior portal was made under direct visualization with the aid of an 18 gauge spinal needle. An anterior cannula was inserted, followed by an arthroscopic probe, and a diagnostic arthroscopy was performed. Diagnostic arthroscopy revealed high-grade partial-thickness tear of superior subscapularis tendon with medial retraction. Subluxation of proximal biceps tendon was noted with associated biceps tendinitis. Upon inspection of the labrum a type II SLAP tear was noted. There is also degenerative fraying of the anterior and inferior labrum. Mild glenohumeral degenerative changes were appreciated with fraying of the inferior glenoid articular surface without full-thickness chondral defect. Inspection of the supraspinatus tendon revealed full-thickness retracted tear with the anterior aspect retracted to the level of the glenoid. At this time a meniscal biter was inserted to release the biceps tendon. This was followed by an arthroscopic shaver used to debride the proximal biceps tendon stump, superior subscapularis tendon, rotator interval including capsule to reveal coracoid process, superior labrum, anterior labrum, inferior labrum, undersurface of supraspinatus tendon.   Attention was then turned to subscapularis repair. An arthroscopic elevator was inserted to free up the subscapularis tendon anteriorly, posteriorly, and superiorly. This was followed by an arthroscopic shaver used to further debride early adhesions. Rotator cuff grasper was then used to ensure proper mobilization of the subscapularis tendon. A 70 degree arthroscope was inserted for better visualization of the lesser tuberosity footprint. A radiofrequency device was used to remove soft tissue from the appropriate point on the subscapularis footprint. This was followed by an arthroscopic shaver to lightly decorticate the repair site. A fiber tape suture was then passed through the retracted superior lateral aspect of the subscapularis tendon. Both ends of tape were loaded into a 4.75 mm swivel lock anchor. The appropriate awl was used to make a starter hole followed by insertion of the anchor and appropriate tensioning of the sutures. Proper reduction and tensioning was noted and the anchor was inserted to secure the repair. Upon probing and rotating the arm internally and externally proper reduction and tension was maintained. Arthroscopic pictures were taken and attention was turned to supraspinatus tear. The arthroscope was removed and subsequently placed into the subacromial space. A lateral portal was made 3 cm off of the lateral aspect of the acromion in line with the rotator cuff tear. The arthroscopic shaver was inserted and a bursectomy was performed. Arthroscopic radiofrequency ablation device was used to remove soft tissue off the undersurface of the acromion and the footprint of the supraspinatus on the greater tuberosity. Arthroscopic shaver was then used to lightly decorticate the repair site. An arthroscopic elevator was used to further free the retracted supraspinatus tendon superiorly and inferiorly which had been retracted in a posterior medial direction.   Rotator cuff grasper was then used to ensure proper mobilization of the supraspinatus and anterior infraspinatus tears and an anterior lateral direction. A free 2 FiberWire suture was then placed through the retracted tendon as a traction suture from the anterior portal.  Tensioning of the suture once again noted proper reduction of the tendon for repair. Camera was then placed in the lateral portal and arthroscopic shaver was placed from the posterior portal to further perform the posterior bursectomy and free up the retracted rotator cuff tissue. Upon proper mobilization, attention was turned to placing accessory portals. Spinal needle was used to locate the proper position of an accessory portal off of the lateral acromion. Radiofrequency device was used to remove remaining soft tissue from the footprint and this was followed by a shaver to lightly decorticate the repair site. Appropriate awl was inserted followed by a 4.75 mm double loaded BioComposite suture anchor anteriorly in the medial row of the footprint. This process was repeated with a second medial row anchor in the central medial aspect of the footprint. A third anchor was then placed more posteriorly in the medial row of the footprint. Camera was then placed in the posterior portal.  Sutures were passed in horizontal mattress fashion from posterior to anterior through the musculotendinous junction while maintaining appropriate tensioning of the traction stitch. Sutures were then tied from anterior to posterior, noting proper reduction and tensioning of the medial row of the repair. Attention was then turned to lateral row fixation. Radiofrequency device was used to remove soft tissue from the lateral aspect of the footprint. 1 suture from each knot was then retrieved. An awl was used to make a starter hole in the anterior aspect of the lateral row.   Sutures were loaded into a 4.75 mm bio composite swivel lock anchor and after appropriately tensioning, the anchor was inserted. Proper reduction and tensioning was noted of the rotator cuff tendon. This process was repeated with a second lateral row anchor posterior to the first.  Final repair was probed and was noted to be stable maintaining appropriate tension and reduction through internal and external rotation. Final arthroscopic pictures were taken. Arthroscopy sites were closed with 3-0 nylon suture. A sterile dressing was applied and the patient was placed in a sling with abduction pillow. He tolerated the procedure well and was transferred to the PACU without complication. I was present for the entire procedure., A qualified resident physician was not available. and A physician assistant, Ammy Elmore PA-C, was required during the procedure for patient positioning,tissue handling, assistance with arthroscopic camera and equipment due to the minimally invasive nature of the surgical case, anchor insertion, and suturing.     Patient Disposition:  PACU         SIGNATURE: Ammy Elmore DO  DATE: July 26, 2023  TIME: 1:14 PM

## 2023-07-26 NOTE — ANESTHESIA PREPROCEDURE EVALUATION
Procedure:  ARTHROSCOPY SHOULDER- Right shoulder arthroscopic rotator cuff repair, possible open subpectoral biceps tenodesis vs biceps tenotomy (Right: Shoulder)    Relevant Problems   CARDIO   (+) Hypertension      GI/HEPATIC   (+) NAFLD (nonalcoholic fatty liver disease)      MUSCULOSKELETAL   (+) Chronic bilateral low back pain with bilateral sciatica      NEURO/PSYCH   (+) Chronic bilateral low back pain with bilateral sciatica   (+) Chronic pain syndrome      Digestive   (+) Ulcerative colitis (HCC)      Musculoskeletal and Integument   (+) Tear of right supraspinatus tendon      Other   (+) Class 2 obesity in adult   (+) Right foot drop   (+) Status post lumbar spinal fusion        Physical Exam    Airway    Mallampati score: II  TM Distance: >3 FB  Neck ROM: full     Dental       Cardiovascular      Pulmonary      Other Findings        Anesthesia Plan  ASA Score- 3     Anesthesia Type- general with ASA Monitors. Additional Monitors:   Airway Plan: ETT. Comment: General with ETT and interscalene block with exparel.     Recent labs personally reviewed:  Lab Results       Component                Value               Date                       WBC                      6.76                02/16/2022                 HGB                      15.5                02/16/2022                 PLT                      244                 02/16/2022            Lab Results       Component                Value               Date                       K                        4.5                 10/28/2022                 BUN                      22                  10/28/2022                 CREATININE               1.18                10/28/2022            Lab Results       Component                Value               Date                       PTT                      28                  07/20/2023             Lab Results       Component                Value               Date                       INR 0.91                07/20/2023              Blood type Anali Marie MD, have personally seen and evaluated the patient prior to anesthetic care. I have reviewed the pre-anesthetic record, medical history, allergies, medications and any other medical records if appropriate to the anesthetic care. If a CRNA is involved in the case, I have reviewed the CRNA assessment, if present, and agree. I consented the patient for general anesthesia with appropriate airway support as indicated. We reviewed the risks associated including PONV, sore throat, allergic reaction to anesthetics and management plan to address these issues. We discussed the indication and risks associated with any invasive monitors that would be placed. We discussed post op pain control and expectations. We discussed rare complications including hypoxia, perioperative cardiac and neurologic events, and death based on the patient's baseline risk. All questions and concerns were addressed. .       Plan Factors-Exercise tolerance (METS): >4 METS. Chart reviewed. Existing labs reviewed. Patient summary reviewed. Patient is not a current smoker. Patient did not smoke on day of surgery. Obstructive sleep apnea risk education given perioperatively. Induction- intravenous. Postoperative Plan-     Informed Consent- Anesthetic plan and risks discussed with patient. I personally reviewed this patient with the CRNA. Discussed and agreed on the Anesthesia Plan with the CRNA. Terrence Vincent

## 2023-07-28 ENCOUNTER — TELEPHONE (OUTPATIENT)
Dept: OBGYN CLINIC | Facility: CLINIC | Age: 68
End: 2023-07-28

## 2023-07-28 ENCOUNTER — TELEPHONE (OUTPATIENT)
Dept: OBGYN CLINIC | Facility: HOSPITAL | Age: 68
End: 2023-07-28

## 2023-07-28 NOTE — TELEPHONE ENCOUNTER
Caller: Patient    Doctor: Ministerio Munoz    Reason for call: Received a call. No message left. Questioned if from Ortho?  Advised nothing noted    Call back#: [phone]

## 2023-08-02 ENCOUNTER — OFFICE VISIT (OUTPATIENT)
Dept: OBGYN CLINIC | Facility: CLINIC | Age: 68
End: 2023-08-02

## 2023-08-02 VITALS
HEART RATE: 77 BPM | DIASTOLIC BLOOD PRESSURE: 90 MMHG | BODY MASS INDEX: 36.3 KG/M2 | WEIGHT: 268 LBS | SYSTOLIC BLOOD PRESSURE: 135 MMHG | HEIGHT: 72 IN

## 2023-08-02 DIAGNOSIS — Z48.89 AFTERCARE FOLLOWING SURGERY: ICD-10-CM

## 2023-08-02 DIAGNOSIS — Z98.890 S/P ARTHROSCOPY OF RIGHT SHOULDER: Primary | ICD-10-CM

## 2023-08-02 PROCEDURE — 99024 POSTOP FOLLOW-UP VISIT: CPT | Performed by: ORTHOPAEDIC SURGERY

## 2023-08-02 NOTE — PROGRESS NOTES
Patient Name:  Patricia Mayer  MRN:  [de-identified]    [id number] [address]. S/P arthroscopy of right shoulder    2. Aftercare following surgery      Approximately 1 week s/p Right shoulder arthroscopic supraspinatus and subscapularis repair, biceps tenotomy, extensive debridement performed on 07/26/2023  · Overall, patient doing well s/p surgical intervention  · Strongly advised patient to maintain shoulder abduction sling throughout the day and may remove for hygenic purposes and exercises. This was reinforced multiple times throughout the appointment when the patient voiced displeasure with proposed 9 years which was also discussed in detail prior to surgery. Educated patient on the risks of removing the shoulder abduction sling too early, rolling on the operative side during sleep or premature active range of motion may cause re-tear or post op complications. Patient understanding and educated patient on sling application. · Removed suture and replaced with steri strips. Reviewed intraoperative images with patient. · Continue ice application, OTC medications as needed  · Demonstrated home exercises with patient including elbow ROM, scapular retractions, pendulums. No table slides at this time. · Follow up in office in 4-5 weeks for reevaluation of Right shoulder. History of the Present Illness   Patricia Mayer is a 79 y.o. male approximately 1 week s/p Right shoulder arthroscopic supraspinatus and subscapularis repair, biceps tenotomy, extensive debridement performed on 07/26/2023. Today, patient reports he is doing well s/p surgical intervention. He has maintained the sling. He admits pain is well controlled. Patient does not like the post op shoulder abduction sling. He wants to know if he can take it off while sleeping because it hurts his neck. Review of Systems     Review of Systems   Constitutional: Negative for chills and fever. HENT: Negative for ear pain and sore throat.     Eyes: Negative for pain and visual disturbance. Respiratory: Negative for cough and shortness of breath. Cardiovascular: Negative for chest pain and palpitations. Gastrointestinal: Negative for abdominal pain and vomiting. Genitourinary: Negative for dysuria and hematuria. Musculoskeletal: Negative for arthralgias and back pain. Skin: Negative for color change and rash. Neurological: Negative for seizures and syncope. All other systems reviewed and are negative. Physical Exam     /90   Pulse 77   Ht 6' (1.829 m)   Wt 122 kg (268 lb)   BMI 36.35 kg/m²     Right Shoulder:   Surgical incisions well healing without signs of infection. Nylon suture removed, replaced with steri strips  Elbow ROM 5-10 to approximately 130 degrees without pain     Passive range of motion   40 degrees of forward flexion   The patient is neurovascularly intact distally in the extremity. Data Review     I have personally reviewed pertinent films in PACS, and my interpretation follows.     No new images     Social History     Tobacco Use   • Smoking status: Never   • Smokeless tobacco: Never   Vaping Use   • Vaping Use: Never used   Substance Use Topics   • Alcohol use: Yes     Comment: rarely   • Drug use: No           Procedures  None     Shanika Morgan PA-C

## 2023-08-04 ENCOUNTER — APPOINTMENT (EMERGENCY)
Dept: CT IMAGING | Facility: HOSPITAL | Age: 68
End: 2023-08-04
Payer: MEDICARE

## 2023-08-04 ENCOUNTER — HOSPITAL ENCOUNTER (OUTPATIENT)
Facility: HOSPITAL | Age: 68
Setting detail: OBSERVATION
Discharge: HOME | End: 2023-08-05
Attending: EMERGENCY MEDICINE | Admitting: INTERNAL MEDICINE
Payer: MEDICARE

## 2023-08-04 DIAGNOSIS — F41.9 ANXIETY: ICD-10-CM

## 2023-08-04 DIAGNOSIS — R94.31 ABNORMAL EKG: Primary | ICD-10-CM

## 2023-08-04 DIAGNOSIS — R42 LIGHTHEADEDNESS: ICD-10-CM

## 2023-08-04 DIAGNOSIS — R06.02 SOB (SHORTNESS OF BREATH): ICD-10-CM

## 2023-08-04 PROBLEM — R79.89 POSITIVE D DIMER: Status: ACTIVE | Noted: 2023-08-04

## 2023-08-04 LAB
ALBUMIN SERPL BCP-MCNC: 4.5 G/DL (ref 3.5–5)
ALP SERPL-CCNC: 62 U/L (ref 34–104)
ALT SERPL W P-5'-P-CCNC: 13 U/L (ref 7–52)
ANION GAP SERPL CALCULATED.3IONS-SCNC: 8 MMOL/L
AST SERPL W P-5'-P-CCNC: 18 U/L (ref 13–39)
ATRIAL RATE: 73 BPM
BASOPHILS # BLD AUTO: 0.03 THOUSANDS/ÂΜL (ref 0–0.1)
BASOPHILS NFR BLD AUTO: 0 % (ref 0–1)
BILIRUB SERPL-MCNC: 0.73 MG/DL (ref 0.2–1)
BNP SERPL-MCNC: 32 PG/ML (ref 0–100)
BUN SERPL-MCNC: 16 MG/DL (ref 5–25)
CALCIUM SERPL-MCNC: 9.4 MG/DL (ref 8.4–10.2)
CARDIAC TROPONIN I PNL SERPL HS: <2 NG/L
CHLORIDE SERPL-SCNC: 105 MMOL/L (ref 96–108)
CO2 SERPL-SCNC: 23 MMOL/L (ref 21–32)
CREAT SERPL-MCNC: 0.98 MG/DL (ref 0.6–1.3)
EOSINOPHIL # BLD AUTO: 0.07 THOUSAND/ÂΜL (ref 0–0.61)
EOSINOPHIL NFR BLD AUTO: 1 % (ref 0–6)
ERYTHROCYTE [DISTWIDTH] IN BLOOD BY AUTOMATED COUNT: 13.7 % (ref 11.6–15.1)
FLUAV RNA RESP QL NAA+PROBE: NEGATIVE
FLUBV RNA RESP QL NAA+PROBE: NEGATIVE
GFR SERPL CREATININE-BSD FRML MDRD: 80 ML/MIN/1.73SQ M
GLUCOSE SERPL-MCNC: 97 MG/DL (ref 65–140)
HCT VFR BLD AUTO: 43.9 % (ref 36.5–49.3)
HGB BLD-MCNC: 14.4 G/DL (ref 12–17)
IMM GRANULOCYTES # BLD AUTO: 0.03 THOUSAND/UL (ref 0–0.2)
IMM GRANULOCYTES NFR BLD AUTO: 0 % (ref 0–2)
LYMPHOCYTES # BLD AUTO: 1.22 THOUSANDS/ÂΜL (ref 0.6–4.47)
LYMPHOCYTES NFR BLD AUTO: 15 % (ref 14–44)
MCH RBC QN AUTO: 28 PG (ref 26.8–34.3)
MCHC RBC AUTO-ENTMCNC: 32.8 G/DL (ref 31.4–37.4)
MCV RBC AUTO: 85 FL (ref 82–98)
MONOCYTES # BLD AUTO: 0.64 THOUSAND/ÂΜL (ref 0.17–1.22)
MONOCYTES NFR BLD AUTO: 8 % (ref 4–12)
NEUTROPHILS # BLD AUTO: 5.96 THOUSANDS/ÂΜL (ref 1.85–7.62)
NEUTS SEG NFR BLD AUTO: 76 % (ref 43–75)
NRBC BLD AUTO-RTO: 0 /100 WBCS
P AXIS: 42 DEGREES
PLATELET # BLD AUTO: 311 THOUSANDS/UL (ref 149–390)
PMV BLD AUTO: 10.1 FL (ref 8.9–12.7)
POTASSIUM SERPL-SCNC: 4.2 MMOL/L (ref 3.5–5.3)
PR INTERVAL: 164 MS
PROT SERPL-MCNC: 7.9 G/DL (ref 6.4–8.4)
QRS AXIS: 79 DEGREES
QRSD INTERVAL: 92 MS
QT INTERVAL: 358 MS
QTC INTERVAL: 394 MS
RBC # BLD AUTO: 5.15 MILLION/UL (ref 3.88–5.62)
RSV RNA RESP QL NAA+PROBE: NEGATIVE
SARS-COV-2 RNA RESP QL NAA+PROBE: NEGATIVE
SODIUM SERPL-SCNC: 136 MMOL/L (ref 135–147)
T WAVE AXIS: -86 DEGREES
VENTRICULAR RATE: 73 BPM
WBC # BLD AUTO: 7.95 THOUSAND/UL (ref 4.31–10.16)

## 2023-08-04 PROCEDURE — 96374 THER/PROPH/DIAG INJ IV PUSH: CPT

## 2023-08-04 PROCEDURE — 99223 1ST HOSP IP/OBS HIGH 75: CPT | Performed by: INTERNAL MEDICINE

## 2023-08-04 PROCEDURE — 93005 ELECTROCARDIOGRAM TRACING: CPT

## 2023-08-04 PROCEDURE — 99285 EMERGENCY DEPT VISIT HI MDM: CPT | Performed by: EMERGENCY MEDICINE

## 2023-08-04 PROCEDURE — 84484 ASSAY OF TROPONIN QUANT: CPT | Performed by: EMERGENCY MEDICINE

## 2023-08-04 PROCEDURE — 99284 EMERGENCY DEPT VISIT MOD MDM: CPT

## 2023-08-04 PROCEDURE — G1004 CDSM NDSC: HCPCS

## 2023-08-04 PROCEDURE — 36415 COLL VENOUS BLD VENIPUNCTURE: CPT | Performed by: EMERGENCY MEDICINE

## 2023-08-04 PROCEDURE — 0241U HB NFCT DS VIR RESP RNA 4 TRGT: CPT | Performed by: EMERGENCY MEDICINE

## 2023-08-04 PROCEDURE — 83880 ASSAY OF NATRIURETIC PEPTIDE: CPT | Performed by: EMERGENCY MEDICINE

## 2023-08-04 PROCEDURE — 96361 HYDRATE IV INFUSION ADD-ON: CPT

## 2023-08-04 PROCEDURE — 71275 CT ANGIOGRAPHY CHEST: CPT

## 2023-08-04 PROCEDURE — 93010 ELECTROCARDIOGRAM REPORT: CPT | Performed by: INTERNAL MEDICINE

## 2023-08-04 PROCEDURE — 80053 COMPREHEN METABOLIC PANEL: CPT | Performed by: EMERGENCY MEDICINE

## 2023-08-04 PROCEDURE — 85025 COMPLETE CBC W/AUTO DIFF WBC: CPT | Performed by: EMERGENCY MEDICINE

## 2023-08-04 RX ORDER — ONDANSETRON 2 MG/ML
4 INJECTION INTRAMUSCULAR; INTRAVENOUS EVERY 6 HOURS PRN
Status: DISCONTINUED | OUTPATIENT
Start: 2023-08-04 | End: 2023-08-05 | Stop reason: HOSPADM

## 2023-08-04 RX ORDER — MORPHINE SULFATE 10 MG/ML
6 INJECTION, SOLUTION INTRAMUSCULAR; INTRAVENOUS ONCE
Status: COMPLETED | OUTPATIENT
Start: 2023-08-04 | End: 2023-08-04

## 2023-08-04 RX ORDER — ZOLPIDEM TARTRATE 5 MG/1
10 TABLET ORAL
Status: DISCONTINUED | OUTPATIENT
Start: 2023-08-04 | End: 2023-08-04

## 2023-08-04 RX ORDER — HEPARIN SODIUM 5000 [USP'U]/ML
5000 INJECTION, SOLUTION INTRAVENOUS; SUBCUTANEOUS EVERY 8 HOURS SCHEDULED
Status: DISCONTINUED | OUTPATIENT
Start: 2023-08-04 | End: 2023-08-05 | Stop reason: HOSPADM

## 2023-08-04 RX ORDER — OXYCODONE HYDROCHLORIDE 10 MG/1
30 TABLET ORAL EVERY 6 HOURS PRN
Status: DISCONTINUED | OUTPATIENT
Start: 2023-08-04 | End: 2023-08-05 | Stop reason: HOSPADM

## 2023-08-04 RX ORDER — MORPHINE SULFATE 4 MG/ML
4 INJECTION, SOLUTION INTRAMUSCULAR; INTRAVENOUS ONCE
Status: DISCONTINUED | OUTPATIENT
Start: 2023-08-04 | End: 2023-08-04

## 2023-08-04 RX ORDER — DIPHENHYDRAMINE HCL 25 MG
50 TABLET ORAL ONCE
Status: COMPLETED | OUTPATIENT
Start: 2023-08-04 | End: 2023-08-04

## 2023-08-04 RX ORDER — CYCLOBENZAPRINE HCL 10 MG
10 TABLET ORAL 3 TIMES DAILY
Status: DISCONTINUED | OUTPATIENT
Start: 2023-08-04 | End: 2023-08-05 | Stop reason: HOSPADM

## 2023-08-04 RX ORDER — EZETIMIBE 10 MG/1
10 TABLET ORAL DAILY
Status: DISCONTINUED | OUTPATIENT
Start: 2023-08-04 | End: 2023-08-05 | Stop reason: HOSPADM

## 2023-08-04 RX ORDER — ACETAMINOPHEN 325 MG/1
650 TABLET ORAL EVERY 6 HOURS PRN
Status: DISCONTINUED | OUTPATIENT
Start: 2023-08-04 | End: 2023-08-05 | Stop reason: HOSPADM

## 2023-08-04 RX ORDER — HYDROMORPHONE HCL/PF 1 MG/ML
0.5 SYRINGE (ML) INJECTION EVERY 6 HOURS PRN
Status: DISCONTINUED | OUTPATIENT
Start: 2023-08-04 | End: 2023-08-05 | Stop reason: HOSPADM

## 2023-08-04 RX ORDER — ZOLPIDEM TARTRATE 5 MG/1
10 TABLET ORAL
Status: DISCONTINUED | OUTPATIENT
Start: 2023-08-05 | End: 2023-08-05 | Stop reason: HOSPADM

## 2023-08-04 RX ORDER — SODIUM CHLORIDE 9 MG/ML
3 INJECTION INTRAVENOUS
Status: DISCONTINUED | OUTPATIENT
Start: 2023-08-04 | End: 2023-08-05 | Stop reason: HOSPADM

## 2023-08-04 RX ORDER — ALPRAZOLAM 0.25 MG/1
0.25 TABLET ORAL 2 TIMES DAILY PRN
Status: DISCONTINUED | OUTPATIENT
Start: 2023-08-04 | End: 2023-08-05 | Stop reason: HOSPADM

## 2023-08-04 RX ADMIN — IOHEXOL 85 ML: 350 INJECTION, SOLUTION INTRAVENOUS at 13:39

## 2023-08-04 RX ADMIN — MORPHINE SULFATE 6 MG: 10 INJECTION INTRAVENOUS at 15:17

## 2023-08-04 RX ADMIN — CYCLOBENZAPRINE HYDROCHLORIDE 10 MG: 10 TABLET, FILM COATED ORAL at 23:27

## 2023-08-04 RX ADMIN — CYCLOBENZAPRINE HYDROCHLORIDE 10 MG: 10 TABLET, FILM COATED ORAL at 18:42

## 2023-08-04 RX ADMIN — HEPARIN SODIUM 5000 UNITS: 5000 INJECTION INTRAVENOUS; SUBCUTANEOUS at 19:18

## 2023-08-04 RX ADMIN — HYDROMORPHONE HYDROCHLORIDE 0.5 MG: 1 INJECTION, SOLUTION INTRAMUSCULAR; INTRAVENOUS; SUBCUTANEOUS at 18:41

## 2023-08-04 RX ADMIN — DIPHENHYDRAMINE HYDROCHLORIDE 50 MG: 25 TABLET ORAL at 23:27

## 2023-08-04 RX ADMIN — SODIUM CHLORIDE 1000 ML: 0.9 INJECTION, SOLUTION INTRAVENOUS at 12:50

## 2023-08-04 RX ADMIN — EZETIMIBE 10 MG: 10 TABLET ORAL at 18:42

## 2023-08-04 RX ADMIN — OXYCODONE HYDROCHLORIDE 30 MG: 10 TABLET ORAL at 19:50

## 2023-08-04 NOTE — ED PROVIDER NOTES
History  Chief Complaint   Patient presents with   • Abnormal Lab     Patient reports "reports he had surgery on Wednesday for right rotator cuff, sent to ED by PCP for elevated d-dimer, c/o dizziness and sob"     78 y/o male presents to the ED for lightheadedness and sob. States that he had right rotator cuff surgery done on 7/26. States that he has had lightheadedness and intermittent sob. States that he "just hasnt felt right" since. He denies any fever, cp, abd pain, n/v, d/c, or urinary symptoms. States that he saw his PCP yesterday and had lab work done. States that he was called for elevated ddimer and told to come in for evaluation. History provided by:  Patient  Evaluation of Abnormal Diagnostic Test  Time since result:  1 day  Patient referred by:  PCP  Resulting agency:  [facility]  Result type: hematology    Hematology:     Other abnormal hematology result:  High ddimer      Prior to Admission Medications   Prescriptions Last Dose Informant Patient Reported? Taking?   balsalazide (COLAZAL) 750 mg capsule  Self No No   Sig: Take 3 capsules (2,250 mg total) by mouth 2 (two) times a day   dicyclomine (BENTYL) 20 mg tablet  Self No No   Sig: Take 1 tablet (20 mg total) by mouth 3 (three) times a day as needed (abdominal pain)   Patient not taking: Reported on 7/25/2023   ezetimibe (ZETIA) 10 mg tablet  Self Yes No   Sig: Take 10 mg by mouth daily   ibuprofen (MOTRIN) 800 mg tablet  Self No No   Sig: Take 1 tablet (800 mg total) by mouth every 8 (eight) hours as needed for mild pain Take 1 tablet by mouth 3 times daily for 3 days, then take as needed. Take with food and water. Discontinue if stomach upset occurs.    methocarbamol (ROBAXIN) 500 mg tablet  Self Yes No   Sig: take 1 tablet by mouth four times a day if needed for muscle spasm   ondansetron (ZOFRAN-ODT) 4 mg disintegrating tablet  Self No No   Sig: Take 1 tablet (4 mg total) by mouth every 6 (six) hours as needed for nausea or vomiting oxyCODONE (ROXICODONE) 30 MG immediate release tablet  Self Yes No   Sig: oxycodone 30 mg tablet   Patient not taking: Reported on 8/2/2023   oxyCODONE-acetaminophen (Percocet)  mg per tablet  Self No No   Sig: Take 1 tablet by mouth every 4 (four) hours as needed for moderate pain Max Daily Amount: 6 tablets   polyethylene glycol (GaviLyte-C) 4000 mL solution   No No   Sig: Take 4,000 mL by mouth once for 1 dose   zolpidem (AMBIEN) 10 mg tablet  Self No No   Sig: Take 1 tablet (10 mg total) by mouth daily at bedtime as needed for sleep for up to 10 days      Facility-Administered Medications: None       Past Medical History:   Diagnosis Date   • Back pain    • Colon polyp    • Hyperlipidemia    • Lumbar fracture with cord injury ([address])    • Neck fracture (HCC)    • Previous back surgery     rods in the back   • Ulcerative colitis [facility])        Past Surgical History:   Procedure Laterality Date   • BACK SURGERY     • LUMBAR FUSION Right 1/20/2016    Procedure: FUSION LUMBAR  POSTERIOR, TLIF L3-4  Right L3-4 Fascet;  Surgeon: Ana María Fish MD;  Location:  MAIN OR;  Service:    • RI COLONOSCOPY FLX DX W/COLLJ SPEC WHEN PFRMD N/A 1/24/2019    Procedure: COLONOSCOPY;  Surgeon: Josie Beckman MD;  Location: [facility]; Service: Gastroenterology   • RI SURGICAL ARTHROSCOPY SHOULDER W/ROTATOR CUFF RPR Right 7/26/2023    Procedure: ARTHROSCOPY SHOULDER- Right shoulder Arthroscopy, supraspinatus and subscapularis repair, biceps tenotomy, extensive debridement;  Surgeon: Stephania Hernandez DO;  Location: [facility];  Service: Orthopedics   • TONSILLECTOMY         Family History   Problem Relation Age of Onset   • Parkinsonism Mother    • Lung cancer Father      I have reviewed and agree with the history as documented.     E-Cigarette/Vaping   • E-Cigarette Use Never User      E-Cigarette/Vaping Substances   • Nicotine No    • THC No    • CBD No    • Flavoring No    • Other No    • Unknown No      Social History     Tobacco Use   • Smoking status: Never   • Smokeless tobacco: Never   Vaping Use   • Vaping Use: Never used   Substance Use Topics   • Alcohol use: Yes     Comment: rarely   • Drug use: No       Review of Systems   Constitutional: Negative for chills and fever. HENT: Negative for congestion, ear pain and sore throat. Eyes: Negative for pain and visual disturbance. Respiratory: Positive for shortness of breath. Negative for cough and wheezing. Cardiovascular: Negative for chest pain and leg swelling. Gastrointestinal: Negative for abdominal pain, diarrhea, nausea and vomiting. Genitourinary: Negative for dysuria, frequency, hematuria and urgency. Musculoskeletal: Negative for neck pain and neck stiffness. Skin: Negative for rash and wound. Neurological: Positive for light-headedness. Negative for weakness, numbness and headaches. Psychiatric/Behavioral: Negative for agitation and confusion. All other systems reviewed and are negative. Physical Exam  Physical Exam  Vitals and nursing note reviewed. Constitutional:       Appearance: He is well-developed. HENT:      Head: Normocephalic and atraumatic. Eyes:      Pupils: Pupils are equal, round, and reactive to light. Cardiovascular:      Rate and Rhythm: Normal rate and regular rhythm. Pulmonary:      Effort: Pulmonary effort is normal.      Breath sounds: Normal breath sounds. No wheezing, rhonchi or rales. Abdominal:      General: Bowel sounds are normal. There is no distension. Palpations: Abdomen is soft. Tenderness: There is no abdominal tenderness. Musculoskeletal:         General: Normal range of motion. Cervical back: Normal range of motion and neck supple. Skin:     General: Skin is warm and dry. Neurological:      General: No focal deficit present. Mental Status: He is alert and oriented to person, place, and time.       Comments: No focal deficits         Vital Signs  ED Triage Vitals   Temperature Pulse Respirations Blood Pressure SpO2   08/04/23 1223 08/04/23 1223 08/04/23 1223 08/04/23 1223 08/04/23 1223   98.4 °F (36.9 °C) 86 16 130/74 99 %      Temp Source Heart Rate Source Patient Position - Orthostatic VS BP Location FiO2 (%)   08/04/23 1223 08/04/23 1223 08/04/23 1223 08/04/23 1223 --   Tympanic Monitor Sitting Left arm       Pain Score       08/04/23 1517       10 - Worst Possible Pain           Vitals:    08/04/23 1223 08/04/23 1300 08/04/23 1515   BP: 130/74 140/87 133/74   Pulse: 86 73 64   Patient Position - Orthostatic VS: Sitting Sitting          Visual Acuity  Visual Acuity    Flowsheet Row Most Recent Value   L Pupil Size (mm) 4   R Pupil Size (mm) 4          ED Medications  Medications   sodium chloride (PF) 0.9 % injection 3 mL (has no administration in time range)   ezetimibe (ZETIA) tablet 10 mg (has no administration in time range)   zolpidem (AMBIEN) tablet 10 mg (has no administration in time range)   ALPRAZolam (XANAX) tablet 0.25 mg (has no administration in time range)   HYDROmorphone (DILAUDID) injection 0.5 mg (has no administration in time range)   cyclobenzaprine (FLEXERIL) tablet 10 mg (has no administration in time range)   acetaminophen (TYLENOL) tablet 650 mg (has no administration in time range)   ondansetron (ZOFRAN) injection 4 mg (has no administration in time range)   heparin (porcine) subcutaneous injection 5,000 Units (has no administration in time range)   oxyCODONE (ROXICODONE) immediate release tablet 30 mg (has no administration in time range)   sodium chloride 0.9 % bolus 1,000 mL (1,000 mL Intravenous New Bag 8/4/23 1250)   iohexol (OMNIPAQUE) 350 MG/ML injection (SINGLE-DOSE) 85 mL (85 mL Intravenous Given 8/4/23 1339)   morphine injection 6 mg (6 mg Intravenous Given 8/4/23 1517)       Diagnostic Studies  Results Reviewed     Procedure Component Value Units Date/Time    HS Troponin I 2hr [[id number]] Collected: 08/04/23 1512    Lab Status: Final result Specimen: Blood from Arm, Left Updated: 08/04/23 1541     hs TnI 2hr <2 ng/L      Delta 2hr hsTnI --    HS Troponin I 4hr [[id number]]     Lab Status: No result Specimen: Blood     FLU/RSV/COVID - if FLU/RSV clinically relevant [[id number]]  (Normal) Collected: 08/04/23 1331    Lab Status: Final result Specimen: Nares from Nose Updated: 08/04/23 1436     SARS-CoV-2 Negative     INFLUENZA A PCR Negative     INFLUENZA B PCR Negative     RSV PCR Negative    Narrative:      FOR PEDIATRIC PATIENTS - copy/paste COVID Guidelines URL to browser: [email or web address]    SARS-CoV-2 assay is a Nucleic Acid Amplification assay intended for the  qualitative detection of nucleic acid from SARS-CoV-2 in nasopharyngeal  swabs. Results are for the presumptive identification of SARS-CoV-2 RNA. Positive results are indicative of infection with SARS-CoV-2, the virus  causing COVID-19, but do not rule out bacterial infection or co-infection  with other viruses. Laboratories within the [facility] and its  territories are required to report all positive results to the appropriate  public health authorities. Negative results do not preclude SARS-CoV-2  infection and should not be used as the sole basis for treatment or other  patient management decisions. Negative results must be combined with  clinical observations, patient history, and epidemiological information. This test has not been FDA cleared or approved. This test has been authorized by FDA under an Emergency Use Authorization  (EUA). This test is only authorized for the duration of time the  declaration that circumstances exist justifying the authorization of the  emergency use of an in vitro diagnostic tests for detection of SARS-CoV-2  virus and/or diagnosis of COVID-19 infection under section 564(b)(1) of  the Act, 21 U. S.C. 059MRB-3(M)(4), unless the authorization is terminated  or revoked sooner.  The test has been validated but independent review by FDA  and CLIA is pending. Test performed using [employer] GeneXpert: This RT-PCR assay targets N2,  a region unique to SARS-CoV-2. A conserved region in the E-gene was chosen  for pan-Sarbecovirus detection which includes SARS-CoV-2. According to CMS-2020-01-R, this platform meets the definition of high-throughput technology.     B-Type Natriuretic Peptide(BNP) [[id number]]  (Normal) Collected: 08/04/23 1248    Lab Status: Final result Specimen: Blood from Arm, Left Updated: 08/04/23 1402     BNP 32 pg/mL     HS Troponin 0hr (reflex protocol) [[id number]]  (Normal) Collected: 08/04/23 1248    Lab Status: Final result Specimen: Blood from Arm, Left Updated: 08/04/23 1325     hs TnI 0hr <2 ng/L     Comprehensive metabolic panel [[id number]] Collected: 08/04/23 1248    Lab Status: Final result Specimen: Blood from Arm, Left Updated: 08/04/23 1318     Sodium 136 mmol/L      Potassium 4.2 mmol/L      Chloride 105 mmol/L      CO2 23 mmol/L      ANION GAP 8 mmol/L      BUN 16 mg/dL      Creatinine 0.98 mg/dL      Glucose 97 mg/dL      Calcium 9.4 mg/dL      AST 18 U/L      ALT 13 U/L      Alkaline Phosphatase 62 U/L      Total Protein 7.9 g/dL      Albumin 4.5 g/dL      Total Bilirubin 0.73 mg/dL      eGFR 80 ml/min/1.73sq m     Narrative:      [place] guidelines for Chronic Kidney Disease (CKD):   •  Stage 1 with normal or high GFR (GFR > 90 mL/min/1.73 square meters)  •  Stage 2 Mild CKD (GFR = 60-89 mL/min/1.73 square meters)  •  Stage 3A Moderate CKD (GFR = 45-59 mL/min/1.73 square meters)  •  Stage 3B Moderate CKD (GFR = 30-44 mL/min/1.73 square meters)  •  Stage 4 Severe CKD (GFR = 15-29 mL/min/1.73 square meters)  •  Stage 5 End Stage CKD (GFR <15 mL/min/1.73 square meters)  Note: GFR calculation is accurate only with a steady state creatinine    CBC and differential [[id number]]  (Abnormal) Collected: 08/04/23 1248    Lab Status: Final result Specimen: Blood from Arm, Left Updated: 08/04/23 1310     WBC 7.95 Thousand/uL      RBC 5.15 Million/uL      Hemoglobin 14.4 g/dL      Hematocrit 43.9 %      MCV 85 fL      MCH 28.0 pg      MCHC 32.8 g/dL      RDW 13.7 %      MPV 10.1 fL      Platelets 749 Thousands/uL      nRBC 0 /100 WBCs      Neutrophils Relative 76 %      Immat GRANS % 0 %      Lymphocytes Relative 15 %      Monocytes Relative 8 %      Eosinophils Relative 1 %      Basophils Relative 0 %      Neutrophils Absolute 5.96 Thousands/µL      Immature Grans Absolute 0.03 Thousand/uL      Lymphocytes Absolute 1.22 Thousands/µL      Monocytes Absolute 0.64 Thousand/µL      Eosinophils Absolute 0.07 Thousand/µL      Basophils Absolute 0.03 Thousands/µL                  CTA ED chest PE study   Final Result by Birdie Laird MD (08/04 1450)      No pulmonary embolus. Measured RV/LV ratio is within normal limits at less than 0.9. Workstation performed: [id number]                    Procedures  ECG 12 Lead Documentation Only    Date/Time: 8/4/2023 12:30 PM    Performed by: Juan Taylor DO  Authorized by: Juan Taylor DO    Indications / Diagnosis:  Sob   Patient location:  ED  Previous ECG:     Previous ECG:  Compared to current    Similarity:  Changes noted  Rate:     ECG rate:  73    ECG rate assessment: normal    Rhythm:     Rhythm: sinus rhythm    Ectopy:     Ectopy: none    QRS:     QRS axis:  Normal    QRS intervals:  Normal  ST segments:     ST segments:  Normal  T waves:     T waves: inverted      Inverted:  II, III, aVF, V4, V5 and V6             ED Course                               SBIRT 22yo+    Flowsheet Row Most Recent Value   Initial Alcohol Screen: US AUDIT-C     1. How often do you have a drink containing alcohol? 0 Filed at: 08/04/2023 1241   2. How many drinks containing alcohol do you have on a typical day you are drinking? 0 Filed at: 08/04/2023 1241   3b. FEMALE Any Age, or MALE 65+:  How often do you have 4 or more drinks on one occassion? 0 Filed at: 08/04/2023 1241   Audit-C Score 0 Filed at: 08/04/2023 1241   AYESHA: How many times in the past year have you. .. Used an illegal drug or used a prescription medication for non-medical reasons? Never Filed at: 08/04/2023 1241                    Medical Decision Making  80 y/o male with sob, lightheadedness, and elevated ddimer. Will get cardiac workup, cta chest to r/o PE and admit for abnormal ekg. Abnormal EKG: acute illness or injury  Lightheadedness: acute illness or injury  SOB (shortness of breath): acute illness or injury  Amount and/or Complexity of Data Reviewed  Labs: ordered. Radiology: ordered. Risk  Prescription drug management. Decision regarding hospitalization. Disposition  Final diagnoses:   Abnormal EKG   SOB (shortness of breath)   Lightheadedness     Time reflects when diagnosis was documented in both MDM as applicable and the Disposition within this note     Time User Action Codes Description Comment    8/4/2023  3:30 PM Adejuliabert Links Ally Bale Add [R94.31] Abnormal EKG     8/4/2023  3:30 PM Arthcuate Malone Modify [R94.31] Abnormal EKG     8/4/2023  3:31 PM Natividad Brain A Add [R06.02] SOB (shortness of breath)     8/4/2023  3:31 PM Kenya Malone Add [R42] Lightheadedness       ED Disposition     ED Disposition   Admit    Condition   Stable    Date/Time   Fri Aug 4, 2023  3:30 PM    Comment   Case was discussed with AMBER and the patient's admission status was agreed to be Admission Status: observation status to the service of Dr. Katelyn Chavez .            Follow-up Information    None         Current Discharge Medication List      CONTINUE these medications which have NOT CHANGED    Details   balsalazide (COLAZAL) 750 mg capsule Take 3 capsules (2,250 mg total) by mouth 2 (two) times a day  Qty: 180 capsule, Refills: 5    Associated Diagnoses: Other ulcerative colitis with complication (HCC)      dicyclomine (BENTYL) 20 mg tablet Take 1 tablet (20 mg total) by mouth 3 (three) times a day as needed (abdominal pain)  Qty: 60 tablet, Refills: 3    Associated Diagnoses: Epigastric pain      ezetimibe (ZETIA) 10 mg tablet Take 10 mg by mouth daily      ibuprofen (MOTRIN) 800 mg tablet Take 1 tablet (800 mg total) by mouth every 8 (eight) hours as needed for mild pain Take 1 tablet by mouth 3 times daily for 3 days, then take as needed. Take with food and water. Discontinue if stomach upset occurs. Qty: 30 tablet, Refills: 0    Associated Diagnoses: Tear of right supraspinatus tendon; Partial tear of right subscapularis tendon, subsequent encounter      methocarbamol (ROBAXIN) 500 mg tablet take 1 tablet by mouth four times a day if needed for muscle spasm      ondansetron (ZOFRAN-ODT) 4 mg disintegrating tablet Take 1 tablet (4 mg total) by mouth every 6 (six) hours as needed for nausea or vomiting  Qty: 20 tablet, Refills: 0    Associated Diagnoses: Tear of right supraspinatus tendon; Partial tear of right subscapularis tendon, subsequent encounter      oxyCODONE (ROXICODONE) 30 MG immediate release tablet oxycodone 30 mg tablet      oxyCODONE-acetaminophen (Percocet)  mg per tablet Take 1 tablet by mouth every 4 (four) hours as needed for moderate pain Max Daily Amount: 6 tablets  Qty: 30 tablet, Refills: 0    Associated Diagnoses: Tear of right supraspinatus tendon; Partial tear of right subscapularis tendon, subsequent encounter      polyethylene glycol (GaviLyte-C) 4000 mL solution Take 4,000 mL by mouth once for 1 dose  Qty: 4000 mL, Refills: 0    Associated Diagnoses: Therapeutic opioid induced constipation      zolpidem (AMBIEN) 10 mg tablet Take 1 tablet (10 mg total) by mouth daily at bedtime as needed for sleep for up to 10 days  Qty: 3 tablet, Refills: 0    Associated Diagnoses: Closed odontoid fracture with type III morphology, initial encounter ([address] [facility])             No discharge procedures on file.     PDMP Review None          ED Provider  Electronically Signed by           Santy Palmer DO  08/04/23 4241

## 2023-08-04 NOTE — PLAN OF CARE
Problem: Potential for Falls  Goal: Patient will remain free of falls  Description: INTERVENTIONS:  - Educate patient/family on patient safety including physical limitations  - Instruct patient to call for assistance with activity   - Consult OT/PT to assist with strengthening/mobility   - Keep Call bell within reach  - Keep bed low and locked with side rails adjusted as appropriate  - Keep care items and personal belongings within reach  - Initiate and maintain comfort rounds  - Make Fall Risk Sign visible to staff  - Offer Toileting every  Hours, in advance of need  - Initiate/Maintain alarm  - Obtain necessary fall risk management equipment:   - Apply yellow socks and bracelet for high fall risk patients  - Consider moving patient to room near nurses station  Outcome: Progressing     Problem: PAIN - ADULT  Goal: Verbalizes/displays adequate comfort level or baseline comfort level  Description: Interventions:  - Encourage patient to monitor pain and request assistance  - Assess pain using appropriate pain scale  - Administer analgesics based on type and severity of pain and evaluate response  - Implement non-pharmacological measures as appropriate and evaluate response  - Consider cultural and social influences on pain and pain management  - Notify physician/advanced practitioner if interventions unsuccessful or patient reports new pain  Outcome: Progressing     Problem: INFECTION - ADULT  Goal: Absence or prevention of progression during hospitalization  Description: INTERVENTIONS:  - Assess and monitor for signs and symptoms of infection  - Monitor lab/diagnostic results  - Monitor all insertion sites, i.e. indwelling lines, tubes, and drains  - Monitor endotracheal if appropriate and nasal secretions for changes in amount and color  - [place] appropriate cooling/warming therapies per order  - Administer medications as ordered  - Instruct and encourage patient and family to use good hand hygiene technique  - Identify and instruct in appropriate isolation precautions for identified infection/condition  Outcome: Progressing  Goal: Absence of fever/infection during neutropenic period  Description: INTERVENTIONS:  - Monitor WBC    Outcome: Progressing     Problem: SAFETY ADULT  Goal: Patient will remain free of falls  Description: INTERVENTIONS:  - Educate patient/family on patient safety including physical limitations  - Instruct patient to call for assistance with activity   - Consult OT/PT to assist with strengthening/mobility   - Keep Call bell within reach  - Keep bed low and locked with side rails adjusted as appropriate  - Keep care items and personal belongings within reach  - Initiate and maintain comfort rounds  - Make Fall Risk Sign visible to staff  - Offer Toileting every  Hours, in advance of need  - Initiate/Maintain alarm  - Obtain necessary fall risk management equipment:   - Apply yellow socks and bracelet for high fall risk patients  - Consider moving patient to room near nurses station  Outcome: Progressing  Goal: Maintain or return to baseline ADL function  Description: INTERVENTIONS:  -  Assess patient's ability to carry out ADLs; assess patient's baseline for ADL function and identify physical deficits which impact ability to perform ADLs (bathing, care of mouth/teeth, toileting, grooming, dressing, etc.)  - Assess/evaluate cause of self-care deficits   - Assess range of motion  - Assess patient's mobility; develop plan if impaired  - Assess patient's need for assistive devices and provide as appropriate  - Encourage maximum independence but intervene and supervise when necessary  - Involve family in performance of ADLs  - Assess for home care needs following discharge   - Consider OT consult to assist with ADL evaluation and planning for discharge  - Provide patient education as appropriate  Outcome: Progressing  Goal: Maintains/Returns to pre admission functional level  Description: INTERVENTIONS:  - Perform BMAT or MOVE assessment daily.   - Set and communicate daily mobility goal to care team and patient/family/caregiver. - Collaborate with rehabilitation services on mobility goals if consulted  - Perform Range of Motion  times a day. - Reposition patient every  hours. - Dangle patient  times a day  - Stand patient  times a day  - Ambulate patient  times a day  - Out of bed to chair  times a day   - Out of bed for jaimie times a day  - Out of bed for toileting  - Record patient progress and toleration of activity level   Outcome: Progressing     Problem: DISCHARGE PLANNING  Goal: Discharge to home or other facility with appropriate resources  Description: INTERVENTIONS:  - Identify barriers to discharge w/patient and caregiver  - Arrange for needed discharge resources and transportation as appropriate  - Identify discharge learning needs (meds, wound care, etc.)  - Arrange for interpretive services to assist at discharge as needed  - Refer to Case Management Department for coordinating discharge planning if the patient needs post-hospital services based on physician/advanced practitioner order or complex needs related to functional status, cognitive ability, or social support system  Outcome: Progressing     Problem: Knowledge Deficit  Goal: Patient/family/caregiver demonstrates understanding of disease process, treatment plan, medications, and discharge instructions  Description: Complete learning assessment and assess knowledge base.   Interventions:  - Provide teaching at level of understanding  - Provide teaching via preferred learning methods  Outcome: Progressing

## 2023-08-04 NOTE — ASSESSMENT & PLAN NOTE
· Noted with some new EKG changes, notably ST-T changes in anterior and inferior leads as well as PVCs  · Otherwise denies any chest pain, troponins negative  · Keep on telemetry and recheck EKG. · May consider echo or further workup if symptoms or further changes develop.

## 2023-08-04 NOTE — ASSESSMENT & PLAN NOTE
· Patient with an outpatient D dimer done 2.5  · This is naturally expected given the postop period  · CTA negative for PE  · No signs of DVT anywhere else otherwise.   Monitor

## 2023-08-04 NOTE — H&P
[address]  H&P  Name: Stephen Flores 79 y.o. male I MRN: [de-identified]  Unit/Bed#: ED 08 I Date of Admission: 8/4/2023   Date of Service: 8/4/2023 I Hospital Day: 0      Assessment/Plan   * Abnormal EKG  Assessment & Plan  · Noted with some new EKG changes, notably ST-T changes in anterior and inferior leads as well as PVCs  · Otherwise denies any chest pain, troponins negative  · Keep on telemetry and recheck EKG. · May consider echo or further workup if symptoms or further changes develop. Anxiety  Assessment & Plan  · Patient is fairly anxious on exam, has not been sleeping well for the past several days given worsening pain and recent procedure. · He feels overall overwhelmed, has some diaphoresis and palpitations. He otherwise denies chest pain presyncope or syncope. · Xanax BID PRN, Ambien qHS at bedtime for sleep, muscle relaxants  · Supportive care    Positive D dimer  Assessment & Plan  · Patient with an outpatient D dimer done 2.5  · This is naturally expected given the postop period  · CTA negative for PE  · No signs of DVT anywhere else otherwise. Monitor    Class 2 obesity in adult  Assessment & Plan  · He would benefit from weight loss after resolution of acute illness    Chronic pain syndrome  Assessment & Plan  · Patient is chronically on oxycodone 30mg 4/5 times daily for chronic back pain  Continue    Hypertension  Assessment & Plan  · Not on any agents, monitor    Ulcerative colitis ([address])  Assessment & Plan  · On Balsalazide 2250 (750x3), BID  · Continue         VTE Prophylaxis: Heparin  / sequential compression device   Code Status: FC  POLST: POLST form is not discussed and not completed at this time. Discussion with family: Patient himself    Anticipated Length of Stay:  Patient will be admitted on an Observation basis with an anticipated length of stay of less than 2 midnights.    Justification for Hospital Stay: Abnormal EKG, anxiety    Total Time for Visit, including Counseling / Coordination of Care: 90 minutes. Greater than 50% of this total time spent on direct patient counseling and coordination of care. Chief Complaint:   Abnormal EKG, anxiety    History of Present Illness:    Michelle Wiley is a 79 y.o. male who presents with abnormal EKG. The patient apparently had a right rotator cuff repair on 26 July by Dr. Mike Lyles. Even prior to that patient does suffer with chronic pain issues chronically on oxycodone. Since the procedure patient has been having worsening pain in the shoulder and the back and patient has had hardly any sleep for the past several days. Patient feels overall anxious and overwhelmed given the same. He otherwise denies any actual chest pain presyncope or syncope. Because patient was feeling overwhelmed he did call his primary care physician, primary care physician ordered a D-dimer which was elevated as expected in the postoperative period. Because of the D-dimer elevation patient was oriented to come to the ED to get a CTA, the CTA did not reveal any acute pulmonary embolism. The patient did have some EKG abnormalities with ST-T changes which are apparently new although again patient continues to deny any chest pain. His troponin is negative. He denies any dyspnea on exertion. He does mention palpitation and diaphoresis. Review of Systems:    Review of Systems   Constitutional: Positive for diaphoresis and fatigue. All other systems reviewed and are negative.       Past Medical and Surgical History:     Past Medical History:   Diagnosis Date   • Back pain    • Colon polyp    • Hyperlipidemia    • Lumbar fracture with cord injury ([address])    • Neck fracture ([address])    • Previous back surgery     rods in the back   • Ulcerative colitis [facility])        Past Surgical History:   Procedure Laterality Date   • BACK SURGERY     • LUMBAR FUSION Right 1/20/2016    Procedure: FUSION LUMBAR  POSTERIOR, TLIF L3-4  Right L3-4 Fascet;  Surgeon: Francisco Javier Lynn MD;  Location:  MAIN OR;  Service:    • SC COLONOSCOPY FLX DX W/COLLJ [facility] WHEN PFRMD N/A 1/24/2019    Procedure: COLONOSCOPY;  Surgeon: Rodrigo Park MD;  Location: [facility]; Service: Gastroenterology   • SC SURGICAL ARTHROSCOPY SHOULDER W/ROTATOR CUFF RPR Right 7/26/2023    Procedure: ARTHROSCOPY SHOULDER- Right shoulder Arthroscopy, supraspinatus and subscapularis repair, biceps tenotomy, extensive debridement;  Surgeon: Noe Moerno DO;  Location: [facility] OR;  Service: Orthopedics   • TONSILLECTOMY         Meds/Allergies:    Prior to Admission medications    Medication Sig Start Date End Date Taking? Authorizing Provider   balsalazide (COLAZAL) 750 mg capsule Take 3 capsules (2,250 mg total) by mouth 2 (two) times a day 1/25/23   Harshal Padilla PA-C   dicyclomine (BENTYL) 20 mg tablet Take 1 tablet (20 mg total) by mouth 3 (three) times a day as needed (abdominal pain)  Patient not taking: Reported on 7/25/2023 7/21/21   Harshal Padilla PA-C   ezetimibe (ZETIA) 10 mg tablet Take 10 mg by mouth daily    Historical Provider, MD   ibuprofen (MOTRIN) 800 mg tablet Take 1 tablet (800 mg total) by mouth every 8 (eight) hours as needed for mild pain Take 1 tablet by mouth 3 times daily for 3 days, then take as needed. Take with food and water. Discontinue if stomach upset occurs.  7/26/23   Noe Moreno PA-C   methocarbamol (ROBAXIN) 500 mg tablet take 1 tablet by mouth four times a day if needed for muscle spasm 7/14/23   Historical Provider, MD   ondansetron (ZOFRAN-ODT) 4 mg disintegrating tablet Take 1 tablet (4 mg total) by mouth every 6 (six) hours as needed for nausea or vomiting 7/26/23   Noe Moreno PA-C   oxyCODONE (ROXICODONE) 30 MG immediate release tablet oxycodone 30 mg tablet  Patient not taking: Reported on 8/2/2023    Historical Provider, MD   oxyCODONE-acetaminophen (Percocet)  mg per tablet Take 1 tablet by mouth every 4 (four) hours as needed for moderate pain Max Daily Amount: 6 tablets 7/26/23   Patt Levy PA-C   polyethylene glycol (GaviLyte-C) 4000 mL solution Take 4,000 mL by mouth once for 1 dose 3/29/23 3/29/23  Phu De La O PA-C   zolpidem (AMBIEN) 10 mg tablet Take 1 tablet (10 mg total) by mouth daily at bedtime as needed for sleep for up to 10 days 4/4/19 7/26/23  Xochitl Long PA-C   omeprazole (PriLOSEC) 40 MG capsule Take 1 capsule (40 mg total) by mouth daily 6/7/22 6/29/22  Phu De La O PA-C     I have reviewed home medications with patient personally. Allergies: Allergies   Allergen Reactions   • No Active Allergies        Social History:     Marital Status: /Civil Union     Substance Use History:   Social History     Substance and Sexual Activity   Alcohol Use Yes    Comment: rarely     Social History     Tobacco Use   Smoking Status Never   Smokeless Tobacco Never     Social History     Substance and Sexual Activity   Drug Use No       Family History:    non-contributory    Physical Exam:     Vitals:   Blood Pressure: 133/74 (08/04/23 1515)  Pulse: 64 (08/04/23 1515)  Temperature: 98.4 °F (36.9 °C) (08/04/23 1223)  Temp Source: Tympanic (08/04/23 1223)  Respirations: 18 (08/04/23 1515)  SpO2: 98 % (08/04/23 1515)    Physical Exam  Vitals and nursing note reviewed. Constitutional:       Appearance: Normal appearance. Comments: Male patient in bed, awake   HENT:      Head: Normocephalic and atraumatic. Right Ear: External ear normal.      Left Ear: External ear normal.      Nose: Nose normal. No congestion or rhinorrhea. Mouth/Throat:      Mouth: Mucous membranes are moist.      Pharynx: Oropharynx is clear. No oropharyngeal exudate or posterior oropharyngeal erythema. Eyes:      General: No scleral icterus. Right eye: No discharge. Left eye: No discharge. Pupils: Pupils are equal, round, and reactive to light. Neck:      Vascular: No carotid bruit.    Cardiovascular: Rate and Rhythm: Normal rate and regular rhythm. Pulses: Normal pulses. Heart sounds: No murmur heard. No friction rub. No gallop. Pulmonary:      Effort: Pulmonary effort is normal. No respiratory distress. Breath sounds: Normal breath sounds. No stridor. No wheezing, rhonchi or rales. Abdominal:      General: Abdomen is flat. Bowel sounds are normal. There is no distension. Palpations: Abdomen is soft. There is no mass. Tenderness: There is no abdominal tenderness. There is no guarding or rebound. Hernia: No hernia is present. Musculoskeletal:         General: No swelling, tenderness, deformity or signs of injury. Normal range of motion. Cervical back: Normal range of motion. No rigidity. No muscular tenderness. Comments: Right upper extremity is in a sling   Lymphadenopathy:      Cervical: No cervical adenopathy. Skin:     General: Skin is warm and dry. Capillary Refill: Capillary refill takes less than 2 seconds. Coloration: Skin is not jaundiced or pale. Findings: No bruising or erythema. Neurological:      General: No focal deficit present. Mental Status: He is alert and oriented to person, place, and time. Mental status is at baseline. Cranial Nerves: No cranial nerve deficit. Sensory: No sensory deficit. Motor: No weakness. Coordination: Coordination normal.      Deep Tendon Reflexes: Reflexes normal.   Psychiatric:      Comments: Fairly anxious on exam             Additional Data:     Lab Results: I have personally reviewed pertinent reports.       Results from last 7 days   Lab Units 08/04/23  1248   WBC Thousand/uL 7.95   HEMOGLOBIN g/dL 14.4   HEMATOCRIT % 43.9   PLATELETS Thousands/uL 311   NEUTROS PCT % 76*   LYMPHS PCT % 15   MONOS PCT % 8   EOS PCT % 1     Results from last 7 days   Lab Units 08/04/23  1248   SODIUM mmol/L 136   POTASSIUM mmol/L 4.2   CHLORIDE mmol/L 105   CO2 mmol/L 23   BUN mg/dL 16 CREATININE mg/dL 0.98   ANION GAP mmol/L 8   CALCIUM mg/dL 9.4   ALBUMIN g/dL 4.5   TOTAL BILIRUBIN mg/dL 0.73   ALK PHOS U/L 62   ALT U/L 13   AST U/L 18   GLUCOSE RANDOM mg/dL 97                       Imaging: I have personally reviewed pertinent reports. CTA ED chest PE study   Final Result by Dakota Ca MD (08/04 1450)      No pulmonary embolus. Measured RV/LV ratio is within normal limits at less than 0.9. Workstation performed: [id number]                 [facility] Reviewed: Yes     ** Please Note: This note has been constructed using a voice recognition system.  **

## 2023-08-04 NOTE — ASSESSMENT & PLAN NOTE
· Patient is fairly anxious on exam, has not been sleeping well for the past several days given worsening pain and recent procedure. · He feels overall overwhelmed, has some diaphoresis and palpitations. He otherwise denies chest pain presyncope or syncope.   · Xanax BID PRN, Ambien qHS at bedtime for sleep, muscle relaxants  · Supportive care

## 2023-08-05 VITALS
TEMPERATURE: 98.6 F | HEIGHT: 72 IN | RESPIRATION RATE: 18 BRPM | BODY MASS INDEX: 36.3 KG/M2 | OXYGEN SATURATION: 96 % | WEIGHT: 268 LBS | SYSTOLIC BLOOD PRESSURE: 158 MMHG | HEART RATE: 76 BPM | DIASTOLIC BLOOD PRESSURE: 90 MMHG

## 2023-08-05 LAB
ANION GAP SERPL CALCULATED.3IONS-SCNC: 6 MMOL/L
ATRIAL RATE: 63 BPM
ATRIAL RATE: 63 BPM
ATRIAL RATE: 64 BPM
ATRIAL RATE: 91 BPM
BASOPHILS # BLD AUTO: 0.02 THOUSANDS/ÂΜL (ref 0–0.1)
BASOPHILS NFR BLD AUTO: 0 % (ref 0–1)
BUN SERPL-MCNC: 15 MG/DL (ref 5–25)
CALCIUM SERPL-MCNC: 8.9 MG/DL (ref 8.4–10.2)
CHLORIDE SERPL-SCNC: 105 MMOL/L (ref 96–108)
CO2 SERPL-SCNC: 26 MMOL/L (ref 21–32)
CREAT SERPL-MCNC: 0.88 MG/DL (ref 0.6–1.3)
EOSINOPHIL # BLD AUTO: 0.14 THOUSAND/ÂΜL (ref 0–0.61)
EOSINOPHIL NFR BLD AUTO: 2 % (ref 0–6)
ERYTHROCYTE [DISTWIDTH] IN BLOOD BY AUTOMATED COUNT: 13.8 % (ref 11.6–15.1)
GFR SERPL CREATININE-BSD FRML MDRD: 89 ML/MIN/1.73SQ M
GLUCOSE SERPL-MCNC: 91 MG/DL (ref 65–140)
HCT VFR BLD AUTO: 42.2 % (ref 36.5–49.3)
HGB BLD-MCNC: 13.6 G/DL (ref 12–17)
IMM GRANULOCYTES # BLD AUTO: 0.03 THOUSAND/UL (ref 0–0.2)
IMM GRANULOCYTES NFR BLD AUTO: 0 % (ref 0–2)
LYMPHOCYTES # BLD AUTO: 1.88 THOUSANDS/ÂΜL (ref 0.6–4.47)
LYMPHOCYTES NFR BLD AUTO: 28 % (ref 14–44)
MCH RBC QN AUTO: 27.7 PG (ref 26.8–34.3)
MCHC RBC AUTO-ENTMCNC: 32.2 G/DL (ref 31.4–37.4)
MCV RBC AUTO: 86 FL (ref 82–98)
MONOCYTES # BLD AUTO: 0.66 THOUSAND/ÂΜL (ref 0.17–1.22)
MONOCYTES NFR BLD AUTO: 10 % (ref 4–12)
NEUTROPHILS # BLD AUTO: 4.02 THOUSANDS/ÂΜL (ref 1.85–7.62)
NEUTS SEG NFR BLD AUTO: 60 % (ref 43–75)
NRBC BLD AUTO-RTO: 0 /100 WBCS
P AXIS: -16 DEGREES
P AXIS: 26 DEGREES
P AXIS: 29 DEGREES
P AXIS: 63 DEGREES
PLATELET # BLD AUTO: 272 THOUSANDS/UL (ref 149–390)
PMV BLD AUTO: 10.4 FL (ref 8.9–12.7)
POTASSIUM SERPL-SCNC: 4.5 MMOL/L (ref 3.5–5.3)
PR INTERVAL: 160 MS
PR INTERVAL: 164 MS
PR INTERVAL: 170 MS
PR INTERVAL: 172 MS
QRS AXIS: 36 DEGREES
QRS AXIS: 36 DEGREES
QRS AXIS: 47 DEGREES
QRS AXIS: 60 DEGREES
QRSD INTERVAL: 74 MS
QRSD INTERVAL: 80 MS
QRSD INTERVAL: 84 MS
QRSD INTERVAL: 94 MS
QT INTERVAL: 368 MS
QT INTERVAL: 410 MS
QT INTERVAL: 410 MS
QT INTERVAL: 418 MS
QTC INTERVAL: 419 MS
QTC INTERVAL: 419 MS
QTC INTERVAL: 431 MS
QTC INTERVAL: 452 MS
RBC # BLD AUTO: 4.91 MILLION/UL (ref 3.88–5.62)
SODIUM SERPL-SCNC: 137 MMOL/L (ref 135–147)
T WAVE AXIS: -57 DEGREES
T WAVE AXIS: -75 DEGREES
T WAVE AXIS: -85 DEGREES
T WAVE AXIS: 268 DEGREES
VENTRICULAR RATE: 63 BPM
VENTRICULAR RATE: 63 BPM
VENTRICULAR RATE: 64 BPM
VENTRICULAR RATE: 91 BPM
WBC # BLD AUTO: 6.75 THOUSAND/UL (ref 4.31–10.16)

## 2023-08-05 PROCEDURE — 85025 COMPLETE CBC W/AUTO DIFF WBC: CPT | Performed by: INTERNAL MEDICINE

## 2023-08-05 PROCEDURE — 93010 ELECTROCARDIOGRAM REPORT: CPT | Performed by: INTERNAL MEDICINE

## 2023-08-05 PROCEDURE — 93005 ELECTROCARDIOGRAM TRACING: CPT

## 2023-08-05 PROCEDURE — 99239 HOSP IP/OBS DSCHRG MGMT >30: CPT | Performed by: INTERNAL MEDICINE

## 2023-08-05 PROCEDURE — 80048 BASIC METABOLIC PNL TOTAL CA: CPT | Performed by: INTERNAL MEDICINE

## 2023-08-05 RX ORDER — ALPRAZOLAM 0.25 MG/1
0.25 TABLET ORAL 2 TIMES DAILY PRN
Qty: 10 TABLET | Refills: 0 | Status: SHIPPED | OUTPATIENT
Start: 2023-08-05 | End: 2023-08-15

## 2023-08-05 RX ORDER — BALSALAZIDE DISODIUM 750 MG/1
2250 CAPSULE ORAL 2 TIMES DAILY
Status: DISCONTINUED | OUTPATIENT
Start: 2023-08-05 | End: 2023-08-05 | Stop reason: HOSPADM

## 2023-08-05 RX ORDER — LIDOCAINE 50 MG/G
1 PATCH TOPICAL DAILY
Status: DISCONTINUED | OUTPATIENT
Start: 2023-08-05 | End: 2023-08-05 | Stop reason: HOSPADM

## 2023-08-05 RX ADMIN — EZETIMIBE 10 MG: 10 TABLET ORAL at 10:07

## 2023-08-05 RX ADMIN — HEPARIN SODIUM 5000 UNITS: 5000 INJECTION INTRAVENOUS; SUBCUTANEOUS at 05:52

## 2023-08-05 RX ADMIN — LIDOCAINE 5% 1 PATCH: 700 PATCH TOPICAL at 10:34

## 2023-08-05 RX ADMIN — CYCLOBENZAPRINE HYDROCHLORIDE 10 MG: 10 TABLET, FILM COATED ORAL at 10:07

## 2023-08-05 RX ADMIN — OXYCODONE HYDROCHLORIDE 30 MG: 10 TABLET ORAL at 10:37

## 2023-08-05 NOTE — ASSESSMENT & PLAN NOTE
· Patient is fairly anxious on exam, has not been sleeping well for the past several days given worsening pain and recent procedure. · Patient states he feels overwhelmed at this time  · We will give small prescription for Xanax upon discharge. Discussed with patient he should not take this at the same time he takes his pain medications.

## 2023-08-05 NOTE — PLAN OF CARE
Problem: Potential for Falls  Goal: Patient will remain free of falls  Description: INTERVENTIONS:  - Educate patient/family on patient safety including physical limitations  - Instruct patient to call for assistance with activity   - Consult OT/PT to assist with strengthening/mobility   - Keep Call bell within reach  - Keep bed low and locked with side rails adjusted as appropriate  - Keep care items and personal belongings within reach  - Initiate and maintain comfort rounds  - Make Fall Risk Sign visible to staff  - Offer Toileting every 2 Hours, in advance of need  - Initiate/Maintain bed alarm  - Obtain necessary fall risk management equipment: call bell within reach   - Apply yellow socks and bracelet for high fall risk patients  - Consider moving patient to room near nurses station  Outcome: Progressing     Problem: PAIN - ADULT  Goal: Verbalizes/displays adequate comfort level or baseline comfort level  Description: Interventions:  - Encourage patient to monitor pain and request assistance  - Assess pain using appropriate pain scale  - Administer analgesics based on type and severity of pain and evaluate response  - Implement non-pharmacological measures as appropriate and evaluate response  - Consider cultural and social influences on pain and pain management  - Notify physician/advanced practitioner if interventions unsuccessful or patient reports new pain  Outcome: Progressing     Problem: INFECTION - ADULT  Goal: Absence of fever/infection during neutropenic period  Description: INTERVENTIONS:  - Monitor WBC    Outcome: Progressing     Problem: SAFETY ADULT  Goal: Maintain or return to baseline ADL function  Description: INTERVENTIONS:  -  Assess patient's ability to carry out ADLs; assess patient's baseline for ADL function and identify physical deficits which impact ability to perform ADLs (bathing, care of mouth/teeth, toileting, grooming, dressing, etc.)  - Assess/evaluate cause of self-care deficits   - Assess range of motion  - Assess patient's mobility; develop plan if impaired  - Assess patient's need for assistive devices and provide as appropriate  - Encourage maximum independence but intervene and supervise when necessary  - Involve family in performance of ADLs  - Assess for home care needs following discharge   - Consider OT consult to assist with ADL evaluation and planning for discharge  - Provide patient education as appropriate  Outcome: Progressing

## 2023-08-05 NOTE — ASSESSMENT & PLAN NOTE
· EKG-notably ST-T changes in anterior and inferior leads as well as PVCs  · Troponin x3 negative  · Patient denies any lightheadedness, dizziness, shortness of breath, chest pain at rest or on exertion, orthopnea, leg swelling.   · When reviewing prior EKGs patient also noted to have similar findings in 2019  · Ambulatory referral for cardiology placed

## 2023-08-05 NOTE — PHYSICAL THERAPY NOTE
08/05/23 1149   PT Last Visit   PT Visit Date 08/05/23   Note Type   Note type Screen   Additional Comments PT order received. Chart review performed. PT entered room and patient states he has been walking around and is just getting ready for discharge. If patient experiences change in status, PT will follow and evaluate as appropriate.

## 2023-08-05 NOTE — DISCHARGE SUMMARY
[address]  Discharge- Ramon Johnsonar 1955, 79 y.o. male MRN: [id number]  Unit/Bed#: -[place] Encounter: [id number]  Primary Care Provider: Chiara Dobbs MD   Date and time admitted to hospital: 8/4/2023 12:35 PM    Anxiety  Assessment & Plan  · Patient is fairly anxious on exam, has not been sleeping well for the past several days given worsening pain and recent procedure. · Patient states he feels overwhelmed at this time  · We will give small prescription for Xanax upon discharge. Discussed with patient he should not take this at the same time he takes his pain medications. Positive D dimer  Assessment & Plan  · Patient with an outpatient D dimer done 2.5  · This is naturally expected given the postop period  · CTA negative for PE  · No signs of DVT anywhere else otherwise. Monitor    Class 2 obesity in adult  Assessment & Plan  · He would benefit from weight loss after resolution of acute illness    Chronic pain syndrome  Assessment & Plan  · Patient is chronically on oxycodone 30mg 4/5 times daily for chronic back pain  Continue    Hypertension  Assessment & Plan  · Not on any agents, monitor    Ulcerative colitis ([address])  Assessment & Plan  · On Balsalazide 2250 (750x3), BID  · Continue    * Abnormal EKG  Assessment & Plan  · EKG-notably ST-T changes in anterior and inferior leads as well as PVCs  · Troponin x3 negative  · Patient denies any lightheadedness, dizziness, shortness of breath, chest pain at rest or on exertion, orthopnea, leg swelling.   · When reviewing prior EKGs patient also noted to have similar findings in 2019  · Ambulatory referral for cardiology placed      Medical Problems     Resolved Problems  Date Reviewed: 7/12/2023   None       Discharging Physician / Practitioner: Nate Yen DO  PCP: Chiara Dobbs MD  Admission Date:   Admission Orders (From admission, onward)     Ordered        08/04/23 1534  Place in Observation  Once Discharge Date: 08/05/23    Consultations During Hospital Stay:  · none    Reason for Admission: Abnormal EKG and anxiety    Hospital Course:   Luis Richards is a 79 y.o. male patient who originally presented to the hospital on 8/4/2023 due to abnormal EKG and anxiety. Patient had troponins x3 which were all negative. Patient also has denied any chest pain, shortness of breath since admission. Patient does state he feels very anxious and overwhelmed given rotator cuff surgery and was started on anxiety medication by primary care provider but this will not start working until 2 to 4 weeks. In the interim patient was given Xanax upon discharge. Did discuss with patient that he should not take Xanax at the same time he takes his current pain medication. We will give referral for cardiology upon discharge to follow-up as outpatient. Patient is stable for discharge at this time. Please see above list of diagnoses and related plan for additional information. Condition at Discharge: stable    Discharge Day Visit / Exam:   Subjective: Patient resting comfortably on examination. Patient had no overnight events or complaints on exam.  Vitals: Blood Pressure: 158/90 (08/05/23 0730)  Pulse: 76 (08/05/23 0730)  Temperature: 98.6 °F (37 °C) (08/05/23 0730)  Temp Source: Tympanic (08/04/23 1223)  Respirations: 18 (08/05/23 0730)  Height: 6' (182.9 cm) (08/05/23 0900)  Weight - Scale: 122 kg (268 lb) (08/05/23 0900)  SpO2: 96 % (08/05/23 0730)    Exam:   Physical Exam  Vitals and nursing note reviewed. Constitutional:       General: He is not in acute distress. Appearance: He is well-developed. He is obese. HENT:      Head: Normocephalic and atraumatic. Eyes:      General: No scleral icterus. Conjunctiva/sclera: Conjunctivae normal.   Cardiovascular:      Rate and Rhythm: Normal rate and regular rhythm. Heart sounds: Normal heart sounds. No murmur heard. No friction rub. No gallop. Pulmonary:      Effort: Pulmonary effort is normal. No respiratory distress. Breath sounds: Normal breath sounds. No wheezing or rales. Abdominal:      General: Bowel sounds are normal. There is no distension. Palpations: Abdomen is soft. Tenderness: There is no abdominal tenderness. Musculoskeletal:         General: Normal range of motion. Comments: Right arm in sling   Skin:     General: Skin is warm. Findings: No rash. Neurological:      Mental Status: He is alert and oriented to person, place, and time. Discussion with Family: Patient declined call to . Discharge instructions/Information to patient and family:   See after visit summary for information provided to patient and family. Provisions for Follow-Up Care:  See after visit summary for information related to follow-up care and any pertinent home health orders. Disposition:   Home    Planned Readmission: none     Discharge Statement:  I spent 34 minutes discharging the patient. This time was spent on the day of discharge. I had direct contact with the patient on the day of discharge. Greater than 50% of the total time was spent examining patient, answering all patient questions, arranging and discussing plan of care with patient as well as directly providing post-discharge instructions. Additional time then spent on discharge activities. Discharge Medications:  See after visit summary for reconciled discharge medications provided to patient and/or family.       **Please Note: This note may have been constructed using a voice recognition system**

## 2023-08-08 ENCOUNTER — EVALUATION (OUTPATIENT)
Dept: PHYSICAL THERAPY | Facility: CLINIC | Age: 68
End: 2023-08-08
Payer: MEDICARE

## 2023-08-08 VITALS — SYSTOLIC BLOOD PRESSURE: 136 MMHG | DIASTOLIC BLOOD PRESSURE: 81 MMHG

## 2023-08-08 DIAGNOSIS — S46.811D PARTIAL TEAR OF RIGHT SUBSCAPULARIS TENDON, SUBSEQUENT ENCOUNTER: ICD-10-CM

## 2023-08-08 DIAGNOSIS — M75.101 TEAR OF RIGHT SUPRASPINATUS TENDON: Primary | ICD-10-CM

## 2023-08-08 PROCEDURE — 97110 THERAPEUTIC EXERCISES: CPT

## 2023-08-08 PROCEDURE — 97161 PT EVAL LOW COMPLEX 20 MIN: CPT

## 2023-08-08 NOTE — PROGRESS NOTES
PT Evaluation     Today's date: 2023  Patient name: Rex Pulliam  : 1955  MRN: [id number]  Referring provider: Sophy Castaneda PA-C  Dx:   Encounter Diagnosis     ICD-10-CM    1. Tear of right supraspinatus tendon  M75.101 Ambulatory Referral to Physical Therapy      2. Partial tear of right subscapularis tendon, subsequent encounter  S46.811D Ambulatory Referral to Physical Therapy          Start Time: 1447  Stop Time: 1530  Total time in clinic (min): 43 minutes    Assessment  Assessment details: Problem List:  1) R shoulder ROM  2) R shoulder strength    Rex Pulliam is a pleasant 79 y.o. male who presents to PT following R RCR on 23. He initially tore RTC from a fall. He has limited R shoulder ROM in all planes, R shoulder weakness in all planes, and activity intolerance resulting in fear of not being able to keep active. No further referral appears necessary at this time based upon examination results. I expect he will improve with skilled PT services. Positive prognostic indicators include positive attitude toward recovery, absence of peripheralization and absence of observed red flags. Negative prognostic indicators include anxiety and obesity. Patient repeatedly expressed his desire to remove the sling due to discomfort and the feeling of being trapped. I educated him on tissue healing times and the importance of keeping the sling on in order to decrease risk of potential re-tear. Reviewed current HEP with patient to improve exercise technique. Patient would benefit from skilled PT services in order to address these deficits.     Comparable signs:  1) R shoulder ROM  2) R shoulder strength  Impairments: abnormal or restricted ROM, activity intolerance, impaired physical strength, lacks appropriate home exercise program, pain with function and poor posture   Understanding of Dx/Px/POC: good   Prognosis: good    Goals  STGs  Patient will be independent with HEP in 4 weeks  Patient will discontinue use of sling in 4 weeks  LTGs  Patient will improve R shoulder flexion AROM to WNL in 8 weeks  Patient will improve R shoulder ABD AROM to WNL in 8 weeks    Plan  Plan details: 2x/week for 4 months (32 visits)  Patient would benefit from: skilled physical therapy  Planned therapy interventions: home exercise program, therapeutic exercise, therapeutic activities, joint mobilization, manual therapy, motor coordination training, neuromuscular re-education, patient education, strengthening, stretching, functional ROM exercises, flexibility and activity modification  Frequency: 2x week  Plan of Care beginning date: 2023  Plan of Care expiration date: 2023  Treatment plan discussed with: patient        Subjective Evaluation    History of Present Illness  Date of surgery: 2023  Mechanism of injury: Patient reports to OP PT following R rotator cuff repair on 23. Tear happened following a fall on R shoulder a few months ago. Patient was at the hospital last week due to elevated D-dimer levels, but ER ruled out blood clots. He did have abnormal EKG.   Patient states that sleeping has been extremely difficult and he doesn't feel like he has slept at all  Patient Goals  Patient goal: get out of sling and restore use  Pain  Current pain ratin  At best pain ratin  At worst pain ratin          Objective     Cervical/Thoracic Screen   Cervical range of motion within normal limits with the following exceptions: Limited in all planes most limited with side bending  90% limited L and R side bending    Passive Range of Motion     Right Shoulder   Flexion: 90 degrees   Abduction: 45 degrees   External rotation 0°: 25 degrees              Precautions: HTN, anxiety, chronic pain syndrome, anxiety  POC expires Auth Status Unit limit Start date  Expiration date PT/OT + Visit Limit?   23 pend n/a pend pend BOMN                                     Visit/Unit Tracking  AUTH Status:  Date  pend Used 1               Remaining                    Foto 8/8            Manuals 8/8            R shoulder mobs                                                    Neuro Re-Ed             scap retraction HEP                                                                                          Ther Ex             Elbow AROM HEP            Pendulums HEP            Table slides HEP                                                                Pt edu GS- tissue healing, sling use            Ther Activity                                       Gait Training                                       Modalities

## 2023-08-09 LAB
ATRIAL RATE: 77 BPM
P AXIS: -28 DEGREES
PR INTERVAL: 146 MS
QRS AXIS: 80 DEGREES
QRSD INTERVAL: 90 MS
QT INTERVAL: 380 MS
QTC INTERVAL: 430 MS
T WAVE AXIS: -68 DEGREES
VENTRICULAR RATE: 77 BPM

## 2023-08-09 PROCEDURE — 93010 ELECTROCARDIOGRAM REPORT: CPT | Performed by: INTERNAL MEDICINE

## 2023-08-14 ENCOUNTER — TELEPHONE (OUTPATIENT)
Age: 68
End: 2023-08-14

## 2023-08-14 NOTE — TELEPHONE ENCOUNTER
Caller: patient    Doctor: Feli Jerry    Reason for call: pt called stating from wearing the brace for his shoulder is causing his low back pain and the pain going down right leg. Stuartbravo Ghulam   He had previous spinal fusion, he is concerned he might do something to the rods in his low back    Call back#: [phone]

## 2023-08-17 ENCOUNTER — OFFICE VISIT (OUTPATIENT)
Dept: PHYSICAL THERAPY | Facility: CLINIC | Age: 68
End: 2023-08-17
Payer: MEDICARE

## 2023-08-17 DIAGNOSIS — M75.101 TEAR OF RIGHT SUPRASPINATUS TENDON: Primary | ICD-10-CM

## 2023-08-17 DIAGNOSIS — S46.811D PARTIAL TEAR OF RIGHT SUBSCAPULARIS TENDON, SUBSEQUENT ENCOUNTER: ICD-10-CM

## 2023-08-17 PROCEDURE — 97140 MANUAL THERAPY 1/> REGIONS: CPT

## 2023-08-17 PROCEDURE — 97110 THERAPEUTIC EXERCISES: CPT

## 2023-08-17 NOTE — PROGRESS NOTES
Daily Note     Today's date: 2023  Patient name: Patricia Mayer  : 1955  MRN: [id number]  Referring provider: Shanika Morgan PA-C  Dx:   Encounter Diagnosis     ICD-10-CM    1. Tear of right supraspinatus tendon  M75.101       2. Partial tear of right subscapularis tendon, subsequent encounter  S46.811D           Start Time: 1728  Stop Time: 1800  Total time in clinic (min): 32 minutes    Subjective: Patient reports that the scap retraction exercises aggravated his back pain and he does not want to perform them anymore. He states that he isn't really having pain and is eager to start moving the arm. Objective: See treatment diary below      Assessment: Patient demonstrates improving R shoulder PROM this session. Patient completed today's session without pain. Added table slides into ABD today and patient tolerated well with some soreness. Emphasized that patient should be keeping sling on at all times except for when he is performing his home exercises. Future sessions should continue to progress PROM as able. Tolerated treatment well. Patient would benefit from continued PT      Plan: Continue per plan of care.       Precautions: HTN, anxiety, chronic pain syndrome, anxiety  POC expires Auth Status Unit limit Start date  Expiration date PT/OT + Visit Limit?   23 n/a n/a 23 BOMN                                       Foto             Manuals            R shoulder mobs  15 min  GS                                                  Neuro Re-Ed             scap retraction HEP dis                                                                                         Ther Ex             Elbow AROM HEP x20           Pendulums HEP x20 ea           Table slides flex HEP x20           Table slides ABD  x20                                                  Pt edu GS- tissue healing, sling use GS- sling use, healing times           Ther Activity Gait Training                                       Modalities

## 2023-08-21 ENCOUNTER — TELEPHONE (OUTPATIENT)
Age: 68
End: 2023-08-21

## 2023-08-21 NOTE — TELEPHONE ENCOUNTER
Caller: patient     Doctor: Dr Jesse Hunt    Reason for call: Patient is calling bc he had to go to the ED over the weekend due to severe back pain. Patient believes that his upscale pain in his back is from the sling. Patient is asking if he can take off the sling while he is sitting or around his house. He will prop it up really well & be careful when maneuvering around the house. He will not use that hand or pick anything up.      Call back#: [phone]

## 2023-08-22 ENCOUNTER — OFFICE VISIT (OUTPATIENT)
Dept: PHYSICAL THERAPY | Facility: CLINIC | Age: 68
End: 2023-08-22
Payer: MEDICARE

## 2023-08-22 DIAGNOSIS — M75.101 TEAR OF RIGHT SUPRASPINATUS TENDON: Primary | ICD-10-CM

## 2023-08-22 DIAGNOSIS — S46.811D PARTIAL TEAR OF RIGHT SUBSCAPULARIS TENDON, SUBSEQUENT ENCOUNTER: ICD-10-CM

## 2023-08-22 PROCEDURE — 97110 THERAPEUTIC EXERCISES: CPT

## 2023-08-22 PROCEDURE — 97140 MANUAL THERAPY 1/> REGIONS: CPT

## 2023-08-22 NOTE — TELEPHONE ENCOUNTER
Spoke with patient at length. Advised the patient may loosen sling while stationary and seated. Patient was understanding had no further questions or concerns at this time.

## 2023-08-22 NOTE — PROGRESS NOTES
Daily Note     Today's date: 2023  Patient name: Hayden Schmidt  : 1955  MRN: [id number]  Referring provider: Nadine Mckeon PA-C  Dx:   Encounter Diagnosis     ICD-10-CM    1. Tear of right supraspinatus tendon  M75.101       2. Partial tear of right subscapularis tendon, subsequent encounter  S46.811D           Start Time: 1116  Stop Time: 1148  Total time in clinic (min): 32 minutes    Subjective: Patient continues to report that the sling is uncomfortable, he is having trouble sleeping, and has no appetite. Objective: See treatment diary below      Assessment: Patient demonstrates improving R shoulder PROM this session. Patient completed today's session without pain. Encouragement provided that patient is almost done with the sling and just has to continue to use it for a couple more weeks. Future sessions should continue to progress ROM exercises as able. Tolerated treatment well. Patient would benefit from continued PT      Plan: Continue per plan of care.       Precautions: HTN, anxiety, chronic pain syndrome, anxiety  POC expires Auth Status Unit limit Start date  Expiration date PT/OT + Visit Limit?   23 n/a n/a 23 BOMN                                       Foto             Manuals           R shoulder mobs  15 min  GS 15 min  GS                                                 Neuro Re-Ed             scap retraction HEP dis                                                                                         Ther Ex             Elbow AROM HEP x20 x20          Pendulums HEP x20 ea x20 ea          Table slides flex HEP x20 x20          Table slides ABD  x20 x20                                                 Pt edu GS- tissue healing, sling use GS- sling use, healing times           Ther Activity                                       Gait Training                                       Modalities

## 2023-08-25 ENCOUNTER — OFFICE VISIT (OUTPATIENT)
Dept: PHYSICAL THERAPY | Facility: CLINIC | Age: 68
End: 2023-08-25
Payer: MEDICARE

## 2023-08-25 DIAGNOSIS — M75.101 TEAR OF RIGHT SUPRASPINATUS TENDON: Primary | ICD-10-CM

## 2023-08-25 DIAGNOSIS — S46.811D PARTIAL TEAR OF RIGHT SUBSCAPULARIS TENDON, SUBSEQUENT ENCOUNTER: ICD-10-CM

## 2023-08-25 PROCEDURE — 97140 MANUAL THERAPY 1/> REGIONS: CPT

## 2023-08-25 PROCEDURE — 97110 THERAPEUTIC EXERCISES: CPT

## 2023-08-25 NOTE — PROGRESS NOTES
Daily Note     Today's date: 2023  Patient name: John Soria  : 1955  MRN: [id number]  Referring provider: Delmy Guzmán PA-C  Dx:   Encounter Diagnosis     ICD-10-CM    1. Tear of right supraspinatus tendon  M75.101       2. Partial tear of right subscapularis tendon, subsequent encounter  S46.811D           Start Time: 1000  Stop Time: 1030  Total time in clinic (min): 30 minutes    Subjective: Patient reports that he still hasn't been able to get much sleep, but is eager to take the sling off next week. Objective: See treatment diary below      Assessment: Patient demonstrates improving R shoulder PROM this session. Patient completed today's session without pain. Progressed table slides to basketball today in order to increase AAROM. Encouraged patient to stick with sling for the next week until surgical site heals more. Future sessions should continue to progress ROM exercises as able. Tolerated treatment well. Patient would benefit from continued PT      Plan: Continue per plan of care.       Precautions: HTN, anxiety, chronic pain syndrome, anxiety  POC expires Auth Status Unit limit Start date  Expiration date PT/OT + Visit Limit?   23 n/a n/a 23 BOMN                                       Foto             Manuals          R shoulder mobs  15 min  GS 15 min  GS 15 min GS                                                Neuro Re-Ed             scap retraction HEP dis                                                                                         Ther Ex             Elbow AROM HEP x20 x20 x20         Pendulums HEP x20 ea x20 ea x20 ea         Table slides flex HEP x20 x20 x20 w bball         Table slides ABD  x20 x20 x20 w bball                                                Pt edu GS- tissue healing, sling use GS- sling use, healing times           Ther Activity                                       Gait Training Modalities

## 2023-08-28 ENCOUNTER — OFFICE VISIT (OUTPATIENT)
Dept: PHYSICAL THERAPY | Facility: CLINIC | Age: 68
End: 2023-08-28
Payer: MEDICARE

## 2023-08-28 DIAGNOSIS — M75.101 TEAR OF RIGHT SUPRASPINATUS TENDON: Primary | ICD-10-CM

## 2023-08-28 DIAGNOSIS — S46.811D PARTIAL TEAR OF RIGHT SUBSCAPULARIS TENDON, SUBSEQUENT ENCOUNTER: ICD-10-CM

## 2023-08-28 PROCEDURE — 97110 THERAPEUTIC EXERCISES: CPT

## 2023-08-28 PROCEDURE — 97140 MANUAL THERAPY 1/> REGIONS: CPT

## 2023-08-28 NOTE — PROGRESS NOTES
Daily Note     Today's date: 2023  Patient name: Nilsa Peña  : 1955  MRN: [id number]  Referring provider: Yvonne Hurt PA-C  Dx:   Encounter Diagnosis     ICD-10-CM    1. Tear of right supraspinatus tendon  M75.101       2. Partial tear of right subscapularis tendon, subsequent encounter  S46.811D           Start Time: 1740  Stop Time: 1808  Total time in clinic (min): 28 minutes    Subjective: Patient reports that he is going to be going into rehab and may have to miss a few appointments. He states that he is not going to take his sling with him and he plans to keep up with his HEP. Objective: See treatment diary below      Assessment: Patient demonstrates improving R shoulder PROM and AAROM this session. Patient completed today's session without pain. Encouraged patient to follow up with surgeon in regards to leaving the sling at home when going to rehab. We reviewed wall slides and supine cane flexion to work on improving AAROM. Emphasized the importance of patient performing these exercises in a slow and controlled manner. Future sessions should continue to progress ROM exercises as able. Tolerated treatment well. Patient would benefit from continued PT      Plan: Continue per plan of care.       Precautions: HTN, anxiety, chronic pain syndrome, anxiety  POC expires Auth Status Unit limit Start date  Expiration date PT/OT + Visit Limit?   23 n/a n/a 23 BOMN                                       Foto             Manuals         R shoulder mobs  15 min  GS 15 min  GS 15 min GS 10 min  GS                                               Neuro Re-Ed             scap retraction HEP dis                                                                                         Ther Ex             Elbow AROM HEP x20 x20 x20         Pendulums HEP x20 ea x20 ea x20 ea         Table slides flex HEP x20 x20 x20 w bball x20 w bball        Table slides ABD  x20 x20 x20 w bball x20 w bball        Supine cane flexion     x20        Wall slides     x20                     Pt edu GS- tissue healing, sling use GS- sling use, healing times   GS- no fast arm movements        Ther Activity                                       Gait Training                                       Modalities

## 2023-08-31 ENCOUNTER — OFFICE VISIT (OUTPATIENT)
Dept: PHYSICAL THERAPY | Facility: CLINIC | Age: 68
End: 2023-08-31
Payer: MEDICARE

## 2023-08-31 ENCOUNTER — OFFICE VISIT (OUTPATIENT)
Dept: OBGYN CLINIC | Facility: CLINIC | Age: 68
End: 2023-08-31

## 2023-08-31 VITALS
WEIGHT: 268 LBS | BODY MASS INDEX: 36.3 KG/M2 | HEART RATE: 84 BPM | DIASTOLIC BLOOD PRESSURE: 95 MMHG | SYSTOLIC BLOOD PRESSURE: 152 MMHG | HEIGHT: 72 IN

## 2023-08-31 DIAGNOSIS — M75.101 TEAR OF RIGHT SUPRASPINATUS TENDON: Primary | ICD-10-CM

## 2023-08-31 DIAGNOSIS — Z98.890 S/P ARTHROSCOPY OF RIGHT SHOULDER: Primary | ICD-10-CM

## 2023-08-31 DIAGNOSIS — S46.811D PARTIAL TEAR OF RIGHT SUBSCAPULARIS TENDON, SUBSEQUENT ENCOUNTER: ICD-10-CM

## 2023-08-31 DIAGNOSIS — Z48.89 AFTERCARE FOLLOWING SURGERY: ICD-10-CM

## 2023-08-31 PROCEDURE — 99024 POSTOP FOLLOW-UP VISIT: CPT | Performed by: ORTHOPAEDIC SURGERY

## 2023-08-31 PROCEDURE — 97110 THERAPEUTIC EXERCISES: CPT

## 2023-08-31 PROCEDURE — 97140 MANUAL THERAPY 1/> REGIONS: CPT

## 2023-08-31 RX ORDER — BUSPIRONE HYDROCHLORIDE 5 MG/1
5 TABLET ORAL 2 TIMES DAILY
COMMUNITY
Start: 2023-08-03

## 2023-08-31 RX ORDER — TRAZODONE HYDROCHLORIDE 50 MG/1
25 TABLET ORAL
COMMUNITY
Start: 2023-08-23

## 2023-08-31 RX ORDER — MECLIZINE HCL 12.5 MG/1
12.5 TABLET ORAL 3 TIMES DAILY PRN
COMMUNITY
Start: 2023-08-17

## 2023-08-31 RX ORDER — TEMAZEPAM 15 MG/1
CAPSULE ORAL
COMMUNITY
Start: 2023-08-19

## 2023-08-31 NOTE — PROGRESS NOTES
Daily Note     Today's date: 2023  Patient name: Tiffany Marrufo  : 1955  MRN: [id number]  Referring provider: Kinjal Mark PA-C  Dx:   Encounter Diagnosis     ICD-10-CM    1. Tear of right supraspinatus tendon  M75.101       2. Partial tear of right subscapularis tendon, subsequent encounter  S46.811D           Start Time: 1805  Stop Time: 1840  Total time in clinic (min): 35 minutes    Subjective: Patient reports that his shoulder is a little sore today. He is eager to take the sling off next week. Objective: See treatment diary below      Assessment: Patient demonstrates improving R shoulder ROM this session. Patient completed today's session without pain. Continued focus on PROM and AAROM. Introduce TB exercises starting next week. Future sessions should continue to progress ROM and strengthening exercises as able. Tolerated treatment well. Patient would benefit from continued PT      Plan: Continue per plan of care.       Precautions: HTN, anxiety, chronic pain syndrome, anxiety  POC expires Auth Status Unit limit Start date  Expiration date PT/OT + Visit Limit?   23 n/a n/a 23 BOMN                                       Foto             Manuals        R shoulder mobs  15 min  GS 15 min  GS 15 min GS 10 min  GS 15 min  GS                                              Neuro Re-Ed             scap retraction HEP dis                                                                                         Ther Ex             Elbow AROM HEP x20 x20 x20         Pendulums HEP x20 ea x20 ea x20 ea         Table slides flex HEP x20 x20 x20 w bball x20 w bball x20 w bball       Table slides ABD  x20 x20 x20 w bball x20 w bball x20 w bball       Supine cane flexion     x20 x20       Wall slides     x20 x20                    Pt edu GS- tissue healing, sling use GS- sling use, healing times   GS- no fast arm movements        Ther Activity Gait Training                                       Modalities

## 2023-08-31 NOTE — PROGRESS NOTES
Patient Name:  Julia Rios  MRN:  [de-identified]    [id number] [address]. S/P arthroscopy of right shoulder    2. Aftercare following surgery        Approximately 5 weeks s/p Right shoulder arthroscopic supraspinatus and subscapularis repair, biceps tenotomy, extensive debridement performed on 07/26/2023. · Continue working with physical therapy, progressing through protocol. · He may completely discontinue his sling at 6 week post-operative vincent. · He may sleep flat as tolerated. · Continue with home exercise plan. · Follow-up in 6 weeks for re-evaluation     History of the Present Illness   Julia Rios is a 79 y.o. male approximately 5 weeks s/p Right shoulder arthroscopic supraspinatus and subscapularis repair, biceps tenotomy, extensive debridement performed on 07/26/2023. He continues to attend physical therapy. Shoulder pain is well controlled. He reports continuing to use his sling. He was having withdrawal from oxycodone. He was in a short term rehab program and completed it today. Review of Systems     Review of Systems   Constitutional: Negative for appetite change and unexpected weight change. HENT: Negative for congestion and trouble swallowing. Eyes: Negative for visual disturbance. Respiratory: Negative for cough and shortness of breath. Cardiovascular: Negative for chest pain and palpitations. Gastrointestinal: Negative for nausea and vomiting. Endocrine: Negative for cold intolerance and heat intolerance. Musculoskeletal: Negative for joint swelling and myalgias. Skin: Negative for rash. Neurological: Negative for numbness. Physical Exam     /95   Pulse 84   Ht 6' (1.829 m)   Wt 122 kg (268 lb)   BMI 36.35 kg/m²     Right Shoulder:   Surgical incisions well healed  Active assisted range of motion   60 degrees forward flexion  120 degrees of forward flexion   Active range of motion of elbow, wrist and digits well maintained.   The patient is neurovascularly intact distally in the extremity. Data Review     I have personally reviewed pertinent films in PACS, and my interpretation follows. No new imaging today. Social History     Tobacco Use   • Smoking status: Never   • Smokeless tobacco: Never   Vaping Use   • Vaping Use: Never used   Substance Use Topics   • Alcohol use: Yes     Comment: rarely   • Drug use: No           Procedures  None performed.      Lobo Ignacio   Scribe Attestation    I,:  Lobo Ignacio am acting as a scribe while in the presence of the attending physician.:       I,:  Eloina Serrato, DO personally performed the services described in this documentation    as scribed in my presence.:

## 2023-09-05 ENCOUNTER — EVALUATION (OUTPATIENT)
Dept: PHYSICAL THERAPY | Facility: CLINIC | Age: 68
End: 2023-09-05
Payer: MEDICARE

## 2023-09-05 DIAGNOSIS — M75.101 TEAR OF RIGHT SUPRASPINATUS TENDON: Primary | ICD-10-CM

## 2023-09-05 DIAGNOSIS — S46.811D PARTIAL TEAR OF RIGHT SUBSCAPULARIS TENDON, SUBSEQUENT ENCOUNTER: ICD-10-CM

## 2023-09-05 PROCEDURE — 97140 MANUAL THERAPY 1/> REGIONS: CPT

## 2023-09-05 PROCEDURE — 97110 THERAPEUTIC EXERCISES: CPT

## 2023-09-05 NOTE — PROGRESS NOTES
PT Re-Evaluation     Today's date: 2023  Patient name: Emelyn Presley  : 1955  MRN: [id number]  Referring provider: Didi Penny MD  Dx:   Encounter Diagnosis     ICD-10-CM    1. Tear of right supraspinatus tendon  M75.101       2. Partial tear of right subscapularis tendon, subsequent encounter  S46.811D           Start Time: 1722  Stop Time: 1753  Total time in clinic (min): 31 minutes    Assessment  Assessment details: Problem List:  1) R shoulder ROM  2) R shoulder strength    Emelyn Presley is a pleasant 79 y.o. male who presents to PT following R RCR on 23. He initially tore RTC from a fall. He has been adherent to sling use and post-op protocol while keeping up with his HEP. PROM has improved with PT and is pain free to the desired ranges in flexion, ABD, and ER. Remaining deficits include limited R shoulder ROM in all planes, R shoulder weakness in all planes, and activity intolerance. Functionally, he would like to return to mowing the grass, cooking, and bathing with his R arm. Tomorrow will be 6 weeks post-op and I educated him the precautions that he has when the sling is removed. No pushing, pulling, or lifting weighted objects at this time. Patient would continue to benefit from skilled PT services in order to address these deficits.     Comparable signs:  1) R shoulder ROM  2) R shoulder strength  Impairments: abnormal or restricted ROM, activity intolerance, impaired physical strength, lacks appropriate home exercise program, pain with function and poor posture   Understanding of Dx/Px/POC: good   Prognosis: good    Goals  STGs  Patient will be independent with HEP in 4 weeks -met  Patient will discontinue use of sling in 4 weeks -met  LTGs  Patient will improve R shoulder flexion AROM to WNL in 8 weeks  Patient will improve R shoulder ABD AROM to WNL in 8 weeks    Plan  Plan details: 2x/week for 4 months (32 visits)  Patient would benefit from: skilled physical therapy  Planned therapy interventions: home exercise program, therapeutic exercise, therapeutic activities, joint mobilization, manual therapy, motor coordination training, neuromuscular re-education, patient education, strengthening, stretching, functional ROM exercises, flexibility and activity modification  Frequency: 2x week  Plan of Care beginning date: 2023  Plan of Care expiration date: 2023  Treatment plan discussed with: patient        Subjective Evaluation    History of Present Illness  Date of surgery: 2023  Mechanism of injury: Patient reports to OP PT following R rotator cuff repair on 23. Tear happened following a fall on R shoulder a few months ago. Patient was at the hospital last week due to elevated D-dimer levels, but ER ruled out blood clots. He did have abnormal EKG.   Patient states that sleeping has been extremely difficult and he doesn't feel like he has slept at all    - patient reports that he feels 0% better since starting PT given that he hasn't gotten out of the sling yet  Patient Goals  Patient goal: get out of sling and restore use  Pain  Current pain ratin  At best pain ratin  At worst pain ratin          Objective     Cervical/Thoracic Screen   Cervical range of motion within normal limits with the following exceptions: Limited in all planes most limited with side bending  90% limited L and R side bending    Passive Range of Motion     Right Shoulder   Flexion: 140 degrees   Abduction: 80 degrees   External rotation 0°: 40 degrees              Precautions: HTN, anxiety, chronic pain syndrome, anxiety    POC expires Auth Status Unit limit Start date  Expiration date PT/OT + Visit Limit?   23 n/a n/a 23 BOMN                                       Foto       Manuals       R shoulder mobs  15 min  GS 15 min  GS 15 min GS 10 min  GS 15 min  GS 10 min  GS                                             Neuro Re-Ed             scap retraction HEP dis                                                                                         Ther Ex             Elbow AROM HEP x20 x20 x20         Pendulums HEP x20 ea x20 ea x20 ea         Table slides flex HEP x20 x20 x20 w bball x20 w bball x20 w bball       Table slides ABD  x20 x20 x20 w bball x20 w bball x20 w bball x20 w bball      Supine cane flexion     x20 x20 x20                   Wall slides     x20 x20 x20      Standing cane ABD       x20      Supine cane ER       x20      PT re eval       GS      Pt edu GS- tissue healing, sling use GS- sling use, healing times   GS- no fast arm movements  GS- discontinue sling, no lifting more than 5#      Ther Activity                                       Gait Training                                       Modalities

## 2023-09-07 ENCOUNTER — OFFICE VISIT (OUTPATIENT)
Dept: PHYSICAL THERAPY | Facility: CLINIC | Age: 68
End: 2023-09-07
Payer: MEDICARE

## 2023-09-07 DIAGNOSIS — S46.811D PARTIAL TEAR OF RIGHT SUBSCAPULARIS TENDON, SUBSEQUENT ENCOUNTER: ICD-10-CM

## 2023-09-07 DIAGNOSIS — M75.101 TEAR OF RIGHT SUPRASPINATUS TENDON: Primary | ICD-10-CM

## 2023-09-07 PROCEDURE — 97140 MANUAL THERAPY 1/> REGIONS: CPT

## 2023-09-07 PROCEDURE — 97110 THERAPEUTIC EXERCISES: CPT

## 2023-09-07 NOTE — PROGRESS NOTES
Daily Note     Today's date: 2023  Patient name: Katerina Swain  : 1955  MRN: [id number]  Referring provider: Sami Ford MD  Dx:   Encounter Diagnosis     ICD-10-CM    1. Tear of right supraspinatus tendon  M75.101       2. Partial tear of right subscapularis tendon, subsequent encounter  S46.811D           Start Time: 1626  Stop Time: 1707  Total time in clinic (min): 41 minutes    Subjective: Patient reports that his shoulder was very sore after last session and yesterday. He states that it has been nice not to have the sling. Objective: See treatment diary below      Assessment: Patient demonstrates improving R shoulder strength this session this session. Patient completed today's session without increase in pain. Initiated pulleys today in order to improve R shoulder ROM. Educated patient that soreness he was experiencing over the past 48 hours was likely due to removal of the sling. Initiated TB strengthening this session and patient performs without difficulty. Gave patient therabands and instructed on HEP. Ensured understanding via teach back. Future sessions should continue to progress strengthening as able. Tolerated treatment well. Patient would benefit from continued PT      Plan: Continue per plan of care.       Precautions: HTN, anxiety, chronic pain syndrome, anxiety    POC expires Auth Status Unit limit Start date  Expiration date PT/OT + Visit Limit?   23 n/a n/a 23 BOMN                                       Foto       Manuals      R shoulder mobs  15 min  GS 15 min  GS 15 min GS 10 min  GS 15 min  GS 10 min  GS 8 min  GS                                            Neuro Re-Ed             scap retraction HEP dis           scap setting MR        x6 5" hold                                                                      Ther Ex             Elbow AROM HEP x20 x20 x20         Pendulums HEP x20 ea x20 ea x20 There is blotchy red areas on the chest, abdomin, and upper arm bilaterally.  TEOFILO Jo is called to assess the rash.  Ruma assessed the skin and determined that its a  rash and to watch for any further change.  The skin was reassessed at  and there is now redness on the upper back area.   ea         Table slides flex HEP x20 x20 x20 w bball x20 w bball x20 w bball       Table slides ABD  x20 x20 x20 w bball x20 w bball x20 w bball x20 w bball      Supine cane flexion     x20 x20 x20 x20                  Wall slides     x20 x20 x20 x20     Standing cane ABD       x20      Supine cane ER       x20      TB ER        3x10 yellow     TB IR        3x10  red     TB row        3x10  green     TB ext        3x10  green     Ricardo        3'     PT re eval       GS      Pt edu GS- tissue healing, sling use GS- sling use, healing times   GS- no fast arm movements  GS- discontinue sling, no lifting more than 5#      Ther Activity                                       Gait Training                                       Modalities

## 2023-09-11 ENCOUNTER — OFFICE VISIT (OUTPATIENT)
Dept: PHYSICAL THERAPY | Facility: CLINIC | Age: 68
End: 2023-09-11
Payer: MEDICARE

## 2023-09-11 DIAGNOSIS — S46.811D PARTIAL TEAR OF RIGHT SUBSCAPULARIS TENDON, SUBSEQUENT ENCOUNTER: ICD-10-CM

## 2023-09-11 DIAGNOSIS — M75.101 TEAR OF RIGHT SUPRASPINATUS TENDON: Primary | ICD-10-CM

## 2023-09-11 PROCEDURE — 97110 THERAPEUTIC EXERCISES: CPT

## 2023-09-11 NOTE — PROGRESS NOTES
Daily Note     Today's date: 2023  Patient name: Ha Arana  : 1955  MRN: [id number]  Referring provider: Madonna Harry MD  Dx:   Encounter Diagnosis     ICD-10-CM    1. Tear of right supraspinatus tendon  M75.101       2. 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 Patient states that his low back is really bothering him today. Objective: See treatment diary below      Assessment: Patient demonstrates improving R shoulder strengh this session. Patient completed today's session without increase in pain. Pulleys performed in order to improve R shoulder ROM. Initiated scapular stabilization exercises with serratus punch and wall ball exercises. Educated patient that he can push up with straight arms but is not to combine with any shoulder extension. Future sessions should continue to progress strengthening exercises as able. Tolerated treatment well. Patient would benefit from continued PT      Plan: Continue per plan of care.       Precautions: HTN, anxiety, chronic pain syndrome, anxiety    POC expires Auth Status Unit limit Start date  Expiration date PT/OT + Visit Limit?   23 n/a n/a 23 BOMN                                       Foto       9      Manuals     R shoulder mobs  15 min  GS 15 min  GS 15 min GS 10 min  GS 15 min  GS 10 min  GS 8 min  GS                                            Neuro Re-Ed             scap retraction HEP dis           scap setting MR        x6 5" hold     Supine serratus press         x30    Wall ball         x20 red    Table plank hold         2x30"                              Ther Ex             Elbow AROM HEP x20 x20 x20         Pendulums HEP x20 ea x20 ea x20 ea         Table slides flex HEP x20 x20 x20 w bball x20 w bball x20 w bball       Table slides ABD  x20 x20 x20 w bball x20 w bball x20 w bball x20 w bball      Supine cane flexion     x20 x20 x20 x20 x20                 Wall slides     x20 x20 x20 x20 x20    Standing cane ABD       x20  x20    Supine cane ER       x20  x20    TB ER        3x10 yellow 3x10  yellow    TB IR        3x10  red 3x10  red    TB row        3x10  grexen 3x10  green    TB ext        3x10  green 3x10  green    Pulley        3' 3'    PT re eval       GS      Pt edu GS- tissue healing, sling use GS- sling use, healing times   GS- no fast arm movements  GS- discontinue sling, no lifting more than 5#      Ther Activity                                       Gait Training                                       Modalities

## 2023-09-14 ENCOUNTER — OFFICE VISIT (OUTPATIENT)
Dept: PHYSICAL THERAPY | Facility: CLINIC | Age: 68
End: 2023-09-14
Payer: MEDICARE

## 2023-09-14 DIAGNOSIS — S46.811D PARTIAL TEAR OF RIGHT SUBSCAPULARIS TENDON, SUBSEQUENT ENCOUNTER: ICD-10-CM

## 2023-09-14 DIAGNOSIS — M75.101 TEAR OF RIGHT SUPRASPINATUS TENDON: Primary | ICD-10-CM

## 2023-09-14 PROCEDURE — 97110 THERAPEUTIC EXERCISES: CPT

## 2023-09-14 PROCEDURE — 97140 MANUAL THERAPY 1/> REGIONS: CPT

## 2023-09-14 NOTE — PROGRESS NOTES
Daily Note     Today's date: 2023  Patient name: John Soria  : 1955  MRN: [id number]  Referring provider: Delmy Guzmán DO  Dx:   Encounter Diagnosis     ICD-10-CM    1. Tear of right supraspinatus tendon  M75.101       2. Partial tear of right subscapularis tendon, subsequent encounter  S46.811D           Start Time: 1445  Stop Time: 1510  Total time in clinic (min): 25 minutes    Subjective: Patient reports that his back is really bothering him and has been for about a week. He also states that his shoulder is in 5/10 pain and has been in increased pain for 3-4 days. Objective: See treatment diary below      Assessment: Patient demonstrates improving R shoulder ROM and strength this session. Patient completed today's session without increase in pain. Pulleys performed in order to improve R shoulder ROM. Shortened session in order to allow pain to decrease. Focus now should be on pain modulation. Informed patient that he should not be lifting anything heavier than a coffer cup at this point. Encouraged patient to decrease frequency of HEP to every other day. Use ice to decrease pain as needed. Future sessions should continue to progress strengthening and ROM exercises as able. Tolerated treatment fair. Patient would benefit from continued PT      Plan: Continue per plan of care.       Precautions: HTN, anxiety, chronic pain syndrome, anxiety    POC expires Auth Status Unit limit Start date  Expiration date PT/OT + Visit Limit?   23 n/a n/a 23 BOMN                                       Foto       Manuals    R shoulder mobs  15 min  GS 15 min  GS 15 min GS 10 min  GS 15 min  GS 10 min  GS 8 min  GS  15 min  GS  Gr 1/2                                          Neuro Re-Ed             scap retraction HEP dis           scap setting MR        x6 5" hold     Supine serratus press         x30    Wall ball         x20 red    Table plank hold         2x30"                              Ther Ex             Elbow AROM HEP x20 x20 x20         Pendulums HEP x20 ea x20 ea x20 ea         Table slides flex HEP x20 x20 x20 w bball x20 w bball x20 w bball       Table slides ABD  x20 x20 x20 w bball x20 w bball x20 w bball x20 w bball      Supine cane flexion     x20 x20 x20 x20 x20 x20                Wall slides     x20 x20 x20 x20 x20    Standing cane ABD       x20  x20 x20   Supine cane ER       x20  x20    TB ER        3x10 yellow 3x10  yellow    TB IR        3x10  red 3x10  red    TB row        3x10  grexen 3x10  green    TB ext        3x10  green 3x10  green    Pulley        3' 3' 5'   PT re eval       GS      Pt edu GS- tissue healing, sling use GS- sling use, healing times   GS- no fast arm movements  GS- discontinue sling, no lifting more than 5#   GS- activity modification   Ther Activity                                       Gait Training                                       Modalities

## 2023-09-18 ENCOUNTER — OFFICE VISIT (OUTPATIENT)
Dept: PHYSICAL THERAPY | Facility: CLINIC | Age: 68
End: 2023-09-18
Payer: MEDICARE

## 2023-09-18 DIAGNOSIS — M75.101 TEAR OF RIGHT SUPRASPINATUS TENDON: Primary | ICD-10-CM

## 2023-09-18 DIAGNOSIS — S46.811D PARTIAL TEAR OF RIGHT SUBSCAPULARIS TENDON, SUBSEQUENT ENCOUNTER: ICD-10-CM

## 2023-09-18 PROCEDURE — 97140 MANUAL THERAPY 1/> REGIONS: CPT

## 2023-09-18 PROCEDURE — 97110 THERAPEUTIC EXERCISES: CPT

## 2023-09-18 NOTE — PROGRESS NOTES
Daily Note     Today's date: 2023  Patient name: Rhonda Heller  : 1955  MRN: [id number]  Referring provider: Shira Farooq MD  Dx:   Encounter Diagnosis     ICD-10-CM    1. Tear of right supraspinatus tendon  M75.101       2. Partial tear of right subscapularis tendon, subsequent encounter  S46.811D           Start Time: 1645  Stop Time: 1725  Total time in clinic (min): 40 minutes    Subjective: Patient reports that his shoulder remains very sore at 5 or 6/10 pain. He also states that his back has been in a lot of pain. Patient states that his shoulder bothers him the most at night when he is just laying in bed. He has not been doing any of the exercises at home because he is worried about the pain. He admits that he has been using it a lot for overhead reaching, working on his car, and general things around the house. Objective: See treatment diary below      Assessment: Patient demonstrates improving R shoulder ROM this session. Patient completed today's session without increase in pain. Pulleys performed in order to improve UE muscular endurance. Patient educated on activity modification to decrease overall shoulder pain and soreness. Even though he has not been performing HEP, the excessive use of shoulder around the house is likely resulting in the constant achy pain he is experiencing. Told patient to hold on HEP until I see him next week and decrease use of R arm for ADLs and limit overhead reaching. Ice for pain modulation. PROM performed for pain modulation. Future sessions should continue to progress ROM and strengthening exercises as able per pain level. Tolerated treatment fair. Patient would benefit from continued PT      Plan: Progress treament per protocol.       Precautions: HTN, anxiety, chronic pain syndrome, anxiety    POC expires Auth Status Unit limit Start date  Expiration date PT/OT + Visit Limit?   23 n/a n/a 23 BOMN Foto       9/5      Manuals 9/18 8/17 8/22 8/25 8/28 8/31 9/5 9/7 9/11 9/14   R shoulder PROM 25 min  GS for pain modulation 15 min  GS 15 min  GS 15 min GS 10 min  GS 15 min  GS 10 min  GS 8 min  GS  15 min  GS  Gr 1/2                                          Neuro Re-Ed             scap retraction  dis           scap setting MR        x6 5" hold     Supine serratus press         x30    Wall ball         x20 red    Table plank hold         2x30"                              Ther Ex             Elbow AROM  x20 x20 x20         Pendulums  x20 ea x20 ea x20 ea         Table slides flex x30 x20 x20 x20 w bball x20 w bball x20 w bball       Table slides ABD x30 x20 x20 x20 w bball x20 w bball x20 w bball x20 w bball      Supine cane flexion     x20 x20 x20 x20 x20 x20                Wall slides     x20 x20 x20 x20 x20    Standing cane ABD       x20  x20 x20   Supine cane ER       x20  x20    TB ER        3x10 yellow 3x10  yellow    TB IR        3x10  red 3x10  red    TB row x30 green P!       3x10  grexen 3x10  green    TB ext x30 green       3x10  green 3x10  green    Pulley 5'       3' 3' 5'   PT re eval       GS      Pt edu GS- activity modifcation GS- sling use, healing times   GS- no fast arm movements  GS- discontinue sling, no lifting more than 5#   GS- activity modification   Ther Activity                                       Gait Training                                       Modalities

## 2023-09-20 ENCOUNTER — TELEPHONE (OUTPATIENT)
Dept: NEUROSURGERY | Facility: CLINIC | Age: 68
End: 2023-09-20

## 2023-09-20 NOTE — TELEPHONE ENCOUNTER
PT called requesting appt with [facility], pt has returning symptoms but no new imaging of Lspine per NB book with [facility]

## 2023-09-21 ENCOUNTER — APPOINTMENT (OUTPATIENT)
Dept: PHYSICAL THERAPY | Facility: CLINIC | Age: 68
End: 2023-09-21
Payer: MEDICARE

## 2023-09-22 ENCOUNTER — TELEPHONE (OUTPATIENT)
Dept: NEUROSURGERY | Facility: CLINIC | Age: 68
End: 2023-09-22

## 2023-09-22 ENCOUNTER — TELEPHONE (OUTPATIENT)
Dept: OTHER | Facility: OTHER | Age: 68
End: 2023-09-22

## 2023-09-22 NOTE — TELEPHONE ENCOUNTER
PT called to confirm address of [address] in [place] [address] in [address] [place], appt with [facility] on 9/27/23

## 2023-09-22 NOTE — TELEPHONE ENCOUNTER
Received not on appointment line patient wanted to reschedule his  appointment for this morning       Call patient back left office number for call back to reschedule.     Appointment for today 09/22/23 @ 7:15am with Nichelle Stapleton is cancelled

## 2023-09-22 NOTE — TELEPHONE ENCOUNTER
PT called to reschedule his appt, pt requested 8 or 830 in [place] so his new appt is 9/27/23 at 830 in [facility]

## 2023-09-22 NOTE — TELEPHONE ENCOUNTER
Patient is calling regarding cancelling an appointment. Date/Time: 09/22/2023/ 7:15 A. M.     Patient was rescheduled: YES [] NO [x]    Patient requesting call back to reschedule: YES [x] NO []

## 2023-09-25 ENCOUNTER — APPOINTMENT (OUTPATIENT)
Dept: PHYSICAL THERAPY | Facility: CLINIC | Age: 68
End: 2023-09-25
Payer: MEDICARE

## 2023-09-26 NOTE — ASSESSMENT & PLAN NOTE
Seen for evaluation of back pain  · S/p L3-4 TLIF 1/20/16 by Dr. Sandra Malhotra. · Started after rotator cuff surgery and positioning he had to maintain postoperatively with his sling. This pain is constant, at best 5-6/10, at worst 10/10. Worse with standing, walking, laying flat. Denies radiating pain, numbness, tingling, weakness in his legs. No BBI. 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 Alina Villatoroaw Dr. Jaiden Funes for an opinion as well as possible SCS trial in 2020  · Exam: Spinal incision well-healed, TTP in low midline spine, pain with ROM, BLE 5/5, LT intact, 2+ DTRs, no clonus     Plan:  · Reviewed current symptoms with patient and wife. He has had no recent imaging of his back. · Recommend baseline upright XR to evaluate his hardware, including flexion/extension views. Will call with the results of this. · He has not tried any conservative measures since his pain restarted and he is not having any focal neurologic deficits. Would not recommend MRI at this time. · Recommended PT evaluation for core and back strengthening, referral placed. · Recommend following up with PCP for trial of different pain medications to see if anything else works for him other than oxycodone. · Referral to pain management placed for possible injections. Initially patient was hesitant to do this as he states it did not work in the past. Explained that now he has had surgery and his back is different and so is the pain, so injections may help in this regard. · States that he is not sleeping well. Current sleep medication isn't helping. Advised that I cannot prescribe anything, but would have him follow up with his PCP. · Reviewed red flag signs and symptoms. · Follow-up in 4-6 weeks to see [facility] after trial of conservative measures. If he is still having significant pain despite this, has new symptoms or develops a deficit would consider MRI and have him see Dr. Sandra Malhotra.  Call sooner with any questions or concerns.

## 2023-09-27 ENCOUNTER — OFFICE VISIT (OUTPATIENT)
Dept: NEUROSURGERY | Facility: CLINIC | Age: 68
End: 2023-09-27
Payer: MEDICARE

## 2023-09-27 ENCOUNTER — HOSPITAL ENCOUNTER (OUTPATIENT)
Dept: RADIOLOGY | Facility: HOSPITAL | Age: 68
Discharge: HOME | End: 2023-09-27
Payer: MEDICARE

## 2023-09-27 VITALS
SYSTOLIC BLOOD PRESSURE: 120 MMHG | BODY MASS INDEX: 32.37 KG/M2 | HEART RATE: 86 BPM | HEIGHT: 72 IN | OXYGEN SATURATION: 98 % | WEIGHT: 239 LBS | TEMPERATURE: 97.5 F | RESPIRATION RATE: 16 BRPM | DIASTOLIC BLOOD PRESSURE: 88 MMHG

## 2023-09-27 DIAGNOSIS — Z98.1 STATUS POST LUMBAR SPINAL FUSION: Primary | ICD-10-CM

## 2023-09-27 DIAGNOSIS — Z98.1 STATUS POST LUMBAR SPINAL FUSION: ICD-10-CM

## 2023-09-27 DIAGNOSIS — G57.31 NEUROPATHY OF RIGHT PERONEAL NERVE: ICD-10-CM

## 2023-09-27 PROCEDURE — 99214 OFFICE O/P EST MOD 30 MIN: CPT | Performed by: PHYSICIAN ASSISTANT

## 2023-09-27 PROCEDURE — 72114 X-RAY EXAM L-S SPINE BENDING: CPT

## 2023-09-27 RX ORDER — OXYCODONE HYDROCHLORIDE 15 MG/1
TABLET ORAL
COMMUNITY
Start: 2023-09-13 | End: 2023-10-04

## 2023-09-27 NOTE — PROGRESS NOTES
Neurosurgery Office Note  Ciera Leigh 79 y.o. male MRN: [de-identified]      Assessment/Plan     Status post lumbar spinal fusion  Seen for evaluation of back pain  · S/p L3-4 TLIF 1/20/16 by Dr. Florencio Jimenez. · Started after rotator cuff surgery and positioning he had to maintain postoperatively with his sling. This pain is constant, at best 5-6/10, at worst 10/10. Worse with standing, walking, laying flat. Denies radiating pain, numbness, tingling, weakness in his legs. No BBI. 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 Johana Quiles noteSaw Dr. Susana Ulloa for an opinion as well as possible SCS trial in 2020  · Exam: Spinal incision well-healed, TTP in low midline spine, pain with ROM, BLE 5/5, LT intact, 2+ DTRs, no clonus     Plan:  · Reviewed current symptoms with patient and wife. He has had no recent imaging of his back. · Recommend baseline upright XR to evaluate his hardware, including flexion/extension views. Will call with the results of this. · He has not tried any conservative measures since his pain restarted and he is not having any focal neurologic deficits. Would not recommend MRI at this time. · Recommended PT evaluation for core and back strengthening, referral placed. · Recommend following up with PCP for trial of different pain medications to see if anything else works for him other than oxycodone. · Referral to pain management placed for possible injections. Initially patient was hesitant to do this as he states it did not work in the past. Explained that now he has had surgery and his back is different and so is the pain, so injections may help in this regard. · States that he is not sleeping well. Current sleep medication isn't helping. Advised that I cannot prescribe anything, but would have him follow up with his PCP. · Reviewed red flag signs and symptoms. · Follow-up in 4-6 weeks to see AP after trial of conservative measures.  If he is still having significant pain despite this, has new symptoms or develops a deficit would consider MRI and have him see Dr. Ewa Hull. Call sooner with any questions or concerns. Diagnoses and all orders for this visit:    Status post lumbar spinal fusion  -     XR spine lumbar complete w bending minimum 6 views; Future  -     Ambulatory referral to Physical Therapy; Future  -     Ambulatory referral to Spine & Pain Management; Future    Neuropathy of right peroneal nerve    Other orders  -     oxyCODONE (ROXICODONE) 15 mg immediate release tablet          I have spent a total time of 35 minutes on 09/27/23 in caring for this patient including Instructions for management, Patient and family education, Impressions, Counseling / Coordination of care, Documenting in the medical record, Reviewing / ordering tests, medicine, procedures   and Obtaining or reviewing history  . CHIEF COMPLAINT    Chief Complaint   Patient presents with   • Follow-up     Recurring back symptoms, h/o TLIF       HISTORY    History of Present Illness     79y.o. year old male     60-year-old gentleman seen for new evaluation of back pain. He is s/p L3-4 TLIF 16 by Dr. Ewa Hull 1/20/16. States he has been doing well until this summer. He had to have urgent rotator cuff surgery 7/2023 and after that developed recurrent back pain. He feels like the positioning he had to be in post-operatively made things worse. He has pain in his whole back. The pain does not radiate around to his chest, abdomen, groin, legs. Denies numbness, tingling, weakness in his legs. He describes the pain as a steady ache in his back, at its worst 10/10, at best 5-6. The pain is worse at night, he is unable to lay flat, and elevates the head of his bed as well as his feet. His back also hurts with standing and walking. It is not as bad with sitting. He has unchanged right foot pain since his surgery.  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 Ravi Shaikh note. He ambulates independently. No bowel or bladder incontinence. Only taking oxycodone. Feels that ibuprofen, Robaxin did not help and the Robaxin made him feel strange. He has not tried any physical therapy or seen pain management since the back pain recurred. See Discussion    REVIEW OF SYSTEMS    Review of Systems   Gastrointestinal: Negative. Genitourinary: Negative. Musculoskeletal: Positive for back pain (low back and right foot pain, sometimes pain radiates up the back) and gait problem (uses cane prn). Neurological: Negative for weakness and numbness. Psychiatric/Behavioral: Positive for sleep disturbance. ROS obtained by MA. Reviewed. See HPI. Meds/Allergies     Current Outpatient Medications   Medication Sig Dispense Refill   • balsalazide (COLAZAL) 750 mg capsule Take 3 capsules (2,250 mg total) by mouth 2 (two) times a day 180 capsule 5   • Diclofenac Sodium (VOLTAREN) 1 %      • ezetimibe (ZETIA) 10 mg tablet Take 10 mg by mouth daily     • oxyCODONE (ROXICODONE) 15 mg immediate release tablet      • zolpidem (AMBIEN) 10 mg tablet Take 1 tablet (10 mg total) by mouth daily at bedtime as needed for sleep for up to 10 days 3 tablet 0   • ALPRAZolam (XANAX) 0.25 mg tablet Take 1 tablet (0.25 mg total) by mouth 2 (two) times a day as needed for anxiety for up to 10 days (Patient not taking: Reported on 9/27/2023) 10 tablet 0   • busPIRone (BUSPAR) 5 mg tablet Take 5 mg by mouth 2 (two) times a day (Patient not taking: Reported on 9/27/2023)     • ibuprofen (MOTRIN) 800 mg tablet Take 1 tablet (800 mg total) by mouth every 8 (eight) hours as needed for mild pain Take 1 tablet by mouth 3 times daily for 3 days, then take as needed. Take with food and water. Discontinue if stomach upset occurs.  (Patient not taking: Reported on 8/31/2023) 30 tablet 0   • meclizine (ANTIVERT) 12.5 MG tablet Take 12.5 mg by mouth Three times daily as needed (Patient not taking: Reported on 8/31/2023)     • methocarbamol (ROBAXIN) 500 mg tablet take 1 tablet by mouth four times a day if needed for muscle spasm (Patient not taking: Reported on 8/31/2023)     • ondansetron (ZOFRAN-ODT) 4 mg disintegrating tablet Take 1 tablet (4 mg total) by mouth every 6 (six) hours as needed for nausea or vomiting (Patient not taking: Reported on 9/27/2023) 20 tablet 0   • oxyCODONE (ROXICODONE) 30 MG immediate release tablet oxycodone 30 mg tablet (Patient not taking: Reported on 8/31/2023)     • oxyCODONE-acetaminophen (Percocet)  mg per tablet Take 1 tablet by mouth every 4 (four) hours as needed for moderate pain Max Daily Amount: 6 tablets (Patient not taking: Reported on 9/27/2023) 30 tablet 0   • polyethylene glycol (GaviLyte-C) 4000 mL solution Take 4,000 mL by mouth once for 1 dose 4000 mL 0   • temazepam (RESTORIL) 15 mg capsule take 1 capsule by mouth nightly if needed for sleep (Patient not taking: Reported on 9/27/2023)     • traZODone (DESYREL) 50 mg tablet Take 25 mg by mouth (Patient not taking: Reported on 8/31/2023)       No current facility-administered medications for this visit. Allergies   Allergen Reactions   • No Active Allergies        PAST HISTORY    Past Medical History:   Diagnosis Date   • Back pain    • Colon polyp    • Hyperlipidemia    • Lumbar fracture with cord injury ([address])    • Neck fracture ([facility])    • Previous back surgery     rods in the back   • Ulcerative colitis [facility])        Past Surgical History:   Procedure Laterality Date   • BACK SURGERY     • LUMBAR FUSION Right 1/20/2016    Procedure: FUSION LUMBAR  POSTERIOR, TLIF L3-4  Right L3-4 Fascet;  Surgeon: Zohra Mendoza MD;  Location: [facility];  Service:    • OR COLONOSCOPY FLX DX W/COLLJ SPEC WHEN PFRMD N/A 1/24/2019    Procedure: COLONOSCOPY;  Surgeon: Mena Long MD;  Location: [facility];   Service: Gastroenterology   • OR SURGICAL ARTHROSCOPY SHOULDER W/ROTATOR CUFF RPR Right 7/26/2023    Procedure: ARTHROSCOPY SHOULDER- Right shoulder Arthroscopy, supraspinatus and subscapularis repair, biceps tenotomy, extensive debridement;  Surgeon: Nadeem Landry DO;  Location: [facility];  Service: Orthopedics   • TONSILLECTOMY         Social History     Tobacco Use   • Smoking status: Never   • Smokeless tobacco: Never   Vaping Use   • Vaping Use: Never used   Substance Use Topics   • Alcohol use: Not Currently     Comment: rarely   • Drug use: No       Family History   Problem Relation Age of Onset   • Parkinsonism Mother    • Lung cancer Father          Above history personally reviewed. EXAM    Vitals:Blood pressure 120/88, pulse 86, temperature 97.5 °F (36.4 °C), temperature source Temporal, resp. rate 16, height 6' (1.829 m), weight 108 kg (239 lb), SpO2 98 %. ,Body mass index is 32.41 kg/m². Physical Exam  Vitals reviewed. Constitutional:       General: He is awake. Appearance: Normal appearance. HENT:      Head: Normocephalic and atraumatic. Eyes:      Conjunctiva/sclera: Conjunctivae normal.   Cardiovascular:      Rate and Rhythm: Normal rate. Pulmonary:      Effort: Pulmonary effort is normal.   Musculoskeletal:      Comments: Well healed spinal incision  Midline TTP in low back  Pain with ROM     Skin:     General: Skin is warm and dry. Neurological:      Mental Status: He is alert and oriented to person, place, and time. Gait: Gait is intact. Deep Tendon Reflexes:      Reflex Scores:       Patellar reflexes are 2+ on the right side and 2+ on the left side. Achilles reflexes are 2+ on the right side and 2+ on the left side. Psychiatric:         Attention and Perception: Attention and perception normal.         Mood and Affect: Mood and affect normal.         Speech: Speech normal.         Behavior: Behavior normal. Behavior is cooperative. Thought Content:  Thought content normal.         Cognition and Memory: Cognition and memory normal.         Judgment: Judgment normal.         Neurologic Exam     Mental Status   Oriented to person, place, and time. Follows 2 step commands. Attention: normal. Concentration: normal.   Speech: speech is normal   Level of consciousness: alert  Knowledge: good. Normal comprehension. Motor Exam   Muscle bulk: normal  Overall muscle tone: normal  BLE - 5/5     Sensory Exam   Right leg light touch: normal  Left leg light touch: normal    Gait, Coordination, and Reflexes     Gait  Gait: normal    Tremor   Resting tremor: absent  Intention tremor: absent  Action tremor: absent    Reflexes   Right patellar: 2+  Left patellar: 2+  Right achilles: 2+  Left achilles: 2+  Right ankle clonus: absent  Left ankle clonus: absent        MEDICAL DECISION MAKING    Imaging Studies:     No results found. I have personally reviewed pertinent reports.    and I have personally reviewed pertinent films in PACS

## 2023-09-28 ENCOUNTER — APPOINTMENT (OUTPATIENT)
Dept: PHYSICAL THERAPY | Facility: CLINIC | Age: 68
End: 2023-09-28
Payer: MEDICARE

## 2023-10-03 ENCOUNTER — HOSPITAL ENCOUNTER (OUTPATIENT)
Facility: HOSPITAL | Age: 68
Setting detail: OBSERVATION
Discharge: HOME | End: 2023-10-04
Attending: EMERGENCY MEDICINE | Admitting: INTERNAL MEDICINE
Payer: MEDICARE

## 2023-10-03 ENCOUNTER — APPOINTMENT (EMERGENCY)
Dept: RADIOLOGY | Facility: HOSPITAL | Age: 68
End: 2023-10-03
Payer: MEDICARE

## 2023-10-03 DIAGNOSIS — S12.120A CLOSED ODONTOID FRACTURE WITH TYPE III MORPHOLOGY, INITIAL ENCOUNTER (HCC): ICD-10-CM

## 2023-10-03 DIAGNOSIS — M54.50 ACUTE EXACERBATION OF CHRONIC LOW BACK PAIN: ICD-10-CM

## 2023-10-03 DIAGNOSIS — M54.16 LUMBAR RADICULOPATHY: Primary | ICD-10-CM

## 2023-10-03 DIAGNOSIS — G89.29 ACUTE EXACERBATION OF CHRONIC LOW BACK PAIN: ICD-10-CM

## 2023-10-03 DIAGNOSIS — I47.19 AVNRT (AV NODAL RE-ENTRY TACHYCARDIA): ICD-10-CM

## 2023-10-03 LAB
ANION GAP SERPL CALCULATED.3IONS-SCNC: 11 MMOL/L
ATRIAL RATE: 170 BPM
ATRIAL RATE: 75 BPM
BASOPHILS # BLD AUTO: 0.03 THOUSANDS/ÂΜL (ref 0–0.1)
BASOPHILS NFR BLD AUTO: 0 % (ref 0–1)
BUN SERPL-MCNC: 14 MG/DL (ref 5–25)
CALCIUM SERPL-MCNC: 9 MG/DL (ref 8.4–10.2)
CHLORIDE SERPL-SCNC: 103 MMOL/L (ref 96–108)
CO2 SERPL-SCNC: 22 MMOL/L (ref 21–32)
CREAT SERPL-MCNC: 0.82 MG/DL (ref 0.6–1.3)
EOSINOPHIL # BLD AUTO: 0.11 THOUSAND/ÂΜL (ref 0–0.61)
EOSINOPHIL NFR BLD AUTO: 1 % (ref 0–6)
ERYTHROCYTE [DISTWIDTH] IN BLOOD BY AUTOMATED COUNT: 13.5 % (ref 11.6–15.1)
GFR SERPL CREATININE-BSD FRML MDRD: 92 ML/MIN/1.73SQ M
GLUCOSE SERPL-MCNC: 84 MG/DL (ref 65–140)
HCT VFR BLD AUTO: 46.2 % (ref 36.5–49.3)
HGB BLD-MCNC: 15.2 G/DL (ref 12–17)
IMM GRANULOCYTES # BLD AUTO: 0.04 THOUSAND/UL (ref 0–0.2)
IMM GRANULOCYTES NFR BLD AUTO: 0 % (ref 0–2)
LYMPHOCYTES # BLD AUTO: 1.58 THOUSANDS/ÂΜL (ref 0.6–4.47)
LYMPHOCYTES NFR BLD AUTO: 18 % (ref 14–44)
MCH RBC QN AUTO: 27.1 PG (ref 26.8–34.3)
MCHC RBC AUTO-ENTMCNC: 32.9 G/DL (ref 31.4–37.4)
MCV RBC AUTO: 83 FL (ref 82–98)
MONOCYTES # BLD AUTO: 0.55 THOUSAND/ÂΜL (ref 0.17–1.22)
MONOCYTES NFR BLD AUTO: 6 % (ref 4–12)
NEUTROPHILS # BLD AUTO: 6.71 THOUSANDS/ÂΜL (ref 1.85–7.62)
NEUTS SEG NFR BLD AUTO: 75 % (ref 43–75)
NRBC BLD AUTO-RTO: 0 /100 WBCS
P AXIS: -88 DEGREES
P AXIS: 27 DEGREES
PLATELET # BLD AUTO: 301 THOUSANDS/UL (ref 149–390)
PMV BLD AUTO: 10.8 FL (ref 8.9–12.7)
POTASSIUM SERPL-SCNC: 3.9 MMOL/L (ref 3.5–5.3)
PR INTERVAL: 148 MS
PR INTERVAL: 150 MS
QRS AXIS: 71 DEGREES
QRS AXIS: 87 DEGREES
QRSD INTERVAL: 84 MS
QRSD INTERVAL: 86 MS
QT INTERVAL: 268 MS
QT INTERVAL: 356 MS
QTC INTERVAL: 397 MS
QTC INTERVAL: 450 MS
RBC # BLD AUTO: 5.6 MILLION/UL (ref 3.88–5.62)
SODIUM SERPL-SCNC: 136 MMOL/L (ref 135–147)
T WAVE AXIS: -59 DEGREES
T WAVE AXIS: -85 DEGREES
VENTRICULAR RATE: 170 BPM
VENTRICULAR RATE: 75 BPM
WBC # BLD AUTO: 9.02 THOUSAND/UL (ref 4.31–10.16)

## 2023-10-03 PROCEDURE — 93010 ELECTROCARDIOGRAM REPORT: CPT | Performed by: INTERNAL MEDICINE

## 2023-10-03 PROCEDURE — 99285 EMERGENCY DEPT VISIT HI MDM: CPT | Performed by: EMERGENCY MEDICINE

## 2023-10-03 PROCEDURE — 96375 TX/PRO/DX INJ NEW DRUG ADDON: CPT

## 2023-10-03 PROCEDURE — 36415 COLL VENOUS BLD VENIPUNCTURE: CPT

## 2023-10-03 PROCEDURE — 96374 THER/PROPH/DIAG INJ IV PUSH: CPT

## 2023-10-03 PROCEDURE — G1004 CDSM NDSC: HCPCS

## 2023-10-03 PROCEDURE — 99284 EMERGENCY DEPT VISIT MOD MDM: CPT

## 2023-10-03 PROCEDURE — 93005 ELECTROCARDIOGRAM TRACING: CPT

## 2023-10-03 PROCEDURE — 85025 COMPLETE CBC W/AUTO DIFF WBC: CPT

## 2023-10-03 PROCEDURE — 80048 BASIC METABOLIC PNL TOTAL CA: CPT

## 2023-10-03 PROCEDURE — 72132 CT LUMBAR SPINE W/DYE: CPT

## 2023-10-03 RX ORDER — GABAPENTIN 300 MG/1
300 CAPSULE ORAL
Status: DISCONTINUED | OUTPATIENT
Start: 2023-10-03 | End: 2023-10-04

## 2023-10-03 RX ORDER — ENOXAPARIN SODIUM 100 MG/ML
40 INJECTION SUBCUTANEOUS DAILY
Status: DISCONTINUED | OUTPATIENT
Start: 2023-10-04 | End: 2023-10-04 | Stop reason: HOSPADM

## 2023-10-03 RX ORDER — METOPROLOL TARTRATE 5 MG/5ML
5 INJECTION INTRAVENOUS EVERY 6 HOURS PRN
Status: DISCONTINUED | OUTPATIENT
Start: 2023-10-03 | End: 2023-10-04 | Stop reason: HOSPADM

## 2023-10-03 RX ORDER — SULFASALAZINE 500 MG/1
1000 TABLET ORAL 2 TIMES DAILY
Status: DISCONTINUED | OUTPATIENT
Start: 2023-10-03 | End: 2023-10-04 | Stop reason: HOSPADM

## 2023-10-03 RX ORDER — HYDROCODONE BITARTRATE AND ACETAMINOPHEN 5; 325 MG/1; MG/1
1 TABLET ORAL EVERY 6 HOURS PRN
Status: DISCONTINUED | OUTPATIENT
Start: 2023-10-03 | End: 2023-10-04 | Stop reason: HOSPADM

## 2023-10-03 RX ORDER — EZETIMIBE 10 MG/1
10 TABLET ORAL DAILY
Status: DISCONTINUED | OUTPATIENT
Start: 2023-10-04 | End: 2023-10-04 | Stop reason: HOSPADM

## 2023-10-03 RX ORDER — LIDOCAINE 50 MG/G
1 PATCH TOPICAL ONCE
Status: COMPLETED | OUTPATIENT
Start: 2023-10-03 | End: 2023-10-04

## 2023-10-03 RX ORDER — ZOLPIDEM TARTRATE 5 MG/1
10 TABLET ORAL
Status: DISCONTINUED | OUTPATIENT
Start: 2023-10-03 | End: 2023-10-04 | Stop reason: HOSPADM

## 2023-10-03 RX ORDER — DIAZEPAM 10 MG/1
10 TABLET ORAL 2 TIMES DAILY
Qty: 8 TABLET | Refills: 0 | Status: SHIPPED | OUTPATIENT
Start: 2023-10-03

## 2023-10-03 RX ORDER — ACETAMINOPHEN 325 MG/1
975 TABLET ORAL ONCE
Status: COMPLETED | OUTPATIENT
Start: 2023-10-03 | End: 2023-10-03

## 2023-10-03 RX ORDER — DIAZEPAM 5 MG/ML
5 INJECTION, SOLUTION INTRAMUSCULAR; INTRAVENOUS ONCE
Status: COMPLETED | OUTPATIENT
Start: 2023-10-03 | End: 2023-10-03

## 2023-10-03 RX ORDER — HYDROCODONE BITARTRATE AND ACETAMINOPHEN 5; 325 MG/1; MG/1
2 TABLET ORAL EVERY 6 HOURS PRN
Status: DISCONTINUED | OUTPATIENT
Start: 2023-10-03 | End: 2023-10-04 | Stop reason: HOSPADM

## 2023-10-03 RX ORDER — DIAZEPAM 5 MG/1
10 TABLET ORAL ONCE
Status: COMPLETED | OUTPATIENT
Start: 2023-10-03 | End: 2023-10-03

## 2023-10-03 RX ORDER — DIAZEPAM 5 MG/ML
5 INJECTION, SOLUTION INTRAMUSCULAR; INTRAVENOUS EVERY 6 HOURS PRN
Status: DISCONTINUED | OUTPATIENT
Start: 2023-10-03 | End: 2023-10-04 | Stop reason: HOSPADM

## 2023-10-03 RX ORDER — ONDANSETRON 2 MG/ML
4 INJECTION INTRAMUSCULAR; INTRAVENOUS EVERY 6 HOURS PRN
Status: DISCONTINUED | OUTPATIENT
Start: 2023-10-03 | End: 2023-10-04 | Stop reason: HOSPADM

## 2023-10-03 RX ORDER — MAGNESIUM HYDROXIDE/ALUMINUM HYDROXICE/SIMETHICONE 120; 1200; 1200 MG/30ML; MG/30ML; MG/30ML
30 SUSPENSION ORAL EVERY 6 HOURS PRN
Status: DISCONTINUED | OUTPATIENT
Start: 2023-10-03 | End: 2023-10-04 | Stop reason: HOSPADM

## 2023-10-03 RX ORDER — KETOROLAC TROMETHAMINE 30 MG/ML
15 INJECTION, SOLUTION INTRAMUSCULAR; INTRAVENOUS ONCE
Status: COMPLETED | OUTPATIENT
Start: 2023-10-03 | End: 2023-10-03

## 2023-10-03 RX ORDER — HYDROMORPHONE HCL/PF 1 MG/ML
1 SYRINGE (ML) INJECTION ONCE
Status: COMPLETED | OUTPATIENT
Start: 2023-10-03 | End: 2023-10-03

## 2023-10-03 RX ORDER — DOCUSATE SODIUM 100 MG/1
100 CAPSULE, LIQUID FILLED ORAL 2 TIMES DAILY
Status: DISCONTINUED | OUTPATIENT
Start: 2023-10-03 | End: 2023-10-04 | Stop reason: HOSPADM

## 2023-10-03 RX ORDER — ACETAMINOPHEN 325 MG/1
975 TABLET ORAL EVERY 8 HOURS PRN
Status: DISCONTINUED | OUTPATIENT
Start: 2023-10-03 | End: 2023-10-03

## 2023-10-03 RX ORDER — METOPROLOL TARTRATE 5 MG/5ML
5 INJECTION INTRAVENOUS ONCE
Status: DISCONTINUED | OUTPATIENT
Start: 2023-10-03 | End: 2023-10-03

## 2023-10-03 RX ORDER — HYDROMORPHONE HCL/PF 1 MG/ML
0.5 SYRINGE (ML) INJECTION EVERY 4 HOURS PRN
Status: DISCONTINUED | OUTPATIENT
Start: 2023-10-03 | End: 2023-10-04 | Stop reason: HOSPADM

## 2023-10-03 RX ADMIN — DIAZEPAM 10 MG: 5 TABLET ORAL at 17:56

## 2023-10-03 RX ADMIN — DOCUSATE SODIUM 100 MG: 100 CAPSULE, LIQUID FILLED ORAL at 21:40

## 2023-10-03 RX ADMIN — HYDROMORPHONE HYDROCHLORIDE 1 MG: 1 INJECTION, SOLUTION INTRAMUSCULAR; INTRAVENOUS; SUBCUTANEOUS at 16:05

## 2023-10-03 RX ADMIN — Medication 12.5 MG: at 18:25

## 2023-10-03 RX ADMIN — KETOROLAC TROMETHAMINE 15 MG: 30 INJECTION, SOLUTION INTRAMUSCULAR; INTRAVENOUS at 14:55

## 2023-10-03 RX ADMIN — ZOLPIDEM TARTRATE 10 MG: 5 TABLET ORAL at 23:48

## 2023-10-03 RX ADMIN — DIAZEPAM 5 MG: 5 INJECTION INTRAMUSCULAR; INTRAVENOUS at 14:54

## 2023-10-03 RX ADMIN — IOHEXOL 100 ML: 350 INJECTION, SOLUTION INTRAVENOUS at 17:47

## 2023-10-03 RX ADMIN — HYDROMORPHONE HYDROCHLORIDE 0.5 MG: 1 INJECTION, SOLUTION INTRAMUSCULAR; INTRAVENOUS; SUBCUTANEOUS at 23:13

## 2023-10-03 RX ADMIN — HYDROCODONE BITARTRATE AND ACETAMINOPHEN 1 TABLET: 5; 325 TABLET ORAL at 21:40

## 2023-10-03 RX ADMIN — GABAPENTIN 300 MG: 300 CAPSULE ORAL at 21:40

## 2023-10-03 RX ADMIN — SULFASALAZINE 1000 MG: 500 TABLET ORAL at 21:41

## 2023-10-03 RX ADMIN — LIDOCAINE 1 PATCH: 50 PATCH CUTANEOUS at 14:55

## 2023-10-03 RX ADMIN — ACETAMINOPHEN 975 MG: 325 TABLET, FILM COATED ORAL at 14:54

## 2023-10-03 NOTE — ED NOTES
Patient ambulatory to restroom w/o assistance. Gait steady and appropriate.       Lang Rodriguez IV, RN  10/03/23 [id number]

## 2023-10-03 NOTE — ED ATTENDING ATTESTATION
10/3/2023  Sarah Huber DO, saw and evaluated the patient. I have discussed the patient with the resident/non-physician practitioner and agree with the resident's/non-physician practitioner's findings, Plan of Care, and MDM as documented in the resident's/non-physician practitioner's note, except where noted. All available labs and Radiology studies were reviewed. I was present for key portions of any procedure(s) performed by the resident/non-physician practitioner and I was immediately available to provide assistance. At this point I agree with the current assessment done in the Emergency Department. I have conducted an independent evaluation of this patient a history and physical is as follows:    80 yo male presents for evaluation of lower back pain at site of prior lumbar laminectomy and fusion. Surgery was performed 2 years ago. He recently had a shoulder arthroscopy and has been having difficulty sleeping due to pain. No recent trauma/fall to trigger lower back pain. Denies new focal weakness/numbness/tingling in legs, feet. No urinary sxs, no bowel sxs. No fever. He does report occasional palpitations when his pain is more intense. No dyspnea, lightheadedness. He does get fatigued when he has these episodes of palpitations. Imp: lumbago without evidence of PÉREZ or myelopathy. Palpitations - PAVNRT plan: maintain on tele, MMA for lumbago. Reassess.  May recommend low dose beta blocker for [facility]        ED Course  ED Course as of 10/03/23 1516   Tue Oct 03, 2023   1515 Procedure Note: EKG  Date/Time: 10/03/23 3:14 PM   Interpreted by: Waqas Chance   Indications / Diagnosis: palpitations  ECG reviewed by me, the ED Provider: yes   The EKG demonstrates:  Rhythm: AVNRT  Intervals: normal QRS  Axis: normal axis  QRS/Blocks: narrow QRS  ST Changes: No acute ST Changes, no STD/MONSE.    AVNRT         Critical Care Time  Procedures

## 2023-10-03 NOTE — ED NOTES
Pt heart rate in 160's when this RN answered call Gladies Guess - Dr. Wilbert Cabrera made aware.  Pt A&O x 3.      Kavita Dubon RN  10/03/23 [facility] Grace Krueger RN  10/03/23 [id number]

## 2023-10-03 NOTE — ED NOTES
Pt rang call bell to use the bathroom. Upon arrival into pt room pt monitor showed HR in 160's-170's. Pt asymptomatic at this time. EKG shot. Dr Pérez Ford made aware.       Odalys Guzmán RN  10/03/23 [id number]

## 2023-10-04 ENCOUNTER — TELEPHONE (OUTPATIENT)
Dept: NEUROSURGERY | Facility: CLINIC | Age: 68
End: 2023-10-04

## 2023-10-04 ENCOUNTER — TELEPHONE (OUTPATIENT)
Age: 68
End: 2023-10-04

## 2023-10-04 VITALS
DIASTOLIC BLOOD PRESSURE: 99 MMHG | TEMPERATURE: 98 F | SYSTOLIC BLOOD PRESSURE: 147 MMHG | RESPIRATION RATE: 20 BRPM | OXYGEN SATURATION: 95 % | HEART RATE: 80 BPM

## 2023-10-04 LAB
ALBUMIN SERPL BCP-MCNC: 3.6 G/DL (ref 3.5–5)
ALP SERPL-CCNC: 48 U/L (ref 34–104)
ALT SERPL W P-5'-P-CCNC: 6 U/L (ref 7–52)
ANION GAP SERPL CALCULATED.3IONS-SCNC: 6 MMOL/L
AST SERPL W P-5'-P-CCNC: 12 U/L (ref 13–39)
BASOPHILS # BLD AUTO: 0.05 THOUSANDS/ÂΜL (ref 0–0.1)
BASOPHILS NFR BLD AUTO: 1 % (ref 0–1)
BILIRUB SERPL-MCNC: 0.53 MG/DL (ref 0.2–1)
BUN SERPL-MCNC: 15 MG/DL (ref 5–25)
CALCIUM SERPL-MCNC: 8.5 MG/DL (ref 8.4–10.2)
CHLORIDE SERPL-SCNC: 106 MMOL/L (ref 96–108)
CO2 SERPL-SCNC: 25 MMOL/L (ref 21–32)
CREAT SERPL-MCNC: 0.94 MG/DL (ref 0.6–1.3)
EOSINOPHIL # BLD AUTO: 0.34 THOUSAND/ÂΜL (ref 0–0.61)
EOSINOPHIL NFR BLD AUTO: 5 % (ref 0–6)
ERYTHROCYTE [DISTWIDTH] IN BLOOD BY AUTOMATED COUNT: 13.6 % (ref 11.6–15.1)
GFR SERPL CREATININE-BSD FRML MDRD: 84 ML/MIN/1.73SQ M
GLUCOSE P FAST SERPL-MCNC: 86 MG/DL (ref 65–99)
GLUCOSE SERPL-MCNC: 86 MG/DL (ref 65–140)
HCT VFR BLD AUTO: 47.3 % (ref 36.5–49.3)
HGB BLD-MCNC: 15 G/DL (ref 12–17)
IMM GRANULOCYTES # BLD AUTO: 0.03 THOUSAND/UL (ref 0–0.2)
IMM GRANULOCYTES NFR BLD AUTO: 0 % (ref 0–2)
LYMPHOCYTES # BLD AUTO: 2.62 THOUSANDS/ÂΜL (ref 0.6–4.47)
LYMPHOCYTES NFR BLD AUTO: 34 % (ref 14–44)
MAGNESIUM SERPL-MCNC: 2.4 MG/DL (ref 1.9–2.7)
MCH RBC QN AUTO: 28.2 PG (ref 26.8–34.3)
MCHC RBC AUTO-ENTMCNC: 31.7 G/DL (ref 31.4–37.4)
MCV RBC AUTO: 89 FL (ref 82–98)
MONOCYTES # BLD AUTO: 0.56 THOUSAND/ÂΜL (ref 0.17–1.22)
MONOCYTES NFR BLD AUTO: 7 % (ref 4–12)
NEUTROPHILS # BLD AUTO: 4.03 THOUSANDS/ÂΜL (ref 1.85–7.62)
NEUTS SEG NFR BLD AUTO: 53 % (ref 43–75)
NRBC BLD AUTO-RTO: 0 /100 WBCS
PLATELET # BLD AUTO: 264 THOUSANDS/UL (ref 149–390)
PMV BLD AUTO: 10.4 FL (ref 8.9–12.7)
POTASSIUM SERPL-SCNC: 4.9 MMOL/L (ref 3.5–5.3)
PROT SERPL-MCNC: 6.5 G/DL (ref 6.4–8.4)
RBC # BLD AUTO: 5.32 MILLION/UL (ref 3.88–5.62)
SODIUM SERPL-SCNC: 137 MMOL/L (ref 135–147)
WBC # BLD AUTO: 7.63 THOUSAND/UL (ref 4.31–10.16)

## 2023-10-04 PROCEDURE — 80053 COMPREHEN METABOLIC PANEL: CPT | Performed by: INTERNAL MEDICINE

## 2023-10-04 PROCEDURE — 99222 1ST HOSP IP/OBS MODERATE 55: CPT | Performed by: PHYSICIAN ASSISTANT

## 2023-10-04 PROCEDURE — 97162 PT EVAL MOD COMPLEX 30 MIN: CPT

## 2023-10-04 PROCEDURE — 85025 COMPLETE CBC W/AUTO DIFF WBC: CPT | Performed by: INTERNAL MEDICINE

## 2023-10-04 PROCEDURE — 83735 ASSAY OF MAGNESIUM: CPT | Performed by: INTERNAL MEDICINE

## 2023-10-04 PROCEDURE — 99236 HOSP IP/OBS SAME DATE HI 85: CPT | Performed by: INTERNAL MEDICINE

## 2023-10-04 PROCEDURE — 97166 OT EVAL MOD COMPLEX 45 MIN: CPT

## 2023-10-04 PROCEDURE — NC001 PR NO CHARGE: Performed by: FAMILY MEDICINE

## 2023-10-04 RX ORDER — GABAPENTIN 300 MG/1
300 CAPSULE ORAL 3 TIMES DAILY
Qty: 90 CAPSULE | Refills: 0 | Status: SHIPPED | OUTPATIENT
Start: 2023-10-04 | End: 2023-10-04 | Stop reason: SDUPTHER

## 2023-10-04 RX ORDER — METHOCARBAMOL 500 MG/1
500 TABLET, FILM COATED ORAL 3 TIMES DAILY
Qty: 90 TABLET | Refills: 0 | Status: SHIPPED | OUTPATIENT
Start: 2023-10-04 | End: 2023-10-04 | Stop reason: SDUPTHER

## 2023-10-04 RX ORDER — GABAPENTIN 300 MG/1
300 CAPSULE ORAL 3 TIMES DAILY
Qty: 90 CAPSULE | Refills: 0 | Status: SHIPPED | OUTPATIENT
Start: 2023-10-04

## 2023-10-04 RX ORDER — METHOCARBAMOL 500 MG/1
500 TABLET, FILM COATED ORAL 3 TIMES DAILY
Qty: 90 TABLET | Refills: 0 | Status: SHIPPED | OUTPATIENT
Start: 2023-10-04 | End: 2023-10-06 | Stop reason: SDUPTHER

## 2023-10-04 RX ORDER — GABAPENTIN 300 MG/1
300 CAPSULE ORAL 3 TIMES DAILY
Status: DISCONTINUED | OUTPATIENT
Start: 2023-10-04 | End: 2023-10-04 | Stop reason: HOSPADM

## 2023-10-04 RX ORDER — ZOLPIDEM TARTRATE 5 MG/1
5 TABLET ORAL
Qty: 5 TABLET | Refills: 0 | Status: SHIPPED | OUTPATIENT
Start: 2023-10-04 | End: 2023-10-06 | Stop reason: SDUPTHER

## 2023-10-04 RX ADMIN — HYDROMORPHONE HYDROCHLORIDE 0.5 MG: 1 INJECTION, SOLUTION INTRAMUSCULAR; INTRAVENOUS; SUBCUTANEOUS at 15:18

## 2023-10-04 RX ADMIN — DIAZEPAM 5 MG: 5 INJECTION INTRAMUSCULAR; INTRAVENOUS at 10:26

## 2023-10-04 RX ADMIN — Medication 2 G: at 12:06

## 2023-10-04 RX ADMIN — EZETIMIBE 10 MG: 10 TABLET ORAL at 08:23

## 2023-10-04 RX ADMIN — ENOXAPARIN SODIUM 40 MG: 40 INJECTION SUBCUTANEOUS at 08:23

## 2023-10-04 RX ADMIN — DOCUSATE SODIUM 100 MG: 100 CAPSULE, LIQUID FILLED ORAL at 08:23

## 2023-10-04 RX ADMIN — HYDROCODONE BITARTRATE AND ACETAMINOPHEN 2 TABLET: 5; 325 TABLET ORAL at 05:46

## 2023-10-04 RX ADMIN — HYDROMORPHONE HYDROCHLORIDE 0.5 MG: 1 INJECTION, SOLUTION INTRAMUSCULAR; INTRAVENOUS; SUBCUTANEOUS at 08:28

## 2023-10-04 RX ADMIN — SULFASALAZINE 1000 MG: 500 TABLET ORAL at 08:24

## 2023-10-04 RX ADMIN — HYDROCODONE BITARTRATE AND ACETAMINOPHEN 2 TABLET: 5; 325 TABLET ORAL at 13:52

## 2023-10-04 NOTE — DISCHARGE SUMMARY
[address] [facility]  Discharge- Therese Johnsonar 1955, 79 y.o. male MRN: [id number]  Unit/Bed#: [facility] 876-23 Encounter: [id number]  Primary Care Provider: Alayna Carrizales MD   Date and time admitted to hospital: 10/3/2023  2:20 PM    * Lumbar radiculopathy  Assessment & Plan  · Patient with status post back surgery care of neurosurgery (Dr. Marques Poon) 5 years ago. · CT scan shows presence of foraminal stenosis at the lumbosacral area. · Neurosurgery consult appreciated. Does not recommend inpatient MRI. · As per neurosurgery, does not need any immediate surgical intervention. Recommend multimodal pain regimen and outpatient follow-up with pain management. · Increasing gabapentin dose to 300 mg 3 times daily, adding Robaxin  · Patient already has an appointment with pain management at 10/13/2023  · As per therapy team, no rehab need. On my exam patient is ambulating around the hallway without any distress  · We will discharge patient home    [address] [place] [address]  Patient currently on Valium for both anxiety and back spasms. According to patient this has helped his anxiety. Also reports of significant insomnia secondary to back pain, unable to sleep for the last 4 nights. Rx sent for Ambien 5 mg #5 pills. Patient educated, for further refills, need to follow-up with PCP. Has an appointment with PCP in 10/6/2023    Hypertension  Assessment & Plan  He is not on any outpatient medications. Initially had reentrant tachycardia probably caused by pain; was given metoprolol x1 currently sinus rhythm. Tachycardia was secondary to pain and anxiety, no further episode. BP and heart rate stable off of any meds    Ulcerative colitis ([address])  Assessment & Plan  Patient is on Colazal 750 mg 3 tablets twice daily.   (Substituted with sulfasalazine 1 g twice daily.)      Discharging Physician / Practitioner: Mercy Jose MD  PCP: Alayna Carrizales MD  Admission Date:   Admission Orders (From admission, onward)     Ordered        10/03/23 2011  Place in Observation  Once                      Discharge Date: 10/04/23    Medical Problems     Resolved Problems  Date Reviewed: 10/4/2023   None         Consultations During Hospital Stay:  · neurosurgery    Procedures Performed:   · none    Significant Findings / Test Results:   CT L Spine: IMPRESSION:     1.  Redemonstrated posterior and interbody instrumented fusion at level L3-4.  2.  No acute osseous abnormality. No definite CT evidence of infectious process. Please note that cannot evaluate the canal at the postsurgical level L3-4 due to artifactually distorted image quality related to surgical hardware. 3.  Degenerative change as detailed without high-grade canal stenosis. 4.  Multilevel foraminal stenosis as described; severe stenosis at right L1-2, right L2-3, and left L5-S1. Moderate to severe stenosis at left L4-5. Incidental Findings:   · none        Reason for Admission: back pain    Hospital Course:     Rizwan Garcia is a 79 y.o. male patient who originally presented to the hospital on 10/3/2023 with history of lumbar radiculopathy status post laminectomy by neurosurgery Dr. Kashmir Amor 5 years ago, presented to ER with complaint of ongoing low back pain. Patient reported he has chronic back pain for ongoing 3 years now however worsening recently unable to sleep on his back. Denies any pain radiating to the lower extremities, denies any weakness, numbness or tingling of the extremities. Denies any bowel, or bladder incontinence. In ER, CT lumbar spine shows multilevel foraminal stenosis, degenerative changes. Patient admitted for intractable pain. Neurosurgery was consulted. They do not recommend obtaining MRI, they do not recommend any acute surgical intervention. They recommend multimodal pain regimen and follow-up with pain management. Patient started on Robaxin, increased dose of gabapentin.   Patient also started on Valium for significant anxiety associated with pain. Patient already has an appointment with pain management on 10/13/2023. Patient is currently ambulating around the hallway without any assistance, does not appear to be in distress. Does not need any rehab. Patient is clear for discharge. Please see above list of diagnoses and related plan for additional information. Condition at Discharge: good     Discharge Day Visit / Exam:     Subjective:    Patient continues to have back pain. However able to ambulate around the hallway. Vitals: Blood Pressure: 147/99 (10/04/23 1502)  Pulse: 80 (10/04/23 1502)  Temperature: 98 °F (36.7 °C) (10/04/23 1502)  Temp Source: Oral (10/04/23 1034)  Respirations: 20 (10/04/23 1034)  SpO2: 95 % (10/04/23 1502)  Exam:   Physical Exam  Vitals and nursing note reviewed. Constitutional:       General: He is not in acute distress. Appearance: Normal appearance. HENT:      Head: Normocephalic and atraumatic. Right Ear: External ear normal.      Left Ear: External ear normal.      Nose: Nose normal.   Eyes:      Extraocular Movements: Extraocular movements intact. Pulmonary:      Effort: Pulmonary effort is normal.   Musculoskeletal:      Cervical back: Normal range of motion. Neurological:      Mental Status: He is alert. Mental status is at baseline. Psychiatric:         Mood and Affect: Mood normal.           Discharge instructions/Information to patient and family:   See after visit summary for information provided to patient and family. Provisions for Follow-Up Care:  See after visit summary for information related to follow-up care and any pertinent home health orders. Disposition:     Home       Planned Readmission: no     Discharge Statement:  I spent 45 minutes discharging the patient. This time was spent on the day of discharge. I had direct contact with the patient on the day of discharge.  Greater than 50% of the total time was spent examining patient, answering all patient questions, arranging and discussing plan of care with patient as well as directly providing post-discharge instructions. Additional time then spent on discharge activities. Discharge Medications:  See after visit summary for reconciled discharge medications provided to patient and family.       ** Please Note: This note has been constructed using a voice recognition system **

## 2023-10-04 NOTE — ASSESSMENT & PLAN NOTE
He is not on any outpatient medications. Initially had reentrant tachycardia probably caused by pain; was given metoprolol x1 currently sinus rhythm. Patient is on Lopressor 5 mg every 6 hours if heart rate greater than 120. Telemetry.

## 2023-10-04 NOTE — CONSULTS
Consultation - Neurosurgery   [facility] 79 y.o. male MRN: [de-identified]  Unit/Bed#: Naina Vargas 363-04 Encounter: [id number]      Inpatient consult to Neurosurgery  Consult performed by: Rochelle Gibson PA-C  Consult ordered by: Sylvain Su MD          Assessment/Plan     Assessment:  1. H/o L3-4 TLIF with Dr. Kathy Sanchez 1/20/2016  2. S/p shoulder arthroscopy with orthopedics 7/26/2023  3. Long standing narcotic use   4. Chronic pain syndrome   5. Peripheral neuropathy     Plan:  · Exam no focal deficits  · Imaging reviewed personally and by attending. Final results as below  · X-ray lumbar spine 9/27/2023: Postoperative changes status post L3-4 fusion without evidence of hardware application  · CT lumbar spine 10/3/2023: Anterior fusion at L3-4 with interbody graft. No acute osseous abnormality. No evidence of high-grade central canal stenosis. Multilevel foraminal disease most pronounced on the right at L1-2 and L2-3 and on the left L4-5 and L5-S1. · Pain control per primary team   · Continue multimodal regimen   · Consider increase in gabapentin, as well as addition of scheduled muscle relaxer   · Recommend PM referral at [place] to establish care with singular provider to assist with managing pain medication regimen  · Mobilize as tolerated- No ambulatory concerns. Ambulating independently in hallway   · DVT PPX: SCD's. lovenox   · Continue medical management   · Patient has isolated axial back pain without neurologic features of radicular pains, weakness or sensation deficits. He has chronic R foot pain  · No indication for MRI imaging at this time. · Continue conservative measures. Can consider outpatient follow up as scheduled should symptoms persist or progressively worsen despite therapies. Consider evaluation with deformity surgeon if still persistent at that time  · Neurosurgery will sign off.      History of Present Illness   HPI: Werner Smith is a 79 y.o. male with PMH including ulcerative colitis, chronic low back pain, history of spinal fusion who presents with complaint of low back pain. Patient states in July he underwent shoulder arthroscopy and was placed in a sling for 6 weeks. He has not been out of the sling for the past 2 to 3 weeks. During that time he was sleeping primarily in a recliner and states he felt as though he was off centered leaning frequently towards his right side. Since his shoulder surgery and abnormal sleeping positions he has been complaining of worsening low back pain. He denies any radiation of the lower extremities however does also complain of right foot pain. Per previous documentation this foot pain was evaluated and was deemed to be likely related to a peripheral neuropathy and was recommended to see a neurology based on documentation from 2021 with Dr. Lazara Haji. Patient has a history of an L3-4 TLIF with Dr. Lazara Haji in 2016. Patient states immediately following surgery he had significant relief of his symptoms for 2 to 3 years. He then began to have some recurrence of his back pain and was being managed conservatively with oral narcotics for several years. He is up to significant dosages over supporting some alterations in his mentation surrounding his dosing. He was able to wean off but states since that time he has had uncontrolled pain and dysfunction. He denies any urinary symptoms. Denies any bowel issues. He denies any weakness. States he does frequently think about his pain and the anticipation of pain developing which causes him anxiety and worsening pain at that time. He also reports significant difficulty with sleeping. Patient was standing and ambulating in his room independently. Review of Systems   Constitutional: Positive for activity change. Negative for fatigue. Respiratory: Negative. Cardiovascular: Negative. Gastrointestinal: Negative. Musculoskeletal: Positive for back pain. Negative for gait problem and neck pain.    Neurological: Negative for dizziness, speech difficulty, weakness, numbness and headaches. Psychiatric/Behavioral: Positive for sleep disturbance. Negative for agitation, behavioral problems and confusion. The patient is nervous/anxious. Historical Information   Past Medical History:   Diagnosis Date   • Back pain    • Colon polyp    • Hyperlipidemia    • Lumbar fracture with cord injury ([address])    • Neck fracture ([address])    • Previous back surgery     rods in the back   • Ulcerative colitis [facility])      Past Surgical History:   Procedure Laterality Date   • BACK SURGERY     • LUMBAR FUSION Right 1/20/2016    Procedure: FUSION LUMBAR  POSTERIOR, TLIF L3-4  Right L3-4 Fascet;  Surgeon: Estuardo Espinosa MD;  Location:  MAIN OR;  Service:    • DE COLONOSCOPY FLX DX W/COLLJ SPEC WHEN PFRMD N/A 1/24/2019    Procedure: COLONOSCOPY;  Surgeon: Modesta Rashid MD;  Location: [facility];   Service: Gastroenterology   • DE SURGICAL ARTHROSCOPY SHOULDER W/ROTATOR CUFF RPR Right 7/26/2023    Procedure: ARTHROSCOPY SHOULDER- Right shoulder Arthroscopy, supraspinatus and subscapularis repair, biceps tenotomy, extensive debridement;  Surgeon: Yvonne Hurt DO;  Location: [facility];  Service: Orthopedics   • TONSILLECTOMY       Social History     Substance and Sexual Activity   Alcohol Use Not Currently    Comment: rarely     Social History     Substance and Sexual Activity   Drug Use No     Social History     Tobacco Use   Smoking Status Never   Smokeless Tobacco Never     Family History   Problem Relation Age of Onset   • Parkinsonism Mother    • Lung cancer Father        Meds/Allergies   all current active meds have been reviewed, current meds:   Current Facility-Administered Medications   Medication Dose Route Frequency   • aluminum-magnesium hydroxide-simethicone (MAALOX) oral suspension 30 mL  30 mL Oral Q6H PRN   • diazepam (VALIUM) injection 5 mg  5 mg Intravenous Q6H PRN   • Diclofenac Sodium (VOLTAREN) 1 % topical gel 2 g 2 g Topical 4x Daily   • docusate sodium (COLACE) capsule 100 mg  100 mg Oral BID   • enoxaparin (LOVENOX) subcutaneous injection 40 mg  40 mg Subcutaneous Daily   • ezetimibe (ZETIA) tablet 10 mg  10 mg Oral Daily   • gabapentin (NEURONTIN) capsule 300 mg  300 mg Oral HS   • HYDROcodone-acetaminophen (NORCO) 5-325 mg per tablet 1 tablet  1 tablet Oral Q6H PRN   • HYDROcodone-acetaminophen (NORCO) 5-325 mg per tablet 2 tablet  2 tablet Oral Q6H PRN   • HYDROmorphone (DILAUDID) injection 0.5 mg  0.5 mg Intravenous Q4H PRN   • metoprolol (LOPRESSOR) injection 5 mg  5 mg Intravenous Q6H PRN   • ondansetron (ZOFRAN) injection 4 mg  4 mg Intravenous Q6H PRN   • sulfaSALAzine (AZULFIDINE) tablet 1,000 mg  1,000 mg Oral BID   • zolpidem (AMBIEN) tablet 10 mg  10 mg Oral HS PRN    and PTA meds:   Prior to Admission Medications   Prescriptions Last Dose Informant Patient Reported? Taking? ALPRAZolam (XANAX) 0.25 mg tablet   No No   Sig: Take 1 tablet (0.25 mg total) by mouth 2 (two) times a day as needed for anxiety for up to 10 days   Patient not taking: Reported on 9/27/2023   Diclofenac Sodium (VOLTAREN) 1 %  Self Yes No   balsalazide (COLAZAL) 750 mg capsule  Self No No   Sig: Take 3 capsules (2,250 mg total) by mouth 2 (two) times a day   busPIRone (BUSPAR) 5 mg tablet  Self Yes No   Sig: Take 5 mg by mouth 2 (two) times a day   Patient not taking: Reported on 9/27/2023   ezetimibe (ZETIA) 10 mg tablet  Self Yes No   Sig: Take 10 mg by mouth daily   ibuprofen (MOTRIN) 800 mg tablet  Self No No   Sig: Take 1 tablet (800 mg total) by mouth every 8 (eight) hours as needed for mild pain Take 1 tablet by mouth 3 times daily for 3 days, then take as needed. Take with food and water. Discontinue if stomach upset occurs.    Patient not taking: Reported on 8/31/2023   meclizine (ANTIVERT) 12.5 MG tablet  Self Yes No   Sig: Take 12.5 mg by mouth Three times daily as needed   Patient not taking: Reported on 8/31/2023 methocarbamol (ROBAXIN) 500 mg tablet  Self Yes No   Sig: take 1 tablet by mouth four times a day if needed for muscle spasm   Patient not taking: Reported on 8/31/2023   ondansetron (ZOFRAN-ODT) 4 mg disintegrating tablet  Self No No   Sig: Take 1 tablet (4 mg total) by mouth every 6 (six) hours as needed for nausea or vomiting   Patient not taking: Reported on 9/27/2023   oxyCODONE (ROXICODONE) 15 mg immediate release tablet   Yes No   oxyCODONE (ROXICODONE) 30 MG immediate release tablet  Self Yes No   Sig: oxycodone 30 mg tablet   Patient not taking: Reported on 8/31/2023   oxyCODONE-acetaminophen (Percocet)  mg per tablet  Self No No   Sig: Take 1 tablet by mouth every 4 (four) hours as needed for moderate pain Max Daily Amount: 6 tablets   Patient not taking: Reported on 9/27/2023   polyethylene glycol (GaviLyte-C) 4000 mL solution   No No   Sig: Take 4,000 mL by mouth once for 1 dose   temazepam (RESTORIL) 15 mg capsule  Self Yes No   Sig: take 1 capsule by mouth nightly if needed for sleep   Patient not taking: Reported on 9/27/2023   traZODone (DESYREL) 50 mg tablet  Self Yes No   Sig: Take 25 mg by mouth   Patient not taking: Reported on 8/31/2023   zolpidem (AMBIEN) 10 mg tablet  Self No No   Sig: Take 1 tablet (10 mg total) by mouth daily at bedtime as needed for sleep for up to 10 days      Facility-Administered Medications: None     Allergies   Allergen Reactions   • No Active Allergies        Objective   I/O       10/02 0701  10/03 0700 10/03 0701  10/04 0700 10/04 0701  10/05 0700    P. O.   600    Total Intake   600    Net   +600           Unmeasured Urine Occurrence   2 x          Physical Exam  Constitutional:       Appearance: He is well-developed. HENT:      Head: Normocephalic. Eyes:      Extraocular Movements: Extraocular movements intact and EOM normal.      Pupils: Pupils are equal, round, and reactive to light. Neck:      Vascular: No JVD.    Cardiovascular:      Rate and Rhythm: Normal rate. Pulmonary:      Effort: Pulmonary effort is normal.   Abdominal:      Palpations: Abdomen is soft. Musculoskeletal:         General: Tenderness (no significant tenderness to palpation of the low back ) present. No deformity. Normal range of motion. Cervical back: Normal range of motion and neck supple. Skin:     General: Skin is warm and dry. Neurological:      Mental Status: He is alert and oriented to person, place, and time. Cranial Nerves: No cranial nerve deficit. Gait: Gait is intact. Deep Tendon Reflexes: Reflexes are normal and symmetric. Psychiatric:         Speech: Speech normal.         Behavior: Behavior normal.         Thought Content: Thought content normal.       Neurologic Exam     Mental Status   Oriented to person, place, and time. Attention: normal.   Speech: speech is normal   Level of consciousness: alert  Knowledge: good. Normal comprehension. Cranial Nerves     CN III, IV, VI   Pupils are equal, round, and reactive to light. Extraocular motions are normal.   Upgaze: normal  Downgaze: normal    CN VII   Facial expression full, symmetric. CN VIII   CN VIII normal.   Hearing: intact    Motor Exam   Muscle bulk: normal  Right arm tone: normal  Left arm tone: normal  Right leg tone: normal  Left leg tone: normalGrossly intact. Standing to ambulate in room without trouble. No focal weakness appreciated on testing      Sensory Exam   Light touch normal.     Gait, Coordination, and Reflexes     Gait  Gait: normal    Tremor   Resting tremor: absent  Action tremor: absent    Vitals:Blood pressure 120/74, pulse 74, temperature 98.7 °F (37.1 °C), temperature source Oral, resp. rate 20, SpO2 94 %. ,There is no height or weight on file to calculate BMI.      Lab Results:   Results from last 7 days   Lab Units 10/04/23  0557 10/03/23  1629   WBC Thousand/uL 7.63 9.02   HEMOGLOBIN g/dL 15.0 15.2   HEMATOCRIT % 47.3 46.2   PLATELETS Thousands/uL 264 301   NEUTROS PCT % 53 75   MONOS PCT % 7 6   EOS PCT % 5 1     Results from last 7 days   Lab Units 10/04/23  0557 10/03/23  1629   POTASSIUM mmol/L 4.9 3.9   CHLORIDE mmol/L 106 103   CO2 mmol/L 25 22   BUN mg/dL 15 14   CREATININE mg/dL 0.94 0.82   CALCIUM mg/dL 8.5 9.0   ALK PHOS U/L 48  --    ALT U/L 6*  --    AST U/L 12*  --      Results from last 7 days   Lab Units 10/04/23  0557   MAGNESIUM mg/dL 2.4             No results found for: "TROPONINT"  ABG:No results found for: "PHART", "SBR4KIG", "PO2ART", "MUN4QJN", "N2ZZNFAA", "BEART", "SOURCE"    Imaging Studies: I have personally reviewed pertinent reports. and I have personally reviewed pertinent films in PACS    CT spine lumbar w contrast    Result Date: 10/3/2023  Impression: 1. Redemonstrated posterior and interbody instrumented fusion at level L3-4. 2.  No acute osseous abnormality. No definite CT evidence of infectious process. Please note that cannot evaluate the canal at the postsurgical level L3-4 due to artifactually distorted image quality related to surgical hardware. 3.  Degenerative change as detailed without high-grade canal stenosis. 4.  Multilevel foraminal stenosis as described; severe stenosis at right L1-2, right L2-3, and left L5-S1. Moderate to severe stenosis at left L4-5. Workstation performed: [id number]       EKG, Pathology, and Other Studies: I have personally reviewed pertinent reports. VTE Prophylaxis: Sequential compression device (Venodyne)  and Enoxaparin (Lovenox)    Code Status: Level 1 - Full Code  Advance Directive and Living Will:      Power of :    POLST:      Counseling / Coordination of Care  I spent 30 minutes with the patient.

## 2023-10-04 NOTE — ASSESSMENT & PLAN NOTE
· Patient with status post back surgery care of neurosurgery (Dr. Chante Galarza) 5 years ago. · CT scan shows presence of foraminal stenosis at the lumbosacral area. · Neurosurgery consult appreciated. Does not recommend inpatient MRI. · As per neurosurgery, does not need any immediate surgical intervention. Recommend multimodal pain regimen and outpatient follow-up with pain management. · Increasing gabapentin dose to 300 mg 3 times daily, adding Robaxin  · Patient already has an appointment with pain management at 10/13/2023  · As per therapy team, no rehab need.   On my exam patient is ambulating around the hallway without any distress  · We will discharge patient home

## 2023-10-04 NOTE — OCCUPATIONAL THERAPY NOTE
Occupational Therapy Evaluation      Marcello Faustin    10/4/2023    Principal Problem:    Lumbar radiculopathy  Active Problems:    Ulcerative colitis ([address])    Hypertension    Anxiety      Past Medical History:   Diagnosis Date    Back pain     Colon polyp     Hyperlipidemia     Lumbar fracture with cord injury ([address])     Neck fracture (HCC)     Previous back surgery     rods in the back    Ulcerative colitis [facility])        Past Surgical History:   Procedure Laterality Date    BACK SURGERY      LUMBAR FUSION Right 1/20/2016    Procedure: FUSION LUMBAR  POSTERIOR, TLIF L3-4  Right L3-4 Fascet;  Surgeon: Wendy Goodwin MD;  Location: [facility];  Service:     [place] COLONOSCOPY FLX DX W/COLLJ SPEC WHEN PFRMD N/A 1/24/2019    Procedure: COLONOSCOPY;  Surgeon: Ronaldo Wyatt MD;  Location: [facility]; Service: Gastroenterology    IL SURGICAL ARTHROSCOPY SHOULDER W/ROTATOR CUFF RPR Right 7/26/2023    Procedure: ARTHROSCOPY SHOULDER- Right shoulder Arthroscopy, supraspinatus and subscapularis repair, biceps tenotomy, extensive debridement;  Surgeon: Agustin Chahal DO;  Location: [facility];  Service: Orthopedics    TONSILLECTOMY          10/04/23 0920   OT Last Visit   OT Visit Date 10/04/23   Note Type   Note type Evaluation   Pain Assessment   Pain Assessment Tool 0-10   Pain Score 5   Pain Location/Orientation Orientation: Bilateral;Location: Back   Restrictions/Precautions   Other Precautions Pain  (spinal surgery 5 years ago, R RTC surgery 9 weeks ago)   Home Living   Type of [address] [place] [address] Dr Multi-level; Able to live on main level with bedroom/bathroom   Bathroom Shower/Tub Walk-in [address]  (did not use)   Additional Comments pt reports living in a ML home, entering home through the basement level. FF to main level. 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 also likes to work on his cars   Lifestyle   Autonomy pt reports he is typically independent w self care, mobility etc. needed some help since his R shoulder surgery in July   Reciprocal Relationships supportive wife   General   Additional Pertinent History pt reports no longer needing abd sling from shoulder surgery. Subjective   Subjective "I think i slept bad because of the sling. I havent slept for days"   ADL   Eating Assistance [address] [facility] 5  Supervision/Setup   UB Bathing Deficit Setup   LB Bathing Assistance 5  Supervision/Setup   LB Bathing Deficit Setup   UB Dressing Assistance 5  Supervision/Setup   UB Dressing Deficit Setup;Verbal cueing   LB Dressing Assistance 5  Supervision/Setup   LB Dressing Deficit Setup;Verbal cueing   Additional Comments increased time. pt instructed in donning R arm first due to decreased AROM   Bed Mobility   Sit to Supine 6  Modified independent   Additional items HOB elevated   Transfers   Sit to Stand 7  Independent   Stand to Sit 7  Independent   Stand pivot 7  Independent   Functional Mobility   Functional Mobility 7  Independent   Balance   Static Sitting Good   Dynamic Sitting Good   Static Standing Fair   Dynamic Standing Fair   Activity Tolerance   Activity Tolerance Patient limited by pain   Medical Staff Made Aware ok to see   RUE Assessment   RUE Assessment   (PROM WFL.  demonstrates functional use)   LUE Assessment   LUE Assessment WFL   Hand Function   Gross Motor Coordination Functional   Fine Motor Coordination Functional   Sensation   Light Touch No apparent deficits   Additional Comments c/o tingline/pain R leg/foot   Vision - Complex Assessment   Acuity Able to read clock/calendar on wall without difficulty   Psychosocial   Psychosocial (WDL) X   Patient Behaviors/Mood Anxious; Cooperative   Cognition   Overall Cognitive Status WFL   Arousal/Participation Alert; Cooperative   Attention Attends with cues to redirect   Orientation Level Oriented X4   Memory Within functional limits   Following Commands Follows all commands and directions without difficulty   Comments pt needs cues to redirect at times. very focused on pain. cues for coping/distraction strategies   Assessment   Assessment Pt is a 79 y.o. male seen for OT evaluation s/p admit to [facility] on 10/3/2023 w/ Lumbar radiculopathy. Pt with c/o severe back pain and inability to sleep because of it. pt  has a past medical history of Back pain, Colon polyp, Hyperlipidemia, Lumbar fracture with cord injury ([address]), Neck fracture ([address]), Previous back surgery, and Ulcerative colitis ([address] also underwent R shoulder arthroscopic surgery on 7/26/23. Pt w anxiety. Prior to admission, pt was living w his wife, being mostly independent w self care. Wife able to assist as needed. Upon evaluation pt reports pain and inability to sleep. Pt also thinks this is from wearing abd. Sling for so long that it irked something while trying to sleep. Pt also admits to feeling very anxious. Pt did demonstrate ability to complete own self care, mobility w no use of DME. No loss of balance noted. Pt was educated on coping strategies for anxiety, ECT/self pacing skills as well as mostly one handed dressing skills and back protection strategies. Pt needing cues and some redirection due to anxiety. Based on findings from OT evaluation and functional performance deficits, pt has been identified as a moderate complexity evaluation. The patient's raw score on the AM-PAC Daily Activity inpatient short form is 23, standardized score is 51.12, greater than 39.4.  Patients at this level are likely to benefit from discharge to home. ongoing skilled OT not indicated at this time. DC OT.    Plan   OT Frequency Eval only   Recommendation   OT Discharge Recommendation No rehabilitation needs   AM-PAC Daily Activity Inpatient   Lower Body Dressing 3   Bathing 4   Toileting 4   Upper Body Dressing 4   Grooming 4   Eating 4   Daily Activity Raw Score 23   Daily Activity Standardized Score (Calc for Raw Score >=11) 51.12

## 2023-10-04 NOTE — H&P
[address] [facility]  H&P  Name: Zoe Gil 79 y.o. male I MRN: [de-identified]  Unit/Bed#: CW2 212-01 I Date of Admission: 10/3/2023   Date of Service: 10/4/2023 I Hospital Day: 0      Assessment/Plan   * Lumbar radiculopathy  Assessment & Plan  Patient with status post back surgery care of neurosurgery (Dr. Shay Gallegos) 5 years ago. Today CT scan shows presence of foraminal stenosis at the lumbosacral area. Considering MRI. (Patient has back hardware and would need to know whether this is MRI compatible.)  Lidocaine patch. Heat at area. Neurosurgery consult. Patient claims that oxycodone is only making him feel wired and not helpful. We will place patient on hydrocodone 5 mg for moderate pain and 10 mg for severe pain. Dilaudid 0.5 mg intravenous as needed for breakthrough pain. Neurontin 300 mg at nighttime. Physical and Occupational Therapy consult. Case management. Anxiety  Assessment & Plan  Patient currently on Valium for both anxiety and back spasms. According to patient this has helped his anxiety. For insomnia, patient is on Ambien 10 mg at night. Hypertension  Assessment & Plan  He is not on any outpatient medications. Initially had reentrant tachycardia probably caused by pain; was given metoprolol x1 currently sinus rhythm. Patient is on Lopressor 5 mg every 6 hours if heart rate greater than 120. Telemetry. Ulcerative colitis ([address])  Assessment & Plan  Patient is on Colazal 750 mg 3 tablets twice daily. (Substituted with sulfasalazine 1 g twice daily.)             VTE Prophylaxis: Enoxaparin (Lovenox)  / sequential compression device   Code Status: Level 1 - Full Code as discussed with patient  POLST: There is no POLST form on file for this patient (pre-hospital)    Anticipated Length of Stay:  Patient will be admitted on an Observation basis with an anticipated length of stay of less than 2 midnights.    Justification for Hospital Stay: Please see detailed plans noted above. Chief Complaint:     Exacerbation of chronic back pain  History of Present Illness:  Julia Rios is a 79 y.o. male who has past medical history significant for obesity with fatty liver, ulcerative colitis, benign essential hypertension, tear of the right shoulder, peroneal neuropathy, lumbar radiculopathy from degenerative disc disease with status post laminectomies in 2016 by neurosurgery (Dr. Manju Sharp). According to patient status post surgery, the patient did well for the next 2 years being free of pain. However since then, there was increasing back pain and made worse for the past few days. Of note the patient was seen by neurosurgery as outpatient in September 27, 2023. No neurologic focal deficits and therefore did not recommend MRI. With the back pain the patient has not been sleeping well especially for the past 4 days. The patient was placed on oxycodone 15 to 30 mg as needed. (This is verified with [facility].)  However the patient continues to have back pain with radiation towards the right leg especially the tips of the toes. It is an excruciating consistent pain that prevents him from getting sleep. During the emergency room, he was given Valium which did help with the spasm as well as the anxiety. Patient states that oxycodone only gets him wired and therefore is requesting a less powerful medication. Patient was given some Dilaudid 1 mg intravenous with noted improvement of pain. Patient denies any fever or chills. Patient also was noted to consistently flex his right knee in order to relieve back pain. Lying flat on bed with straight leg testing exacerbates back pain.     Review of Systems:    Constitutional:  Denies fever or chills   Eyes:  Denies change in visual acuity   HENT:  Denies nasal congestion or sore throat   Respiratory:  Denies cough or shortness of breath   Cardiovascular:  Denies chest pain or edema   GI:  Denies abdominal pain, nausea, vomiting, bloody stools or diarrhea   :  Denies dysuria   Musculoskeletal: Lumbar back pain more on the right than the left. Integument:  Denies rash   Neurologic:  Denies headache, focal weakness or sensory changes   Endocrine:  Denies polyuria or polydipsia   Psychiatric:  Denies depression however with anxiety and insomnia    Past Medical and Surgical History:   Past Medical History:   Diagnosis Date   • Back pain    • Colon polyp    • Hyperlipidemia    • Lumbar fracture with cord injury ([address])    • Neck fracture ([address])    • Previous back surgery     rods in the back   • Ulcerative colitis [facility])      Past Surgical History:   Procedure Laterality Date   • BACK SURGERY     • LUMBAR FUSION Right 1/20/2016    Procedure: FUSION LUMBAR  POSTERIOR, TLIF L3-4  Right L3-4 Fascet;  Surgeon: Nessa Branch MD;  Location:  MAIN OR;  Service:    • [place] COLONOSCOPY FLX DX W/COLLJ SPEC WHEN PFRMD N/A 1/24/2019    Procedure: COLONOSCOPY;  Surgeon: Rachelle Phillips MD;  Location: [facility];   Service: Gastroenterology   • [place] SURGICAL ARTHROSCOPY SHOULDER W/ROTATOR CUFF RPR Right 7/26/2023    Procedure: ARTHROSCOPY SHOULDER- Right shoulder Arthroscopy, supraspinatus and subscapularis repair, biceps tenotomy, extensive debridement;  Surgeon: Fredis Hart DO;  Location: [facility];  Service: Orthopedics   • TONSILLECTOMY         Meds/Allergies:  Medications Prior to Admission   Medication   • ALPRAZolam (XANAX) 0.25 mg tablet   • balsalazide (COLAZAL) 750 mg capsule   • busPIRone (BUSPAR) 5 mg tablet   • Diclofenac Sodium (VOLTAREN) 1 %   • ezetimibe (ZETIA) 10 mg tablet   • ibuprofen (MOTRIN) 800 mg tablet   • meclizine (ANTIVERT) 12.5 MG tablet   • methocarbamol (ROBAXIN) 500 mg tablet   • ondansetron (ZOFRAN-ODT) 4 mg disintegrating tablet   • oxyCODONE (ROXICODONE) 15 mg immediate release tablet   • oxyCODONE (ROXICODONE) 30 MG immediate release tablet   • oxyCODONE-acetaminophen (Percocet)  mg per tablet   • polyethylene glycol (GaviLyte-C) 4000 mL solution   • temazepam (RESTORIL) 15 mg capsule   • traZODone (DESYREL) 50 mg tablet   • zolpidem (AMBIEN) 10 mg tablet       Allergies: Allergies   Allergen Reactions   • No Active Allergies      History:  Marital Status: /Civil Union   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 Patient Pre-hospital Living Situation: Lives at home  Patient Pre-hospital Level of Mobility: Ambulatory  Patient Pre-hospital Diet Restrictions: Regular diet  Substance Use History:   Social History     Substance and Sexual Activity   Alcohol Use Not Currently    Comment: rarely     Social History     Tobacco Use   Smoking Status Never   Smokeless Tobacco Never     Social History     Substance and Sexual Activity   Drug Use No       Family History:  Family History   Problem Relation Age of Onset   • Parkinsonism Mother    • Lung cancer Father        Physical Exam:     Vitals:   Blood Pressure: 106/67 (10/03/23 2307)  Pulse: 75 (10/03/23 2307)  Temperature: 98.5 °F (36.9 °C) (10/03/23 2307)  Temp Source: Oral (10/03/23 2307)  Respirations: 21 (10/03/23 1900)  SpO2: 96 % (10/03/23 2307)    Constitutional:  Well developed, well nourished, no acute distress, non-toxic appearance with obese habitus  Eyes:  PERRL, conjunctiva normal   HENT:  Atraumatic, external ears normal, nose normal, oropharynx moist, no pharyngeal exudates. Neck- normal range of motion, no tenderness, supple   Respiratory:  No respiratory distress, normal breath sounds, no rales, no wheezing   Cardiovascular:  Normal rate, normal rhythm, no murmurs, no gallops, no rubs   GI:  Soft, nondistended, normal bowel sounds, nontender, no organomegaly, no mass, no rebound, no guarding   :  No costovertebral angle tenderness   Musculoskeletal:  No edema, no tenderness, no deformities. Back tenderness noted at the lumbar area more on the right paraspinal than left. Straight leg testing positive at the right.   Integument: Well hydrated, no rash   Lymphatic:  No lymphadenopathy noted   Neurologic:  Alert &awake, communicative, CN 2-12 normal, normal motor function, normal sensory function, no focal deficits noted and able to move all 4 extremities. Psychiatric:  Speech and behavior appropriate although showing anxiety with constant racing thoughts      Lab Results: I have personally reviewed pertinent reports. Results from last 7 days   Lab Units 10/03/23  1629   WBC Thousand/uL 9.02   HEMOGLOBIN g/dL 15.2   HEMATOCRIT % 46.2   PLATELETS Thousands/uL 301   NEUTROS PCT % 75   LYMPHS PCT % 18   MONOS PCT % 6   EOS PCT % 1     Results from last 7 days   Lab Units 10/03/23  1629   POTASSIUM mmol/L 3.9   CHLORIDE mmol/L 103   CO2 mmol/L 22   BUN mg/dL 14   CREATININE mg/dL 0.82   CALCIUM mg/dL 9.0         Imaging: I have personally reviewed pertinent reports. CT spine lumbar w contrast    Result Date: 10/3/2023  Narrative: CT LUMBAR SPINE INDICATION:   eval deep space infection. COMPARISON: MRI lumbar spine 10/14/2021 TECHNIQUE:  Contiguous axial images through the lumbar spine were obtained. Sagittal and coronal reconstructions were performed. IV Contrast: 100 mL of iohexol (OMNIPAQUE) Radiation dose length product (DLP) for this visit:  1439.87 mGy-cm . This examination, like all CT scans performed in the [facility], was performed utilizing techniques to minimize radiation dose exposure, including the use of iterative reconstruction and automated exposure control. IMAGE QUALITY:  Diagnostic. FINDINGS: ALIGNMENT:  There are 5 lumbar type vertebral bodies. Levoscoliosis with apex at L2-3. Redemonstrated postsurgical change at level L3-4 with posterior and interbody instrumented fusion. There is solid osseous fusion across the facet joint and osseous bridging through the interbody spacer. Hardware appears intact without evidence of loosening. VERTEBRAE: No acute fracture.  Stable L5 superior endplate Schmorl's node and mild height loss. DEGENERATIVE CHANGES: Lower Thoracic spine:  Normal lower thoracic disc spaces. L1-2: There is mild disc bulge and superimposed right foraminal disc protrusion. Bilateral facet arthropathy. Mild canal stenosis. There is severe right and mild left foraminal stenosis. L2-3: Mild disc bulge. Left greater right bilateral foraminal disc protrusion. Bilateral facet arthropathy. Mild canal stenosis. Severe right and moderate left foraminal stenosis. L3-4: Prior instrumented fusion. Cannot reliably evaluate the canal contents due to distorted image quality by artifact from surgical hardware. L4-5: Disc bulge. Bilateral facet arthropathy. Mild canal stenosis. Moderate to severe left and moderate right foraminal narrowing. L5-S1: Disc bulge. Bilateral facet arthropathy. Mild canal stenosis. Severe left and mild right foraminal stenosis. PARASPINAL SOFT TISSUES:   Normal. 2.7 cm right hepatic lobe lesion with peripheral globular enhancement consistent with hemangioma, similar to CT 3/29/2019. Impression: 1. Redemonstrated posterior and interbody instrumented fusion at level L3-4. 2.  No acute osseous abnormality. No definite CT evidence of infectious process. Please note that cannot evaluate the canal at the postsurgical level L3-4 due to artifactually distorted image quality related to surgical hardware. 3.  Degenerative change as detailed without high-grade canal stenosis. 4.  Multilevel foraminal stenosis as described; severe stenosis at right L1-2, right L2-3, and left L5-S1. Moderate to severe stenosis at left L4-5. Workstation performed: [id number]     XR spine lumbar complete w bending minimum 6 views    Result Date: 10/3/2023  Narrative: LUMBAR SPINE INDICATION:   Z98.1: Arthrodesis status. COMPARISON: 10/28/2021 VIEWS:  XR SPINE LUMBAR COMPLETE W BENDING MINIMUM 6 VIEWS Images: 6 FINDINGS: Levoscoliosis of the lumbar spine with apex at the L3 vertebral body level.  Straightening of the normal lordosis. Unchanged mild depression of the superior endplate at L2. Remaining vertebral bodies maintain their normal height and alignment. Intervertebral spacer at L3-L4. Posterior fusion hardware with by pedicular screws and rods at L3-L4. No evidence of hardware fracture or adjacent osteolysis. There are 5 non rib bearing lumbar vertebral bodies. There is no evidence of acute fracture or destructive osseous lesion. No evidence of instability during flexion/extension. Degenerative disc disease and facet arthropathy. The pedicles appear intact. There are atherosclerotic calcifications. Soft tissues are otherwise unremarkable. Impression: 1. Postoperative changes of the lumbar spine at L3-4 without evidence of hardware fracture or adjacent osteolysis. Workstation performed: [id number]     ECHO 2D COMPLETE    Result Date: 9/30/2023  Narrative: This result has an attachment that is not available. •  Left Ventricle: There is mild concentric hypertrophy. Systolic function is mildly decreased with an ejection fraction of 45-50%. Global hypokinesis. There is grade I (mild) diastolic dysfunction. •  Ascending Aorta: The ascending aorta is mildly dilated. 3.8cm. Left Ventricle Left ventricle is normal in size. There is mild concentric hypertrophy. Systolic function is mildly decreased with an ejection fraction of 45-50%. Global hypokinesis. There is grade I (mild) diastolic dysfunction. Right Ventricle Right ventricle cavity is normal. Systolic function is normal. Left Atrium Left atrium cavity size is normal. Right Atrium Right atrium cavity is normal. IVC/SVC The inferior vena cava demonstrates a diameter of <=21 mm and collapses >50%; therefore, the right atrial pressure is estimated at 0-5 mmHg. Mitral Valve Mitral valve structure is normal. There is no regurgitation or stenosis. Tricuspid Valve Tricuspid valve structure is normal. There is trace regurgitation. There is no evidence of tricuspid valve stenosis. Aortic Valve The aortic valve is trileaflet. There is no regurgitation or stenosis. Pulmonic Valve Pulmonic valve structure is normal. There is no regurgitation or stenosis. Ascending Aorta The ascending aorta is mildly dilated. 3.8cm. Pericardium There is no pericardial effusion. Study Details A complete 2D echocardiogram was performed. Color flow, Pulse Wave and Continuous Wave Doppler was performed and analyzed. Overall the study quality was adequate. The study was difficult due to patient's body habitus. Wall Scoring Baseline Score Index: 2.00 The following segments are hypokinetic: basal inferolateral, basal anterolateral, mid inferolateral, mid anterolateral and apical lateral. Other segments could not be evaluated. ** Please Note: Dragon 360 Dictation voice to text software was used in the creation of this document.  **

## 2023-10-04 NOTE — ASSESSMENT & PLAN NOTE
Patient is on Colazal 750 mg 3 tablets twice daily.   (Substituted with sulfasalazine 1 g twice daily.)

## 2023-10-04 NOTE — ASSESSMENT & PLAN NOTE
Patient with status post back surgery care of neurosurgery (Dr. Lynn Godoy) 5 years ago. Today CT scan shows presence of foraminal stenosis at the lumbosacral area. Considering MRI. (Patient has back hardware and would need to know whether this is MRI compatible.)  Lidocaine patch. Heat at area. Neurosurgery consult. Patient claims that oxycodone is only making him feel wired and not helpful. We will place patient on hydrocodone 5 mg for moderate pain and 10 mg for severe pain. Dilaudid 0.5 mg intravenous as needed for breakthrough pain. Neurontin 300 mg at nighttime. Physical and Occupational Therapy consult. Case management.

## 2023-10-04 NOTE — ED PROVIDER NOTES
History  Chief Complaint   Patient presents with   • Back Pain     Pt reports lower back pain that radiates up into upper back, states that he hasnt been able to sleep in 4 days due to pain. Also c/o "heart pounding" and dizziness intermittently. HPI    Patient is a 82-year-old male with past medical history of spinal fusion presenting with low back pain. Patient states several weeks ago he had rotator cuff surgery and caused him to sleep in an awkward position. He feels that this change in sleeping position as caused him to have new severe back pain worsening over the past week. Pain located in the low back and sometimes radiates up his back. He denies any new neurologic symptoms, urinary retention or incontinence, bowel dysfunction. Prior to Admission Medications   Prescriptions Last Dose Informant Patient Reported? Taking? ALPRAZolam (XANAX) 0.25 mg tablet   No No   Sig: Take 1 tablet (0.25 mg total) by mouth 2 (two) times a day as needed for anxiety for up to 10 days   Patient not taking: Reported on 9/27/2023   Diclofenac Sodium (VOLTAREN) 1 %  Self Yes No   balsalazide (COLAZAL) 750 mg capsule  Self No No   Sig: Take 3 capsules (2,250 mg total) by mouth 2 (two) times a day   busPIRone (BUSPAR) 5 mg tablet  Self Yes No   Sig: Take 5 mg by mouth 2 (two) times a day   Patient not taking: Reported on 9/27/2023   ezetimibe (ZETIA) 10 mg tablet  Self Yes No   Sig: Take 10 mg by mouth daily   ibuprofen (MOTRIN) 800 mg tablet  Self No No   Sig: Take 1 tablet (800 mg total) by mouth every 8 (eight) hours as needed for mild pain Take 1 tablet by mouth 3 times daily for 3 days, then take as needed. Take with food and water. Discontinue if stomach upset occurs.    Patient not taking: Reported on 8/31/2023   meclizine (ANTIVERT) 12.5 MG tablet  Self Yes No   Sig: Take 12.5 mg by mouth Three times daily as needed   Patient not taking: Reported on 8/31/2023   methocarbamol (ROBAXIN) 500 mg tablet  Self Yes No Sig: take 1 tablet by mouth four times a day if needed for muscle spasm   Patient not taking: Reported on 8/31/2023   ondansetron (ZOFRAN-ODT) 4 mg disintegrating tablet  Self No No   Sig: Take 1 tablet (4 mg total) by mouth every 6 (six) hours as needed for nausea or vomiting   Patient not taking: Reported on 9/27/2023   oxyCODONE (ROXICODONE) 15 mg immediate release tablet   Yes No   oxyCODONE (ROXICODONE) 30 MG immediate release tablet  Self Yes No   Sig: oxycodone 30 mg tablet   Patient not taking: Reported on 8/31/2023   oxyCODONE-acetaminophen (Percocet)  mg per tablet  Self No No   Sig: Take 1 tablet by mouth every 4 (four) hours as needed for moderate pain Max Daily Amount: 6 tablets   Patient not taking: Reported on 9/27/2023   polyethylene glycol (GaviLyte-C) 4000 mL solution   No No   Sig: Take 4,000 mL by mouth once for 1 dose   temazepam (RESTORIL) 15 mg capsule  Self Yes No   Sig: take 1 capsule by mouth nightly if needed for sleep   Patient not taking: Reported on 9/27/2023   traZODone (DESYREL) 50 mg tablet  Self Yes No   Sig: Take 25 mg by mouth   Patient not taking: Reported on 8/31/2023   zolpidem (AMBIEN) 10 mg tablet  Self No No   Sig: Take 1 tablet (10 mg total) by mouth daily at bedtime as needed for sleep for up to 10 days      Facility-Administered Medications: None       Past Medical History:   Diagnosis Date   • Back pain    • Colon polyp    • Hyperlipidemia    • Lumbar fracture with cord injury ([address])    • Neck fracture (HCC)    • Previous back surgery     rods in the back   • Ulcerative colitis [facility])        Past Surgical History:   Procedure Laterality Date   • BACK SURGERY     • LUMBAR FUSION Right 1/20/2016    Procedure: FUSION LUMBAR  POSTERIOR, TLIF L3-4  Right L3-4 Fascet;  Surgeon: Corey Davila MD;  Location:  MAIN OR;  Service:    • [place] COLONOSCOPY FLX DX W/COLLJ SPEC WHEN PFRMD N/A 1/24/2019    Procedure: COLONOSCOPY;  Surgeon: Daphne Duke MD;  Location: [facility]; Service: Gastroenterology   • [place] SURGICAL ARTHROSCOPY SHOULDER W/ROTATOR CUFF RPR Right 7/26/2023    Procedure: ARTHROSCOPY SHOULDER- Right shoulder Arthroscopy, supraspinatus and subscapularis repair, biceps tenotomy, extensive debridement;  Surgeon: Lele Hdz DO;  Location: [facility];  Service: Orthopedics   • TONSILLECTOMY         Family History   Problem Relation Age of Onset   • Parkinsonism Mother    • Lung cancer Father      I have reviewed and agree with the history as documented.     E-Cigarette/Vaping   • E-Cigarette Use Never User      E-Cigarette/Vaping Substances   • Nicotine No    • THC No    • CBD No    • Flavoring No    • Other No    • Unknown No      Social History     Tobacco Use   • Smoking status: Never   • Smokeless tobacco: Never   Vaping Use   • Vaping Use: Never used   Substance Use Topics   • Alcohol use: Not Currently     Comment: rarely   • Drug use: No        Review of Systems    Physical Exam  ED Triage Vitals   Temperature Pulse Respirations Blood Pressure SpO2   10/03/23 1413 10/03/23 1411 10/03/23 1411 10/03/23 1411 10/03/23 1411   97.8 °F (36.6 °C) 81 18 (!) 143/103 96 %      Temp Source Heart Rate Source Patient Position - Orthostatic VS BP Location FiO2 (%)   10/03/23 1413 10/03/23 1500 10/03/23 1500 10/03/23 1500 --   Tympanic Monitor Lying Right arm       Pain Score       10/03/23 1411       10 - Worst Possible Pain             Orthostatic Vital Signs  Vitals:    10/03/23 1825 10/03/23 1830 10/03/23 1900 10/03/23 2307   BP: 152/92 123/87 137/88 106/67   Pulse: 77 76 74 75   Patient Position - Orthostatic VS:    Sitting       Physical Exam    ED Medications  Medications   lidocaine (LIDODERM) 5 % patch 1 patch (1 patch Topical Medication Applied 10/3/23 1455)   zolpidem (AMBIEN) tablet 10 mg (has no administration in time range)   Diclofenac Sodium (VOLTAREN) 1 % topical gel 2 g (2 g Topical Not Given 10/3/23 2145)   ezetimibe (ZETIA) tablet 10 mg (has no administration in time range)   sulfaSALAzine (AZULFIDINE) tablet 1,000 mg (1,000 mg Oral Given 10/3/23 2141)   docusate sodium (COLACE) capsule 100 mg (100 mg Oral Given 10/3/23 2140)   ondansetron (ZOFRAN) injection 4 mg (has no administration in time range)   aluminum-magnesium hydroxide-simethicone (MAALOX) oral suspension 30 mL (has no administration in time range)   enoxaparin (LOVENOX) subcutaneous injection 40 mg (has no administration in time range)   gabapentin (NEURONTIN) capsule 300 mg (300 mg Oral Given 10/3/23 2140)   HYDROmorphone (DILAUDID) injection 0.5 mg (0.5 mg Intravenous Given 10/3/23 2313)   metoprolol (LOPRESSOR) injection 5 mg (has no administration in time range)   diazepam (VALIUM) injection 5 mg (has no administration in time range)   HYDROcodone-acetaminophen (NORCO) 5-325 mg per tablet 2 tablet (has no administration in time range)   HYDROcodone-acetaminophen (NORCO) 5-325 mg per tablet 1 tablet (1 tablet Oral Given 10/3/23 2140)   diazepam (VALIUM) injection 5 mg (5 mg Intravenous Given 10/3/23 1454)   ketorolac (TORADOL) injection 15 mg (15 mg Intravenous Given 10/3/23 1455)   acetaminophen (TYLENOL) tablet 975 mg (975 mg Oral Given 10/3/23 1454)   HYDROmorphone (DILAUDID) injection 1 mg (1 mg Intravenous Given 10/3/23 1605)   iohexol (OMNIPAQUE) 350 MG/ML injection (MULTI-DOSE) 100 mL (100 mL Intravenous Given 10/3/23 1747)   diazepam (VALIUM) tablet 10 mg (10 mg Oral Given 10/3/23 1756)   metoprolol tartrate (LOPRESSOR) partial tablet 12.5 mg (12.5 mg Oral Given 10/3/23 1825)       Diagnostic Studies  Results Reviewed     Procedure Component Value Units Date/Time    UA (URINE) with reflex to Scope [[id number]]     Lab Status: No result Specimen: Urine     Basic metabolic panel [[id number]] Collected: 10/03/23 1629    Lab Status: Final result Specimen: Blood from Hand, Left Updated: 10/03/23 1659     Sodium 136 mmol/L      Potassium 3.9 mmol/L      Chloride 103 mmol/L      CO2 22 mmol/L      ANION GAP 11 mmol/L      BUN 14 mg/dL      Creatinine 0.82 mg/dL      Glucose 84 mg/dL      Calcium 9.0 mg/dL      eGFR 92 ml/min/1.73sq m     Narrative:      [place] guidelines for Chronic Kidney Disease (CKD):   •  Stage 1 with normal or high GFR (GFR > 90 mL/min/1.73 square meters)  •  Stage 2 Mild CKD (GFR = 60-89 mL/min/1.73 square meters)  •  Stage 3A Moderate CKD (GFR = 45-59 mL/min/1.73 square meters)  •  Stage 3B Moderate CKD (GFR = 30-44 mL/min/1.73 square meters)  •  Stage 4 Severe CKD (GFR = 15-29 mL/min/1.73 square meters)  •  Stage 5 End Stage CKD (GFR <15 mL/min/1.73 square meters)  Note: GFR calculation is accurate only with a steady state creatinine    CBC and differential [[id number]] Collected: 10/03/23 1629    Lab Status: Final result Specimen: Blood from Hand, Left Updated: 10/03/23 1638     WBC 9.02 Thousand/uL      RBC 5.60 Million/uL      Hemoglobin 15.2 g/dL      Hematocrit 46.2 %      MCV 83 fL      MCH 27.1 pg      MCHC 32.9 g/dL      RDW 13.5 %      MPV 10.8 fL      Platelets 976 Thousands/uL      nRBC 0 /100 WBCs      Neutrophils Relative 75 %      Immat GRANS % 0 %      Lymphocytes Relative 18 %      Monocytes Relative 6 %      Eosinophils Relative 1 %      Basophils Relative 0 %      Neutrophils Absolute 6.71 Thousands/µL      Immature Grans Absolute 0.04 Thousand/uL      Lymphocytes Absolute 1.58 Thousands/µL      Monocytes Absolute 0.55 Thousand/µL      Eosinophils Absolute 0.11 Thousand/µL      Basophils Absolute 0.03 Thousands/µL                  CT spine lumbar w contrast   Final Result by Padmini Francis MD (10/03 1954)      1. Redemonstrated posterior and interbody instrumented fusion at level L3-4.   2.  No acute osseous abnormality. No definite CT evidence of infectious process. Please note that cannot evaluate the canal at the postsurgical level L3-4 due to artifactually distorted image quality related to surgical hardware.    3. Degenerative change as detailed without high-grade canal stenosis. 4.  Multilevel foraminal stenosis as described; severe stenosis at right L1-2, right L2-3, and left L5-S1. Moderate to severe stenosis at left L4-5. Workstation performed: [id number]               Procedures  Procedures      ED Course  ED Course as of 10/03/23 2338   Tue Oct 03, 2023   1447 ECG 12 lead  Procedure Note: EKG  Date/Time: 10/03/23 2:47 PM   Interpreted by: Luzma Caba MD  Indications / Diagnosis: palpitations   ECG reviewed by me, the ED Provider: yes   The EKG demonstrates:  Rhythm: normal sinus with pvc and pac  Intervals: normal intervals  Axis: normal axis  QRS/Blocks: normal QRS  ST Changes: No acute ST Changes, no STD/MONSE.     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 Plan to start pt on po metoprolol and f/u with cardiology                                       MDM      Disposition  Final diagnoses:   Lumbar radiculopathy   Acute exacerbation of chronic low back pain   AVNRT (AV shukri re-entry tachycardia)     Time reflects when diagnosis was documented in both MDM as applicable and the Disposition within this note     Time User Action Codes Description Comment    10/3/2023  5:47 PM Terrence Baker Add [M54.16] Lumbar radiculopathy     10/3/2023  8:11 PM Terrence Baker Add [M54.50,  G89.29] Acute exacerbation of chronic low back pain     10/3/2023  8:11 PM Terrence Baker Add [I47.19] AVNRT (AV shukri re-entry tachycardia)       ED Disposition     ED Disposition   Admit    Condition   Stable    Date/Time   Tue Oct 3, 2023  8:10 PM    Comment   Case was discussed with AMBER and the patient's admission status was agreed to be Admission Status: observation status to the service of Dr. Mga Stokes .            Follow-up Information    None         Current Discharge Medication List      START taking these medications    Details   diazepam (VALIUM) 10 mg tablet Take 1 tablet (10 mg total) by mouth 2 (two) times a day  Qty: 8 tablet, Refills: 0    Associated Diagnoses: Lumbar radiculopathy         CONTINUE these medications which have NOT CHANGED    Details   ALPRAZolam (XANAX) 0.25 mg tablet Take 1 tablet (0.25 mg total) by mouth 2 (two) times a day as needed for anxiety for up to 10 days  Qty: 10 tablet, Refills: 0    Associated Diagnoses: Anxiety      balsalazide (COLAZAL) 750 mg capsule Take 3 capsules (2,250 mg total) by mouth 2 (two) times a day  Qty: 180 capsule, Refills: 5    Associated Diagnoses: Other ulcerative colitis with complication (HCC)      busPIRone (BUSPAR) 5 mg tablet Take 5 mg by mouth 2 (two) times a day      Diclofenac Sodium (VOLTAREN) 1 %       ezetimibe (ZETIA) 10 mg tablet Take 10 mg by mouth daily      ibuprofen (MOTRIN) 800 mg tablet Take 1 tablet (800 mg total) by mouth every 8 (eight) hours as needed for mild pain Take 1 tablet by mouth 3 times daily for 3 days, then take as needed. Take with food and water. Discontinue if stomach upset occurs.   Qty: 30 tablet, Refills: 0    Associated Diagnoses: Tear of right supraspinatus tendon; Partial tear of right subscapularis tendon, subsequent encounter      meclizine (ANTIVERT) 12.5 MG tablet Take 12.5 mg by mouth Three times daily as needed      methocarbamol (ROBAXIN) 500 mg tablet take 1 tablet by mouth four times a day if needed for muscle spasm      ondansetron (ZOFRAN-ODT) 4 mg disintegrating tablet Take 1 tablet (4 mg total) by mouth every 6 (six) hours as needed for nausea or vomiting  Qty: 20 tablet, Refills: 0    Associated Diagnoses: Tear of right supraspinatus tendon; Partial tear of right subscapularis tendon, subsequent encounter      !! oxyCODONE (ROXICODONE) 15 mg immediate release tablet       !! oxyCODONE (ROXICODONE) 30 MG immediate release tablet oxycodone 30 mg tablet      oxyCODONE-acetaminophen (Percocet)  mg per tablet Take 1 tablet by mouth every 4 (four) hours as needed for moderate pain Max Daily Amount: 6 tablets  Qty: 30 tablet, Refills: 0    Associated Diagnoses: Tear of right supraspinatus tendon; Partial tear of right subscapularis tendon, subsequent encounter      polyethylene glycol (GaviLyte-C) 4000 mL solution Take 4,000 mL by mouth once for 1 dose  Qty: 4000 mL, Refills: 0    Associated Diagnoses: Therapeutic opioid induced constipation      temazepam (RESTORIL) 15 mg capsule take 1 capsule by mouth nightly if needed for sleep      traZODone (DESYREL) 50 mg tablet Take 25 mg by mouth      zolpidem (AMBIEN) 10 mg tablet Take 1 tablet (10 mg total) by mouth daily at bedtime as needed for sleep for up to 10 days  Qty: 3 tablet, Refills: 0    Associated Diagnoses: Closed odontoid fracture with type III morphology, initial encounter ([address])       ! ! - Potential duplicate medications found. Please discuss with provider. No discharge procedures on file. PDMP Review     None           ED Provider  Attending physically available and evaluated Trish Pardo. I managed the patient along with the ED Attending.     Electronically Signed by

## 2023-10-04 NOTE — TELEPHONE ENCOUNTER
Caller: Patient    Doctor: Mirna Mcleod    Reason for call: Patient returning call. Message relayed. Patient satisfied.      Call back#: n/a

## 2023-10-04 NOTE — ASSESSMENT & PLAN NOTE
Patient currently on Valium for both anxiety and back spasms. According to patient this has helped his anxiety. For insomnia, patient is on Ambien 10 mg at night.

## 2023-10-04 NOTE — PHYSICAL THERAPY NOTE
Physical Therapy Evaluation    Patient's Name: Rhonda Heller    Admitting Diagnosis  Lumbar radiculopathy [M54.16]  Back pain [M54.9]  AVNRT (AV shukri re-entry tachycardia) [I47.19]  Acute exacerbation of chronic low back pain [M54.50, G89.29]    Problem List  Patient Active Problem List   Diagnosis    Pain in right hip    Ulcerative colitis ([address])    Hypertension    Impaired mobility and activities of daily living    Facet arthropathy, lumbar    Status post lumbar spinal fusion    Hx of adenomatous polyp of colon    Ulcerative colitis with rectal bleeding (HCC)    Type III fracture of odontoid process (HCC)    Traumatic compression fracture of T2 thoracic vertebra (HCC)    Left elbow fracture    Sternal fracture    Fall    Acute pain due to trauma    NAFLD (nonalcoholic fatty liver disease)    Chronic bilateral low back pain with bilateral sciatica    Lumbar radiculopathy    Therapeutic opioid induced constipation    Chronic foot pain, right    Right leg pain    Chronic pain syndrome    Neuropathy    Peroneal neuropathy    Right foot drop    Tear of right supraspinatus tendon    Class 2 obesity in adult    Partial tear of right subscapularis tendon, subsequent encounter    Abnormal EKG    Positive D dimer    Anxiety       Past Medical History  Past Medical History:   Diagnosis Date    Back pain     Colon polyp     Hyperlipidemia     Lumbar fracture with cord injury ([address])     Neck fracture (HCC)     Previous back surgery     rods in the back    Ulcerative colitis [facility])        Past Surgical History  Past Surgical History:   Procedure Laterality Date    BACK SURGERY      LUMBAR FUSION Right 1/20/2016    Procedure: FUSION LUMBAR  POSTERIOR, TLIF L3-4  Right L3-4 Fascet;  Surgeon: Patricia Leiva MD;  [facility];  Service:     [place] COLONOSCOPY FLX DX W/COLLJ SPEC WHEN PFRMD N/A 1/24/2019    Procedure: COLONOSCOPY;  Surgeon: Harjinder Morrell MD;  Location: [facility];   Service: Gastroenterology    [place] SURGICAL ARTHROSCOPY SHOULDER W/ROTATOR CUFF RPR Right 7/26/2023    Procedure: ARTHROSCOPY SHOULDER- Right shoulder Arthroscopy, supraspinatus and subscapularis repair, biceps tenotomy, extensive debridement;  Surgeon: Jane Jimenez DO;  Location: [facility];  Service: Orthopedics    TONSILLECTOMY          10/04/23 1342   PT Last Visit   PT Visit Date 10/04/23   Note Type   Note type Evaluation   Pain Assessment   Pain Assessment Tool 0-10   Pain Score 10 - Worst Possible Pain   Pain Location/Orientation Location: Back   Pain Radiating Towards R foot (pt reports hx of R radicular pain that has been present since his spinal surgery 5 years ago)   Restrictions/Precautions   Other Precautions Pain   Home Living   Type of [address] [place] [address]  MultiJoycelevel; Able to live on main level with bedroom/bathroom   Bathroom Shower/Tub Walk-in shower   [address] [facility]   Prior Function   Level of [place] Independent with ADLs; Independent with functional mobility  (I w/ ambulation w/o AD)   Lives With Spouse   Receives Help From Family   IADLs Independent with driving; Independent with meal prep; Independent with medication management   Falls in the last 6 months 0   Vocational Retired   General   Family/Caregiver Present No   Cognition   Overall Cognitive Status WFL   Arousal/Participation Alert   Orientation Level Oriented X4   Comments pt hyperfocused on pain   Subjective   Subjective "Should I walk down to the ED myself to find a doctor so I can get my pain meds?"   RLE Assessment   RLE Assessment   (hx R foot drop, otherwise WFL)   LLE Assessment   LLE Assessment WFL   Coordination   Movements are Fluid and Coordinated 1   Bed Mobility   Supine to Sit 6  Modified independent   Sit to Supine 6  Modified independent   Additional Comments Pt greeted ambulating independently in hallway.    Transfers   Sit to Stand 7  Independent   Stand to Sit 7  Independent   Stand pivot 7  Independent   Additional Comments no AD Ambulation/Elevation   Gait pattern Decreased foot clearance  (R foot drop)   Gait Assistance 7  Independent   Assistive Device None   Distance 200'   Stair Management Assistance 6  Modified independent   Stair Management Technique Two rails; Reciprocal   Number of Stairs 10   Balance   Static Sitting Good   Dynamic Sitting Good   Static Standing Fair   Dynamic Standing Fair   Ambulatory Fair   Endurance Deficit   Endurance Deficit No   Activity Tolerance   Activity Tolerance Patient tolerated treatment well   Nurse Made Aware yes   Assessment   Problem List Pain;Orthopedic restrictions   Assessment Pt is 79 y.o. male seen for a PT evaluation s/p admit to [address] [place] [address] on 10/3/2023. Pt presenting w/ lumbar radiculopathy. Pt s/p right rotator cuff repair 7/26/23 and was sleeping w/ spine in lateral flexion - pt attributes this as likely what aggravated his LBP. Of note pt does have a hx of spinal surgery 5 years ago. Please see above for other active problem list / PMH. PT now consulted to assess functional mobility and needs for safe d/c planning. Prior to admission, pt was I w/ ambulation, lives w/ spouse in a house w/ stairs. Currently pt is [place] for bed skills; I for functional transfers; I for ambulation w/o AD; I for stair negotiation. Attempted to educate pt about positioning strategies, importance of changing position, + use of cryotherapy to assist in managing pain. Pt stated, "It doesn't work. I need my pain meds." From a PT standpoint, recommend continued OP PT for R rotator cuff repair.    Goals   Patient Goals "to get my pain meds"   Plan   PT Frequency   (d/c PT)   Recommendation   PT Discharge Recommendation Home with outpatient rehabilitation   AM-PAC Basic Mobility Inpatient   Turning in Flat Bed Without Bedrails 4   Lying on Back to Sitting on Edge of Flat Bed Without Bedrails 4   Moving Bed to Chair 4   Standing Up From Chair Using Arms 4   Walk in Room 4   Climb 3-5 Stairs With Cecy 4   Basic Mobility Inpatient Raw Score 24   Basic Mobility Standardized Score 57.68   Highest Level Of Mobility   JH-HLM Goal 8: Walk 250 feet or more   JH-HLM Achieved 7: Walk 25 feet or more   End of Consult   Patient Position at End of Consult Supine; All needs within reach     Bess Mejia, PT, DPT

## 2023-10-04 NOTE — TELEPHONE ENCOUNTER
Caller: Patient- Ciera Leigh    Doctor: Dr Char Green     Reason for call: Patient is calling in stating that he had surgery on 7/26/23 to his right shoulder and is having pain. He is having issues when moving it to the side, up or down not all the time just sometimes 60-70% of the time and is asking if this is normal or not? Please advise.     Call back#: [phone]

## 2023-10-04 NOTE — ASSESSMENT & PLAN NOTE
Patient currently on Valium for both anxiety and back spasms. According to patient this has helped his anxiety. Also reports of significant insomnia secondary to back pain, unable to sleep for the last 4 nights. Rx sent for Ambien 5 mg #5 pills. Patient educated, for further refills, need to follow-up with PCP.   Has an appointment with PCP in 10/6/2023

## 2023-10-04 NOTE — ED PROVIDER NOTES
History  Chief Complaint   Patient presents with   • Back Pain     Pt reports lower back pain that radiates up into upper back, states that he hasnt been able to sleep in 4 days due to pain. Also c/o "heart pounding" and dizziness intermittently. HPI     Patient is a 79-year-old male with past medical history of spinal fusion presenting with low back pain. Patient states several weeks ago he had rotator cuff surgery and caused him to sleep in an awkward position. He feels that this change in sleeping position as caused him to have new severe back pain worsening over the past week. Pain located in the low back and sometimes radiates up his back. He denies any new neurologic symptoms, urinary retention or incontinence, bowel dysfunction. Prior to Admission Medications   Prescriptions Last Dose Informant Patient Reported? Taking? ALPRAZolam (XANAX) 0.25 mg tablet   No No   Sig: Take 1 tablet (0.25 mg total) by mouth 2 (two) times a day as needed for anxiety for up to 10 days   Patient not taking: Reported on 9/27/2023   Diclofenac Sodium (VOLTAREN) 1 %  Self Yes No   balsalazide (COLAZAL) 750 mg capsule  Self No No   Sig: Take 3 capsules (2,250 mg total) by mouth 2 (two) times a day   busPIRone (BUSPAR) 5 mg tablet  Self Yes No   Sig: Take 5 mg by mouth 2 (two) times a day   Patient not taking: Reported on 9/27/2023   ezetimibe (ZETIA) 10 mg tablet  Self Yes No   Sig: Take 10 mg by mouth daily   ibuprofen (MOTRIN) 800 mg tablet  Self No No   Sig: Take 1 tablet (800 mg total) by mouth every 8 (eight) hours as needed for mild pain Take 1 tablet by mouth 3 times daily for 3 days, then take as needed. Take with food and water. Discontinue if stomach upset occurs.    Patient not taking: Reported on 8/31/2023   meclizine (ANTIVERT) 12.5 MG tablet  Self Yes No   Sig: Take 12.5 mg by mouth Three times daily as needed   Patient not taking: Reported on 8/31/2023   methocarbamol (ROBAXIN) 500 mg tablet  Self Yes No Sig: take 1 tablet by mouth four times a day if needed for muscle spasm   Patient not taking: Reported on 8/31/2023   ondansetron (ZOFRAN-ODT) 4 mg disintegrating tablet  Self No No   Sig: Take 1 tablet (4 mg total) by mouth every 6 (six) hours as needed for nausea or vomiting   Patient not taking: Reported on 9/27/2023   oxyCODONE (ROXICODONE) 15 mg immediate release tablet   Yes No   oxyCODONE (ROXICODONE) 30 MG immediate release tablet  Self Yes No   Sig: oxycodone 30 mg tablet   Patient not taking: Reported on 8/31/2023   oxyCODONE-acetaminophen (Percocet)  mg per tablet  Self No No   Sig: Take 1 tablet by mouth every 4 (four) hours as needed for moderate pain Max Daily Amount: 6 tablets   Patient not taking: Reported on 9/27/2023   polyethylene glycol (GaviLyte-C) 4000 mL solution   No No   Sig: Take 4,000 mL by mouth once for 1 dose   temazepam (RESTORIL) 15 mg capsule  Self Yes No   Sig: take 1 capsule by mouth nightly if needed for sleep   Patient not taking: Reported on 9/27/2023   traZODone (DESYREL) 50 mg tablet  Self Yes No   Sig: Take 25 mg by mouth   Patient not taking: Reported on 8/31/2023   zolpidem (AMBIEN) 10 mg tablet  Self No No   Sig: Take 1 tablet (10 mg total) by mouth daily at bedtime as needed for sleep for up to 10 days      Facility-Administered Medications: None       Past Medical History:   Diagnosis Date   • Back pain    • Colon polyp    • Hyperlipidemia    • Lumbar fracture with cord injury ([address])    • Neck fracture (HCC)    • Previous back surgery     rods in the back   • Ulcerative colitis [facility])        Past Surgical History:   Procedure Laterality Date   • BACK SURGERY     • LUMBAR FUSION Right 1/20/2016    Procedure: FUSION LUMBAR  POSTERIOR, TLIF L3-4  Right L3-4 Fascet;  Surgeon: Estuardo Espinosa MD;  Location: [facility];  Service:    • [place] COLONOSCOPY FLX DX W/COLLJ SPEC WHEN PFRMD N/A 1/24/2019    Procedure: COLONOSCOPY;  Surgeon: Modesta Rashid MD;  Location: [facility]; Service: Gastroenterology   • [place] SURGICAL ARTHROSCOPY SHOULDER W/ROTATOR CUFF RPR Right 7/26/2023    Procedure: ARTHROSCOPY SHOULDER- Right shoulder Arthroscopy, supraspinatus and subscapularis repair, biceps tenotomy, extensive debridement;  Surgeon: Nubia Lemus DO;  Location: [facility];  Service: Orthopedics   • TONSILLECTOMY         Family History   Problem Relation Age of Onset   • Parkinsonism Mother    • Lung cancer Father      I have reviewed and agree with the history as documented. E-Cigarette/Vaping   • E-Cigarette Use Never User      E-Cigarette/Vaping Substances   • Nicotine No    • THC No    • CBD No    • Flavoring No    • Other No    • Unknown No      Social History     Tobacco Use   • Smoking status: Never   • Smokeless tobacco: Never   Vaping Use   • Vaping Use: Never used   Substance Use Topics   • Alcohol use: Not Currently     Comment: rarely   • Drug use: No        Review of Systems    Physical Exam  ED Triage Vitals   Temperature Pulse Respirations Blood Pressure SpO2   10/03/23 1413 10/03/23 1411 10/03/23 1411 10/03/23 1411 10/03/23 1411   97.8 °F (36.6 °C) 81 18 (!) 143/103 96 %      Temp Source Heart Rate Source Patient Position - Orthostatic VS BP Location FiO2 (%)   10/03/23 1413 10/03/23 1500 10/03/23 1500 10/03/23 1500 --   Tympanic Monitor Lying Right arm       Pain Score       10/03/23 1411       10 - Worst Possible Pain             Orthostatic Vital Signs  Vitals:    10/03/23 1825 10/03/23 1830 10/03/23 1900 10/03/23 2307   BP: 152/92 123/87 137/88 106/67   Pulse: 77 76 74 75   Patient Position - Orthostatic VS:    Sitting       Physical Exam  Vitals and nursing note reviewed. Constitutional:       General: He is not in acute distress. Appearance: He is well-developed. He is not toxic-appearing. HENT:      Head: Normocephalic and atraumatic.       Right Ear: External ear normal.      Left Ear: External ear normal.      Nose: Nose normal.      Mouth/Throat: Pharynx: Oropharynx is clear. No oropharyngeal exudate or posterior oropharyngeal erythema. Eyes:      Extraocular Movements: Extraocular movements intact. Conjunctiva/sclera: Conjunctivae normal.      Pupils: Pupils are equal, round, and reactive to light. Cardiovascular:      Rate and Rhythm: Normal rate and regular rhythm. Pulses: Normal pulses. Heart sounds: Normal heart sounds. No murmur heard. No friction rub. No gallop. Pulmonary:      Effort: Pulmonary effort is normal. No respiratory distress. Breath sounds: Normal breath sounds. No wheezing, rhonchi or rales. Abdominal:      General: Abdomen is flat. Palpations: Abdomen is soft. Tenderness: There is no abdominal tenderness. There is no guarding or rebound. Musculoskeletal:         General: Tenderness present. Cervical back: Normal range of motion. No rigidity. Right lower leg: No edema. Left lower leg: No edema. Comments: Mild midline tenderness over site of old surgical scar in mid lumbar region. No warmth, fluctuance, erythema. No paraspinal tenderness. Skin:     General: Skin is warm and dry. Capillary Refill: Capillary refill takes less than 2 seconds. Neurological:      General: No focal deficit present. Mental Status: He is alert. Cranial Nerves: No cranial nerve deficit. Sensory: No sensory deficit. Motor: No weakness.       Comments: No focal neurologic deficits on exam. Normal sensation, normal strength 5/5 throughout, 2+ patellar reflexes   Psychiatric:         Mood and Affect: Mood normal.         ED Medications  Medications   lidocaine (LIDODERM) 5 % patch 1 patch (1 patch Topical Medication Applied 10/3/23 9958)   zolpidem (AMBIEN) tablet 10 mg (10 mg Oral Given 10/3/23 1220)   Diclofenac Sodium (VOLTAREN) 1 % topical gel 2 g (2 g Topical Not Given 10/3/23 5395)   ezetimibe (ZETIA) tablet 10 mg (has no administration in time range)   sulfaSALAzine (AZULFIDINE) tablet 1,000 mg (1,000 mg Oral Given 10/3/23 2141)   docusate sodium (COLACE) capsule 100 mg (100 mg Oral Given 10/3/23 2140)   ondansetron (ZOFRAN) injection 4 mg (has no administration in time range)   aluminum-magnesium hydroxide-simethicone (MAALOX) oral suspension 30 mL (has no administration in time range)   enoxaparin (LOVENOX) subcutaneous injection 40 mg (has no administration in time range)   gabapentin (NEURONTIN) capsule 300 mg (300 mg Oral Given 10/3/23 2140)   HYDROmorphone (DILAUDID) injection 0.5 mg (0.5 mg Intravenous Given 10/3/23 2313)   metoprolol (LOPRESSOR) injection 5 mg (has no administration in time range)   diazepam (VALIUM) injection 5 mg (has no administration in time range)   HYDROcodone-acetaminophen (NORCO) 5-325 mg per tablet 2 tablet (has no administration in time range)   HYDROcodone-acetaminophen (NORCO) 5-325 mg per tablet 1 tablet (1 tablet Oral Given 10/3/23 2140)   diazepam (VALIUM) injection 5 mg (5 mg Intravenous Given 10/3/23 1454)   ketorolac (TORADOL) injection 15 mg (15 mg Intravenous Given 10/3/23 1455)   acetaminophen (TYLENOL) tablet 975 mg (975 mg Oral Given 10/3/23 1454)   HYDROmorphone (DILAUDID) injection 1 mg (1 mg Intravenous Given 10/3/23 1605)   iohexol (OMNIPAQUE) 350 MG/ML injection (MULTI-DOSE) 100 mL (100 mL Intravenous Given 10/3/23 1747)   diazepam (VALIUM) tablet 10 mg (10 mg Oral Given 10/3/23 1756)   metoprolol tartrate (LOPRESSOR) partial tablet 12.5 mg (12.5 mg Oral Given 10/3/23 1825)       Diagnostic Studies  Results Reviewed     Procedure Component Value Units Date/Time    UA (URINE) with reflex to Scope [[id number]]     Lab Status: No result Specimen: Urine     Basic metabolic panel [[id number]] Collected: 10/03/23 1629    Lab Status: Final result Specimen: Blood from Hand, Left Updated: 10/03/23 3335     Sodium 136 mmol/L      Potassium 3.9 mmol/L      Chloride 103 mmol/L      CO2 22 mmol/L      ANION GAP 11 mmol/L      BUN 14 mg/dL Creatinine 0.82 mg/dL      Glucose 84 mg/dL      Calcium 9.0 mg/dL      eGFR 92 ml/min/1.73sq m     Narrative:      National Kid[employer] guidelines for Chronic Kidney Disease (CKD):   •  Stage 1 with normal or high GFR (GFR > 90 mL/min/1.73 square meters)  •  Stage 2 Mild CKD (GFR = 60-89 mL/min/1.73 square meters)  •  Stage 3A Moderate CKD (GFR = 45-59 mL/min/1.73 square meters)  •  Stage 3B Moderate CKD (GFR = 30-44 mL/min/1.73 square meters)  •  Stage 4 Severe CKD (GFR = 15-29 mL/min/1.73 square meters)  •  Stage 5 End Stage CKD (GFR <15 mL/min/1.73 square meters)  Note: GFR calculation is accurate only with a steady state creatinine    CBC and differential [[id number]] Collected: 10/03/23 1629    Lab Status: Final result Specimen: Blood from Hand, Left Updated: 10/03/23 1638     WBC 9.02 Thousand/uL      RBC 5.60 Million/uL      Hemoglobin 15.2 g/dL      Hematocrit 46.2 %      MCV 83 fL      MCH 27.1 pg      MCHC 32.9 g/dL      RDW 13.5 %      MPV 10.8 fL      Platelets 871 Thousands/uL      nRBC 0 /100 WBCs      Neutrophils Relative 75 %      Immat GRANS % 0 %      Lymphocytes Relative 18 %      Monocytes Relative 6 %      Eosinophils Relative 1 %      Basophils Relative 0 %      Neutrophils Absolute 6.71 Thousands/µL      Immature Grans Absolute 0.04 Thousand/uL      Lymphocytes Absolute 1.58 Thousands/µL      Monocytes Absolute 0.55 Thousand/µL      Eosinophils Absolute 0.11 Thousand/µL      Basophils Absolute 0.03 Thousands/µL                  CT spine lumbar w contrast   Final Result by Robinson Myers MD (10/03 1954)      1. Redemonstrated posterior and interbody instrumented fusion at level L3-4.   2.  No acute osseous abnormality. No definite CT evidence of infectious process. Please note that cannot evaluate the canal at the postsurgical level L3-4 due to artifactually distorted image quality related to surgical hardware.    3.  Degenerative change as detailed without high-grade canal stenosis. 4.  Multilevel foraminal stenosis as described; severe stenosis at right L1-2, right L2-3, and left L5-S1. Moderate to severe stenosis at left L4-5. Workstation performed: [id number]               Procedures  Procedures      ED Course  ED Course as of 10/04/23 Camila Ledezma Oct 03, 2023   1447 ECG 12 lead  Procedure Note: EKG  Date/Time: 10/03/23 2:47 PM   Interpreted by: Darlin Chua MD  Indications / Diagnosis: palpitations   ECG reviewed by me, the ED Provider: yes   The EKG demonstrates:  Rhythm: normal sinus with pvc and pac  Intervals: normal intervals  Axis: normal axis  QRS/Blocks: normal QRS  ST Changes: No acute ST Changes, no STD/MONSE.     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 Plan to start pt on po metoprolol and f/u with cardiology                                       Medical Decision Making  C12year-old male presenting with low back pain. Presents stable. Patient has no focal exam findings other than some tenderness in the mid back. Will treat symptomatically and reassess. Doubt cauda equina or epidural abscess. During the emergency department visit, patient has multiple episodes of AVNRT that break without intervention. Patient experiences palpitations during these episodes. Patient with ongoing pain during the ER stay despite multiple doses of benzodiazepines and opioids. CT imaging is overall unremarkable. Given difficult to control pain. Shared decision-making patient agreed to be admitted for pain control. Amount and/or Complexity of Data Reviewed  Labs: ordered. Radiology: ordered. ECG/medicine tests:  Decision-making details documented in ED Course. Risk  OTC drugs. Prescription drug management. Decision regarding hospitalization.             Disposition  Final diagnoses:   Lumbar radiculopathy   Acute exacerbation of chronic low back pain   AVNRT (AV shukri re-entry tachycardia)     Time reflects when diagnosis was documented in both MDM as applicable and the Disposition within this note     Time User Action Codes Description Comment    10/3/2023  5:47 PM Nathaniel Trell Add [Y93.80] Lumbar radiculopathy     10/3/2023  8:11 PM BrookePeter pembertons Add [M54.50,  G89.29] Acute exacerbation of chronic low back pain     10/3/2023  8:11 PM Peter Baker Add [I47.19] AVNRT (AV shukri re-entry tachycardia)       ED Disposition     ED Disposition   Admit    Condition   Stable    Date/Time   Tue Oct 3, 2023  8:10 PM    Comment   Case was discussed with AMBER and the patient's admission status was agreed to be Admission Status: observation status to the service of Dr. Laurie Lion . Follow-up Information    None         Current Discharge Medication List      START taking these medications    Details   diazepam (VALIUM) 10 mg tablet Take 1 tablet (10 mg total) by mouth 2 (two) times a day  Qty: 8 tablet, Refills: 0    Associated Diagnoses: Lumbar radiculopathy         CONTINUE these medications which have NOT CHANGED    Details   ALPRAZolam (XANAX) 0.25 mg tablet Take 1 tablet (0.25 mg total) by mouth 2 (two) times a day as needed for anxiety for up to 10 days  Qty: 10 tablet, Refills: 0    Associated Diagnoses: Anxiety      balsalazide (COLAZAL) 750 mg capsule Take 3 capsules (2,250 mg total) by mouth 2 (two) times a day  Qty: 180 capsule, Refills: 5    Associated Diagnoses: Other ulcerative colitis with complication (HCC)      busPIRone (BUSPAR) 5 mg tablet Take 5 mg by mouth 2 (two) times a day      Diclofenac Sodium (VOLTAREN) 1 %       ezetimibe (ZETIA) 10 mg tablet Take 10 mg by mouth daily      ibuprofen (MOTRIN) 800 mg tablet Take 1 tablet (800 mg total) by mouth every 8 (eight) hours as needed for mild pain Take 1 tablet by mouth 3 times daily for 3 days, then take as needed. Take with food and water. Discontinue if stomach upset occurs.   Qty: 30 tablet, Refills: 0    Associated Diagnoses: Tear of right supraspinatus tendon; Partial tear of right subscapularis tendon, subsequent encounter      meclizine (ANTIVERT) 12.5 MG tablet Take 12.5 mg by mouth Three times daily as needed      methocarbamol (ROBAXIN) 500 mg tablet take 1 tablet by mouth four times a day if needed for muscle spasm      ondansetron (ZOFRAN-ODT) 4 mg disintegrating tablet Take 1 tablet (4 mg total) by mouth every 6 (six) hours as needed for nausea or vomiting  Qty: 20 tablet, Refills: 0    Associated Diagnoses: Tear of right supraspinatus tendon; Partial tear of right subscapularis tendon, subsequent encounter      !! oxyCODONE (ROXICODONE) 15 mg immediate release tablet       !! oxyCODONE (ROXICODONE) 30 MG immediate release tablet oxycodone 30 mg tablet      oxyCODONE-acetaminophen (Percocet)  mg per tablet Take 1 tablet by mouth every 4 (four) hours as needed for moderate pain Max Daily Amount: 6 tablets  Qty: 30 tablet, Refills: 0    Associated Diagnoses: Tear of right supraspinatus tendon; Partial tear of right subscapularis tendon, subsequent encounter      polyethylene glycol (GaviLyte-C) 4000 mL solution Take 4,000 mL by mouth once for 1 dose  Qty: 4000 mL, Refills: 0    Associated Diagnoses: Therapeutic opioid induced constipation      temazepam (RESTORIL) 15 mg capsule take 1 capsule by mouth nightly if needed for sleep      traZODone (DESYREL) 50 mg tablet Take 25 mg by mouth      zolpidem (AMBIEN) 10 mg tablet Take 1 tablet (10 mg total) by mouth daily at bedtime as needed for sleep for up to 10 days  Qty: 3 tablet, Refills: 0    Associated Diagnoses: Closed odontoid fracture with type III morphology, initial encounter ([address])       ! ! - Potential duplicate medications found. Please discuss with provider. No discharge procedures on file. PDMP Review     None           ED Provider  Attending physically available and evaluated Raul Green. I managed the patient along with the ED Attending.     Electronically Signed by         Donya Estrada Dilan Luna MD  10/04/23 7944

## 2023-10-06 ENCOUNTER — OFFICE VISIT (OUTPATIENT)
Dept: FAMILY MEDICINE CLINIC | Facility: CLINIC | Age: 68
End: 2023-10-06
Payer: MEDICARE

## 2023-10-06 VITALS
HEART RATE: 101 BPM | SYSTOLIC BLOOD PRESSURE: 142 MMHG | HEIGHT: 72 IN | OXYGEN SATURATION: 97 % | BODY MASS INDEX: 32.51 KG/M2 | WEIGHT: 240 LBS | DIASTOLIC BLOOD PRESSURE: 82 MMHG | TEMPERATURE: 98.5 F

## 2023-10-06 DIAGNOSIS — Z23 ENCOUNTER FOR IMMUNIZATION: ICD-10-CM

## 2023-10-06 DIAGNOSIS — G47.01 INSOMNIA DUE TO MEDICAL CONDITION: ICD-10-CM

## 2023-10-06 DIAGNOSIS — M54.16 LUMBAR RADICULOPATHY: ICD-10-CM

## 2023-10-06 DIAGNOSIS — Z98.1 STATUS POST LUMBAR SPINAL FUSION: ICD-10-CM

## 2023-10-06 DIAGNOSIS — M48.061 SPINAL STENOSIS OF LUMBAR REGION, UNSPECIFIED WHETHER NEUROGENIC CLAUDICATION PRESENT: Primary | ICD-10-CM

## 2023-10-06 PROCEDURE — G0008 ADMIN INFLUENZA VIRUS VAC: HCPCS

## 2023-10-06 PROCEDURE — 90662 IIV NO PRSV INCREASED AG IM: CPT

## 2023-10-06 PROCEDURE — G0439 PPPS, SUBSEQ VISIT: HCPCS | Performed by: FAMILY MEDICINE

## 2023-10-06 PROCEDURE — 99204 OFFICE O/P NEW MOD 45 MIN: CPT | Performed by: FAMILY MEDICINE

## 2023-10-06 RX ORDER — ZOLPIDEM TARTRATE 5 MG/1
5 TABLET ORAL
Qty: 30 TABLET | Refills: 0 | Status: SHIPPED | OUTPATIENT
Start: 2023-10-06 | End: 2023-11-05

## 2023-10-06 RX ORDER — METHOCARBAMOL 500 MG/1
500 TABLET, FILM COATED ORAL 3 TIMES DAILY
Qty: 90 TABLET | Refills: 0 | Status: SHIPPED | OUTPATIENT
Start: 2023-10-06

## 2023-10-06 NOTE — PROGRESS NOTES
Assessment and Plan:     Problem List Items Addressed This Visit        Nervous and Auditory    Lumbar radiculopathy    Relevant Medications    methocarbamol (ROBAXIN) 500 mg tablet       Other    Status post lumbar spinal fusion   Other Visit Diagnoses     Spinal stenosis of lumbar region, unspecified whether neurogenic claudication present    -  Primary    Insomnia due to medical condition        Relevant Medications    zolpidem (AMBIEN) 5 mg tablet      Discussed 30 day supply of Ambien with no long term prescribing. BMI Counseling: Body mass index is 32.55 kg/m². The BMI is above normal. Nutrition recommendations include decreasing portion sizes, encouraging healthy choices of fruits and vegetables, consuming healthier snacks, limiting drinks that contain sugar, moderation in carbohydrate intake, increasing intake of lean protein and reducing intake of cholesterol. Exercise recommendations include moderate physical activity 150 minutes/week and exercising 3-5 times per week. No pharmacotherapy was ordered. Rationale for BMI follow-up plan is due to patient being overweight or obese. Physical activity limited due to spinal stenosis. Depression Screening and Follow-up Plan: Patient was screened for depression during today's encounter. They screened negative with a PHQ-2 score of 0. Preventive health issues were discussed with patient, and age appropriate screening tests were ordered as noted in patient's After Visit Summary. Personalized health advice and appropriate referrals for health education or preventive services given if needed, as noted in patient's After Visit Summary.      History of Present Illness:     Patient presents for a Medicare Wellness Visit    HPI   Patient Care Team:  Sadia Mckeon DO as PCP - General (Family Medicine)  MD Lydia Kruse MD Kirby Stakes, MD Colleen Ouch, PA-C as Physician Assistant (Gastroenterology)  Lexis Ruelas MD as [address] (Family Medicine)    Patient presents to the office to establish care. Reports that he had rotator cuff surgery 8 weeks ago with Dr. Sahil Hernandez. Notes that he is having a hard time sleeping at night due to the back pain. Taking gabapentin 300 mg TID. States that he is following up with pain management on 10/13/23. Feels that the back pain has not improved since the hospitalization. Has drop foot from his back operation about 5 year ago. Denies urinary or stool incontinence.      Has cardio follow up on 11/1/23     Review of Systems:     Review of Systems     Problem List:     Patient Active Problem List   Diagnosis   • Pain in right hip   • Ulcerative colitis ([address])   • Hypertension   • Impaired mobility and activities of daily living   • Facet arthropathy, lumbar   • Status post lumbar spinal fusion   • Hx of adenomatous polyp of colon   • Ulcerative colitis with rectal bleeding (HCC)   • Type III fracture of odontoid process (HCC)   • Traumatic compression fracture of T2 thoracic vertebra (HCC)   • Left elbow fracture   • Sternal fracture   • Fall   • Acute pain due to trauma   • NAFLD (nonalcoholic fatty liver disease)   • Chronic bilateral low back pain with bilateral sciatica   • Lumbar radiculopathy   • Therapeutic opioid induced constipation   • Chronic foot pain, right   • Right leg pain   • Chronic pain syndrome   • Neuropathy   • Peroneal neuropathy   • Right foot drop   • Tear of right supraspinatus tendon   • Class 2 obesity in adult   • Partial tear of right subscapularis tendon, subsequent encounter   • Abnormal EKG   • Positive D dimer   • Anxiety      Past Medical and Surgical History:     Past Medical History:   Diagnosis Date   • Back pain    • Colon polyp    • Hyperlipidemia    • Lumbar fracture with cord injury ([address])    • Neck fracture ([address])    • Previous back surgery     rods in the back   • Ulcerative colitis [facility])      Past Surgical History: Procedure Laterality Date   • BACK SURGERY     • LUMBAR FUSION Right 1/20/2016    Procedure: FUSION LUMBAR  POSTERIOR, TLIF L3-4  Right L3-4 Fascet;  Surgeon: Susie Kincaid MD;  Location:  MAIN OR;  Service:    • NC COLONOSCOPY FLX DX W/COLLJ SPEC WHEN PFRMD N/A 1/24/2019    Procedure: COLONOSCOPY;  Surgeon: Pilar Jamison MD;  Location: [facility]; Service: Gastroenterology   • NC SURGICAL ARTHROSCOPY SHOULDER W/ROTATOR CUFF RPR Right 7/26/2023    Procedure: ARTHROSCOPY SHOULDER- Right shoulder Arthroscopy, supraspinatus and subscapularis repair, biceps tenotomy, extensive debridement;  Surgeon: Rodeirck Wiggins DO;  Location: [facility];  Service: Orthopedics   • TONSILLECTOMY        Family History:     Family History   Problem Relation Age of Onset   • Parkinsonism Mother    • Lung cancer Father       Social History:     Social History     Socioeconomic History   • Marital status: /Civil Union     Spouse name: None   • Number of children: None   • Years of education: None   • Highest education level: None   Occupational History   • None   Tobacco Use   • Smoking status: Never   • Smokeless tobacco: Never   Vaping Use   • Vaping Use: Never used   Substance and Sexual Activity   • Alcohol use: Not Currently     Comment: rarely   • Drug use: No   • Sexual activity: Not Currently   Other Topics Concern   • None   Social History Narrative   • None     Social Determinants of Health     Financial Resource Strain: Low Risk  (10/6/2023)    Overall Financial Resource Strain (CARDIA)    • Difficulty of Paying Living Expenses: Not hard at all   Food Insecurity: Not on file   Transportation Needs: No Transportation Needs (10/6/2023)    PRAPARE - Transportation    • Lack of Transportation (Medical): No    • Lack of Transportation (Non-Medical):  No   Physical Activity: Not on file   Stress: Not on file   Social Connections: Not on file   Intimate Partner Violence: Not on file   Housing Stability: Not on file      Medications and Allergies:     Current Outpatient Medications   Medication Sig Dispense Refill   • balsalazide (COLAZAL) 750 mg capsule Take 3 capsules (2,250 mg total) by mouth 2 (two) times a day 180 capsule 5   • diazepam (VALIUM) 10 mg tablet Take 1 tablet (10 mg total) by mouth 2 (two) times a day 8 tablet 0   • Diclofenac Sodium (VOLTAREN) 1 %      • ezetimibe (ZETIA) 10 mg tablet Take 10 mg by mouth daily     • gabapentin (NEURONTIN) 300 mg capsule Take 1 capsule (300 mg total) by mouth 3 (three) times a day 90 capsule 0   • methocarbamol (ROBAXIN) 500 mg tablet Take 1 tablet (500 mg total) by mouth 3 (three) times a day 90 tablet 0   • zolpidem (AMBIEN) 5 mg tablet Take 1 tablet (5 mg total) by mouth daily at bedtime as needed for sleep 30 tablet 0     No current facility-administered medications for this visit. Allergies   Allergen Reactions   • No Active Allergies       Immunizations:     Immunization History   Administered Date(s) Administered   • COVID-19 PFIZER VACCINE 0.3 ML IM 03/08/2021, 03/29/2021   • INFLUENZA 10/09/2015, 10/02/2016, 09/18/2017, 09/27/2018   • Influenza, seasonal, injectable 1955   • Pneumococcal Conjugate 13-Valent 12/28/2016   • Pneumococcal Polysaccharide PPV23 1955   • Tdap 1955   • Zoster 1955   • influenza, injectable, quadrivalent 09/27/2018      Health Maintenance:         Topic Date Due   • Colorectal Cancer Screening  06/26/2028   • Hepatitis C Screening  Completed         Topic Date Due   • Pneumococcal Vaccine: 65+ Years (2 - PPSV23 if available, else PCV20) 02/22/2017   • COVID-19 Vaccine (3 - Pfizer series) 05/24/2021   • Influenza Vaccine (1) 09/01/2023      Medicare Screening Tests and Risk Assessments:     Daryn Lora is here for his Subsequent Wellness visit. Health Risk Assessment:   Patient rates overall health as good. Patient feels that their physical health rating is same. Patient is satisfied with their life.  Eyesight was rated as same. Hearing was rated as same. Patient feels that their emotional and mental health rating is same. Patients states they are never, rarely angry. Patient states they are never, rarely unusually tired/fatigued. Pain experienced in the last 7 days has been none. Patient states that he has experienced no weight loss or gain in last 6 months. Fall Risk Screening: In the past year, patient has experienced: history of falling in past year    Number of falls: 1    Home Safety:  Patient has trouble with stairs inside or outside of their home. Patient has working smoke alarms and has working carbon monoxide detector. Home safety hazards include: none. Nutrition:   Current diet is Regular. Medications:   Patient is not currently taking any over-the-counter supplements. Patient is able to manage medications. Activities of Daily Living (ADLs)/Instrumental Activities of Daily Living (IADLs):   Walk and transfer into and out of bed and chair?: Yes  Dress and groom yourself?: Yes    Bathe or shower yourself?: Yes    Feed yourself?  Yes  Do your laundry/housekeeping?: Yes  Manage your money, pay your bills and track your expenses?: Yes  Make your own meals?: Yes    Do your own shopping?: Yes    Previous Hospitalizations:   Any hospitalizations or ED visits within the last 12 months?: Yes    How many hospitalizations have you had in the last year?: 1-2    Advance Care Planning:   Living will: No    Durable POA for healthcare: No    Advanced directive: No      PREVENTIVE SCREENINGS      Cardiovascular Screening:    General: Screening Not Indicated and History Lipid Disorder      Diabetes Screening:     General: Screening Current      Colorectal Cancer Screening:     General: Screening Current      Prostate Cancer Screening:    General: Screening Current      Abdominal Aortic Aneurysm (AAA) Screening:    Risk factors include: age between 70-75 yo        Lung Cancer Screening:     General: Screening Not Indicated      Hepatitis C Screening:    General: Screening Current    Screening, Brief Intervention, and Referral to Treatment (SBIRT)    Screening  Typical number of drinks in a day: 0  Typical number of drinks in a week: 0  Interpretation: Low risk drinking behavior. Single Item Drug Screening:  How often have you used an illegal drug (including marijuana) or a prescription medication for non-medical reasons in the past year? never    Single Item Drug Screen Score: 0  Interpretation: Negative screen for possible drug use disorder    No results found. Physical Exam:     /82   Pulse 101   Temp 98.5 °F (36.9 °C)   Ht 6' (1.829 m)   Wt 109 kg (240 lb)   SpO2 97%   BMI 32.55 kg/m²     Physical Exam  Vitals reviewed. Constitutional:       General: He is not in acute distress. Appearance: Normal appearance. HENT:      Head: Normocephalic and atraumatic. Right Ear: External ear normal.      Left Ear: External ear normal.      Nose: Nose normal.      Mouth/Throat:      Mouth: Mucous membranes are moist.   Eyes:      Extraocular Movements: Extraocular movements intact. Conjunctiva/sclera: Conjunctivae normal.   Cardiovascular:      Rate and Rhythm: Normal rate and regular rhythm. Heart sounds: Normal heart sounds. Pulmonary:      Effort: Pulmonary effort is normal.      Breath sounds: Normal breath sounds. No wheezing, rhonchi or rales. Abdominal:      General: Bowel sounds are normal. There is no distension. Palpations: Abdomen is soft. Tenderness: There is no abdominal tenderness. Musculoskeletal:      Right lower leg: No edema. Left lower leg: No edema. Skin:     General: Skin is warm. Capillary Refill: Capillary refill takes less than 2 seconds. Findings: No rash. Neurological:      Mental Status: He is alert. Mental status is at baseline.         Jose Luis No DO

## 2023-10-08 ENCOUNTER — HOSPITAL ENCOUNTER (EMERGENCY)
Facility: HOSPITAL | Age: 68
Discharge: HOME | End: 2023-10-08
Attending: EMERGENCY MEDICINE
Payer: MEDICARE

## 2023-10-08 VITALS
TEMPERATURE: 98 F | RESPIRATION RATE: 17 BRPM | HEART RATE: 91 BPM | OXYGEN SATURATION: 97 % | SYSTOLIC BLOOD PRESSURE: 157 MMHG | DIASTOLIC BLOOD PRESSURE: 102 MMHG

## 2023-10-08 DIAGNOSIS — Z76.0 ENCOUNTER FOR MEDICATION REFILL: Primary | ICD-10-CM

## 2023-10-08 PROCEDURE — 99283 EMERGENCY DEPT VISIT LOW MDM: CPT | Performed by: PHYSICIAN ASSISTANT

## 2023-10-08 PROCEDURE — 99281 EMR DPT VST MAYX REQ PHY/QHP: CPT

## 2023-10-08 NOTE — ED PROVIDER NOTES
History  Chief Complaint   Patient presents with   • Medication Refill     Pt states he was d/c from [facility] and ran out of his valium for his back pain       78 yo male here for med refill. Has chronic back pain and is requesting refill of valium. He denies any acute change in his pain. No saddle anesthesia, urinary incontinence, retention. No extremity numbness, tingling, weakness. History provided by:  Patient   used: No    Medication Refill      Prior to Admission Medications   Prescriptions Last Dose Informant Patient Reported? Taking?    Diclofenac Sodium (VOLTAREN) 1 %  Self Yes No   balsalazide (COLAZAL) 750 mg capsule  Self No No   Sig: Take 3 capsules (2,250 mg total) by mouth 2 (two) times a day   diazepam (VALIUM) 10 mg tablet   No No   Sig: Take 1 tablet (10 mg total) by mouth 2 (two) times a day   ezetimibe (ZETIA) 10 mg tablet  Self Yes No   Sig: Take 10 mg by mouth daily   gabapentin (NEURONTIN) 300 mg capsule   No No   Sig: Take 1 capsule (300 mg total) by mouth 3 (three) times a day   methocarbamol (ROBAXIN) 500 mg tablet   No No   Sig: Take 1 tablet (500 mg total) by mouth 3 (three) times a day   zolpidem (AMBIEN) 5 mg tablet   No No   Sig: Take 1 tablet (5 mg total) by mouth daily at bedtime as needed for sleep      Facility-Administered Medications: None       Past Medical History:   Diagnosis Date   • Back pain    • Colon polyp    • Hyperlipidemia    • Lumbar fracture with cord injury ([address])    • Neck fracture (HCC)    • Previous back surgery     rods in the back   • Ulcerative colitis [facility])        Past Surgical History:   Procedure Laterality Date   • BACK SURGERY     • LUMBAR FUSION Right 1/20/2016    Procedure: FUSION LUMBAR  POSTERIOR, TLIF L3-4  Right L3-4 Fascet;  Surgeon: Shena Hayward MD;  [facility];  Service:    • MA COLONOSCOPY FLX DX W/COLLJ SPEC WHEN PFRMD N/A 1/24/2019    Procedure: COLONOSCOPY;  Surgeon: Nereyda Arenas MD; Location: [facility]; Service: Gastroenterology   • ID SURGICAL ARTHROSCOPY SHOULDER W/ROTATOR CUFF RPR Right 7/26/2023    Procedure: ARTHROSCOPY SHOULDER- Right shoulder Arthroscopy, supraspinatus and subscapularis repair, biceps tenotomy, extensive debridement;  Surgeon: Jordi Chung DO;  Location: [facility];  Service: Orthopedics   • TONSILLECTOMY         Family History   Problem Relation Age of Onset   • Parkinsonism Mother    • Lung cancer Father      I have reviewed and agree with the history as documented. E-Cigarette/Vaping   • E-Cigarette Use Never User      E-Cigarette/Vaping Substances   • Nicotine No    • THC No    • CBD No    • Flavoring No    • Other No    • Unknown No      Social History     Tobacco Use   • Smoking status: Never   • Smokeless tobacco: Never   Vaping Use   • Vaping Use: Never used   Substance Use Topics   • Alcohol use: Not Currently     Comment: rarely   • Drug use: No       Review of Systems   Constitutional: Negative for chills and fever. HENT: Negative for ear pain and sore throat. Eyes: Negative for pain and visual disturbance. Respiratory: Negative for cough and shortness of breath. Cardiovascular: Negative for chest pain and palpitations. Gastrointestinal: Negative for abdominal pain and vomiting. Genitourinary: Negative for dysuria and hematuria. Musculoskeletal: Positive for back pain. Negative for arthralgias. Skin: Negative for color change and rash. Neurological: Negative for seizures and syncope. All other systems reviewed and are negative. Physical Exam  Physical Exam  Vitals and nursing note reviewed. Constitutional:       General: He is not in acute distress. Appearance: He is well-developed. HENT:      Head: Normocephalic and atraumatic. Eyes:      Conjunctiva/sclera: Conjunctivae normal.   Cardiovascular:      Rate and Rhythm: Normal rate and regular rhythm. Heart sounds: No murmur heard.   Pulmonary:      Effort: Pulmonary effort is normal. No respiratory distress. Breath sounds: Normal breath sounds. Abdominal:      Palpations: Abdomen is soft. Tenderness: There is no abdominal tenderness. Musculoskeletal:         General: No swelling. Cervical back: Neck supple. Skin:     General: Skin is warm and dry. Capillary Refill: Capillary refill takes less than 2 seconds. Neurological:      Mental Status: He is alert and oriented to person, place, and time. GCS: GCS eye subscore is 4. GCS verbal subscore is 5. GCS motor subscore is 6. Comments: GCS 15. AAOx3. Ambulating in department without difficulty. CN II-XII grossly intact. No focal neuro deficits. Psychiatric:         Mood and Affect: Mood normal.         Vital Signs  ED Triage Vitals [10/08/23 1539]   Temperature Pulse Respirations Blood Pressure SpO2   98 °F (36.7 °C) 91 17 (!) 157/102 97 %      Temp Source Heart Rate Source Patient Position - Orthostatic VS BP Location FiO2 (%)   Temporal Monitor Sitting Left arm --      Pain Score       --           Vitals:    10/08/23 1539   BP: (!) 157/102   Pulse: 91   Patient Position - Orthostatic VS: Sitting         Visual Acuity      ED Medications  Medications - No data to display    Diagnostic Studies  Results Reviewed     None                 No orders to display              Procedures  Procedures         ED Course                               SBIRT 20yo+    Flowsheet Row Most Recent Value   Initial Alcohol Screen: US AUDIT-C     1. How often do you have a drink containing alcohol? 0 Filed at: 10/08/2023 1540   2. How many drinks containing alcohol do you have on a typical day you are drinking? 0 Filed at: 10/08/2023 1540   3b. FEMALE Any Age, or MALE 65+: How often do you have 4 or more drinks on one occassion? 0 Filed at: 10/08/2023 1540   Audit-C Score 0 Filed at: 10/08/2023 1540   AYESHA: How many times in the past year have you. ..     Used an illegal drug or used a prescription medication for non-medical reasons? Never Filed at: 10/08/2023 1540                    Medical Decision Making  DDx including but not limited to: sciatica, herniated disc, arthritis, spinal stenosis, strain, sprain, fracture, cauda equina syndrome, epidural abscess, transverse myelitis, AAA. Plan/MDM: 78 yo with chronic pain requesting med refill. Explained to patient hospital policy that I cannot prescribe controlled medications for chronic pain. Expresses his understanding and states he will see his PCP in f/u. Return parameters provided. Pt understands and agrees with plan. Disposition  Final diagnoses:   Encounter for medication refill     Time reflects when diagnosis was documented in both MDM as applicable and the Disposition within this note     Time User Action Codes Description Comment    10/8/2023  3:59 PM Ana Langley Add [Z76.0] Encounter for medication refill       ED Disposition     ED Disposition   Discharge    Condition   Stable    Date/Time   Sun Oct 8, 2023  3:59 PM    Comment   Dixie Motley discharge to home/self care. Follow-up Information    None         Patient's Medications   Discharge Prescriptions    No medications on file       No discharge procedures on file.     PDMP Review       Value Time User    PDMP Reviewed  Yes 10/6/2023  2:27 PM Merlyn Brown DO          ED Provider  Electronically Signed by           Ana Langley PA-C  10/08/23 4262

## 2023-10-09 ENCOUNTER — TELEPHONE (OUTPATIENT)
Dept: FAMILY MEDICINE CLINIC | Facility: CLINIC | Age: 68
End: 2023-10-09

## 2023-10-09 NOTE — TELEPHONE ENCOUNTER
Pt stopped into office -- states when he was seen on 10/6 he made a mistake as to which medication he wanted. He said he wanted the Ambien, but he really would like the Diazepam because it takes the pain away and helps relax him. Pt stated he will stop taking the Ambien. Asking if Dr Natalia العلي will send in a script for him. Pt is aware that Dr Natalia العلي is out of the office until 10/11.

## 2023-10-10 NOTE — PROGRESS NOTES
Assessment:  1. Lumbar facet arthropathy    2. Status post lumbar spinal fusion    3. Chronic pain syndrome    4. Lumbar post-laminectomy syndrome        Plan:  Orders Placed This Encounter   Procedures    FL spine and pain procedure     Standing Status:   Future     Standing Expiration Date:   10/13/2027     Order Specific Question:   Reason for Exam:     Answer:   bilateral L1-2, L2-3, L4-5 MBB #1     Order Specific Question:   Anticoagulant hold needed? Answer:   No       No orders of the defined types were placed in this encounter. My impressions and treatment recommendations were discussed in detail with the patient, who verbalized understanding and had no further questions. He is a 79year old male with history of L3-4 fusion who presents her office with upper lumbar pain that is exacerbated with twisting. He may be having axial pain secondary to facet disease above and below his fusion. He has had injections with pain management after surgery, Dr. Sheila Song. Reports having SCS trial with no relief. I discussed trial of L1-2, L2-3, and L4-5 medial branch block with possible radiofrequency ablation if greater than 80% relief. In regards to medications, patient reports his PCP continues to prescribe oxycodone, however Valium is what worked best for him and was given to him in the ED. He is requesting Valium. Discussed that we do not prescribe benzodiazepines on a chronic basis. He was started on Cymbalta by his PCP which I encouraged him to try first.  This may be helpful for both his pain and anxiety surrounding his symptoms. [place] Prescription Drug Monitoring Program report was reviewed and was appropriate     Complete risks and benefits including bleeding, infection, tissue reaction, nerve injury and allergic reaction were discussed. The approach was demonstrated using models and literature was provided. Verbal and written consent was obtained.     Discharge instructions were provided. I personally saw and examined the patient and I agree with the above discussed plan of care. History of Present Illness:    Ned Hammonds is a 79 y.o. male who presents to [facility] for initial evaluation of the above stated pain complaints. The patient has a past medical and chronic pain history as outlined in the assessment section. He was referred by Ravi Montez PA-C  [address] [facility]   [address] [place] [id number]     Patient is here with chief complaint of of lower back pain for last 5 years. Severe in intensity over the past month. 10 out of 10. Constant. Worse in the evening and nighttime. Shooting, cutting and constant pain. Uses a cane to ambulate. Next  Pain is increased with lying down, standing, bending, walking, exercise, relaxation, bowel movement. He is status post L3-4 TLIF in 2016 by Dr. Lazara Haji. Reports doing well for about 1 year and then started to develop significant back pain. He reports pain and tingling in the toes of the right foot as well. He reports most of his pain near the operative region. Appears to be in the upper lumbar region. Recent CT on 10/3/2023 notes multilevel facet disease. Reports X relief with surgery in the past.  No relief with injections AFTER surgery. No tobacco or marijuana use. Alcohol use. Not on blood thinners. Not allergic to latex or contrast dye. Currently using oxycodone through his primary care doctor. Review of Systems:    Review of Systems   Cardiovascular:  Positive for palpitations. Musculoskeletal:  Positive for arthralgias and myalgias.            Past Medical History:   Diagnosis Date    Back pain     Colon polyp     Hyperlipidemia     Lumbar fracture with cord injury ([address])     Neck fracture (HCC)     Previous back surgery     rods in the back    Ulcerative colitis [facility])        Past Surgical History:   Procedure Laterality Date    BACK SURGERY      LUMBAR FUSION Right 1/20/2016    Procedure: FUSION LUMBAR  POSTERIOR, TLIF L3-4  Right L3-4 Fascet;  Surgeon: Sergo Johnson MD;  Location:  MAIN OR;  Service:     FL COLONOSCOPY FLX DX W/COLLJ SPEC WHEN PFRMD N/A 1/24/2019    Procedure: COLONOSCOPY;  Surgeon: Arias Dey MD;  Location: [facility];   Service: Gastroenterology    FL SURGICAL ARTHROSCOPY SHOULDER W/ROTATOR CUFF RPR Right 7/26/2023    Procedure: ARTHROSCOPY SHOULDER- Right shoulder Arthroscopy, supraspinatus and subscapularis repair, biceps tenotomy, extensive debridement;  Surgeon: Tirso Larson DO;  Location: [facility];  Service: Orthopedics    TONSILLECTOMY         Family History   Problem Relation Age of Onset    Parkinsonism Mother     Lung cancer Father        Social History     Occupational History    Not on file   Tobacco Use    Smoking status: Never    Smokeless tobacco: Never   Vaping Use    Vaping Use: Never used   Substance and Sexual Activity    Alcohol use: Not Currently     Comment: rarely    Drug use: No    Sexual activity: Not Currently         Current Outpatient Medications:     balsalazide (COLAZAL) 750 mg capsule, Take 3 capsules (2,250 mg total) by mouth 2 (two) times a day, Disp: 180 capsule, Rfl: 5    Diclofenac Sodium (VOLTAREN) 1 %, , Disp: , Rfl:     ezetimibe (ZETIA) 10 mg tablet, Take 10 mg by mouth daily, Disp: , Rfl:     gabapentin (NEURONTIN) 300 mg capsule, Take 1 capsule (300 mg total) by mouth 3 (three) times a day, Disp: 90 capsule, Rfl: 0    methocarbamol (ROBAXIN) 500 mg tablet, Take 1 tablet (500 mg total) by mouth 3 (three) times a day, Disp: 90 tablet, Rfl: 0    diazepam (VALIUM) 10 mg tablet, Take 1 tablet (10 mg total) by mouth 2 (two) times a day (Patient not taking: Reported on 10/13/2023), Disp: 8 tablet, Rfl: 0    DULoxetine (CYMBALTA) 30 mg delayed release capsule, , Disp: , Rfl:     zolpidem (AMBIEN) 5 mg tablet, Take 1 tablet (5 mg total) by mouth daily at bedtime as needed for sleep (Patient not taking: Reported on 10/13/2023), Disp: 30 tablet, Rfl: 0    Allergies   Allergen Reactions    No Active Allergies        Physical Exam:    /92   Pulse 76   Ht 6' (1.829 m)   Wt 110 kg (242 lb)   BMI 32.82 kg/m²     Constitutional: normal, well developed, well nourished, alert, in no distress and non-toxic and no overt pain behavior. Eyes: anicteric  HEENT: grossly intact  Neck: supple, symmetric, trachea midline and no masses   Pulmonary:even and unlabored  Cardiovascular:No edema or pitting edema present  Skin:Normal without rashes or lesions and well hydrated  Psychiatric:Mood and affect appropriate  Neurologic:Cranial Nerves II-XII grossly intact  Musculoskeletal:normal    Imaging    CT LUMBAR SPINE  10/3/23     INDICATION:   eval deep space infection. COMPARISON: MRI lumbar spine 10/14/2021     TECHNIQUE:  Contiguous axial images through the lumbar spine were obtained. Sagittal and coronal reconstructions were performed. IV Contrast: 100 mL of iohexol (OMNIPAQUE)     Radiation dose length product (DLP) for this visit:  1439.87 mGy-cm . This examination, like all CT scans performed in the [facility], was performed utilizing techniques to minimize radiation dose exposure, including the use of   iterative reconstruction and automated exposure control. IMAGE QUALITY:  Diagnostic. FINDINGS:     ALIGNMENT:  There are 5 lumbar type vertebral bodies. Levoscoliosis with apex at L2-3. Redemonstrated postsurgical change at level L3-4 with posterior and interbody instrumented fusion. There is solid osseous fusion across the facet joint and osseous bridging through the interbody spacer. Hardware appears intact without evidence of   loosening. VERTEBRAE: No acute fracture. Stable L5 superior endplate Schmorl's node and mild height loss. DEGENERATIVE CHANGES:     Lower Thoracic spine:  Normal lower thoracic disc spaces.      L1-2: There is mild disc bulge and superimposed right foraminal disc protrusion. Bilateral facet arthropathy. Mild canal stenosis. There is severe right and mild left foraminal stenosis. L2-3: Mild disc bulge. Left greater right bilateral foraminal disc protrusion. Bilateral facet arthropathy. Mild canal stenosis. Severe right and moderate left foraminal stenosis. L3-4: Prior instrumented fusion. Cannot reliably evaluate the canal contents due to distorted image quality by artifact from surgical hardware. L4-5: Disc bulge. Bilateral facet arthropathy. Mild canal stenosis. Moderate to severe left and moderate right foraminal narrowing. L5-S1: Disc bulge. Bilateral facet arthropathy. Mild canal stenosis. Severe left and mild right foraminal stenosis. PARASPINAL SOFT TISSUES:   Normal.     2.7 cm right hepatic lobe lesion with peripheral globular enhancement consistent with hemangioma, similar to CT 3/29/2019. IMPRESSION:     1. Redemonstrated posterior and interbody instrumented fusion at level L3-4.  2.  No acute osseous abnormality. No definite CT evidence of infectious process. Please note that cannot evaluate the canal at the postsurgical level L3-4 due to artifactually distorted image quality related to surgical hardware. 3.  Degenerative change as detailed without high-grade canal stenosis. 4.  Multilevel foraminal stenosis as described; severe stenosis at right L1-2, right L2-3, and left L5-S1. Moderate to severe stenosis at left L4-5. LUMBAR SPINE  9/27/23     INDICATION:   Z98.1: Arthrodesis status. COMPARISON: 10/28/2021     VIEWS:  XR SPINE LUMBAR COMPLETE W BENDING MINIMUM 6 VIEWS  Images: 6     FINDINGS:     Levoscoliosis of the lumbar spine with apex at the L3 vertebral body level. Straightening of the normal lordosis. Unchanged mild depression of the superior endplate at L2. Remaining vertebral bodies maintain their normal height and alignment. Intervertebral spacer at L3-L4.  Posterior fusion hardware with by pedicular screws and rods at L3-L4. No evidence of hardware fracture or adjacent osteolysis. There are 5 non rib bearing lumbar vertebral bodies. There is no evidence of acute fracture or destructive osseous lesion. No evidence of instability during flexion/extension. Degenerative disc disease and facet arthropathy. The pedicles appear intact. There are atherosclerotic calcifications. Soft tissues are otherwise unremarkable. IMPRESSION:     1. Postoperative changes of the lumbar spine at L3-4 without evidence of hardware fracture or adjacent osteolysis.            FL spine and pain procedure    (Results Pending)       Orders Placed This Encounter   Procedures    FL spine and pain procedure

## 2023-10-11 NOTE — TELEPHONE ENCOUNTER
Pt lm for office -- requesting update on request -- would like diazepam instead of zolpidem, as zolpidem does not help at all with his pain.

## 2023-10-12 ENCOUNTER — OFFICE VISIT (OUTPATIENT)
Dept: OBGYN CLINIC | Facility: CLINIC | Age: 68
End: 2023-10-12

## 2023-10-12 VITALS
BODY MASS INDEX: 33 KG/M2 | HEIGHT: 72 IN | DIASTOLIC BLOOD PRESSURE: 87 MMHG | SYSTOLIC BLOOD PRESSURE: 126 MMHG | HEART RATE: 80 BPM | WEIGHT: 243.6 LBS

## 2023-10-12 DIAGNOSIS — Z98.890 S/P ARTHROSCOPY OF RIGHT SHOULDER: Primary | ICD-10-CM

## 2023-10-12 DIAGNOSIS — Z48.89 AFTERCARE FOLLOWING SURGERY: ICD-10-CM

## 2023-10-12 PROCEDURE — 99024 POSTOP FOLLOW-UP VISIT: CPT | Performed by: ORTHOPAEDIC SURGERY

## 2023-10-12 RX ORDER — DULOXETIN HYDROCHLORIDE 30 MG/1
CAPSULE, DELAYED RELEASE ORAL
COMMUNITY
Start: 2023-10-10

## 2023-10-12 NOTE — PROGRESS NOTES
Patient Name:  Rizwan Garcia  MRN:  [[id number]]    [id number] [address]     1. S/P arthroscopy of right shoulder  -     Ambulatory Referral to Physical Therapy; Future    2. Aftercare following surgery      Approximately 11 weeks s/p Right shoulder arthroscopic supraspinatus and subscapularis repair, biceps tenotomy, extensive debridement performed on 07/26/2023. The patient is encouraged to continue with physical therapy once per week if he is able to. Continue with home exercise plan. Be mindful of activities, not to over do it. He may lay on shoulder sparingly if he is comfortable with it. Follow-up in 3 months for re-evaluation, range of motion and strength check     History of the Present Illness   Rizwan Garcia is a 79 y.o. male approximately 11 weeks s/p Right shoulder arthroscopic supraspinatus and subscapularis repair, biceps tenotomy, extensive debridement performed on 07/26/2023. The patient was last seen in the office on 8/31/23 where he was advised to continue working with physical therapy and continue with home exercise plan. He reports he is suffering from low back pain. He was provided diazepam from the emergency room on 10/3/23. He notes great improvement in his range of motion of his shoulder. He does have mild pain in the shoulder. He has stopped attending physical therapy mid September due to increase in lumbar pain which has required pain medication. He does not want to drive while taking pain medication. He asked if he could stack wood. Review of Systems     Review of Systems   Constitutional:  Negative for appetite change and unexpected weight change. HENT:  Negative for congestion and trouble swallowing. Eyes:  Negative for visual disturbance. Respiratory:  Negative for cough and shortness of breath. Cardiovascular:  Negative for chest pain and palpitations. Gastrointestinal:  Negative for nausea and vomiting. Endocrine: Negative for cold intolerance and heat intolerance. Musculoskeletal:  Positive for arthralgias. Negative for joint swelling and myalgias. Skin:  Negative for rash. Neurological:  Negative for numbness. Physical Exam     /87   Pulse 80   Ht 6' (1.829 m)   Wt 110 kg (243 lb 9.6 oz)   BMI 33.04 kg/m²     Right Shoulder:   Surgical incisions well healed. Active range of motion   150 degrees forward flexion  150 degrees abduction  50 degrees external rotation   L4 internal rotation      There is 4/5 strength with supraspinatus testing. There is 4/5 strength with infraspinatus testing. The patient is neurovascularly intact distally in the extremity. Data Review     I have personally reviewed pertinent films in PACS, and my interpretation follows. No new imaging. Social History     Tobacco Use   • Smoking status: Never   • Smokeless tobacco: Never   Vaping Use   • Vaping Use: Never used   Substance Use Topics   • Alcohol use: Not Currently     Comment: rarely   • Drug use: No           Procedures  None performed.      Arabella Cockayne   Scribe Attestation    I,:  Arabella Cockayne am acting as a scribe while in the presence of the attending physician.:       I,:  Tyra Brunson, DO personally performed the services described in this documentation    as scribed in my presence.:

## 2023-10-12 NOTE — TELEPHONE ENCOUNTER
Patient has an appointment with pain management tomorrow. He can discuss medications for his back pain at this appointment as this is the specialist managing his back pain. He just received a script of valium from the ED.     Kelechi Snider [facility]  10/12/2023 7:20 AM

## 2023-10-13 ENCOUNTER — CONSULT (OUTPATIENT)
Dept: PAIN MEDICINE | Facility: CLINIC | Age: 68
End: 2023-10-13
Payer: MEDICARE

## 2023-10-13 VITALS
DIASTOLIC BLOOD PRESSURE: 92 MMHG | HEART RATE: 76 BPM | WEIGHT: 242 LBS | HEIGHT: 72 IN | SYSTOLIC BLOOD PRESSURE: 130 MMHG | BODY MASS INDEX: 32.78 KG/M2

## 2023-10-13 DIAGNOSIS — Z98.1 STATUS POST LUMBAR SPINAL FUSION: ICD-10-CM

## 2023-10-13 DIAGNOSIS — M96.1 LUMBAR POST-LAMINECTOMY SYNDROME: ICD-10-CM

## 2023-10-13 DIAGNOSIS — M47.816 LUMBAR FACET ARTHROPATHY: Primary | ICD-10-CM

## 2023-10-13 DIAGNOSIS — G89.4 CHRONIC PAIN SYNDROME: ICD-10-CM

## 2023-10-13 PROCEDURE — 99204 OFFICE O/P NEW MOD 45 MIN: CPT | Performed by: STUDENT IN AN ORGANIZED HEALTH CARE EDUCATION/TRAINING PROGRAM

## 2023-10-13 NOTE — PATIENT INSTRUCTIONS
Lumbar Facet Block   WHAT YOU NEED TO KNOW:   A lumbar facet block is a procedure used to decrease inflammation in your lower spine. Medicines are injected at facet joints in your lower back. Facet joints are found at the back of each vertebrae. DISCHARGE INSTRUCTIONS:   Call your local emergency number (911 in the [address]) if:   You have sudden chest pain or trouble breathing. Call your doctor if:   You have a severe headache. Your feet are numb, tingly, cool, or pale. You have a fever or chills. Your procedure site is red, swollen, or draining pus. You have nausea or are vomiting. You have questions or concerns about your condition or care. Self-care:   Do not take pain medicines until directed. Your healthcare provider will tell you when to start using your usual pain medicines again. This will help him or her see if the facet block worked. Return to your daily activities as directed. Your provider may tell you to rest or do light activities the day of your procedure. You may be able to return to normal activities the next day. Keep a pain record, if directed. You may be asked to keep a pain record for a few days. This will help your provider see if the facet block worked. Include when you have pain, and how severe it is. Include anything that relieves the pain or makes it worse, such as certain movements. Bring the record with you to all follow-up visits. Go to physical therapy as directed. A physical therapist teaches you exercises to help improve movement and strength, and to decrease pain. Follow up with your doctor as directed:  Write down your questions so you remember to ask them during your visits. © Copyright [facility] 2023 Information is for End User's use only and may not be sold, redistributed or otherwise used for commercial purposes. The above information is an  only.  It is not intended as medical advice for individual conditions or treatments. Talk to your doctor, nurse or pharmacist before following any medical regimen to see if it is safe and effective for you.

## 2023-10-16 ENCOUNTER — TELEPHONE (OUTPATIENT)
Dept: RADIOLOGY | Facility: CLINIC | Age: 68
End: 2023-10-16

## 2023-10-16 NOTE — TELEPHONE ENCOUNTER
Voicemail was left asking patient to please call us back to schedule bilateral L1-2, L2-3, L4-5 MBB #1  with Dr. Luigi Loya.

## 2023-10-16 NOTE — TELEPHONE ENCOUNTER
Caller: Anuja Humphrey     Doctor: Dr Ernestina Escalera     Reason for call: Patient returning     Call back#: [phone]

## 2023-10-17 NOTE — TELEPHONE ENCOUNTER
Caller: Jim Giron    Doctor: Rui Acosta    Reason for call: Pt is returning call to schedule procedure.      Call back#: [phone]

## 2023-10-25 ENCOUNTER — TELEPHONE (OUTPATIENT)
Dept: NEUROSURGERY | Facility: CLINIC | Age: 68
End: 2023-10-25

## 2023-10-25 NOTE — TELEPHONE ENCOUNTER
Pt called to reschedule to day he does not have michelle appem[facility]

## 2023-10-25 NOTE — TELEPHONE ENCOUNTER
Pt called to reschedule to day he does not have anther appt pt is now on 11/3/23 in [facility] with Jama Mitchell  10/26/23  returned pt VM and left message that the appt is still on 11/3/23

## 2023-10-31 ENCOUNTER — TELEPHONE (OUTPATIENT)
Dept: NEUROSURGERY | Facility: CLINIC | Age: 68
End: 2023-10-31

## 2023-11-02 ENCOUNTER — APPOINTMENT (OUTPATIENT)
Dept: PHYSICAL THERAPY | Facility: CLINIC | Age: 68
End: 2023-11-02
Payer: MEDICARE

## 2023-11-02 DIAGNOSIS — M54.16 LUMBAR RADICULOPATHY: ICD-10-CM

## 2023-11-02 RX ORDER — METHOCARBAMOL 500 MG/1
500 TABLET, FILM COATED ORAL 3 TIMES DAILY
Qty: 90 TABLET | Refills: 0 | Status: SHIPPED | OUTPATIENT
Start: 2023-11-02

## 2023-11-02 NOTE — ASSESSMENT & PLAN NOTE
Returns for follow up of back pain  S/p L3-4 TLIF 1/20/16 by Dr. Kennedi Robin. Scheduled for bilateral L1-2, L2-3, L4-5 MBB   Started after rotator cuff surgery and positioning he had to maintain postoperatively with his sling. This pain is constant, at best 5-6/10, at worst 10/10. Worse with standing, walking, laying flat. Denies radiating pain, numbness, tingling, weakness in his legs. Has baseline right foot pain since surgery and numbness in the calf. No BBI. 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 Ramon Fields note, saw Dr. Tammie Valladares for an opinion as well as possible SCS trial in 2020  Exam: Spinal incision well-healed, TTP in low midline spine, pain with ROM, BLE 5/5, 2+ DTRs, no clonus     Plan:  Discussed symptoms with patient. He continues with severe low back pain without radiculopathy. There are no new focal deficits on exam.   Agree with injections as scheduled. Recommend PT. No recommendation for MRI at this time with lack of new neuro deficits and no conservative measures. Will order scoliosis XR and follow up with deformity surgeon once injection completed per patient request.   Call sooner with any questions or concerns.

## 2023-11-03 ENCOUNTER — TELEPHONE (OUTPATIENT)
Age: 68
End: 2023-11-03

## 2023-11-03 ENCOUNTER — HOSPITAL ENCOUNTER (OUTPATIENT)
Dept: RADIOLOGY | Facility: HOSPITAL | Age: 68
End: 2023-11-03
Payer: MEDICARE

## 2023-11-03 ENCOUNTER — OFFICE VISIT (OUTPATIENT)
Dept: NEUROSURGERY | Facility: CLINIC | Age: 68
End: 2023-11-03
Payer: MEDICARE

## 2023-11-03 VITALS
DIASTOLIC BLOOD PRESSURE: 68 MMHG | BODY MASS INDEX: 32.82 KG/M2 | TEMPERATURE: 98.2 F | OXYGEN SATURATION: 98 % | SYSTOLIC BLOOD PRESSURE: 128 MMHG | HEART RATE: 69 BPM | HEIGHT: 72 IN

## 2023-11-03 DIAGNOSIS — Z98.1 STATUS POST LUMBAR SPINAL FUSION: ICD-10-CM

## 2023-11-03 DIAGNOSIS — Z98.1 STATUS POST LUMBAR SPINAL FUSION: Primary | ICD-10-CM

## 2023-11-03 PROCEDURE — 99214 OFFICE O/P EST MOD 30 MIN: CPT | Performed by: PHYSICIAN ASSISTANT

## 2023-11-03 PROCEDURE — 72082 X-RAY EXAM ENTIRE SPI 2/3 VW: CPT

## 2023-11-03 RX ORDER — PROMETHAZINE HYDROCHLORIDE 25 MG/1
25 TABLET ORAL
COMMUNITY
Start: 2023-11-01

## 2023-11-03 RX ORDER — OXYCODONE HYDROCHLORIDE 10 MG/1
TABLET ORAL
COMMUNITY
Start: 2023-10-19

## 2023-11-03 RX ORDER — DIAZEPAM 2 MG/1
2 TABLET ORAL EVERY 6 HOURS PRN
COMMUNITY
Start: 2023-10-24

## 2023-11-03 RX ORDER — DOXEPIN HYDROCHLORIDE 25 MG/1
25 CAPSULE ORAL
COMMUNITY
Start: 2023-11-01

## 2023-11-03 RX ORDER — VORTIOXETINE 10 MG/1
10 TABLET, FILM COATED ORAL DAILY
COMMUNITY
Start: 2023-10-31

## 2023-11-03 RX ORDER — ZOLPIDEM TARTRATE 12.5 MG/1
12.5 TABLET, FILM COATED, EXTENDED RELEASE ORAL
COMMUNITY
Start: 2023-10-19

## 2023-11-03 RX ORDER — METOPROLOL SUCCINATE 25 MG/1
25 TABLET, EXTENDED RELEASE ORAL DAILY
COMMUNITY
Start: 2023-10-30 | End: 2024-10-29

## 2023-11-03 NOTE — TELEPHONE ENCOUNTER
Caller: Patient    Doctor: Karen Cadet    Reason for call:  Patient has a burning sensation on his right shoulder, call was warm transferred to nurse

## 2023-11-03 NOTE — PROGRESS NOTES
Neurosurgery Office Note  Nessa Mi 79 y.o. male MRN: [de-identified]      Assessment/Plan     Status post lumbar spinal fusion  Returns for follow up of back pain  S/p L3-4 TLIF 1/20/16 by Dr. Oliver Anders. Scheduled for bilateral L1-2, L2-3, L4-5 MBB   Started after rotator cuff surgery and positioning he had to maintain postoperatively with his sling. This pain is constant, at best 5-6/10, at worst 10/10. Worse with standing, walking, laying flat. Denies radiating pain, numbness, tingling, weakness in his legs. Has baseline right foot pain since surgery and numbness in the calf. No BBI. 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 Donavan Cesar note, saw Dr. Jw Brock for an opinion as well as possible SCS trial in 2020  Exam: Spinal incision well-healed, TTP in low midline spine, pain with ROM, BLE 5/5, 2+ DTRs, no clonus     Plan:  Discussed symptoms with patient. He continues with severe low back pain without radiculopathy. There are no new focal deficits on exam.   Agree with injections as scheduled. Recommend PT. No recommendation for MRI at this time with lack of new neuro deficits and no conservative measures. Will order scoliosis XR and follow up with deformity surgeon once injection completed per patient request.   Call sooner with any questions or concerns. Diagnoses and all orders for this visit:    Status post lumbar spinal fusion  -     XR entire spine (scoliosis) 6+ vw; Future    Other orders  -     doxepin (SINEquan) 25 mg capsule; Take 25 mg by mouth daily at bedtime  -     metoprolol succinate (TOPROL-XL) 25 mg 24 hr tablet; Take 25 mg by mouth daily  -     oxyCODONE (ROXICODONE) 10 MG TABS  -     promethazine (PHENERGAN) 25 mg tablet; Take 25 mg by mouth (Patient not taking: Reported on 11/3/2023)  -     diazepam (VALIUM) 2 mg tablet; Take 2 mg by mouth every 6 (six) hours as needed for anxiety  -     zolpidem (AMBIEN CR) 12.5 MG CR tablet;  Take 12.5 mg by mouth daily at bedtime as needed  -     Trintellix 10 MG tablet; Take 10 mg by mouth daily (Patient not taking: Reported on 11/3/2023)          I have spent a total time of 35 minutes on 11/03/23 in caring for this patient including Diagnostic results, Instructions for management, Patient and family education, Impressions, Counseling / Coordination of care, Documenting in the medical record, Reviewing / ordering tests, medicine, procedures  , and Obtaining or reviewing history  . CHIEF COMPLAINT    Chief Complaint   Patient presents with    Follow-up     Lspine xray-9/27/23       HISTORY    History of Present Illness     79y.o. year old male     29-year-old gentleman returns for follow up of back pain. He is s/p L3-4 TLIF 16 by Dr. Rebecca Milton 1/20/16. States he has been doing well until this summer. He had to have urgent rotator cuff surgery 7/2023 and after that developed recurrent back pain. He feels like the positioning he had to be in post-operatively made things worse. He has pain in his whole back, worse in the low back. The pain does not radiate around to his chest, abdomen, groin, legs. Denies numbness, tingling, weakness in his legs. He describes the pain as a steady ache in his back, at its worst 10/10, at best 5-6. The pain is worse at night, he is unable to lay flat, and elevates the head of his bed as well as his feet. His back also hurts with standing and walking. It is not as bad with sitting. He has right foot pain since his surgery, but feels that it is worse now. 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 Vela Fear note. Previously saw Dr. Jud Patel for SCS trial in 2020 that patient states didn't do much. He ambulates independently. No bowel or bladder incontinence. Only taking oxycodone and gabapentin. Tried cymbalta. Feels that ibuprofen, Robaxin did not help and the Robaxin made him feel strange.  He has not tried any physical therapy, but is scheduled for bilateral L1-2, L2-3, 3-4 MBB next week. He was seen in the hospital a few weeks ago for uncontrolled pain and conservative measures and possible scoliosis imaging recommended. See Discussion    REVIEW OF SYSTEMS    Review of Systems   Constitutional: Negative. HENT: Negative. Eyes: Negative. Respiratory: Negative. Cardiovascular: Negative. Gastrointestinal: Negative. Endocrine: Negative. Genitourinary: Negative. Musculoskeletal:  Positive for back pain (low back and right foot pain, pain radiates up the back) and gait problem (uses cane prn). Skin: Negative. Allergic/Immunologic: Negative. Neurological:  Positive for numbness (N+T right foot/toes). Negative for weakness. Right leg pain     Hematological: Negative. Psychiatric/Behavioral:  Positive for sleep disturbance. ROS obtained by MA. Reviewed. See HPI.      Meds/Allergies     Current Outpatient Medications   Medication Sig Dispense Refill    balsalazide (COLAZAL) 750 mg capsule Take 3 capsules (2,250 mg total) by mouth 2 (two) times a day 180 capsule 5    diazepam (VALIUM) 2 mg tablet Take 2 mg by mouth every 6 (six) hours as needed for anxiety      doxepin (SINEquan) 25 mg capsule Take 25 mg by mouth daily at bedtime      ezetimibe (ZETIA) 10 mg tablet Take 10 mg by mouth daily      gabapentin (NEURONTIN) 300 mg capsule Take 1 capsule (300 mg total) by mouth 3 (three) times a day 90 capsule 0    methocarbamol (ROBAXIN) 500 mg tablet TAKE 1 TABLET BY MOUTH THREE TIMES A DAY 90 tablet 0    metoprolol succinate (TOPROL-XL) 25 mg 24 hr tablet Take 25 mg by mouth daily      oxyCODONE (ROXICODONE) 10 MG TABS       zolpidem (AMBIEN CR) 12.5 MG CR tablet Take 12.5 mg by mouth daily at bedtime as needed      diazepam (VALIUM) 10 mg tablet Take 1 tablet (10 mg total) by mouth 2 (two) times a day (Patient not taking: Reported on 10/13/2023) 8 tablet 0    Diclofenac Sodium (VOLTAREN) 1 %  (Patient not taking: Reported on 11/3/2023) DULoxetine (CYMBALTA) 30 mg delayed release capsule  (Patient not taking: Reported on 10/13/2023)      promethazine (PHENERGAN) 25 mg tablet Take 25 mg by mouth (Patient not taking: Reported on 11/3/2023)      Trintellix 10 MG tablet Take 10 mg by mouth daily (Patient not taking: Reported on 11/3/2023)       No current facility-administered medications for this visit. Allergies   Allergen Reactions    No Active Allergies        PAST HISTORY    Past Medical History:   Diagnosis Date    Back pain     Colon polyp     Hyperlipidemia     Lumbar fracture with cord injury ([address])     Neck fracture (HCC)     Previous back surgery     rods in the back    Ulcerative colitis [facility])        Past Surgical History:   Procedure Laterality Date    BACK SURGERY      LUMBAR FUSION Right 1/20/2016    Procedure: FUSION LUMBAR  POSTERIOR, TLIF L3-4  Right L3-4 Fascet;  Surgeon: Marcello Ochoa MD;  Location:  MAIN OR;  Service:     WI COLONOSCOPY FLX DX W/COLLJ SPEC WHEN PFRMD N/A 1/24/2019    Procedure: COLONOSCOPY;  Surgeon: Mason Odonnell MD;  Location: [facility]; Service: Gastroenterology    WI SURGICAL ARTHROSCOPY SHOULDER W/ROTATOR CUFF RPR Right 7/26/2023    Procedure: ARTHROSCOPY SHOULDER- Right shoulder Arthroscopy, supraspinatus and subscapularis repair, biceps tenotomy, extensive debridement;  Surgeon: Tyra Brunson DO;  Location: [facility];  Service: Orthopedics    TONSILLECTOMY         Social History     Tobacco Use    Smoking status: Never    Smokeless tobacco: Never   Vaping Use    Vaping Use: Never used   Substance Use Topics    Alcohol use: Not Currently     Comment: rarely    Drug use: No       Family History   Problem Relation Age of Onset    Parkinsonism Mother     Lung cancer Father          Above history personally reviewed. EXAM    Vitals:Blood pressure 128/68, pulse 69, temperature 98.2 °F (36.8 °C), temperature source Temporal, height 6' (1.829 m), SpO2 98 %. ,Body mass index is 32.82 kg/m². Physical Exam  Vitals reviewed. Constitutional:       General: He is awake. Appearance: Normal appearance. HENT:      Head: Normocephalic and atraumatic. Eyes:      Conjunctiva/sclera: Conjunctivae normal.   Cardiovascular:      Rate and Rhythm: Normal rate. Pulmonary:      Effort: Pulmonary effort is normal.   Musculoskeletal:      Comments: Midline low back TTP  Pain with ROM  Well healed spinal incision   Skin:     General: Skin is warm and dry. Neurological:      Mental Status: He is alert and oriented to person, place, and time. Deep Tendon Reflexes:      Reflex Scores:       Patellar reflexes are 2+ on the right side and 2+ on the left side. Achilles reflexes are 2+ on the right side and 2+ on the left side. Psychiatric:         Attention and Perception: Attention and perception normal.         Mood and Affect: Mood and affect normal.         Speech: Speech normal.         Behavior: Behavior normal. Behavior is cooperative. Thought Content: Thought content normal.         Cognition and Memory: Cognition and memory normal.         Judgment: Judgment normal.         Neurologic Exam     Mental Status   Oriented to person, place, and time. Follows 2 step commands. Attention: normal. Concentration: normal.   Speech: speech is normal   Level of consciousness: alert  Knowledge: good. Normal comprehension. Motor Exam   Muscle bulk: normal  Overall muscle tone: normal  BLE - 5/5     Sensory Exam   Right leg light touch: unchanged diminished LT to right calf. Left leg light touch: normal    Gait, Coordination, and Reflexes     Tremor   Resting tremor: absent  Intention tremor: absent  Action tremor: absent    Reflexes   Right patellar: 2+  Left patellar: 2+  Right achilles: 2+  Left achilles: 2+  Right ankle clonus: absent  Left ankle clonus: absent  Antalgic gait         MEDICAL DECISION MAKING    Imaging Studies:     No results found.     I have personally reviewed pertinent reports.    and I have personally reviewed pertinent films in PACS

## 2023-11-03 NOTE — TELEPHONE ENCOUNTER
Received call from pt and pt is approximately 3 months out from S/P arthroscopy Right Shoulder w/Dr Johnnie Mcelroy. Last PO ov 10/12/23. Pt states that he is having intermittent burning sensation and pain 6-7/10 in his right shoulder and bicep. He is not doing much due to his back and saw neurosurgery today. He is taking oxycodone, robaxin, gabapentin and applies Voltaren get. He would like to know if this is normal? He read on internet that pain can last up to 6 months to a year after surgery. Pt is aware Dr Johnnie Mcelroy is in [address] today and may not get response back until Monday. Home # [phone]  Pharmacy: [facility] in [place] on file.

## 2023-11-06 ENCOUNTER — EVALUATION (OUTPATIENT)
Dept: PHYSICAL THERAPY | Facility: CLINIC | Age: 68
End: 2023-11-06
Payer: MEDICARE

## 2023-11-06 ENCOUNTER — TELEPHONE (OUTPATIENT)
Age: 68
End: 2023-11-06

## 2023-11-06 VITALS — SYSTOLIC BLOOD PRESSURE: 117 MMHG | DIASTOLIC BLOOD PRESSURE: 81 MMHG

## 2023-11-06 DIAGNOSIS — M75.101 TEAR OF RIGHT SUPRASPINATUS TENDON: ICD-10-CM

## 2023-11-06 DIAGNOSIS — Z98.890 S/P ARTHROSCOPY OF RIGHT SHOULDER: Primary | ICD-10-CM

## 2023-11-06 PROCEDURE — 97161 PT EVAL LOW COMPLEX 20 MIN: CPT

## 2023-11-06 PROCEDURE — 97110 THERAPEUTIC EXERCISES: CPT

## 2023-11-06 RX ORDER — ZOLPIDEM TARTRATE 12.5 MG/1
12.5 TABLET, FILM COATED, EXTENDED RELEASE ORAL
Qty: 30 TABLET | OUTPATIENT
Start: 2023-11-06

## 2023-11-06 NOTE — TELEPHONE ENCOUNTER
Caller: Fan Pinto pt    Doctor: Luigi Loya    Reason for call: pt has questions about his procedure that is scheduled on 11/8    Call back#: [phone] home or [phone]    Pt asked to call his home number first

## 2023-11-06 NOTE — TELEPHONE ENCOUNTER
Requested too soon, patient also received last refill from a different provider. Please clarify who he is seeing and who will be prescribing his controlled substance, Ambien. I will not be sending prescriptions for this at this time as he has received this from another provider on 10/19/23.     Abe Treadwell [facility]  11/6/2023 7:36 AM

## 2023-11-06 NOTE — PROGRESS NOTES
PT Evaluation     Today's date: 2023  Patient name: Rizwan Garcia  : 1955  MRN: [id number]  Referring provider: Gayle Zavala DO  Dx:   Encounter Diagnosis     ICD-10-CM    1. S/P arthroscopy of right shoulder  Z98.890 Ambulatory Referral to Physical Therapy      2. Tear of right supraspinatus tendon  M75.101           Start Time: 1430  Stop Time: 1501  Total time in clinic (min): 31 minutes    Assessment  Assessment details: Problem List:  1) R shoulder weakness  2) Limited R shoulder AROM    Rizwan Garcia is a pleasant 79 y.o. male who presents s/p R RCR on . Early in rehab patient was consistent with attending therapy and PROM was full last time I saw him. He has been away from PT for the last month due to increased back pain. He has R shoulder weakness, limited R shoulder ROM, and activity intolerance resulting in fear of not being able to keep active. No further referral appears necessary at this time based upon examination results. I expect he will improve with progressive strengthening program.  Positive prognostic indicators include good understanding of diagnosis and treatment plan options. Negative prognostic indicators include chronicity of symptoms, anxiety, high symptom irritability, obesity, and dependency on medications. Gave patient TB row, ext, ER, and IR for HEP. Patient would benefit from skilled PT services to address these deficits and return to PLOF.     Comparable signs:  1) R shoulder ABD AROM  2) R shoulder ABD strength  Impairments: abnormal or restricted ROM, activity intolerance, impaired physical strength, lacks appropriate home exercise program, pain with function and poor posture   Understanding of Dx/Px/POC: fair   Prognosis: fair    Goals  STGs  Patient will be independent with HEP in 4 weeks  Patient will decrease pain by 2 points in 4 weeks  LTGs  Patient will achieve R shoulder AROM equal to L in 8 weeks  Patient will achieve R shoulder strength equal to L in 12 weeks    Plan  Plan details: 2x/week for 12 weeks, 24 visits    Patient would benefit from: skilled physical therapy  Planned therapy interventions: home exercise program, therapeutic exercise, therapeutic activities, joint mobilization, manual therapy, motor coordination training, neuromuscular re-education, patient education, strengthening, activity modification, functional ROM exercises and flexibility  Frequency: 2x week  Plan of Care beginning date: 2023  Plan of Care expiration date: 2024  Treatment plan discussed with: patient        Subjective Evaluation    History of Present Illness  Date of surgery: 2023  Mechanism of injury: Pain location: R shoulder into anterior arm/biceps area  Pain severity: 4-0-8  Aggs: moving light wood, ER and ABD  Eases: rest  Irritability: moderate  MAVERICK/timeline: R RCR on  after suffering a fall resulting in supraspinatus tear, patient does note one instance since surgery when he was working on pipes at his house when he felt a sudden sharp pain  Red flags: none  PMH/PSH: lumbar fusion, hx of cervical fractures    Patient Goals  Patient goal: get back to normal function  Pain  Current pain ratin  At best pain ratin  At worst pain ratin          Objective     Cervical/Thoracic Screen   Cervical range of motion within normal limits with the following exceptions: Flexion WNL  All other motions limited 50-75%    Active Range of Motion     Right Shoulder   Flexion: 110 degrees with pain  Abduction: 63 degrees with pain  External rotation 0°: 61 degrees with pain    Passive Range of Motion   Left Shoulder   Normal passive range of motion    Right Shoulder   Flexion: 135 degrees with pain  Abduction: 118 degrees with pain  External rotation 0°: 63 degrees with pain    Strength/Myotome Testing     Right Shoulder     Planes of Motion   Flexion: 3+   Abduction: 3     Additional Strength Details  Deferred due to pain      Flowsheet Rows      Flowsheet Row Most Recent Value   PT/OT G-Codes    Current Score 40   Projected Score 61               Precautions: HTN, anxiety, chronic pain syndrome, anxiety  DOS: 7/26/23  POC expires Unit limit Auth Expiration date PT/OT + Visit Limit?    1/29/24 BOMN pend BOMN                           Visit/Unit Tracking  AUTH Status:  Date 11/6              pend Used                Remaining                    Foto 11/6            Manuals 11/6            R shoulder mobs                                                    Neuro Re-Ed                                                                                                        Ther Ex             TB row, ext HEP            TB ER, IR HEP            Prone Y, T, I             Prone shoulder ext             Standing scaption             Standing ABD             Supine cane flexion             Supine cane ER             Standing cane ABD             UBE             Ther Activity                                       Gait Training                                       Modalities

## 2023-11-06 NOTE — TELEPHONE ENCOUNTER
Patient is requesting a call back to discuss type of procedure he will be getting and would like to know if he will be numbed prior to receiving injection.

## 2023-11-09 RX ORDER — ZOLPIDEM TARTRATE 12.5 MG/1
12.5 TABLET, FILM COATED, EXTENDED RELEASE ORAL
Qty: 30 TABLET | OUTPATIENT
Start: 2023-11-09

## 2023-11-09 NOTE — TELEPHONE ENCOUNTER
Patient is on Temazepam from Dr. Nathaniel Hurtado. No longer prescribing Ambien.     DO Joseline Guerrero [facility]  11/9/2023 3:17 PM

## 2023-11-09 NOTE — TELEPHONE ENCOUNTER
I l/m stating why his procedure was denied.  I am awaiting to hear back from Dr Angelica Hernandez with updated notes before resubmitting for auth

## 2023-11-09 NOTE — TELEPHONE ENCOUNTER
Caller: brent    Doctor: nahun    Reason for call: pt is saying his procedure needs auth.     Call back#: [phone]

## 2023-11-09 NOTE — TELEPHONE ENCOUNTER
Caller: Dong Licea    Doctor: dr Emi Foley    Reason for call: pt called in to advise that medicare was unable to approve recent procedure because they did not received an prior auth     Call back#: [phone]

## 2023-11-14 ENCOUNTER — OFFICE VISIT (OUTPATIENT)
Dept: PHYSICAL THERAPY | Facility: CLINIC | Age: 68
End: 2023-11-14
Payer: MEDICARE

## 2023-11-14 DIAGNOSIS — M75.101 TEAR OF RIGHT SUPRASPINATUS TENDON: ICD-10-CM

## 2023-11-14 DIAGNOSIS — Z98.890 S/P ARTHROSCOPY OF RIGHT SHOULDER: Primary | ICD-10-CM

## 2023-11-14 PROCEDURE — 97140 MANUAL THERAPY 1/> REGIONS: CPT

## 2023-11-14 PROCEDURE — 97110 THERAPEUTIC EXERCISES: CPT

## 2023-11-14 NOTE — PROGRESS NOTES
Daily Note     Today's date: 2023  Patient name: Patricia Mays  : 1955  MRN: [id number]  Referring provider: Giovanny Rockwell DO  Dx:   Encounter Diagnosis     ICD-10-CM    1. S/P arthroscopy of right shoulder  Z98.890       2. Tear of right supraspinatus tendon  M75.101           Start Time:   Stop Time:   Total time in clinic (min): 42 minutes    Subjective: Patient reports that his R shoulder is still bothering him. He thinks that the pain started to increase after he discontinued use of the sling and started using his arm more. Patient reports new onset of pain in the       Objective: See treatment diary below      Assessment: Patient demonstrates improving R shoulder strength this session. Patient completed today's session with increase in pain at end range PROM that returns to baseline at end of session . UBE performed in order to improve UE muscular endurance. [address] [place] mobilizations performed in order to improve ROM. Shoulder weakness likely causing high pain levels. Educated patient that he should not be lifting more than 5# at this time as he needs to continue to build up shoulder strength prior to heavy lifting. Future sessions should continue to progress strengthening and ROM exercises as able. Tolerated treatment well. Patient would benefit from continued PT      Plan: Continue per plan of care. Precautions: HTN, anxiety, chronic pain syndrome, anxiety  DOS: 23  POC expires Unit limit Auth Expiration date PT/OT + Visit Limit?    24 BOMN 23 BOMN                               Foto             Manuals            R shoulder mobs  GS  Gr4 infer                                                  Neuro Re-Ed                                                                                                        Ther Ex             TB row, ext HEP 2x15  gtb           TB ER, IR HEP 2x15  rtb           Prone Y, T, I             SL ER  x20           Prone shoulder ext  x20           Standing scaption  x20           Standing ABD             Supine cane flexion  x20           Supine cane ER             Standing cane ABD             UBE  6'           Ther Activity                                       Gait Training                                       Modalities

## 2023-11-15 NOTE — TELEPHONE ENCOUNTER
Caller: Ciera Leigh     Doctor: Dr Keyes Mediate    Reason for call: Patient calling stating spoke to [facility] and they states procedure will be covered but medication needs to go through [facility] please advise     Call back#: [phone] and [phone]

## 2023-11-17 ENCOUNTER — OFFICE VISIT (OUTPATIENT)
Dept: PHYSICAL THERAPY | Facility: CLINIC | Age: 68
End: 2023-11-17
Payer: MEDICARE

## 2023-11-17 DIAGNOSIS — M75.101 TEAR OF RIGHT SUPRASPINATUS TENDON: ICD-10-CM

## 2023-11-17 DIAGNOSIS — Z98.890 S/P ARTHROSCOPY OF RIGHT SHOULDER: Primary | ICD-10-CM

## 2023-11-17 PROCEDURE — 97140 MANUAL THERAPY 1/> REGIONS: CPT

## 2023-11-17 PROCEDURE — 97110 THERAPEUTIC EXERCISES: CPT

## 2023-11-17 NOTE — PROGRESS NOTES
Daily Note     Today's date: 2023  Patient name: Kenny Clarke  : 1955  MRN: [id number]  Referring provider: Neel Elliott DO  Dx:   Encounter Diagnosis     ICD-10-CM    1. S/P arthroscopy of right shoulder  Z98.890       2. Tear of right supraspinatus tendon  M75.101           Start Time: 3809  Stop Time: 5860  Total time in clinic (min): 45 minutes    Subjective: Patient reports that his shoulder was sore the day after last session. He states that the pain typically does not bother him at night. Objective: See treatment diary below      Assessment: Patient demonstrates improving R shoulder strength this session. Patient completed today's session with increase in pain with AROM above shoulder height. UBE performed in order to improve UE muscular endurance. R shoulder ABD PROM remains limited. AROM is limited and painful in all planes. Mobilizations performed in order to address capsule tightness. Future sessions should continue to progress strengthening exercises as able. Tolerated treatment well. Patient would benefit from continued PT      Plan: Continue per plan of care. Precautions: HTN, anxiety, chronic pain syndrome, anxiety  DOS: 23  POC expires Unit limit Auth Expiration date PT/OT + Visit Limit?    24 BOMN 23 BOMN                               Foto             Manuals           R shoulder mobs  GS  Gr4 infer GS  Gr 4 post                                                 Neuro Re-Ed             Table top shoulder taps   X20 ea          Wall ball- up down   X20 red ball                                                                           Ther Ex             TB row, ext HEP 2x15  gtb 2x15  btb          TB ER, IR HEP 2x15  rtb 2x15 rtb          Prone Y, T, I   X20 ea          SL ER  x20 X20 1#          Prone shoulder ext  x20 X20  2#          Standing scaption  x20           Standing ABD             Supine cane flexion  x20 x20 Supine cane ER             Standing cane ABD             UBE  6' 3'3'          Ther Activity                                       Gait Training                                       Modalities

## 2023-11-20 ENCOUNTER — OFFICE VISIT (OUTPATIENT)
Dept: PHYSICAL THERAPY | Facility: CLINIC | Age: 68
End: 2023-11-20
Payer: MEDICARE

## 2023-11-20 DIAGNOSIS — Z98.890 S/P ARTHROSCOPY OF RIGHT SHOULDER: ICD-10-CM

## 2023-11-20 DIAGNOSIS — M75.101 TEAR OF RIGHT SUPRASPINATUS TENDON: Primary | ICD-10-CM

## 2023-11-20 PROCEDURE — 97140 MANUAL THERAPY 1/> REGIONS: CPT

## 2023-11-20 PROCEDURE — 97112 NEUROMUSCULAR REEDUCATION: CPT

## 2023-11-20 PROCEDURE — 97110 THERAPEUTIC EXERCISES: CPT

## 2023-11-20 NOTE — PROGRESS NOTES
Daily Note     Today's date: 2023  Patient name: Steve Rojas  : 1955  MRN: [id number]  Referring provider: Mazin Kruse DO  Dx:   Encounter Diagnosis     ICD-10-CM    1. Tear of right supraspinatus tendon  M75.101       2. S/P arthroscopy of right shoulder  Z98.890           Start Time: 6001  Stop Time: 1511  Total time in clinic (min): 43 minutes    Subjective: Patient reports that his R shoulder is okay. He states that he was sore after last session but it went away after ~24 hours. He was helping his wife tighten a ratchet this weekend which resulted in 24 hour soreness that dissipated. Objective: See treatment diary below      Assessment: Patient demonstrates improving R shoulder strength this session. Patient completed today's session with increase in pain during weight bearing table top exercises and standing AROM. UBE performed in order to improve UE muscular endurance. Mobilizations performed in order to increase capsule extensibility for improved overhead motion and decreased pain. Future sessions should continue to progress strengthening exercises and mobilizations as able. Tolerated treatment well. Patient would benefit from continued PT      Plan: Continue per plan of care. Precautions: HTN, anxiety, chronic pain syndrome, anxiety  DOS: 23  POC expires Unit limit Auth Expiration date PT/OT + Visit Limit?    24 BOMN 23 BOMN                               Foto             Manuals          R shoulder mobs  GS  Gr4 infer GS  Gr 4 post GS  Gr 4 post and inf                                                Neuro Re-Ed             Table top shoulder taps   X20 ea X20 ea         Wall ball- up down, sides   X20 red ball X20 red ball         Manual scap setting    2x10                                                             Ther Ex             TB row, ext HEP 2x15  gtb 2x15  btb 2x15  btb         TB ER, IR HEP 2x15  rtb 2x15 rtb 2x15  gtb Prone Y, T   X20 ea X20,x11         SL ER  x20 X20 1# X20  2#         Prone shoulder ext  x20 X20  2# X20  3#         Standing scaption  x20           Standing ABD             Supine cane flexion  x20 x20          Supine cane ER             Standing cane ABD             UBE  6' 3'3' 3'3'         Ther Activity                                       Gait Training                                       Modalities

## 2023-11-21 DIAGNOSIS — K51.819 OTHER ULCERATIVE COLITIS WITH COMPLICATION (HCC): ICD-10-CM

## 2023-11-21 RX ORDER — BALSALAZIDE DISODIUM 750 MG/1
2250 CAPSULE ORAL 2 TIMES DAILY
Qty: 540 CAPSULE | Refills: 1 | Status: SHIPPED | OUTPATIENT
Start: 2023-11-21

## 2023-11-22 ENCOUNTER — OFFICE VISIT (OUTPATIENT)
Dept: PHYSICAL THERAPY | Facility: CLINIC | Age: 68
End: 2023-11-22
Payer: MEDICARE

## 2023-11-22 DIAGNOSIS — M75.101 TEAR OF RIGHT SUPRASPINATUS TENDON: Primary | ICD-10-CM

## 2023-11-22 DIAGNOSIS — Z98.890 S/P ARTHROSCOPY OF RIGHT SHOULDER: ICD-10-CM

## 2023-11-22 PROCEDURE — 97110 THERAPEUTIC EXERCISES: CPT

## 2023-11-22 PROCEDURE — 97112 NEUROMUSCULAR REEDUCATION: CPT

## 2023-11-22 PROCEDURE — 97140 MANUAL THERAPY 1/> REGIONS: CPT

## 2023-11-22 NOTE — PROGRESS NOTES
Daily Note     Today's date: 2023  Patient name: Emelyn Presley  : 1955  MRN: [id number]  Referring provider: Milton García DO  Dx:   Encounter Diagnosis     ICD-10-CM    1. Tear of right supraspinatus tendon  M75.101       2. S/P arthroscopy of right shoulder  Z98.890           Start Time: 1105  Stop Time: 1150  Total time in clinic (min): 45 minutes    Subjective: Patient reports that he was feeling pretty good after our session Monday. His shoulder started to bother him yesterday but it wasn't too bad. Objective: See treatment diary below      Assessment: Patient demonstrates improving R shoulder strength this session. Patient completed today's session with increase in pain with active motion in all directions. UBE performed in order to improve UE muscular endurance. Included AROM in order to promote increased use of R shoulder in addition to strengthening against gravity. Verbal cues for patient to slow down with exercises. Future sessions should continue to progress strengthening exercises as able. Tolerated treatment fair. Patient would benefit from continued PT      Plan: Continue per plan of care. Precautions: HTN, anxiety, chronic pain syndrome, anxiety  DOS: 23  POC expires Unit limit Auth Expiration date PT/OT + Visit Limit?    24 BOMN 23 BOMN                               Foto             Manuals         R shoulder mobs  GS  Gr4 infer GS  Gr 4 post GS  Gr 4 post and inf GS  Gr 4 post and inf                                               Neuro Re-Ed             Table top shoulder taps   X20 ea X20 ea X20 ea        Wall ball- up down, sides   X20 red ball X20 red ball X20 red ball        Manual scap setting    2x10                                                             Ther Ex             TB row, ext HEP 2x15  gtb 2x15  btb 2x15  btb 2x15  btb        TB ER, IR HEP 2x15  rtb 2x15 rtb 2x15  gtb 2x15  gtb        Prone Y, T   X20 ea X20,x11 X20 ea        SL ER  x20 X20 1# X20  2# X20  2#        Prone shoulder ext  x20 X20  2# X20  3# X20  4#        Standing scaption  x20           Standing ABD             Supine cane flexion  x20 x20          AROM- flex, ABD     x15        Supine cane ER             Standing cane ABD             UBE  6' 3'3' 3'3' 3'3'        Ther Activity                                       Gait Training                                       Modalities

## 2023-11-27 ENCOUNTER — APPOINTMENT (OUTPATIENT)
Dept: PHYSICAL THERAPY | Facility: CLINIC | Age: 68
End: 2023-11-27
Payer: MEDICARE

## 2023-11-28 ENCOUNTER — OFFICE VISIT (OUTPATIENT)
Dept: PHYSICAL THERAPY | Facility: CLINIC | Age: 68
End: 2023-11-28
Payer: MEDICARE

## 2023-11-28 DIAGNOSIS — Z98.890 S/P ARTHROSCOPY OF RIGHT SHOULDER: ICD-10-CM

## 2023-11-28 DIAGNOSIS — M75.101 TEAR OF RIGHT SUPRASPINATUS TENDON: Primary | ICD-10-CM

## 2023-11-28 PROCEDURE — 97140 MANUAL THERAPY 1/> REGIONS: CPT

## 2023-11-28 PROCEDURE — 97110 THERAPEUTIC EXERCISES: CPT

## 2023-11-28 NOTE — PROGRESS NOTES
Daily Note     Today's date: 2023  Patient name: Nilsa Peña  : 1955  MRN: [id number]  Referring provider: Yvonne Hurt DO  Dx:   Encounter Diagnosis     ICD-10-CM    1. Tear of right supraspinatus tendon  M75.101       2. S/P arthroscopy of right shoulder  Z98.890           Start Time: 1430  Stop Time: 1513  Total time in clinic (min): 43 minutes    Subjective: Patient reports that his shoulder really bothers him sometimes but then does not bother him other times. He states that he doesn't really have pain at night anymore. Objective: See treatment diary below      Assessment: Patient demonstrates improving R shoulder strength this session. Patient completed today's session without increase in pain. UBE performed in order to improve UE muscular endurance. Patient is progressing nicely with ER strength and minimal discomfort. Patient has difficulty with prone Y exercise and standing strengthening exercises. Future sessions should continue to progress strengthening exercises as able. Tolerated treatment well. Patient would benefit from continued PT      Plan: Continue per plan of care. Precautions: HTN, anxiety, chronic pain syndrome, anxiety  DOS: 23  POC expires Unit limit Auth Expiration date PT/OT + Visit Limit?    24 BOMN 23 BOMN                               Foto             Manuals        R shoulder mobs  GS  Gr4 infer GS  Gr 4 post GS  Gr 4 post and inf GS  Gr 4 post and inf GS  Gr 4 post and inf                                              Neuro Re-Ed             Table top shoulder taps   X20 ea X20 ea X20 ea        Wall ball- up down, sides   X20 red ball X20 red ball X20 red ball        Manual scap setting    2x10         Supine serratus press      2x15  5#                                              Ther Ex             TB row, ext HEP 2x15  gtb 2x15  btb 2x15  btb 2x15  btb 2x15  black       TB ER, IR HEP 2x15  rtb 2x15 rtb 2x15  gtb 2x15  gtb 2x15  btb       Prone Y, T   X20 ea X20,x11 X20 ea 2x15  1#       SL ER  x20 X20 1# X20  2# X20  2# 2x15  3#       Prone shoulder ext  x20 X20  2# X20  3# X20  4# 2x15  5#       Seated DB ER      2x15  1#       Wall slide      x20       Standing scaption  x20           Standing ABD             Supine cane flexion  x20 x20   2x15       AROM- flex, ABD     x15        Supine cane ER             Standing cane ABD             UBE  6' 3'3' 3'3' 3'3' 3'3'       Ther Activity                                       Gait Training                                       Modalities

## 2023-11-30 ENCOUNTER — OFFICE VISIT (OUTPATIENT)
Dept: NEUROSURGERY | Facility: CLINIC | Age: 68
End: 2023-11-30
Payer: MEDICARE

## 2023-11-30 ENCOUNTER — TELEPHONE (OUTPATIENT)
Age: 68
End: 2023-11-30

## 2023-11-30 VITALS
BODY MASS INDEX: 32.1 KG/M2 | HEIGHT: 72 IN | SYSTOLIC BLOOD PRESSURE: 131 MMHG | TEMPERATURE: 98.5 F | DIASTOLIC BLOOD PRESSURE: 79 MMHG | HEART RATE: 73 BPM | WEIGHT: 237 LBS | OXYGEN SATURATION: 94 %

## 2023-11-30 DIAGNOSIS — M54.50 LOWER BACK PAIN: Primary | ICD-10-CM

## 2023-11-30 PROCEDURE — 99215 OFFICE O/P EST HI 40 MIN: CPT | Performed by: STUDENT IN AN ORGANIZED HEALTH CARE EDUCATION/TRAINING PROGRAM

## 2023-11-30 PROCEDURE — 99205 OFFICE O/P NEW HI 60 MIN: CPT | Performed by: STUDENT IN AN ORGANIZED HEALTH CARE EDUCATION/TRAINING PROGRAM

## 2023-11-30 RX ORDER — TEMAZEPAM 30 MG/1
30 CAPSULE ORAL
COMMUNITY
Start: 2023-11-22

## 2023-11-30 RX ORDER — QUETIAPINE FUMARATE 100 MG/1
TABLET, FILM COATED ORAL
COMMUNITY
Start: 2023-11-29

## 2023-11-30 NOTE — PROGRESS NOTES
Assessment/Plan:  57-year-old male with a history of hypertension, previous L3-4 decompression and fusion several years ago, who presents to clinic for evaluation of chronic lumbar back pain that has been progressively worsening over the last 6 6 to 8 months. He has a PI-LL mismatch of 28 degrees and a pelvic tilt of 31 degrees. I discussed with him that he is sagittally malaligned which can cause severe low back pain with activity and I discussed the risks and benefits of a T10 to pelvis deformity correction surgery. I also discussed spinal cord stimulator placement which she said he had a trial done years ago and he did not receive benefit from so he never underwent the permanent placement. I also recommended seeing our pain management physicians here at [facility] for facet injections and possible facet ablations. I ordered a DEXA scan as well as an MRI thoracic and lumbar spine to evaluate for herniated disks prior to deformity correction surgery. I also placed an order for physical therapy. We will follow-up with him in 4 to 6 weeks after he trialed physical therapy and possible facet injections to discuss the results of DEXA scan and MRI scans and to talk more about deformity correction surgery. Subjective:      Patient ID: Rosana Ferrell is a 79 y.o. male. HPI    57-year-old male with a history of hypertension, previous L3-4 decompression and fusion several years ago, who presents to clinic for evaluation of chronic lumbar back pain that has been progressively worsening over the last 6 6 to 8 months. He has tried injections in the past and stated that it has not worked. He also does physical therapy at home and he says there are several exercises that makes the pain worse so he has since stopped doing it. He also has a history of right-sided foot drop that started before his L3-4 decompression and fusion and has been present since.   He states that it has somewhat improved over the years but has been relatively stable. The low back pain is incredibly severe and is worse whenever he is up moving around. He needs to rest frequently throughout the day because he is unable to walk for long periods of time. He states that the pain will occasionally radiate down his thighs but largely stays in his lumbar back. Denies any associated numbness, new weakness, urinary or bowel changes. The following portions of the patient's history were reviewed and updated as appropriate: allergies, current medications, past family history, past medical history, past social history, past surgical history, and problem list.        Review of Systems      Objective:      /79 (BP Location: Left arm, Patient Position: Sitting, Cuff Size: Standard)   Pulse 73   Temp 98.5 °F (36.9 °C) (Temporal)   Ht 6' (1.829 m)   Wt 108 kg (237 lb)   SpO2 94%   BMI 32.14 kg/m²          Physical Exam    A&OX3  Gaze conjugate  EOMI  FS  TM  Fcx4  5/5 BUE  5/5 LLE  RLE 5 hf 5 ke 3 df 5 ehl 5 pf (3/5 inversion, 5/5 eversion) (states this is his baseline)  SILT  No clonus  No coyle  No babinski    Imaging and labs:  I reviewed his x-ray scoliosis films. His SVA is 5 cm. His pelvic incidence is 68 degrees, his lumbar lordosis is 40 degrees with a mismatch of 28 degrees. His pelvic tilt is 31 degrees. His thoracolumbar T10-L2 is 11 degrees of lordosis.

## 2023-11-30 NOTE — TELEPHONE ENCOUNTER
Caller: Inez Menjivar    Doctor: Bob Diaz    Reason for call: Pt wants to know if his upcoming procedure was approved and good to go for the 12/12 he had issues in the past where it was denied and he doesn't want to wait and then be told it was denied.     Please advise    Call back#: [phone]

## 2023-12-01 ENCOUNTER — OFFICE VISIT (OUTPATIENT)
Dept: PHYSICAL THERAPY | Facility: CLINIC | Age: 68
End: 2023-12-01
Payer: MEDICARE

## 2023-12-01 DIAGNOSIS — Z98.890 S/P ARTHROSCOPY OF RIGHT SHOULDER: ICD-10-CM

## 2023-12-01 DIAGNOSIS — M75.101 TEAR OF RIGHT SUPRASPINATUS TENDON: Primary | ICD-10-CM

## 2023-12-01 PROCEDURE — 97110 THERAPEUTIC EXERCISES: CPT

## 2023-12-01 PROCEDURE — 97140 MANUAL THERAPY 1/> REGIONS: CPT

## 2023-12-01 NOTE — TELEPHONE ENCOUNTER
Called patient to let him know that the procedure on 12.12.23 has been approved. No answer, Voicemail was left with the information .

## 2023-12-01 NOTE — PROGRESS NOTES
Daily Note     Today's date: 2023  Patient name: Alban Jimenez  : 1955  MRN: [id number]  Referring provider: Lele Hdz DO  Dx:   Encounter Diagnosis     ICD-10-CM    1. Tear of right supraspinatus tendon  M75.101       2. S/P arthroscopy of right shoulder  Z98.890           Start Time: 1330  Stop Time: 1414  Total time in clinic (min): 44 minutes    Subjective: Patient reports that his shoulder was very painful since last session. He states that it was so painful for 2 days that he could barely move it, thinks we overdid it in therapy. Objective: See treatment diary below      Assessment: Patient demonstrates consistent R shoulder strength this session. Patient completed today's session with increase in pain with any activation of shoulder muscles. UBE performed in order to improve UE muscular endurance. Did not progress strengthening exercises today due to residual soreness. Posterior mobilizations were the only non-painful intervention. Future sessions should continue to progress strengthening exercises as able. Tolerated treatment well. Patient would benefit from continued PT      Plan: Continue per plan of care. Precautions: HTN, anxiety, chronic pain syndrome, anxiety  DOS: 23  POC expires Unit limit Auth Expiration date PT/OT + Visit Limit? 24 BOMN 23 BOMN                               Foto             Manuals       R shoulder mobs  GS  Gr4 infer GS  Gr 4 post GS  Gr 4 post and inf GS  Gr 4 post and inf GS  Gr 4 post and inf GS  Gr 4 post      R first rib mob       P!                                 Neuro Re-Ed             Table top shoulder taps   X20 ea X20 ea X20 ea        Wall ball- up down, sides   X20 red ball X20 red ball X20 red ball        Manual scap setting    2x10         Supine serratus press      2x15  5# x20                                             Ther Ex             TB row, ext HEP 2x15  gtb 2x15  btb 2x15  btb 2x15  btb 2x15  black 2x15  black      TB ER, IR HEP 2x15  rtb 2x15 rtb 2x15  gtb 2x15  gtb 2x15  btb 2x10  btb      Prone Y, T   X20 ea X20,x11 X20 ea 2x15  1#       SL ER  x20 X20 1# X20  2# X20  2# 2x15  3# 2x10  2#      Prone shoulder ext  x20 X20  2# X20  3# X20  4# 2x15  5#       Seated DB ER      2x15  1# 2x15  1#      Wall slide      x20       Standing scaption  x20           Standing ABD             Supine cane flexion  x20 x20   2x15       AROM- flex, ABD     x15        Supine cane ER             Standing cane ABD             UBE  6' 3'3' 3'3' 3'3' 3'3' 3'3      Ther Activity                                       Gait Training                                       Modalities

## 2023-12-04 ENCOUNTER — EVALUATION (OUTPATIENT)
Dept: PHYSICAL THERAPY | Facility: CLINIC | Age: 68
End: 2023-12-04
Payer: MEDICARE

## 2023-12-04 DIAGNOSIS — Z98.890 S/P ARTHROSCOPY OF RIGHT SHOULDER: ICD-10-CM

## 2023-12-04 DIAGNOSIS — M75.101 TEAR OF RIGHT SUPRASPINATUS TENDON: Primary | ICD-10-CM

## 2023-12-04 PROCEDURE — 97140 MANUAL THERAPY 1/> REGIONS: CPT

## 2023-12-04 PROCEDURE — 97110 THERAPEUTIC EXERCISES: CPT

## 2023-12-04 NOTE — PROGRESS NOTES
PT Re-Evaluation     Today's date: 2023  Patient name: Gaylan Goldmann  : 1955  MRN: [id number]  Referring provider: Ariel Rodriguez DO  Dx:   Encounter Diagnosis     ICD-10-CM    1. Tear of right supraspinatus tendon  M75.101       2. S/P arthroscopy of right shoulder  Z98.890           Start Time: 1430  Stop Time: 1511  Total time in clinic (min): 41 minutes    Assessment  Assessment details: Problem List:  1) R shoulder weakness  2) Limited R shoulder AROM    Gaylan Goldmann is a pleasant 79 y.o. male who presents s/p R RCR on . Over the last month, R shoulder capsular mobility has improved with ROM slowly improving. PROM has improved but AROM remains limited. Pain levels remain unchanged. Remaining deficits include R shoulder weakness, limited R shoulder ROM, and activity intolerance. Patient continues to have concerns about re-tear due to high pain levels. Gave patient TB row, ext, ER, and IR for HEP. Patient would benefit from skilled PT services to address these deficits and return to PLOF.     Comparable signs:  1) R shoulder ABD AROM  2) R shoulder ABD strength  Impairments: abnormal or restricted ROM, activity intolerance, impaired physical strength, lacks appropriate home exercise program, pain with function and poor posture   Understanding of Dx/Px/POC: fair   Prognosis: fair    Goals  STGs  Patient will be independent with HEP in 4 weeks -met  Patient will decrease pain by 2 points in 4 weeks -not met  LTGs  Patient will achieve R shoulder AROM equal to L in 8 weeks  Patient will achieve R shoulder strength equal to L in 12 weeks     Plan  Plan details: 2x/week for 12 weeks, 24 visits    Patient would benefit from: skilled physical therapy  Planned therapy interventions: home exercise program, therapeutic exercise, therapeutic activities, joint mobilization, manual therapy, motor coordination training, neuromuscular re-education, patient education, strengthening, activity modification, functional ROM exercises and flexibility  Frequency: 2x week  Plan of Care beginning date: 2023  Plan of Care expiration date: 2024  Treatment plan discussed with: patient        Subjective Evaluation    History of Present Illness  Date of surgery: 2023  Mechanism of injury: Pain location: R shoulder into anterior arm/biceps area  Pain severity: 4-0-8  Aggs: moving light wood, ER and ABD  Eases: rest  Irritability: moderate  MAVERICK/timeline: R RCR on  after suffering a fall resulting in supraspinatus tear, patient does note one instance since surgery when he was working on pipes at his house when he felt a sudden sharp pain  Red flags: none  PMH/PSH: lumbar fusion, hx of cervical fractures    - patient reports that he feels about 50% better since starting PT. He is still having trouble picking up objects and reaching overhead    Patient Goals  Patient goal: get back to normal function  Pain  Current pain ratin  At best pain ratin  At worst pain ratin          Objective     Cervical/Thoracic Screen   Cervical range of motion within normal limits with the following exceptions: Flexion WNL  All other motions limited 50-75%    Active Range of Motion     Right Shoulder   Flexion: 110 degrees with pain  Abduction: 75 degrees with pain  External rotation 0°: 50 degrees with pain    Passive Range of Motion   Left Shoulder   Normal passive range of motion    Right Shoulder   Flexion: 155 degrees with pain  Abduction: 140 degrees with pain  External rotation 0°: 65 degrees with pain    Strength/Myotome Testing     Right Shoulder     Planes of Motion   Flexion: 3+   Abduction: 3     Additional Strength Details  Deferred due to pain               Precautions: HTN, anxiety, chronic pain syndrome, anxiety  DOS: 23    POC expires Unit limit Auth Expiration date PT/OT + Visit Limit?    24 BOMN 23 BOMN   RE due 24                            Foto      Manuals  11/14 11/17 11/20 11/22 11/28 12/1 12/4     R shoulder mobs  GS  Gr4 infer GS  Gr 4 post GS  Gr 4 post and inf GS  Gr 4 post and inf GS  Gr 4 post and inf GS  Gr 4 post GS  Gr 4 poster and inf     R first rib mob       P!                                 Neuro Re-Ed             Table top shoulder taps   X20 ea X20 ea X20 ea        Wall ball- up down, sides   X20 red ball X20 red ball X20 red ball        Manual scap setting    2x10         Supine serratus press      2x15  5# x20                                             Ther Ex             TB row, ext HEP 2x15  gtb 2x15  btb 2x15  btb 2x15  btb 2x15  black 2x15  black      TB ER, IR HEP 2x15  rtb 2x15 rtb 2x15  gtb 2x15  gtb 2x15  btb 2x10  btb      Prone Y, T   X20 ea X20,x11 X20 ea 2x15  1#       SL ER  x20 X20 1# X20  2# X20  2# 2x15  3# 2x10  2# x20     Prone shoulder ext  x20 X20  2# X20  3# X20  4# 2x15  5#  x20     Seated DB ER      2x15  1# 2x15  1#      Wall slide      x20       Standing scaption  x20           Standing ABD             Supine cane flexion  x20 x20   2x15       AROM- flex, scaption, ABD     x15   X20 ea     Supine cane ER             Standing cane ABD             UBE  6' 3'3' 3'3' 3'3' 3'3' 3'3 3'3'     Ther Activity                                       Gait Training                                       Modalities

## 2023-12-06 ENCOUNTER — HOSPITAL ENCOUNTER (OUTPATIENT)
Age: 68
Discharge: HOME | End: 2023-12-06
Payer: MEDICARE

## 2023-12-06 VITALS — HEIGHT: 72 IN | BODY MASS INDEX: 32.64 KG/M2 | WEIGHT: 241 LBS

## 2023-12-06 DIAGNOSIS — M54.50 LOWER BACK PAIN: ICD-10-CM

## 2023-12-06 PROCEDURE — 77080 DXA BONE DENSITY AXIAL: CPT

## 2023-12-08 ENCOUNTER — OFFICE VISIT (OUTPATIENT)
Dept: PHYSICAL THERAPY | Facility: CLINIC | Age: 68
End: 2023-12-08
Payer: MEDICARE

## 2023-12-08 DIAGNOSIS — M75.101 TEAR OF RIGHT SUPRASPINATUS TENDON: Primary | ICD-10-CM

## 2023-12-08 DIAGNOSIS — Z98.890 S/P ARTHROSCOPY OF RIGHT SHOULDER: ICD-10-CM

## 2023-12-08 PROCEDURE — 97530 THERAPEUTIC ACTIVITIES: CPT

## 2023-12-08 PROCEDURE — 97110 THERAPEUTIC EXERCISES: CPT

## 2023-12-08 PROCEDURE — 97140 MANUAL THERAPY 1/> REGIONS: CPT

## 2023-12-08 NOTE — PROGRESS NOTES
Daily Note     Today's date: 2023  Patient name: Hayden Schmidt  : 1955  MRN: [id number]  Referring provider: Nadine Mckeon DO  Dx:   Encounter Diagnosis     ICD-10-CM    1. Tear of right supraspinatus tendon  M75.101       2. S/P arthroscopy of right shoulder  Z98.890           Start Time: 1330  Stop Time: 1425  Total time in clinic (min): 55 minutes    Subjective: Patient reports that his shoulder continues to bother him during the day but then does not have pain at night when he sleeps. Objective: See treatment diary below      Assessment: Patient demonstrates improving R shoulder strength this session. Patient completed today's session with increase in pain with table transfer. UBE performed in order to improve UE muscular endurance. Added more functional training exercises with cone stacking and dumbbell transfer across tables to simulate carrying gallon of milk. Given a task with cone stacking, patient is able to actively lift arm above shoulder height with minimal complaints of pain. Future sessions should continue to progress strengthening exercises and functional training as able. Tolerated treatment well. Patient would benefit from continued PT      Plan: Continue per plan of care. Precautions: HTN, anxiety, chronic pain syndrome, anxiety  DOS: 23    POC expires Unit limit Auth Expiration date PT/OT + Visit Limit? 24 BOMN 23 BOMN   RE due 24                            Foto      Manuals     R shoulder mobs  GS  Gr4 infer GS  Gr 4 post GS  Gr 4 post and inf GS  Gr 4 post and inf GS  Gr 4 post and inf GS  Gr 4 post GS  Gr 4 poster and inf GS  Gr post    R first rib mob       P!                                 Neuro Re-Ed             Table top shoulder taps   X20 ea X20 ea X20 ea        Wall ball- up down, sides   X20 red ball X20 red ball X20 red ball        Manual scap setting    2x10 Supine serratus press      2x15  5# x20      TA contraction         X20 3"                              Ther Ex             TB row, ext HEP 2x15  gtb 2x15  btb 2x15  btb 2x15  btb 2x15  black 2x15  black  2x15  btb    TB ER, IR HEP 2x15  rtb 2x15 rtb 2x15  gtb 2x15  gtb 2x15  btb 2x10  btb  2x15  gtb    Prone Y, T   X20 ea X20,x11 X20 ea 2x15  1#       SL ER  x20 X20 1# X20  2# X20  2# 2x15  3# 2x10  2# x20 2x10  1#    Prone shoulder ext  x20 X20  2# X20  3# X20  4# 2x15  5#  x20 3x10  1#    Seated DB ER      2x15  1# 2x15  1#  2x10  1#    Wall slide      x20       Standing scaption  x20           Standing ABD         2x10    Supine cane flexion  x20 x20   2x15   2x15    AROM- flex, scaption, ABD     x15   X20 ea X20 flex    UBE  6' 3'3' 3'3' 3'3' 3'3' 3'3 3'3' 3'3'    Ther Activity             Table transfer w DB         X10  10#    Cone stacking         4x5    Gait Training                                       Modalities

## 2023-12-12 ENCOUNTER — OFFICE VISIT (OUTPATIENT)
Dept: PHYSICAL THERAPY | Facility: CLINIC | Age: 68
End: 2023-12-12
Payer: MEDICARE

## 2023-12-12 ENCOUNTER — HOSPITAL ENCOUNTER (OUTPATIENT)
Dept: RADIOLOGY | Facility: CLINIC | Age: 68
Discharge: HOME | End: 2023-12-12
Payer: MEDICARE

## 2023-12-12 VITALS
TEMPERATURE: 97.3 F | OXYGEN SATURATION: 96 % | DIASTOLIC BLOOD PRESSURE: 87 MMHG | RESPIRATION RATE: 20 BRPM | SYSTOLIC BLOOD PRESSURE: 126 MMHG | HEART RATE: 65 BPM

## 2023-12-12 DIAGNOSIS — M75.101 TEAR OF RIGHT SUPRASPINATUS TENDON: Primary | ICD-10-CM

## 2023-12-12 DIAGNOSIS — Z98.890 S/P ARTHROSCOPY OF RIGHT SHOULDER: ICD-10-CM

## 2023-12-12 DIAGNOSIS — M47.816 LUMBAR FACET ARTHROPATHY: ICD-10-CM

## 2023-12-12 PROCEDURE — 97110 THERAPEUTIC EXERCISES: CPT

## 2023-12-12 PROCEDURE — 97530 THERAPEUTIC ACTIVITIES: CPT

## 2023-12-12 PROCEDURE — 97140 MANUAL THERAPY 1/> REGIONS: CPT

## 2023-12-12 PROCEDURE — 64493 INJ PARAVERT F JNT L/S 1 LEV: CPT | Performed by: STUDENT IN AN ORGANIZED HEALTH CARE EDUCATION/TRAINING PROGRAM

## 2023-12-12 PROCEDURE — 64494 INJ PARAVERT F JNT L/S 2 LEV: CPT | Performed by: STUDENT IN AN ORGANIZED HEALTH CARE EDUCATION/TRAINING PROGRAM

## 2023-12-12 RX ORDER — BUPIVACAINE HCL/PF 2.5 MG/ML
3 VIAL (ML) INJECTION ONCE
Status: COMPLETED | OUTPATIENT
Start: 2023-12-12 | End: 2023-12-12

## 2023-12-12 RX ADMIN — Medication 2 ML: at 15:09

## 2023-12-12 NOTE — PROGRESS NOTES
Daily Note     Today's date: 2023  Patient name: Trish Pardo  : 1955  MRN: [id number]  Referring provider: Carlito Gan DO  Dx:   Encounter Diagnosis     ICD-10-CM    1. Tear of right supraspinatus tendon  M75.101       2. S/P arthroscopy of right shoulder  Z98.890           Start Time:   Stop Time: 180  Total time in clinic (min): 38 minutes    Subjective: Patient reports that he had his injections in his back today and his back feels a little better. Patient has no new reports for shoulder. Objective: See treatment diary below      Assessment: Patient demonstrates improving L shoulder strength this session. Patient completed today's session without increase in pain. UBE performed in order to improve UE muscular endurance. Patient is still pain dominant with shoulder exercises but continues to demonstrate improving strength and functional ROM. Encouraged patient to keep up with HEP for continued improvements. Future sessions should continue to progress strengthening exercises as able. Tolerated treatment well. Patient would benefit from continued PT        Plan: Continue per plan of care. Precautions: HTN, anxiety, chronic pain syndrome, anxiety  DOS: 23    POC expires Unit limit Auth Expiration date PT/OT + Visit Limit? 24 BOMN 23 BOMN   RE due 24                            Foto      Manuals    R shoulder mobs  GS  Gr4 infer GS  Gr 4 post GS  Gr 4 post and inf GS  Gr 4 post and inf GS  Gr 4 post and inf GS  Gr 4 post GS  Gr 4 poster and inf GS  Gr post GS  Gr 4 post   R first rib mob       P!                                 Neuro Re-Ed             Table top shoulder taps   X20 ea X20 ea X20 ea     X20 ea   Wall ball- up down, sides   X20 red ball X20 red ball X20 red ball        Manual scap setting    2x10         Supine serratus press      2x15  5# x20      TA contraction         X20 3"                              Ther Ex             TB row, ext HEP 2x15  gtb 2x15  btb 2x15  btb 2x15  btb 2x15  black 2x15  black  2x15  btb 2x15  black   TB ER, IR HEP 2x15  rtb 2x15 rtb 2x15  gtb 2x15  gtb 2x15  btb 2x10  btb  2x15  gtb 2x10  btb   Prone Y, T   X20 ea X20,x11 X20 ea 2x15  1#       SL ER  x20 X20 1# X20  2# X20  2# 2x15  3# 2x10  2# x20 2x10  1# 2x15  2#   Prone shoulder ext  x20 X20  2# X20  3# X20  4# 2x15  5#  x20 3x10  1# 2x15  2#   Seated DB ER      2x15  1# 2x15  1#  2x10  1# 2x15  2#   Wall slide      x20       Standing scaption  x20           Standing ABD         2x10    Supine cane flexion  x20 x20   2x15   2x15    AROM- flex, scaption, ABD     x15   X20 ea X20 flex X20 flex, ABD   UBE  6' 3'3' 3'3' 3'3' 3'3' 3'3 3'3' 3'3' 3'3'   Ther Activity             Table transfer w DB         X10  10#    Cone stacking         4x5 3x5   Gait Training                                       Modalities

## 2023-12-12 NOTE — DISCHARGE INSTR - LAB

## 2023-12-12 NOTE — H&P
History of Present Illness: The patient is a 79 y.o. male who presents with complaints of bilateral low back pain    Past Medical History:   Diagnosis Date    Back pain     Colon polyp     Hyperlipidemia     Lumbar fracture with cord injury ([address])     Neck fracture (HCC)     Previous back surgery     rods in the back    Ulcerative colitis [facility])        Past Surgical History:   Procedure Laterality Date    BACK SURGERY      LUMBAR FUSION Right 1/20/2016    Procedure: FUSION LUMBAR  POSTERIOR, TLIF L3-4  Right L3-4 Fascet;  Surgeon: Arvind Cary MD;  Location:  MAIN OR;  Service:     OK COLONOSCOPY FLX DX W/COLLJ SPEC WHEN PFRMD N/A 1/24/2019    Procedure: COLONOSCOPY;  Surgeon: Christian Brock MD;  Location: [facility];   Service: Gastroenterology    OK SURGICAL ARTHROSCOPY SHOULDER W/ROTATOR CUFF RPR Right 7/26/2023    Procedure: ARTHROSCOPY SHOULDER- Right shoulder Arthroscopy, supraspinatus and subscapularis repair, biceps tenotomy, extensive debridement;  Surgeon: Nilay Akers DO;  Location: [facility];  Service: Orthopedics    TONSILLECTOMY           Current Outpatient Medications:     balsalazide (COLAZAL) 750 mg capsule, TAKE 3 CAPSULES BY MOUTH TWICE DAILY, Disp: 540 capsule, Rfl: 1    Diclofenac Sodium (VOLTAREN) 1 %, , Disp: , Rfl:     ezetimibe (ZETIA) 10 mg tablet, Take 10 mg by mouth daily, Disp: , Rfl:     gabapentin (NEURONTIN) 300 mg capsule, Take 1 capsule (300 mg total) by mouth 3 (three) times a day, Disp: 90 capsule, Rfl: 0    methocarbamol (ROBAXIN) 500 mg tablet, TAKE 1 TABLET BY MOUTH THREE TIMES A DAY, Disp: 90 tablet, Rfl: 0    metoprolol succinate (TOPROL-XL) 25 mg 24 hr tablet, Take 25 mg by mouth daily (Patient not taking: Reported on 11/30/2023), Disp: , Rfl:     oxyCODONE (ROXICODONE) 10 MG TABS, , Disp: , Rfl:     QUEtiapine (SEROquel) 100 mg tablet, , Disp: , Rfl:     temazepam (RESTORIL) 30 mg capsule, 30 mg, Disp: , Rfl:     Trintellix 10 MG tablet, Take 10 mg by mouth daily (Patient not taking: Reported on 11/3/2023), Disp: , Rfl:     zolpidem (AMBIEN CR) 12.5 MG CR tablet, Take 12.5 mg by mouth daily at bedtime as needed, Disp: , Rfl:     Allergies   Allergen Reactions    No Active Allergies        Physical Exam: There were no vitals filed for this visit. General: Awake, Alert, Oriented x 3, Mood and affect appropriate  Respiratory: Respirations even and unlabored  Cardiovascular: Peripheral pulses intact; no edema  Musculoskeletal Exam: facet loading positive bilaterally    ASA Score: 3         Assessment:   1.  Lumbar facet arthropathy        Plan: bilateral L1-2, L2-3, MBB1

## 2023-12-13 ENCOUNTER — TELEPHONE (OUTPATIENT)
Dept: NEUROSURGERY | Facility: CLINIC | Age: 68
End: 2023-12-13

## 2023-12-13 DIAGNOSIS — F40.240 CLAUSTROPHOBIA: Primary | ICD-10-CM

## 2023-12-13 RX ORDER — LORAZEPAM 1 MG/1
1 TABLET ORAL
Qty: 2 TABLET | Refills: 0 | Status: SHIPPED | OUTPATIENT
Start: 2023-12-13

## 2023-12-13 NOTE — TELEPHONE ENCOUNTER
Called patient to make him aware that scripts were sent to his pharmacy.   He needs to have a  to and from his MRI appointments and he should not take his temazepam at bedtime for those two nights when taking the lorazepam.

## 2023-12-13 NOTE — TELEPHONE ENCOUNTER
Patient called the nurse line requesting medication that he can take prior to his 2 upcoming MRIs. He has one scheduled on 12/14 and one on 12/15. Will route to provider to authorize.

## 2023-12-13 NOTE — TELEPHONE ENCOUNTER
Patient called because he called [facility] pharmacy asking if prescription was ready for . They told him no that they needed verification from the office to dispense. Spoke to nurse Riya Hopper she stated that [facility] needs to call us for verification. Informed patient.

## 2023-12-14 ENCOUNTER — TELEPHONE (OUTPATIENT)
Dept: NEUROSURGERY | Facility: CLINIC | Age: 68
End: 2023-12-14

## 2023-12-14 ENCOUNTER — APPOINTMENT (OUTPATIENT)
Dept: PHYSICAL THERAPY | Facility: CLINIC | Age: 68
End: 2023-12-14
Payer: MEDICARE

## 2023-12-14 ENCOUNTER — HOSPITAL ENCOUNTER (OUTPATIENT)
Dept: MRI IMAGING | Facility: HOSPITAL | Age: 68
End: 2023-12-14
Attending: STUDENT IN AN ORGANIZED HEALTH CARE EDUCATION/TRAINING PROGRAM
Payer: MEDICARE

## 2023-12-14 DIAGNOSIS — M54.50 LOWER BACK PAIN: ICD-10-CM

## 2023-12-14 PROCEDURE — 72148 MRI LUMBAR SPINE W/O DYE: CPT

## 2023-12-14 PROCEDURE — G1004 CDSM NDSC: HCPCS

## 2023-12-14 NOTE — TELEPHONE ENCOUNTER
Received call on nurseline from patient stating [facility] told him to call us to have us reach out to them. This RN reached out to patient's pharmacy, [facility] handy reporting issue with lorazepam is resolved.

## 2023-12-15 ENCOUNTER — EVALUATION (OUTPATIENT)
Dept: PHYSICAL THERAPY | Facility: CLINIC | Age: 68
End: 2023-12-15
Payer: MEDICARE

## 2023-12-15 ENCOUNTER — HOSPITAL ENCOUNTER (OUTPATIENT)
Dept: MRI IMAGING | Facility: HOSPITAL | Age: 68
End: 2023-12-15
Attending: STUDENT IN AN ORGANIZED HEALTH CARE EDUCATION/TRAINING PROGRAM
Payer: MEDICARE

## 2023-12-15 DIAGNOSIS — M75.101 TEAR OF RIGHT SUPRASPINATUS TENDON: Primary | ICD-10-CM

## 2023-12-15 DIAGNOSIS — G89.29 CHRONIC MIDLINE LOW BACK PAIN WITHOUT SCIATICA: ICD-10-CM

## 2023-12-15 DIAGNOSIS — Z98.890 S/P ARTHROSCOPY OF RIGHT SHOULDER: ICD-10-CM

## 2023-12-15 DIAGNOSIS — M54.50 CHRONIC MIDLINE LOW BACK PAIN WITHOUT SCIATICA: ICD-10-CM

## 2023-12-15 DIAGNOSIS — M54.50 LOWER BACK PAIN: ICD-10-CM

## 2023-12-15 PROCEDURE — G1004 CDSM NDSC: HCPCS

## 2023-12-15 PROCEDURE — 97530 THERAPEUTIC ACTIVITIES: CPT

## 2023-12-15 PROCEDURE — 72146 MRI CHEST SPINE W/O DYE: CPT

## 2023-12-15 PROCEDURE — 97110 THERAPEUTIC EXERCISES: CPT

## 2023-12-15 NOTE — PROGRESS NOTES
PT Re-Evaluation     Today's date: 12/15/2023  Patient name: Marcello Faustin  : 1955  MRN: [id number]  Referring provider: Agustin Chahal DO  Dx:   Encounter Diagnosis     ICD-10-CM    1. Tear of right supraspinatus tendon  M75.101       2. S/P arthroscopy of right shoulder  Z98.890       3. Chronic midline low back pain without sciatica  M54.50     G89.29           Start Time: 1415  Stop Time: 1455  Total time in clinic (min): 40 minutes    Assessment  Assessment details: Problem List:  1) R shoulder weakness  2) Limited R shoulder AROM    Marcello Faustin is a pleasant 79 y.o. male who presents s/p R RCR on . Over the last month, R shoulder capsular mobility has improved with ROM slowly improving. PROM has improved but AROM remains limited. Pain levels remain unchanged. Remaining deficits include R shoulder weakness, limited R shoulder ROM, and activity intolerance. Patient continues to have concerns about re-tear due to high pain levels. Gave patient TB row, ext, ER, and IR for HEP. Patient would benefit from skilled PT services to address these deficits and return to PLOF. Patient also presents with chronic LBP. On evaluation, patient demonstrates significant limitations in lumbar ROM, poor core activation, and overall activity tolerance. Patient has pain with motion in all planes and activation of TA. Back pain increased after light exam in session today. Due to high irritability and minimal activity tolerance, patient is not appropriate for PT treatment at this time. Patient should continue to follow up with ortho spine and continue to work on shoulder strength in PT at this time.     Comparable signs:  1) R shoulder ABD AROM  2) R shoulder ABD strength  Impairments: abnormal or restricted ROM, activity intolerance, impaired physical strength, lacks appropriate home exercise program, pain with function and poor posture   Understanding of Dx/Px/POC: fair   Prognosis: fair    Goals  STGs  Patient will be independent with HEP in 4 weeks -met  Patient will decrease pain by 2 points in 4 weeks -not met  LTGs  Patient will achieve R shoulder AROM equal to L in 8 weeks  Patient will achieve R shoulder strength equal to L in 12 weeks     Plan  Plan details: 2x/week for 12 weeks, 24 visits    Patient would benefit from: skilled physical therapy  Planned therapy interventions: home exercise program, therapeutic exercise, therapeutic activities, joint mobilization, manual therapy, motor coordination training, neuromuscular re-education, patient education, strengthening, activity modification, functional ROM exercises and flexibility  Frequency: 2x week  Plan of Care beginning date: 11/6/2023  Plan of Care expiration date: 1/29/2024  Treatment plan discussed with: patient        Subjective Evaluation    History of Present Illness  Date of surgery: 7/26/2023  Mechanism of injury: Pain location: R shoulder into anterior arm/biceps area  Pain severity: 4-0-8  Aggs: moving light wood, ER and ABD  Eases: rest  Irritability: moderate  MAVERICK/timeline: R RCR on 7/26 after suffering a fall resulting in supraspinatus tear, patient does note one instance since surgery when he was working on pipes at his house when he felt a sudden sharp pain  Red flags: none  PMH/PSH: lumbar fusion, hx of cervical fractures    12/4- patient reports that he feels about 50% better since starting PT.   He is still having trouble picking up objects and reaching overhead    Back assessment  Pain location: L3-5 central lumbar  Pain severity: 0-0-10  Aggs: bending, lifting, twisting, walking  Eases: back brace  Irritability: high  MAVERICK/timeline: pains started about 6-7 years ago when he was picking heavy objects up at work when we felt something "go", patient states that he went to hospital and they performed 1 segment lumbar fusion the next day, back was good until recently when he had rotator cuff surgery on 7/26/23  Red flags: none   PMH/PSH: L3/4 lumbar fusion      Patient Goals  Patient goal: get back to normal function  Pain  Current pain ratin  At best pain ratin  At worst pain ratin          Objective     Cervical/Thoracic Screen   Cervical range of motion within normal limits with the following exceptions: Flexion WNL  All other motions limited 50-75%    Active Range of Motion     Right Shoulder   Flexion: 110 degrees with pain  Abduction: 75 degrees with pain  External rotation 0°: 50 degrees with pain    Lumbar   Flexion: 75 degrees  Restriction level: minimal  Extension: 50 degrees  with pain Restriction level: moderate  Left lateral flexion: 25 degrees    with pain Restriction level: maximal  Right lateral flexion: 25 degrees  with pain Restriction level: maximal  Left rotation: 5 degrees  with pain Restriction level: maximal  Right rotation: 5 degrees  Restriction level: maximal    Passive Range of Motion   Left Shoulder   Normal passive range of motion    Right Shoulder   Flexion: 155 degrees with pain  Abduction: 140 degrees with pain  External rotation 0°: 65 degrees with pain    Joint Play     Pain: L1, L2, L3, L4, L5 and S1     Strength/Myotome Testing     Right Shoulder     Planes of Motion   Flexion: 3+   Abduction: 3     Additional Strength Details  Deferred due to pain    Muscle Activation     Additional Muscle Activation Details  Fair TA contraction               Precautions: HTN, anxiety, chronic pain syndrome, anxiety  DOS: 23    POC expires Unit limit Auth Expiration date PT/OT + Visit Limit? 24 BOMN 23 BOMN   RE due 24                            Foto             Manuals 12/15 11/14 11/17 11/20 11/22 11/28 12/1 12/4 12/8 12/12   R shoulder mobs  GS  Gr4 infer GS  Gr 4 post GS  Gr 4 post and inf GS  Gr 4 post and inf GS  Gr 4 post and inf GS  Gr 4 post GS  Gr 4 poster and inf GS  Gr post GS  Gr 4 post   R first rib mob       P!                                 Neuro Re-Ed Table top shoulder taps   X20 ea X20 ea X20 ea     X20 ea   Wall ball- up down, sides   X20 red ball X20 red ball X20 red ball        Manual scap setting    2x10         Supine serratus press      2x15  5# x20      TA contraction X20 3"        X20 3"                              Ther Ex             TB row, ext 2x15  black 2x15  gtb 2x15  btb 2x15  btb 2x15  btb 2x15  black 2x15  black  2x15  btb 2x15  black   TB ER, IR 2x10  btb 2x15  rtb 2x15 rtb 2x15  gtb 2x15  gtb 2x15  btb 2x10  btb  2x15  gtb 2x10  btb   Prone Y, T   X20 ea X20,x11 X20 ea 2x15  1#       SL ER  x20 X20 1# X20  2# X20  2# 2x15  3# 2x10  2# x20 2x10  1# 2x15  2#   Prone shoulder ext  x20 X20  2# X20  3# X20  4# 2x15  5#  x20 3x10  1# 2x15  2#   Seated DB ER      2x15  1# 2x15  1#  2x10  1# 2x15  2#   Wall slide      x20       Standing scaption  x20           Standing ABD         2x10    Supine cane flexion  x20 x20   2x15   2x15    AROM- flex, scaption, ABD     x15   X20 ea X20 flex X20 flex, ABD   PT re eval GS            UBE 3'3' 6' 3'3' 3'3' 3'3' 3'3' 3'3 3'3' 3'3' 3'3'   Ther Activity             Table transfer w DB         X10  10#    Cone stacking 3x5        4x5 3x5   Gait Training                                       Modalities

## 2023-12-18 ENCOUNTER — OFFICE VISIT (OUTPATIENT)
Dept: PHYSICAL THERAPY | Facility: CLINIC | Age: 68
End: 2023-12-18
Payer: MEDICARE

## 2023-12-18 DIAGNOSIS — G89.29 CHRONIC MIDLINE LOW BACK PAIN WITHOUT SCIATICA: ICD-10-CM

## 2023-12-18 DIAGNOSIS — Z98.890 S/P ARTHROSCOPY OF RIGHT SHOULDER: ICD-10-CM

## 2023-12-18 DIAGNOSIS — M75.101 TEAR OF RIGHT SUPRASPINATUS TENDON: Primary | ICD-10-CM

## 2023-12-18 DIAGNOSIS — M54.50 CHRONIC MIDLINE LOW BACK PAIN WITHOUT SCIATICA: ICD-10-CM

## 2023-12-18 PROCEDURE — 97110 THERAPEUTIC EXERCISES: CPT

## 2023-12-18 PROCEDURE — 97140 MANUAL THERAPY 1/> REGIONS: CPT

## 2023-12-18 NOTE — PROGRESS NOTES
Daily Note     Today's date: 2023  Patient name: Toney Motley  : 1955  MRN: [id number]  Referring provider: Oscar Corbett DO  Dx:   Encounter Diagnosis     ICD-10-CM    1. Tear of right supraspinatus tendon  M75.101       2. S/P arthroscopy of right shoulder  Z98.890       3. Chronic midline low back pain without sciatica  M54.50     G89.29           Start Time: 1559  Stop Time: 1638  Total time in clinic (min): 39 minutes    Subjective: Patient reports that his R biceps has been bothering him over the last week or two.  Patient is unsure if shoulder pain is improving.      Objective: See treatment diary below      Assessment: Patient demonstrates improving L shoulder strength this session.  Patient completed today's session with increase in pain with supine serratus press.  UBE performed in order to improve UE muscular endurance.  Mobilizations do not appear to decrease pain or improve motion this session.  Patient has pain when holding arm above shoulder height, but minimal pain without holding it.  Future sessions should continue to progress strengthening exercises as able. Tolerated treatment well. Patient would benefit from continued PT      Plan: Continue per plan of care.      Precautions: HTN, anxiety, chronic pain syndrome, anxiety  DOS: 23    POC expires Unit limit Auth Expiration date PT/OT + Visit Limit?   24 BOMN 23 BOMN   RE due 24                            Foto             Manuals 12/15 12/18 11/17 11/20 11/22 11/28 12/1 12/4 12/8 12/12   R shoulder mobs  GS  Gr4 post an inf GS  Gr 4 post GS  Gr 4 post and inf GS  Gr 4 post and inf GS  Gr 4 post and inf GS  Gr 4 post GS  Gr 4 poster and inf GS  Gr post GS  Gr 4 post   R first rib mob       P!                                Neuro Re-Ed             Table top shoulder taps   X20 ea X20 ea X20 ea     X20 ea   Wall ball- up down, sides   X20 red ball X20 red ball X20 red ball        Manual scap setting    2x10        "  Supine serratus press  X20  2#    2x15  5# x20      TA contraction X20 3\"        X20 3\"                              Ther Ex             TB row, ext 2x15  black Keis  3x10  15#, 10# 2x15  btb 2x15  btb 2x15  btb 2x15  black 2x15  black  2x15  btb 2x15  black   TB ER, IR 2x10  btb Keis  3x10  5#, 6# 2x15 rtb 2x15  gtb 2x15  gtb 2x15  btb 2x10  btb  2x15  gtb 2x10  btb   Prone Y, T  x20 X20 ea X20,x11 X20 ea 2x15  1#       SL ER   X20 1# X20  2# X20  2# 2x15  3# 2x10  2# x20 2x10  1# 2x15  2#   Prone shoulder ext  X20  2# X20  2# X20  3# X20  4# 2x15  5#  x20 3x10  1# 2x15  2#   Seated DB ER      2x15  1# 2x15  1#  2x10  1# 2x15  2#   Wall slide      x20       Standing scaption  X20  1#           Standing ABD         2x10    Supine cane flexion   x20   2x15   2x15    AROM- flex, scaption, ABD     x15   X20 ea X20 flex X20 flex, ABD   PT re eval GS            UBE 3'3' 6' 3'3' 3'3' 3'3' 3'3' 3'3 3'3' 3'3' 3'3'   Ther Activity             Table transfer w DB         X10  10#    Cone stacking 3x5 3x5  1#       4x5 3x5   Gait Training                                       Modalities                                                "

## 2023-12-19 ENCOUNTER — TELEPHONE (OUTPATIENT)
Dept: RADIOLOGY | Facility: CLINIC | Age: 68
End: 2023-12-19

## 2023-12-19 DIAGNOSIS — M47.816 LUMBAR FACET ARTHROPATHY: Primary | ICD-10-CM

## 2023-12-19 NOTE — TELEPHONE ENCOUNTER
Voicemail left asking patient to please call back to schedule Salazar L1-2, L2-3 MBB #2  with Dr. Mendoza.

## 2023-12-19 NOTE — TELEPHONE ENCOUNTER
Caller: patient    Doctor: nahun    Reason for call: patient returned missed call. Patient gave cell and home number to call back     Call back#: [phone] or [phone]

## 2023-12-19 NOTE — TELEPHONE ENCOUNTER
Pt is S/P Salazar L1-2, L2-3 MBB #1    Pain diary received  Pt reports 4 plus hours of % relief throughout trial    Per SP protocol, Please schedule   Salazar L1-2, L2-3 MBB #2

## 2023-12-20 ENCOUNTER — APPOINTMENT (OUTPATIENT)
Dept: PHYSICAL THERAPY | Facility: CLINIC | Age: 68
End: 2023-12-20
Payer: MEDICARE

## 2023-12-20 NOTE — TELEPHONE ENCOUNTER
Procedure scheduled, all instructions were reviewed with the patient.      Patient has questions regarding the procedure, please call him regarding the same.

## 2023-12-20 NOTE — TELEPHONE ENCOUNTER
Caller: Patient     Doctor: Reji    Reason for call: Calling back in regards to this     Call back#: [phone]

## 2023-12-22 ENCOUNTER — OFFICE VISIT (OUTPATIENT)
Dept: PHYSICAL THERAPY | Facility: CLINIC | Age: 68
End: 2023-12-22
Payer: MEDICARE

## 2023-12-22 DIAGNOSIS — M75.101 TEAR OF RIGHT SUPRASPINATUS TENDON: Primary | ICD-10-CM

## 2023-12-22 DIAGNOSIS — Z98.890 S/P ARTHROSCOPY OF RIGHT SHOULDER: ICD-10-CM

## 2023-12-22 DIAGNOSIS — M54.50 CHRONIC MIDLINE LOW BACK PAIN WITHOUT SCIATICA: ICD-10-CM

## 2023-12-22 DIAGNOSIS — G89.29 CHRONIC MIDLINE LOW BACK PAIN WITHOUT SCIATICA: ICD-10-CM

## 2023-12-22 PROCEDURE — 97140 MANUAL THERAPY 1/> REGIONS: CPT

## 2023-12-22 PROCEDURE — 97110 THERAPEUTIC EXERCISES: CPT

## 2023-12-22 NOTE — PROGRESS NOTES
"Daily Note     Today's date: 2023  Patient name: Toney Motley  : 1955  MRN: [id number]  Referring provider: Oscar oCrbett DO  Dx:   Encounter Diagnosis     ICD-10-CM    1. Tear of right supraspinatus tendon  M75.101       2. Chronic midline low back pain without sciatica  M54.50     G89.29       3. S/P arthroscopy of right shoulder  Z98.890           Start Time: 1329  Stop Time: 1424  Total time in clinic (min): 55 minutes    Subjective: Patient reports that his shoulder is sore.      Objective: See treatment diary below      Assessment: Patient demonstrates improving R shoulder strength this session.  Patient completed today's session with increase in pain when raising arm above shoulder height.  Pain appears to be more related to stiffness than weakness at this time.  UBE performed in order to improve UE muscular endurance.  Educated patient that it is necessary to stretch the shoulder in order to improve end range motion.  Future sessions should continue to progress strengthening exercises as able. Tolerated treatment well. Patient would benefit from continued PT      Plan: Continue per plan of care.      Precautions: HTN, anxiety, chronic pain syndrome, anxiety  DOS: 23    POC expires Unit limit Auth Expiration date PT/OT + Visit Limit?   24 BOMN 23 BOMN   RE due 24                            Foto             Manuals 12/15 12/18 12/22 11/20 11/22 11/28 12/1 12/4 12/8 12/12   R shoulder mobs  GS  Gr4 post an inf GS  Gr 4 post and inf GS  Gr 4 post and inf GS  Gr 4 post and inf GS  Gr 4 post and inf GS  Gr 4 post GS  Gr 4 poster and inf GS  Gr post GS  Gr 4 post   R first rib mob       P!                                Neuro Re-Ed             Table top shoulder taps    X20 ea X20 ea     X20 ea   Wall ball- up down, sides    X20 red ball X20 red ball        Manual scap setting    2x10         Supine serratus press  X20  2# X20  3#   2x15  5# x20      TA contraction X20 3\"     " "   X20 3\"                              Ther Ex             TB row, ext 2x15  black Keis  3x10  15#, 10# Keis  3x10  17#,  15# 2x15  btb 2x15  btb 2x15  black 2x15  black  2x15  btb 2x15  black   TB ER, IR 2x10  btb Keis  3x10  5#, 6# Keis  3x10  4#, 6.5# 2x15  gtb 2x15  gtb 2x15  btb 2x10  btb  2x15  gtb 2x10  btb   Prone Y, T  x20 x20 X20,x11 X20 ea 2x15  1#       SL ER   X20  3# X20  2# X20  2# 2x15  3# 2x10  2# x20 2x10  1# 2x15  2#   Prone shoulder ext  X20  2# X20  #3 X20  3# X20  4# 2x15  5#  x20 3x10  1# 2x15  2#   Seated DB ER   X30  3#   2x15  1# 2x15  1#  2x10  1# 2x15  2#   Wall slide      x20       Standing scaption  X20  1# Eccentric only 1# P!          Standing ABD         2x10    Supine cane flexion      2x15   2x15    AROM- flex, scaption, ABD   X20 P!  x15   X20 ea X20 flex X20 flex, ABD   Pt edu   GS- HEP          PT re eval GS            UBE 3'3' 6' 3'3' 3'3' 3'3' 3'3' 3'3 3'3' 3'3' 3'3'   Ther Activity             Table transfer w DB         X10  10#    Cone stacking 3x5 3x5  1# 4x5  1#      4x5 3x5   Gait Training                                       Modalities                                                  "

## 2023-12-27 ENCOUNTER — TELEPHONE (OUTPATIENT)
Dept: NEUROSURGERY | Facility: CLINIC | Age: 68
End: 2023-12-27

## 2023-12-29 ENCOUNTER — APPOINTMENT (OUTPATIENT)
Dept: PHYSICAL THERAPY | Facility: CLINIC | Age: 68
End: 2023-12-29
Payer: MEDICARE

## 2024-01-01 NOTE — TELEPHONE ENCOUNTER
Caller: Malinda Pablo    Doctor: nahun     Reason for call: patient states Medicare needs 5 digit code for procedure    Call back#: [phone] hard copy, drawn during this pregnancy

## 2024-01-02 ENCOUNTER — OFFICE VISIT (OUTPATIENT)
Dept: PHYSICAL THERAPY | Facility: CLINIC | Age: 69
End: 2024-01-02
Payer: MEDICARE

## 2024-01-02 DIAGNOSIS — M54.50 CHRONIC MIDLINE LOW BACK PAIN WITHOUT SCIATICA: ICD-10-CM

## 2024-01-02 DIAGNOSIS — M75.101 TEAR OF RIGHT SUPRASPINATUS TENDON: Primary | ICD-10-CM

## 2024-01-02 DIAGNOSIS — G89.29 CHRONIC MIDLINE LOW BACK PAIN WITHOUT SCIATICA: ICD-10-CM

## 2024-01-02 DIAGNOSIS — Z98.890 S/P ARTHROSCOPY OF RIGHT SHOULDER: ICD-10-CM

## 2024-01-02 PROCEDURE — 97112 NEUROMUSCULAR REEDUCATION: CPT

## 2024-01-02 PROCEDURE — 97140 MANUAL THERAPY 1/> REGIONS: CPT

## 2024-01-02 NOTE — PROGRESS NOTES
Daily Note     Today's date: 2024  Patient name: Toney Motley  : 1955  MRN: [id number]  Referring provider: Oscar Corbett DO  Dx:   Encounter Diagnosis     ICD-10-CM    1. Tear of right supraspinatus tendon  M75.101       2. Chronic midline low back pain without sciatica  M54.50     G89.29       3. S/P arthroscopy of right shoulder  Z98.890           Start Time: 1250  Stop Time: 1329  Total time in clinic (min): 39 minutes    Subjective: Patient reports that his shoulder has really been bothering him over the last week.  He states it does not bother him at night when sleeping though.      Objective: See treatment diary below      Assessment: Patient demonstrates improving R scapular neuromuscular control this session.  Patient completed today's session with decrease in pain post-session.  UBE performed in order to improve UE muscular endurance.  Scapular relocation results in decreased pain with elevation.  Focused on retraining scapular retraction.  Future sessions should continue to progress neuromuscular control and strengthening exercises as able. Tolerated treatment well. Patient would benefit from continued PT      Plan: Continue per plan of care.      Precautions: HTN, anxiety, chronic pain syndrome, anxiety  DOS: 23    POC expires Unit limit Auth Expiration date PT/OT + Visit Limit?   24 BOMN 23 BOMN   RE due 24                            Foto             Manuals 12/15 12/18 12/22 1/2 11/22 11/28 12/1 12/4 12/8 12/12   R shoulder mobs  GS  Gr4 post an inf GS  Gr 4 post and inf GS  Gr 4 inf GS  Gr 4 post and inf GS  Gr 4 post and inf GS  Gr 4 post GS  Gr 4 poster and inf GS  Gr post GS  Gr 4 post   R first rib mob       P!                                Neuro Re-Ed             Table top shoulder taps     X20 ea     X20 ea   Wall ball- up down, sides     X20 red ball        Manual scap setting    2x10         Supine serratus press  X20  2# X20  3#   2x15  5# x20      TA  "contraction X20 3\"        X20 3\"    SL scap retraction    x20         Prone scap retraction    x20         Ther Ex             TB row, ext 2x15  black Keis  3x10  15#, 10# Keis  3x10  17#,  15# Keis  3x10  20# ext 2x15  btb 2x15  black 2x15  black  2x15  btb 2x15  black   TB ER, IR 2x10  btb Keis  3x10  5#, 6# Keis  3x10  4#, 6.5#  2x15  gtb 2x15  btb 2x10  btb  2x15  gtb 2x10  btb   Prone Y, T  x20 x20  X20 ea 2x15  1#       SL ER   X20  3#  X20  2# 2x15  3# 2x10  2# x20 2x10  1# 2x15  2#   Prone shoulder ext  X20  2# X20  #3  X20  4# 2x15  5#  x20 3x10  1# 2x15  2#   Seated DB ER   X30  3#   2x15  1# 2x15  1#  2x10  1# 2x15  2#   Wall slide      x20       Standing scaption  X20  1# Eccentric only 1# P!          Standing ABD         2x10    Supine cane flexion      2x15   2x15    AROM- flex, scaption, ABD   X20 P! Flex x10 verbal cues for scap setting x15   X20 ea X20 flex X20 flex, ABD   Pt edu   GS- HEP          PT re eval GS            UBE 3'3' 6' 3'3' 3'3' 3'3' 3'3' 3'3 3'3' 3'3' 3'3'   Ther Activity             Table transfer w DB         X10  10#    Cone stacking 3x5 3x5  1# 4x5  1#      4x5 3x5   Gait Training                                       Modalities                                                    "

## 2024-01-05 ENCOUNTER — APPOINTMENT (OUTPATIENT)
Dept: PHYSICAL THERAPY | Facility: CLINIC | Age: 69
End: 2024-01-05
Payer: MEDICARE

## 2024-01-08 ENCOUNTER — RA CDI HCC (OUTPATIENT)
Dept: OTHER | Facility: HOSPITAL | Age: 69
End: 2024-01-08

## 2024-01-08 ENCOUNTER — OFFICE VISIT (OUTPATIENT)
Dept: PHYSICAL THERAPY | Facility: CLINIC | Age: 69
End: 2024-01-08
Payer: MEDICARE

## 2024-01-08 DIAGNOSIS — G89.29 CHRONIC MIDLINE LOW BACK PAIN WITHOUT SCIATICA: ICD-10-CM

## 2024-01-08 DIAGNOSIS — M75.101 TEAR OF RIGHT SUPRASPINATUS TENDON: Primary | ICD-10-CM

## 2024-01-08 DIAGNOSIS — Z98.890 S/P ARTHROSCOPY OF RIGHT SHOULDER: ICD-10-CM

## 2024-01-08 DIAGNOSIS — M54.50 CHRONIC MIDLINE LOW BACK PAIN WITHOUT SCIATICA: ICD-10-CM

## 2024-01-08 PROCEDURE — 97110 THERAPEUTIC EXERCISES: CPT

## 2024-01-08 NOTE — PROGRESS NOTES
HCC coding opportunities       Chart reviewed, no opportunity found: CHART REVIEWED, NO OPPORTUNITY FOUND        Patients Insurance     Medicare Insurance: Medicare           full weight-bearing

## 2024-01-08 NOTE — PROGRESS NOTES
PT Re-Evaluation     Today's date: 2024  Patient name: Toney Motley  : 1955  MRN: [id number]  Referring provider: Oscar Corbett DO  Dx:   Encounter Diagnosis     ICD-10-CM    1. Tear of right supraspinatus tendon  M75.101       2. Chronic midline low back pain without sciatica  M54.50     G89.29       3. S/P arthroscopy of right shoulder  Z98.890           Start Time: 1800  Stop Time: 1844  Total time in clinic (min): 44 minutes    Assessment  Assessment details: Problem List:  1) R shoulder weakness  2) Limited R shoulder AROM    Toney Motley is a pleasant 68 y.o. male who presents s/p R RCR on .  Over the last 2 months, AROM and strength have slowly improved.  Pain levels remain unchanged.  Remaining deficits include R shoulder weakness, limited R shoulder ROM, and activity intolerance.  Patient continues to have concerns about re-tear due to high pain levels.  Poor neuromuscular control of R scapula and ER weakness results in significant  Gave patient TB row, ext, ER, and IR for HEP.  Patient would continue to benefit from skilled PT services to address these deficits and return to PLOF.    Comparable signs:  1) R shoulder ABD AROM  2) R shoulder ABD strength  Impairments: abnormal or restricted ROM, activity intolerance, impaired physical strength, lacks appropriate home exercise program, pain with function and poor posture     Symptom irritability: highUnderstanding of Dx/Px/POC: fair   Prognosis: fair    Goals  STGs  Patient will be independent with HEP in 4 weeks -met  Patient will decrease pain by 2 points in 4 weeks -not met  LTGs  Patient will achieve R shoulder AROM equal to L in 8 weeks -progressing  Patient will achieve R shoulder strength equal to L in 12 weeks -progressing    Plan  Plan details: 2x/week for 12 weeks, 24 visits    Patient would benefit from: skilled physical therapy  Planned therapy interventions: home exercise program, therapeutic exercise, therapeutic activities,  "joint mobilization, manual therapy, motor coordination training, neuromuscular re-education, patient education, strengthening, activity modification, functional ROM exercises and flexibility  Frequency: 2x week  Plan of Care beginning date: 2024  Plan of Care expiration date: 2024  Treatment plan discussed with: patient        Subjective Evaluation    History of Present Illness  Date of surgery: 2023  Mechanism of injury: Pain location: R shoulder into anterior arm/biceps area  Pain severity: 4-0-8  Aggs: moving light wood, ER and ABD  Eases: rest  Irritability: moderate  MAVERICK/timeline: R RCR on  after suffering a fall resulting in supraspinatus tear, patient does note one instance since surgery when he was working on pipes at his house when he felt a sudden sharp pain  Red flags: none  PMH/PSH: lumbar fusion, hx of cervical fractures    - patient reports that he feels about 50% better since starting PT.  He is still having trouble picking up objects and reaching overhead    Back assessment  Pain location: L3-5 central lumbar  Pain severity: 0-0-10  Aggs: bending, lifting, twisting, walking  Eases: back brace  Irritability: high  MAVERICK/timeline: pains started about 6-7 years ago when he was picking heavy objects up at work when we felt something \"go\", patient states that he went to hospital and they performed 1 segment lumbar fusion the next day, back was good until recently when he had rotator cuff surgery on 23  Red flags: none   PMH/PSH: L3/4 lumbar fusion    - patient reports that he feels 0% better since starting PT  Patient Goals  Patient goal: get back to normal function  Pain  Current pain ratin  At best pain ratin  At worst pain ratin          Objective     Cervical/Thoracic Screen   Cervical range of motion within normal limits with the following exceptions: Flexion WNL  All other motions limited 50-75%    Active Range of Motion     Right Shoulder   Flexion: 130 " "degrees with pain  Abduction: 110 degrees with pain  External rotation 0°: 60 degrees with pain    Lumbar   Flexion: 75 degrees  Restriction level: minimal  Extension: 50 degrees  with pain Restriction level: moderate  Left lateral flexion: 25 degrees    with pain Restriction level: maximal  Right lateral flexion: 25 degrees  with pain Restriction level: maximal  Left rotation: 5 degrees  with pain Restriction level: maximal  Right rotation: 5 degrees  Restriction level: maximal    Passive Range of Motion   Left Shoulder   Normal passive range of motion    Right Shoulder   Flexion: WFL and with pain  Abduction: 140 degrees with pain  External rotation 0°: 65 degrees with pain    Scapular Mobility     Right Shoulder   Scapular Mobility with Shoulder to 90° FF   Scapular elevation: severe    Joint Play     Pain: L1, L2, L3, L4, L5 and S1     Strength/Myotome Testing     Right Shoulder     Planes of Motion   Flexion: 3+   Abduction: 3   External rotation at 0°: 4   Internal rotation at 0°: 4+     Additional Strength Details  Deferred due to pain    Muscle Activation     Additional Muscle Activation Details  Fair TA contraction    Tests     Right Shoulder   Positive relocation.                      Precautions: HTN, anxiety, chronic pain syndrome, anxiety  DOS: 7/26/23    POC expires Unit limit Auth Expiration date PT/OT + Visit Limit?   2/8/24 BOMN 12/31/23 BOMN   RE due 2/8                            Foto     1/8 12/4     Manuals 12/15 12/18 12/22 1/2 1/8 11/28 12/1 12/4 12/8 12/12   R shoulder mobs  GS  Gr4 post an inf GS  Gr 4 post and inf GS  Gr 4 inf  GS  Gr 4 post and inf GS  Gr 4 post GS  Gr 4 poster and inf GS  Gr post GS  Gr 4 post   R first rib mob       P!                                Neuro Re-Ed             Table top shoulder taps          X20 ea   Wall ball- up down, sides             Manual scap setting    2x10         Supine serratus press  X20  2# X20  3#   2x15  5# x20      TA contraction X20 3\"    " "    X20 3\"    Seated scap retraction     x20        SL scap retraction    x20         Prone scap retraction    x20         Ther Ex             TB row, ext 2x15  black Keis  3x10  15#, 10# Keis  3x10  17#,  15# Keis  3x10  20# ext Row  Keis  3x10  22# 2x15  black 2x15  black  2x15  btb 2x15  black   TB ER, IR 2x10  btb Keis  3x10  5#, 6# Keis  3x10  4#, 6.5#  ER  Keis  3x10  6# 2x15  btb 2x10  btb  2x15  gtb 2x10  btb   Prone Y, T  x20 x20   2x15  1#       SL ER   X20  3#   2x15  3# 2x10  2# x20 2x10  1# 2x15  2#   Prone shoulder ext  X20  2# X20  #3   2x15  5#  x20 3x10  1# 2x15  2#   Seated DB ER   X30  3#   2x15  1# 2x15  1#  2x10  1# 2x15  2#   Wall slide      x20       Standing scaption  X20  1# Eccentric only 1# P!          Standing ABD         2x10    Supine cane flexion      2x15   2x15    AROM- flex, scaption, ABD   X20 P! Flex x10 verbal cues for scap setting    X20 ea X20 flex X20 flex, ABD   Pt edu   GS- HEP  GS-HEP, progression of tissue healing        PT re eval GS            UBE 3'3' 6' 3'3' 3'3' 3'3' 3'3' 3'3 3'3' 3'3' 3'3'   Ther Activity             Table transfer w DB         X10  10#    Cone stacking 3x5 3x5  1# 4x5  1#      4x5 3x5   Gait Training                                       Modalities                                                      "

## 2024-01-09 ENCOUNTER — OFFICE VISIT (OUTPATIENT)
Dept: NEUROSURGERY | Facility: CLINIC | Age: 69
End: 2024-01-09
Payer: MEDICARE

## 2024-01-09 ENCOUNTER — TELEPHONE (OUTPATIENT)
Age: 69
End: 2024-01-09

## 2024-01-09 ENCOUNTER — APPOINTMENT (OUTPATIENT)
Dept: PHYSICAL THERAPY | Facility: CLINIC | Age: 69
End: 2024-01-09
Payer: MEDICARE

## 2024-01-09 ENCOUNTER — TELEPHONE (OUTPATIENT)
Dept: NEUROSURGERY | Facility: CLINIC | Age: 69
End: 2024-01-09

## 2024-01-09 VITALS
TEMPERATURE: 98.4 F | WEIGHT: 243 LBS | SYSTOLIC BLOOD PRESSURE: 138 MMHG | BODY MASS INDEX: 32.91 KG/M2 | HEIGHT: 72 IN | DIASTOLIC BLOOD PRESSURE: 80 MMHG | HEART RATE: 84 BPM | RESPIRATION RATE: 20 BRPM | OXYGEN SATURATION: 96 %

## 2024-01-09 DIAGNOSIS — G89.29 CHRONIC MIDLINE LOW BACK PAIN WITHOUT SCIATICA: Primary | ICD-10-CM

## 2024-01-09 DIAGNOSIS — Z98.1 STATUS POST LUMBAR SPINAL FUSION: ICD-10-CM

## 2024-01-09 DIAGNOSIS — M54.50 CHRONIC MIDLINE LOW BACK PAIN WITHOUT SCIATICA: Primary | ICD-10-CM

## 2024-01-09 PROCEDURE — 99205 OFFICE O/P NEW HI 60 MIN: CPT | Performed by: STUDENT IN AN ORGANIZED HEALTH CARE EDUCATION/TRAINING PROGRAM

## 2024-01-09 NOTE — TELEPHONE ENCOUNTER
I nitza apt with dr mcneal 1/23/24 wife advise of this apt with dr shin- they susan cx apt prior for injection _BA

## 2024-01-09 NOTE — PROGRESS NOTES
Office Note - Neurosurgery   Toney Motley 68 y.o. male MRN: [id number]      Assessment and Plan:    This is a 68-year-old male with history of L3-4 TLIF presenting with low back pain.      MRI of his lumbar spine was reviewed which demonstrates diffuse degeneration and the development of degenerative levoscoliosis.  There appears to be degeneration at L4-5 and L5-S1 as well as L2-3.  There is no overt central canal stenosis however there appears to be left-sided severe foraminal stenosis at L5-S1.    CT lumbar spine demonstrates postsurgical changes at L3-4.  There appears to be disc vacuum phenomena at L4-5 and L5-S1.    Standing scoliosis films demonstrate 29 degree lumbar levoscoliosis and 23 degree thoracic dextroscoliosis.  SVA appears to be within normal limits.    His DEXA scan demonstrates normal bone density.    I had a long discussion with the patient about his symptoms as well as imaging findings. I believe the patient's symptoms may be stemming from the disease he has in his lumbar spine.  We discussed treatment options and I believe fixation could provide the patient with benefit although I am not fully certain on this.  If we were to proceed with surgery my plan would be to perform L2-S1 fusion.  The patient's symptoms are primarily of low back pain without any radicular symptoms.  I would like to evaluate this further by obtaining injections at L4-5 and L5-S1 for diagnostic and therapeutic purposes.  I would also like the patient to undergo extensive physical therapy focused on strengthening his lower back muscles and his core muscles.  The patient is to come back for follow-up visit following the injection and PT.    History, physical examination and diagnostic tests were reviewed and questions answered. Diagnosis, care plan and treatment options were discussed. The patient and spouse/SO understand instructions and will follow up as directed.    Follow-up: After injections    I spent approximately 45  minutes with the patient. This time was dedicated to acquiring the patient's history, performing physical examination, reviewing pertinent imaging and discussing the treatment plan.    Diagnoses and all orders for this visit:    Chronic midline low back pain without sciatica  -     Ambulatory referral to Spine & Pain Management; Future  -     Ambulatory Referral to Physical Therapy; Future    Status post lumbar spinal fusion  -     Ambulatory referral to Spine & Pain Management; Future  -     Ambulatory Referral to Physical Therapy; Future        Subjective/Objective     Chief Complaint    Low back pain    HPI    This is a 68-year-old male with history of L3-4 TLIF 7 years ago presenting with low back pain.  The patient states his symptoms began in July 2023 when he was having some shoulder issues.  Due to the shoulder pain, he began to sleep in the recliner.  He states he began to have back pain after starting to sleep in the recliner and he believes it may have been related to his posture.  He states the pain is localized to the lower back and goes across his back.  The pain does not radiate into his lower extremities.  Prior to his surgery 7 years ago, the patient had a right foot drop along with numbness and tingling in his toes.  He states he continues to have the symptoms however since the back pain has worsened, the symptoms have also worsened.  He had an injection recently at L1-2 and L2-3 without any relief.  He was undergoing physical therapy for his shoulder however they began to incorporate some back therapy.  He does not report any significant benefit with it although he believes there has not been a significant focus on his lower back.    LOWELL ETIENNE personally reviewed and updated.    Review of Systems   Constitutional:  Positive for fatigue.   HENT: Negative.     Eyes: Negative.    Respiratory: Negative.     Cardiovascular: Negative.    Gastrointestinal: Negative.    Endocrine: Negative.     Genitourinary: Negative.    Musculoskeletal:  Positive for back pain (across the lower back down to the R foot), gait problem (hard to walk) and myalgias.   Skin: Negative.    Allergic/Immunologic: Negative.    Neurological:  Positive for weakness and numbness (R foot toes).   Psychiatric/Behavioral:  Positive for sleep disturbance (not sleeping much do to pain).        Family History    Family History   Problem Relation Age of Onset    Parkinsonism Mother     Lung cancer Father        Social History    Social History     Socioeconomic History    Marital status: /Civil Union     Spouse name: Not on file    Number of children: Not on file    Years of education: Not on file    Highest education level: Not on file   Occupational History    Not on file   Tobacco Use    Smoking status: Never    Smokeless tobacco: Never   Vaping Use    Vaping status: Never Used   Substance and Sexual Activity    Alcohol use: Not Currently     Comment: rarely    Drug use: No    Sexual activity: Not Currently   Other Topics Concern    Not on file   Social History Narrative    Not on file     Social Determinants of Health     Financial Resource Strain: Low Risk  (10/6/2023)    Overall Financial Resource Strain (CARDIA)     Difficulty of Paying Living Expenses: Not hard at all   Food Insecurity: Not on file   Transportation Needs: No Transportation Needs (10/6/2023)    PRAPARE - Transportation     Lack of Transportation (Medical): No     Lack of Transportation (Non-Medical): No   Physical Activity: Not on file   Stress: Not on file   Social Connections: Not on file   Intimate Partner Violence: Not on file   Housing Stability: Not on file       Past Medical History    Past Medical History:   Diagnosis Date    Back pain     Colon polyp     Hyperlipidemia     Lumbar fracture with cord injury (HCC)     Neck fracture (HCC)     Previous back surgery     rods in the back    Ulcerative colitis (HCC)        Surgical History    Past Surgical  History:   Procedure Laterality Date    BACK SURGERY      LUMBAR FUSION Right 1/20/2016    Procedure: FUSION LUMBAR  POSTERIOR, TLIF L3-4  Right L3-4 Fascet;  Surgeon: Narayan Castro MD;  Location:  MAIN OR;  Service:     MS COLONOSCOPY FLX DX W/COLLJ SPEC WHEN PFRMD N/A 1/24/2019    Procedure: COLONOSCOPY;  Surgeon: Sanjeev Orellana III, MD;  Location: [facility];  Service: Gastroenterology    MS SURGICAL ARTHROSCOPY SHOULDER W/ROTATOR CUFF RPR Right 7/26/2023    Procedure: ARTHROSCOPY SHOULDER- Right shoulder Arthroscopy, supraspinatus and subscapularis repair, biceps tenotomy, extensive debridement;  Surgeon: Oscar Corbett DO;  Location: [facility];  Service: Orthopedics    TONSILLECTOMY         Medications      Current Outpatient Medications:     balsalazide (COLAZAL) 750 mg capsule, TAKE 3 CAPSULES BY MOUTH TWICE DAILY, Disp: 540 capsule, Rfl: 1    ezetimibe (ZETIA) 10 mg tablet, Take 10 mg by mouth daily, Disp: , Rfl:     gabapentin (NEURONTIN) 300 mg capsule, Take 1 capsule (300 mg total) by mouth 3 (three) times a day, Disp: 90 capsule, Rfl: 0    methocarbamol (ROBAXIN) 500 mg tablet, TAKE 1 TABLET BY MOUTH THREE TIMES A DAY, Disp: 90 tablet, Rfl: 0    oxyCODONE (ROXICODONE) 10 MG TABS, , Disp: , Rfl:     zolpidem (AMBIEN CR) 12.5 MG CR tablet, Take 12.5 mg by mouth daily at bedtime as needed, Disp: , Rfl:     Diclofenac Sodium (VOLTAREN) 1 %, , Disp: , Rfl:     LORazepam (ATIVAN) 1 mg tablet, Take 1 tablet (1 mg total) by mouth 60 minutes pre-procedure May repeat x1 30 mins prior if needed (Patient not taking: Reported on 1/9/2024), Disp: 2 tablet, Rfl: 0    LORazepam (ATIVAN) 1 mg tablet, Take 1 tablet (1 mg total) by mouth 60 minutes pre-procedure May repeat x1 30 mins prior if needed (Patient not taking: Reported on 1/9/2024), Disp: 2 tablet, Rfl: 0    metoprolol succinate (TOPROL-XL) 25 mg 24 hr tablet, Take 25 mg by mouth daily (Patient not taking: Reported on 11/30/2023), Disp: , Rfl:      QUEtiapine (SEROquel) 100 mg tablet, , Disp: , Rfl:     temazepam (RESTORIL) 30 mg capsule, 30 mg (Patient not taking: Reported on 1/9/2024), Disp: , Rfl:     Trintellix 10 MG tablet, Take 10 mg by mouth daily (Patient not taking: Reported on 11/3/2023), Disp: , Rfl:     Allergies    Allergies   Allergen Reactions    No Active Allergies        The following portions of the patient's history were reviewed and updated as appropriate: allergies, current medications, past family history, past medical history, past social history, past surgical history, and problem list.    Investigations    I personally reviewed the CT, MRI, XRAY, and DEXA Scan  results with the patient:      Physical Exam    Vitals:  Blood pressure 138/80, pulse 84, temperature 98.4 °F (36.9 °C), temperature source Temporal, resp. rate 20, height 6' (1.829 m), weight 110 kg (243 lb), SpO2 96%.,Body mass index is 32.96 kg/m².    General:  Normal, well developed, not in distress/pain     Skin:   No issues, no rashes noted     Musculoskeletal:   5/5 strength throughout all muscle groups  No tenderness to palpation of the spine       Neurologic Exam:  Awake and alert  Oriented x3  Speech clear and fluent  FREDERICK   Sensation to light touch and pin prick intact throughout  No coyle's  No clonus  2+ patellar reflexes     Gait:   normal gait, normal posture

## 2024-01-09 NOTE — TELEPHONE ENCOUNTER
Caller: Patient     Doctor/Office: Neurosurgery    Call regarding :  running late for appt      Call was transferred to: Neurosurgery

## 2024-01-09 NOTE — TELEPHONE ENCOUNTER
Caller: Arnol Ziegler    Doctor/Office: Reji    Callback#: [phone]        Patient is requesting a transfer of care for the following reason: referral Neuro        Doctor: Reji    Would you release patient from your care?      Doctor: Darion    Would you take patient on as a patient?        Please advise,   Thank you.

## 2024-01-10 NOTE — TELEPHONE ENCOUNTER
Caller: Toney    Doctor: Darion    Reason for call: Pt has a scheduled procedure and would like to cancel as he is going to be seeing Dr Orlando moving forward.     Call back#: [phone]

## 2024-01-11 ENCOUNTER — OFFICE VISIT (OUTPATIENT)
Dept: PHYSICAL THERAPY | Facility: CLINIC | Age: 69
End: 2024-01-11
Payer: MEDICARE

## 2024-01-11 DIAGNOSIS — G89.29 CHRONIC MIDLINE LOW BACK PAIN WITHOUT SCIATICA: ICD-10-CM

## 2024-01-11 DIAGNOSIS — M54.50 CHRONIC MIDLINE LOW BACK PAIN WITHOUT SCIATICA: ICD-10-CM

## 2024-01-11 DIAGNOSIS — Z98.890 S/P ARTHROSCOPY OF RIGHT SHOULDER: ICD-10-CM

## 2024-01-11 DIAGNOSIS — M75.101 TEAR OF RIGHT SUPRASPINATUS TENDON: Primary | ICD-10-CM

## 2024-01-11 PROCEDURE — 97112 NEUROMUSCULAR REEDUCATION: CPT

## 2024-01-11 PROCEDURE — 97110 THERAPEUTIC EXERCISES: CPT

## 2024-01-11 PROCEDURE — 97140 MANUAL THERAPY 1/> REGIONS: CPT

## 2024-01-11 NOTE — PROGRESS NOTES
"Daily Note     Today's date: 2024  Patient name: Toney Motley  : 1955  MRN: [id number]  Referring provider: Oscar Corbett DO  Dx:   Encounter Diagnosis     ICD-10-CM    1. Tear of right supraspinatus tendon  M75.101       2. Chronic midline low back pain without sciatica  M54.50     G89.29       3. S/P arthroscopy of right shoulder  Z98.890           Start Time: 1516  Stop Time: 1557  Total time in clinic (min): 41 minutes    Subjective: Patient reports that 2 days ago was the first day that he went all day without any pain.  Patient states that pain is back today but not that bad.      Objective: See treatment diary below      Assessment: Patient demonstrates improving R rotator cuff strength this session.  Patient completed today's session without increase in pain.  UBE performed in order to improve UE muscular endurance.  Continued focus on posterior rotator cuff strengthening.  Future sessions should continue to progress strengthening exercises as able. Tolerated treatment well. Patient would benefit from continued PT      Plan: Continue per plan of care.      Precautions: HTN, anxiety, chronic pain syndrome, anxiety  DOS: 23    POC expires Unit limit Auth Expiration date PT/OT + Visit Limit?   24 BOMN 23 BOMN   RE due                             Foto          Manuals 12/15 12/18 12/22 1/2 1/8 1/11 12/1 12/4 12/8 12/12   R shoulder mobs  GS  Gr4 post an inf GS  Gr 4 post and inf GS  Gr 4 inf  GS  Gr 4 infer GS  Gr 4 post GS  Gr 4 poster and inf GS  Gr post GS  Gr 4 post   R first rib mob       P!                                Neuro Re-Ed             Table top shoulder taps          X20 ea   Wall ball- up down, sides             Manual scap setting    2x10         Supine serratus press  X20  2# X20  3#    x20      TA contraction X20 3\"        X20 3\"    Seated scap retraction     x20 x30       SL scap retraction    x20         ER MRE      2x15       Prone scap retraction "    x20         Ther Ex             TB row, ext 2x15  black Keis  3x10  15#, 10# Keis  3x10  17#,  15# Keis  3x10  20# ext Row  Keis  3x10  22#  2x15  black  2x15  btb 2x15  black   TB ER, IR 2x10  btb Keis  3x10  5#, 6# Keis  3x10  4#, 6.5#  ER  Keis  3x10  6#  2x10  btb  2x15  gtb 2x10  btb   Prone Y, T  x20 x20          SL ER   X20  3#   2x20  2# 2x10  2# x20 2x10  1# 2x15  2#   Seated table ER      2x15  rtb       Prone shoulder ext  X20  2# X20  #3     x20 3x10  1# 2x15  2#   Seated DB ER   X30  3#   X30  3# 2x15  1#  2x10  1# 2x15  2#   Wall slide             Standing scaption  X20  1# Eccentric only 1# P!          Standing ABD         2x10    Supine cane flexion         2x15    AROM- flex, scaption, ABD   X20 P! Flex x10 verbal cues for scap setting    X20 ea X20 flex X20 flex, ABD   Pt edu   GS- HEP  GS-HEP, progression of tissue healing        PT re eval GS            UBE 3'3' 6' 3'3' 3'3' 3'3' 3'3' 3'3 3'3' 3'3' 3'3'   Ther Activity             Table transfer w DB         X10  10#    Cone stacking 3x5 3x5  1# 4x5  1#      4x5 3x5   Gait Training                                       Modalities

## 2024-01-15 ENCOUNTER — OFFICE VISIT (OUTPATIENT)
Dept: OBGYN CLINIC | Facility: CLINIC | Age: 69
End: 2024-01-15
Payer: MEDICARE

## 2024-01-15 VITALS
HEART RATE: 87 BPM | HEIGHT: 72 IN | SYSTOLIC BLOOD PRESSURE: 129 MMHG | WEIGHT: 243 LBS | BODY MASS INDEX: 32.91 KG/M2 | DIASTOLIC BLOOD PRESSURE: 75 MMHG

## 2024-01-15 DIAGNOSIS — Z98.890 S/P ARTHROSCOPY OF RIGHT SHOULDER: Primary | ICD-10-CM

## 2024-01-15 PROCEDURE — 99213 OFFICE O/P EST LOW 20 MIN: CPT | Performed by: ORTHOPAEDIC SURGERY

## 2024-01-15 NOTE — PROGRESS NOTES
Patient Name:  Toney Motley  MRN:  [id number]    Assessment & Plan     1. S/P arthroscopy of right shoulder      68 y.o. male approximately 5 month 20 days s/p Right shoulder arthroscopic supraspinatus and subscapularis repair, biceps tenotomy, extensive debridement performed on 07/26/2023.  Overall, patient improving in regards to pain and range of motion, despite residual discomfort with range of motion.  Again, advised patient it may take about 1 year to see improvements with strength and range of motion. Strongly advised patient to continue with outpatient PT and home exercises to maintain and improve active and passive motion and improving strengthening. Perform ROM exercises in the mirror to assess for symmetry   Avoid heavy overhead lifting at this time, but may put small logs of wood into his fireplace.   Continue OTC medication as needed for pain relief  Follow up in 6 months for reevaluation of Right shoulder.     History of the Present Illness   Toney Motley is a 68 y.o. male approximately 5 month 20 days s/p Right shoulder arthroscopic supraspinatus and subscapularis repair, biceps tenotomy, extensive debridement performed on 07/26/2023. Today, patient reports he is frustrated with his range of motion and persistent pain. He admits to discomfort with range of motion overhead. He has not been lifting much with therapy, only light weights. Patient then admits his range of motion has improved since before surgery and he no longer has pain at night.         Review of Systems     Review of Systems   Constitutional:  Negative for chills and fever.   HENT:  Negative for ear pain and sore throat.    Eyes:  Negative for pain and visual disturbance.   Respiratory:  Negative for cough and shortness of breath.    Cardiovascular:  Negative for chest pain and palpitations.   Gastrointestinal:  Negative for abdominal pain and vomiting.   Genitourinary:  Negative for dysuria and hematuria.   Musculoskeletal:  Negative for  arthralgias and back pain.   Skin:  Negative for color change and rash.   Neurological:  Negative for seizures and syncope.   All other systems reviewed and are negative.      Physical Exam     /75   Pulse 87   Ht 6' (1.829 m)   Wt 110 kg (243 lb)   BMI 32.96 kg/m²     Right Shoulder:   Surgical incisions well healed  Active range of motion   150-160 degrees forward flexion  150-160 degrees abduction  70 degrees external rotation   L1 internal rotation    Passive range of motion   160-170 degrees of forward flexion     There is no tenderness present over the shoulder.   Supraspinatus testing 4/5 and improving  Infraspinatus testing 4+/5 and improving  Subscapularis testing 4+/5 and improving  The patient is neurovascularly intact distally in the extremity.      Data Review     I have personally reviewed pertinent films in PACS, and my interpretation follows.    No new images    Social History     Tobacco Use    Smoking status: Never    Smokeless tobacco: Never   Vaping Use    Vaping status: Never Used   Substance Use Topics    Alcohol use: Not Currently     Comment: rarely    Drug use: No           Procedures  None

## 2024-01-18 ENCOUNTER — APPOINTMENT (OUTPATIENT)
Dept: PHYSICAL THERAPY | Facility: CLINIC | Age: 69
End: 2024-01-18
Payer: MEDICARE

## 2024-01-19 ENCOUNTER — OFFICE VISIT (OUTPATIENT)
Dept: PHYSICAL THERAPY | Facility: CLINIC | Age: 69
End: 2024-01-19
Payer: MEDICARE

## 2024-01-19 DIAGNOSIS — Z98.890 S/P ARTHROSCOPY OF RIGHT SHOULDER: ICD-10-CM

## 2024-01-19 DIAGNOSIS — G89.29 CHRONIC MIDLINE LOW BACK PAIN WITHOUT SCIATICA: ICD-10-CM

## 2024-01-19 DIAGNOSIS — M54.50 CHRONIC MIDLINE LOW BACK PAIN WITHOUT SCIATICA: ICD-10-CM

## 2024-01-19 DIAGNOSIS — M75.101 TEAR OF RIGHT SUPRASPINATUS TENDON: Primary | ICD-10-CM

## 2024-01-19 PROCEDURE — 97110 THERAPEUTIC EXERCISES: CPT

## 2024-01-19 NOTE — PROGRESS NOTES
Daily Note     Today's date: 2024  Patient name: Toney Motley  : 1955  MRN: [id number]  Referring provider: Oscar Corbett DO  Dx:   Encounter Diagnosis     ICD-10-CM    1. Tear of right supraspinatus tendon  M75.101       2. Chronic midline low back pain without sciatica  M54.50     G89.29       3. S/P arthroscopy of right shoulder  Z98.890           Start Time: 1030  Stop Time: 1102  Total time in clinic (min): 32 minutes    Subjective: Patient reports that his shoulder is sore today.  He states that he had follow up with surgeon earlier this week who was pleased with the progress. He reports that he has been doing a lot more since then.      Objective: See treatment diary below      Assessment: Patient demonstrates consistent R shoulder ROM and  this session.  Patient completed today's session with steady increase in pain throughout as he continued to use arm.  UBE performed in order to improve UE muscular endurance.  Ended session early due to patient's increasing soreness since he was already sore prior to exercise.  Encouraged him to take it easy today and tomorrow but he should begin to return to HEP in 2 days as directed.  Future sessions should continue to progress strengthening exercises as able. Tolerated treatment fair. Patient would benefit from continued PT      Plan: Continue per plan of care.      Precautions: HTN, anxiety, chronic pain syndrome, anxiety  DOS: 23    POC expires Unit limit Auth Expiration date PT/OT + Visit Limit?   24 BOMN 23 BOMN   RE due                             Foto          Manuals 12/15 12/18 12/22 1/2 1/8 1/11 1/19 12/4 12/8 12/12   R shoulder mobs  GS  Gr4 post an inf GS  Gr 4 post and inf GS  Gr 4 inf  GS  Gr 4 infer GS  Gr 4  post GS  Gr 4 poster and inf GS  Gr post GS  Gr 4 post   R first rib mob                                       Neuro Re-Ed             Table top shoulder taps          X20 ea   Wall ball- up down, sides        "      Manual scap setting    2x10         Supine serratus press  X20  2# X20  3#          TA contraction X20 3\"        X20 3\"    Seated scap retraction     x20 x30       SL scap retraction    x20         ER MRE      2x15       Prone scap retraction    x20         Ther Ex             Table slide       X20 w PB      TB row, ext 2x15  black Keis  3x10  15#, 10# Keis  3x10  17#,  15# Keis  3x10  20# ext Row  Keis  3x10  22#    2x15  btb 2x15  black   TB ER, IR 2x10  btb Keis  3x10  5#, 6# Keis  3x10  4#, 6.5#  ER  Keis  3x10  6#  ER  Keis  3x10  6#  2x15  gtb 2x10  btb   Prone Y, T  x20 x20          SL ER   X20  3#   2x20  2# 2x15  2# x20 2x10  1# 2x15  2#   Seated table ER      2x15  rtb 2x10  rtb      Prone shoulder ext  X20  2# X20  #3     x20 3x10  1# 2x15  2#   Seated DB ER   X30  3#   X30  3# X30  2#  2x10  1# 2x15  2#   Wall slide             Standing scaption  X20  1# Eccentric only 1# P!          Standing ABD         2x10    Supine cane flexion         2x15    AROM- flex, scaption, ABD   X20 P! Flex x10 verbal cues for scap setting    X20 ea X20 flex X20 flex, ABD   Pt edu   GS- HEP  GS-HEP, progression of tissue healing  GS- soreness, HEP      PT re eval GS            UBE 3'3' 6' 3'3' 3'3' 3'3' 3'3' 3'3 3'3' 3'3' 3'3'   Ther Activity             Table transfer w DB         X10  10#    [facility] stacking 3x5 3x5  1# 4x5  1#    2x5  1#  4x5 3x5   Gait Training                                       Modalities                                              "

## 2024-01-22 ENCOUNTER — OFFICE VISIT (OUTPATIENT)
Age: 69
End: 2024-01-22
Payer: MEDICARE

## 2024-01-22 ENCOUNTER — APPOINTMENT (OUTPATIENT)
Dept: LAB | Facility: HOSPITAL | Age: 69
End: 2024-01-22
Payer: MEDICARE

## 2024-01-22 VITALS
OXYGEN SATURATION: 97 % | DIASTOLIC BLOOD PRESSURE: 70 MMHG | WEIGHT: 239 LBS | SYSTOLIC BLOOD PRESSURE: 127 MMHG | BODY MASS INDEX: 32.37 KG/M2 | HEART RATE: 87 BPM | HEIGHT: 72 IN

## 2024-01-22 DIAGNOSIS — R10.13 EPIGASTRIC PAIN: Primary | ICD-10-CM

## 2024-01-22 DIAGNOSIS — R11.0 NAUSEA: ICD-10-CM

## 2024-01-22 DIAGNOSIS — R10.13 EPIGASTRIC PAIN: ICD-10-CM

## 2024-01-22 DIAGNOSIS — K29.70 GASTRITIS WITHOUT BLEEDING, UNSPECIFIED CHRONICITY, UNSPECIFIED GASTRITIS TYPE: ICD-10-CM

## 2024-01-22 LAB
ALBUMIN SERPL BCP-MCNC: 4.3 G/DL (ref 3.5–5)
ALP SERPL-CCNC: 54 U/L (ref 34–104)
ALT SERPL W P-5'-P-CCNC: 10 U/L (ref 7–52)
ANION GAP SERPL CALCULATED.3IONS-SCNC: 7 MMOL/L
AST SERPL W P-5'-P-CCNC: 13 U/L (ref 13–39)
BILIRUB SERPL-MCNC: 0.6 MG/DL (ref 0.2–1)
BUN SERPL-MCNC: 18 MG/DL (ref 5–25)
CALCIUM SERPL-MCNC: 9.7 MG/DL (ref 8.4–10.2)
CHLORIDE SERPL-SCNC: 103 MMOL/L (ref 96–108)
CO2 SERPL-SCNC: 30 MMOL/L (ref 21–32)
CREAT SERPL-MCNC: 1.09 MG/DL (ref 0.6–1.3)
CREAT UR-MCNC: 233.7 MG/DL
ERYTHROCYTE [DISTWIDTH] IN BLOOD BY AUTOMATED COUNT: 13.5 % (ref 11.6–15.1)
GFR SERPL CREATININE-BSD FRML MDRD: 70 ML/MIN/1.73SQ M
GLUCOSE SERPL-MCNC: 85 MG/DL (ref 65–140)
HCT VFR BLD AUTO: 47.6 % (ref 36.5–49.3)
HGB BLD-MCNC: 15.8 G/DL (ref 12–17)
LIPASE SERPL-CCNC: 25 U/L (ref 11–82)
MCH RBC QN AUTO: 28.2 PG (ref 26.8–34.3)
MCHC RBC AUTO-ENTMCNC: 33.2 G/DL (ref 31.4–37.4)
MCV RBC AUTO: 85 FL (ref 82–98)
MICROALBUMIN UR-MCNC: 14.8 MG/L
MICROALBUMIN/CREAT 24H UR: 6 MG/G CREATININE (ref 0–30)
PLATELET # BLD AUTO: 273 THOUSANDS/UL (ref 149–390)
PMV BLD AUTO: 10.7 FL (ref 8.9–12.7)
POTASSIUM SERPL-SCNC: 4.8 MMOL/L (ref 3.5–5.3)
PROT SERPL-MCNC: 7.6 G/DL (ref 6.4–8.4)
RBC # BLD AUTO: 5.6 MILLION/UL (ref 3.88–5.62)
SODIUM SERPL-SCNC: 140 MMOL/L (ref 135–147)
WBC # BLD AUTO: 7.23 THOUSAND/UL (ref 4.31–10.16)

## 2024-01-22 PROCEDURE — 82043 UR ALBUMIN QUANTITATIVE: CPT

## 2024-01-22 PROCEDURE — 99214 OFFICE O/P EST MOD 30 MIN: CPT | Performed by: PHYSICIAN ASSISTANT

## 2024-01-22 PROCEDURE — 80053 COMPREHEN METABOLIC PANEL: CPT

## 2024-01-22 PROCEDURE — 36415 COLL VENOUS BLD VENIPUNCTURE: CPT

## 2024-01-22 PROCEDURE — 82570 ASSAY OF URINE CREATININE: CPT

## 2024-01-22 PROCEDURE — 83690 ASSAY OF LIPASE: CPT

## 2024-01-22 PROCEDURE — 85027 COMPLETE CBC AUTOMATED: CPT

## 2024-01-22 RX ORDER — NALOXONE HYDROCHLORIDE 4 MG/.1ML
SPRAY NASAL
COMMUNITY
Start: 2024-01-09

## 2024-01-22 RX ORDER — DULOXETIN HYDROCHLORIDE 60 MG/1
60 CAPSULE, DELAYED RELEASE ORAL DAILY
COMMUNITY
Start: 2023-11-15

## 2024-01-22 RX ORDER — OMEPRAZOLE 40 MG/1
40 CAPSULE, DELAYED RELEASE ORAL DAILY
Qty: 90 CAPSULE | Refills: 0 | Status: SHIPPED | OUTPATIENT
Start: 2024-01-22 | End: 2024-04-21

## 2024-01-22 RX ORDER — TRAMADOL HYDROCHLORIDE 50 MG/1
TABLET ORAL
COMMUNITY
Start: 2023-11-17

## 2024-01-22 NOTE — PROGRESS NOTES
[facility] - Outpatient Follow-up Note  Toney Motley 68 y.o. male MRN: [id number]  Encounter: [id number]          ASSESSMENT AND PLAN:      1. Epigastric pain  2. Nausea  3. Gastritis without bleeding    Patient presents for an evaluation of the development of epigastric discomfort and nausea x 1 week.  He has a history of LA grade B esophagitis and mild erosive gastritis on prior EGD in 6/2022 (bx negative for h pylori).  He has a history of UC on Balsalazide and chronic constipation controlled on Miralax (recent colonoscopy in June of 2023 showed no active UC).    Will check an abdominal ultrasound, CBC, CMP, and lipase level to investigate.  Will begin a PPI course with Omeprazole 40mg po daily x 8 weeks.  He can also utilize Pepcid 20mg q hs temporarily as well.  If worsening severe pain/vomiting develops in the interim, he will go to the ED.  If the above work up is negative and he does not improve on the PPI, will plan for CT Scan A/P and repeat EGD to further investigate.  ______________________________________________________________________    SUBJECTIVE:  Patient is a pleasant 68 year old male who presents to the office for an evaluation of new epigastric discomfort over the past week.  He describes it as aching in nature.  He reports associated nausea but no vomiting.  He denies frequent NSAIDs.  He has a history of gastritis and was previously on Omeprazole for treatment back in 2022.  He also has a history of UC and chronic constipation on Miralax.  He reports his constipation is well controlled on Miralax. No rectal bleeding.  He reports dark stools when he took Pepto.      REVIEW OF SYSTEMS IS OTHERWISE NEGATIVE.      Historical Information   Past Medical History:   Diagnosis Date    Back pain     Colon polyp     Hyperlipidemia     Lumbar fracture with cord injury (HCC)     Neck fracture (HCC)     Previous back surgery     rods in the back    Ulcerative colitis (HCC)       Past Surgical History:   Procedure Laterality Date    BACK SURGERY      LUMBAR FUSION Right 1/20/2016    Procedure: FUSION LUMBAR  POSTERIOR, TLIF L3-4  Right L3-4 Fascet;  Surgeon: Narayan Castro MD;  Location:  MAIN OR;  Service:     NJ COLONOSCOPY FLX DX W/COLLJ SPEC WHEN PFRMD N/A 1/24/2019    Procedure: COLONOSCOPY;  Surgeon: Sanjeev Orellana III, MD;  Location: [facility];  Service: Gastroenterology    NJ SURGICAL ARTHROSCOPY SHOULDER W/ROTATOR CUFF RPR Right 7/26/2023    Procedure: ARTHROSCOPY SHOULDER- Right shoulder Arthroscopy, supraspinatus and subscapularis repair, biceps tenotomy, extensive debridement;  Surgeon: Oscar Corbett DO;  Location: [facility];  Service: Orthopedics    TONSILLECTOMY       Social History   Social History     Substance and Sexual Activity   Alcohol Use Not Currently    Comment: rarely     Social History     Substance and Sexual Activity   Drug Use No     Social History     Tobacco Use   Smoking Status Never   Smokeless Tobacco Never     Family History   Problem Relation Age of Onset    Parkinsonism Mother     Lung cancer Father        Meds/Allergies       Current Outpatient Medications:     balsalazide (COLAZAL) 750 mg capsule    DULoxetine (CYMBALTA) 60 mg delayed release capsule    ezetimibe (ZETIA) 10 mg tablet    gabapentin (NEURONTIN) 300 mg capsule    metoprolol succinate (TOPROL-XL) 25 mg 24 hr tablet    naloxone (NARCAN) 4 mg/0.1 mL nasal spray    omeprazole (PriLOSEC) 40 MG capsule    oxyCODONE (ROXICODONE) 10 MG TABS    traMADol (ULTRAM) 50 mg tablet    zolpidem (AMBIEN CR) 12.5 MG CR tablet    Diclofenac Sodium (VOLTAREN) 1 %    LORazepam (ATIVAN) 1 mg tablet    LORazepam (ATIVAN) 1 mg tablet    methocarbamol (ROBAXIN) 500 mg tablet    temazepam (RESTORIL) 30 mg capsule    Allergies   Allergen Reactions    No Active Allergies            Objective     Blood pressure 127/70, pulse 87, height 6' (1.829 m), weight 108 kg (239 lb), SpO2 97%. Body mass index is  32.41 kg/m².      PHYSICAL EXAM:      General Appearance:   Alert, cooperative, no distress   HEENT:   Normocephalic, atraumatic, anicteric.     Neck:  Supple, symmetrical, trachea midline   Lungs:   Clear to auscultation bilaterally; no rales, rhonchi or wheezing; respirations unlabored    Heart::   Regular rate and rhythm; no murmur, rub, or gallop.   Abdomen:   Soft, non-tender, non-distended; normal bowel sounds; no masses, no organomegaly    Genitalia:   Deferred    Rectal:   Deferred    Extremities:  No cyanosis, clubbing or edema    Pulses:  2+ and symmetric    Skin:  No jaundice, rashes, or lesions    Lymph nodes:  No palpable cervical lymphadenopathy        Lab Results:   No visits with results within 1 Day(s) from this visit.   Latest known visit with results is:   Admission on 10/03/2023, Discharged on 10/04/2023   Component Date Value    Ventricular Rate 10/03/2023 75     Atrial Rate 10/03/2023 75     GA Interval 10/03/2023 148     QRSD Interval 10/03/2023 84     QT Interval 10/03/2023 356     QTC Interval 10/03/2023 397     P [place] 10/03/2023 27     QRS [place] 10/03/2023 71     T Wave [place] 10/03/2023 -59     Ventricular Rate 10/03/2023 170     Atrial Rate 10/03/2023 170     GA Interval 10/03/2023 150     QRSD Interval 10/03/2023 86     QT Interval 10/03/2023 268     QTC Interval 10/03/2023 450     P Axis 10/03/2023 -88     QRS [place] 10/03/2023 87     T Wave [place] 10/03/2023 -85     WBC 10/03/2023 9.02     RBC 10/03/2023 5.60     Hemoglobin 10/03/2023 15.2     Hematocrit 10/03/2023 46.2     MCV 10/03/2023 83     MCH 10/03/2023 27.1     MCHC 10/03/2023 32.9     RDW 10/03/2023 13.5     MPV 10/03/2023 10.8     Platelets 10/03/2023 301     nRBC 10/03/2023 0     Neutrophils Relative 10/03/2023 75     Immat GRANS % 10/03/2023 0     Lymphocytes Relative 10/03/2023 18     Monocytes Relative 10/03/2023 6     Eosinophils Relative 10/03/2023 1     Basophils Relative 10/03/2023 0     Neutrophils Absolute 10/03/2023 6.71      Immature Grans Absolute 10/03/2023 0.04     Lymphocytes Absolute 10/03/2023 1.58     Monocytes Absolute 10/03/2023 0.55     Eosinophils Absolute 10/03/2023 0.11     Basophils Absolute 10/03/2023 0.03     Sodium 10/03/2023 136     Potassium 10/03/2023 3.9     Chloride 10/03/2023 103     CO2 10/03/2023 22     ANION GAP 10/03/2023 11     BUN 10/03/2023 14     Creatinine 10/03/2023 0.82     Glucose 10/03/2023 84     Calcium 10/03/2023 9.0     eGFR 10/03/2023 92     Sodium 10/04/2023 137     Potassium 10/04/2023 4.9     Chloride 10/04/2023 106     CO2 10/04/2023 25     ANION GAP 10/04/2023 6     BUN 10/04/2023 15     Creatinine 10/04/2023 0.94     Glucose 10/04/2023 86     Glucose, Fasting 10/04/2023 86     Calcium 10/04/2023 8.5     AST 10/04/2023 12 (L)     ALT 10/04/2023 6 (L)     Alkaline Phosphatase 10/04/2023 48     Total Protein 10/04/2023 6.5     Albumin 10/04/2023 3.6     Total Bilirubin 10/04/2023 0.53     eGFR 10/04/2023 84     Magnesium 10/04/2023 2.4     WBC 10/04/2023 7.63     RBC 10/04/2023 5.32     Hemoglobin 10/04/2023 15.0     Hematocrit 10/04/2023 47.3     MCV 10/04/2023 89     MCH 10/04/2023 28.2     MCHC 10/04/2023 31.7     RDW 10/04/2023 13.6     MPV 10/04/2023 10.4     Platelets 10/04/2023 264     nRBC 10/04/2023 0     Neutrophils Relative 10/04/2023 53     Immat GRANS % 10/04/2023 0     Lymphocytes Relative 10/04/2023 34     Monocytes Relative 10/04/2023 7     Eosinophils Relative 10/04/2023 5     Basophils Relative 10/04/2023 1     Neutrophils Absolute 10/04/2023 4.03     Immature Grans Absolute 10/04/2023 0.03     Lymphocytes Absolute 10/04/2023 2.62     Monocytes Absolute 10/04/2023 0.56     Eosinophils Absolute 10/04/2023 0.34     Basophils Absolute 10/04/2023 0.05          Radiology Results:   No results found.

## 2024-01-23 ENCOUNTER — TELEPHONE (OUTPATIENT)
Age: 69
End: 2024-01-23

## 2024-01-23 ENCOUNTER — HOSPITAL ENCOUNTER (OUTPATIENT)
Dept: ULTRASOUND IMAGING | Facility: HOSPITAL | Age: 69
Discharge: HOME | End: 2024-01-23
Payer: MEDICARE

## 2024-01-23 ENCOUNTER — OFFICE VISIT (OUTPATIENT)
Dept: PHYSICAL THERAPY | Facility: CLINIC | Age: 69
End: 2024-01-23
Payer: MEDICARE

## 2024-01-23 DIAGNOSIS — M75.101 TEAR OF RIGHT SUPRASPINATUS TENDON: Primary | ICD-10-CM

## 2024-01-23 DIAGNOSIS — M54.50 CHRONIC MIDLINE LOW BACK PAIN WITHOUT SCIATICA: ICD-10-CM

## 2024-01-23 DIAGNOSIS — Z98.890 S/P ARTHROSCOPY OF RIGHT SHOULDER: ICD-10-CM

## 2024-01-23 DIAGNOSIS — G89.29 CHRONIC MIDLINE LOW BACK PAIN WITHOUT SCIATICA: ICD-10-CM

## 2024-01-23 DIAGNOSIS — R10.13 EPIGASTRIC PAIN: ICD-10-CM

## 2024-01-23 PROCEDURE — 97110 THERAPEUTIC EXERCISES: CPT

## 2024-01-23 PROCEDURE — 97140 MANUAL THERAPY 1/> REGIONS: CPT

## 2024-01-23 PROCEDURE — 76700 US EXAM ABDOM COMPLETE: CPT

## 2024-01-23 NOTE — TELEPHONE ENCOUNTER
----- Message from Angela Chen PA-C sent at 1/22/2024  4:07 PM EST -----  Please inform patient that the blood work looked good: CBC, CMP and lipase level was normal. Ty.

## 2024-01-23 NOTE — PROGRESS NOTES
"Daily Note     Today's date: 2024  Patient name: Toney Motley  : 1955  MRN: [id number]  Referring provider: Oscar Corbett DO  Dx:   Encounter Diagnosis     ICD-10-CM    1. Tear of right supraspinatus tendon  M75.101       2. S/P arthroscopy of right shoulder  Z98.890       3. Chronic midline low back pain without sciatica  M54.50     G89.29           Start Time: 0945  Stop Time: 1023  Total time in clinic (min): 38 minutes    Subjective: Patient reports that his shoulder is sore today as he has been using it more.      Objective: See treatment diary below      Assessment: Patient demonstrates consistent R shoulder strength and ROM this session.  Patient completed today's session without increase in pain.  UBE performed in order to improve UE muscular endurance.  Discussed pending discharge with patient.  Patient is nearly 6 months post op and has functional use of his shoulder at this point.  We will begin to transition to [facility] for continued strengthening on his own.  Future sessions should continue to progress strengthening exercises as able. Tolerated treatment well. Patient would benefit from continued PT      Plan: Continue per plan of care.      Precautions: HTN, anxiety, chronic pain syndrome, anxiety  DOS: 23    POC expires Unit limit Auth Expiration date PT/OT + Visit Limit?   24 BOMN 23 BOMN   RE due                             Foto             Manuals 12/15 12/18 12/22 1/2 1/8 1/11 1/19 1/23 12/8 12/12   R shoulder mobs  GS  Gr4 post an inf GS  Gr 4 post and inf GS  Gr 4 inf  GS  Gr 4 infer GS  Gr 4  post GS  Gr 4 post and inf GS  Gr post GS  Gr 4 post   R first rib mob                                       Neuro Re-Ed             Table top shoulder taps          X20 ea   Wall ball- up down, sides             Manual scap setting    2x10         Supine serratus press  X20  2# X20  3#          TA contraction X20 3\"        X20 3\"    Seated scap retraction     x20 x30     "   SL scap retraction    x20         ER MRE      2x15       Prone scap retraction    x20         Ther Ex             Table slide       X20 w PB X20 flex and ABD     TB row, ext 2x15  black Keis  3x10  15#, 10# Keis  3x10  17#,  15# Keis  3x10  20# ext Row  Keis  3x10  22#    2x15  btb 2x15  black   TB ER, IR 2x10  btb Keis  3x10  5#, 6# Keis  3x10  4#, 6.5#  ER  Keis  3x10  6#  ER  Keis  3x10  6# ER  Keis  2x15  4# 2x15  gtb 2x10  btb   Prone Y, T  x20 x20          SL ER   X20  3#   2x20  2# 2x15  2# 2x15  3# 2x10  1# 2x15  2#   Seated table ER      2x15  rtb 2x10  rtb      Prone shoulder ext  X20  2# X20  #3     2x15  4# 3x10  1# 2x15  2#   Seated DB ER   X30  3#   X30  3# X30  2# X30  3# 2x10  1# 2x15  2#   Wall slide             Standing scaption  X20  1# Eccentric only 1# P!          Standing ABD         2x10    Supine cane flexion         2x15    AROM- flex, scaption, ABD   X20 P! Flex x10 verbal cues for scap setting     X20 flex X20 flex, ABD   Pt edu   GS- HEP  GS-HEP, progression of tissue healing  GS- soreness, HEP      PT re eval GS            UBE 3'3' 6' 3'3' 3'3' 3'3' 3'3' 3'3 3'3' 3'3' 3'3'   Ther Activity             Table transfer w DB         X10  10#    [facility] stacking 3x5 3x5  1# 4x5  1#    4x5  1#  4x5 3x5   Gait Training                                       Modalities

## 2024-01-24 ENCOUNTER — OFFICE VISIT (OUTPATIENT)
Dept: PAIN MEDICINE | Facility: CLINIC | Age: 69
End: 2024-01-24
Payer: MEDICARE

## 2024-01-24 ENCOUNTER — TELEPHONE (OUTPATIENT)
Age: 69
End: 2024-01-24

## 2024-01-24 VITALS
BODY MASS INDEX: 32.37 KG/M2 | HEART RATE: 106 BPM | RESPIRATION RATE: 16 BRPM | WEIGHT: 239 LBS | SYSTOLIC BLOOD PRESSURE: 132 MMHG | HEIGHT: 72 IN | DIASTOLIC BLOOD PRESSURE: 80 MMHG

## 2024-01-24 DIAGNOSIS — M51.16 LUMBAR DISC DISEASE WITH RADICULOPATHY: Primary | ICD-10-CM

## 2024-01-24 DIAGNOSIS — G89.4 CHRONIC PAIN SYNDROME: ICD-10-CM

## 2024-01-24 DIAGNOSIS — M54.16 LUMBAR RADICULOPATHY: ICD-10-CM

## 2024-01-24 DIAGNOSIS — M79.18 MYOFASCIAL PAIN SYNDROME: ICD-10-CM

## 2024-01-24 DIAGNOSIS — M47.816 LUMBAR SPONDYLOSIS: ICD-10-CM

## 2024-01-24 PROCEDURE — 99214 OFFICE O/P EST MOD 30 MIN: CPT | Performed by: ANESTHESIOLOGY

## 2024-01-24 NOTE — PROGRESS NOTES
Assessment:  1. Lumbar disc disease with radiculopathy    2. Lumbar radiculopathy    3. Lumbar spondylosis    4. Myofascial pain syndrome    5. Chronic pain syndrome        Plan:  Patient is a 68-year-old male with complaints of low back pain and leg pain consistent with chronic patient secondary to lumbar degenerative disc disease and lumbar spondylosis status post L3-L4 fusion presents to office for follow-up visit.  Patient MRI lumbar spine shows severe L2-L3 on the right and L5-S1 on the left with moderate left L4-L5 foraminal narrowing.  Anatomy and physiology reviewed with patient via the PACS system of patient's MRI of the lumbar spine.  Patient reports looking for a long-term relief I expressed to the patient that in order for him to get any significant length last 90  Most likely have to engage in physical therapy.  After further discussion patient has tried some land-based physical therapy and noted exacerbation of low back pain..  1.  We will trial aqua therapy lumbar core strengthening if patient cannot tolerate we will then provide patient with a epidural steroid injection history recommended by Dr. Rivas.  1.  Follow-up in 6 weeks        History of Present Illness:  The patient is a 68 y.o. male who presents for a follow up office visit in regards to Back Pain.   The patient’s current symptoms include 8 out of 10 intermittent doses aching, sharp, numbness is worse    Current pain medications includes:  Gabapentin, Cymbalta.  The patient reports that this regimen is providing 20% pain relief.  The patient is reporting no side effects from this pain medication regimen.    I have personally reviewed and/or updated the patient's past medical history, past surgical history, family history, social history, current medications, allergies, and vital signs today.         Review of Systems  Review of Systems   Gastrointestinal:  Positive for nausea.   Musculoskeletal:  Positive for back pain and gait problem.    All other systems reviewed and are negative.          Past Medical History:   Diagnosis Date    Back pain     Colon polyp     Hyperlipidemia     Lumbar fracture with cord injury (HCC)     Neck fracture (HCC)     Previous back surgery     rods in the back    Ulcerative colitis (HCC)        Past Surgical History:   Procedure Laterality Date    BACK SURGERY      LUMBAR FUSION Right 1/20/2016    Procedure: FUSION LUMBAR  POSTERIOR, TLIF L3-4  Right L3-4 Fascet;  Surgeon: Narayan Castro MD;  Location:  MAIN OR;  Service:     [facility] COLONOSCOPY FLX DX W/COLLJ SPEC WHEN PFRMD N/A 1/24/2019    Procedure: COLONOSCOPY;  Surgeon: Sanjeev Orellana III, MD;  Location: [facility];  Service: Gastroenterology    VA SURGICAL ARTHROSCOPY SHOULDER W/ROTATOR CUFF RPR Right 7/26/2023    Procedure: ARTHROSCOPY SHOULDER- Right shoulder Arthroscopy, supraspinatus and subscapularis repair, biceps tenotomy, extensive debridement;  Surgeon: Oscar Corbett DO;  Location: [facility];  Service: Orthopedics    TONSILLECTOMY         Family History   Problem Relation Age of Onset    Parkinsonism Mother     Lung cancer Father        Social History     Occupational History    Not on file   Tobacco Use    Smoking status: Never    Smokeless tobacco: Never   Vaping Use    Vaping status: Never Used   Substance and Sexual Activity    Alcohol use: Not Currently     Comment: rarely    Drug use: No    Sexual activity: Not Currently         Current Outpatient Medications:     balsalazide (COLAZAL) 750 mg capsule, TAKE 3 CAPSULES BY MOUTH TWICE DAILY, Disp: 540 capsule, Rfl: 1    DULoxetine (CYMBALTA) 60 mg delayed release capsule, Take 60 mg by mouth daily, Disp: , Rfl:     ezetimibe (ZETIA) 10 mg tablet, Take 10 mg by mouth daily, Disp: , Rfl:     gabapentin (NEURONTIN) 300 mg capsule, Take 1 capsule (300 mg total) by mouth 3 (three) times a day, Disp: 90 capsule, Rfl: 0    metoprolol succinate (TOPROL-XL) 25 mg 24 hr tablet, Take 25 mg by mouth daily,  Disp: , Rfl:     naloxone (NARCAN) 4 mg/0.1 mL nasal spray, , Disp: , Rfl:     omeprazole (PriLOSEC) 40 MG capsule, Take 1 capsule (40 mg total) by mouth daily, Disp: 90 capsule, Rfl: 0    oxyCODONE (ROXICODONE) 10 MG TABS, , Disp: , Rfl:     traMADol (ULTRAM) 50 mg tablet, , Disp: , Rfl:     zolpidem (AMBIEN CR) 12.5 MG CR tablet, Take 12.5 mg by mouth daily at bedtime as needed, Disp: , Rfl:     Diclofenac Sodium (VOLTAREN) 1 %, , Disp: , Rfl:     LORazepam (ATIVAN) 1 mg tablet, Take 1 tablet (1 mg total) by mouth 60 minutes pre-procedure May repeat x1 30 mins prior if needed (Patient not taking: Reported on 1/9/2024), Disp: 2 tablet, Rfl: 0    LORazepam (ATIVAN) 1 mg tablet, Take 1 tablet (1 mg total) by mouth 60 minutes pre-procedure May repeat x1 30 mins prior if needed (Patient not taking: Reported on 1/24/2024), Disp: 2 tablet, Rfl: 0    methocarbamol (ROBAXIN) 500 mg tablet, TAKE 1 TABLET BY MOUTH THREE TIMES A DAY (Patient not taking: Reported on 1/15/2024), Disp: 90 tablet, Rfl: 0    temazepam (RESTORIL) 30 mg capsule, 30 mg (Patient not taking: Reported on 1/9/2024), Disp: , Rfl:     Allergies   Allergen Reactions    No Active Allergies        Physical Exam:    /80   Pulse (!) 106   Resp 16   Ht 6' (1.829 m)   Wt 108 kg (239 lb)   BMI 32.41 kg/m²     Constitutional:normal, well developed, well nourished, alert, in no distress and non-toxic and no overt pain behavior. and obese  Eyes:anicteric  HEENT:grossly intact  Neck:supple, symmetric, trachea midline and no masses   Pulmonary:even and unlabored  Cardiovascular:No edema or pitting edema present  Skin:Normal without rashes or lesions and well hydrated  Psychiatric:Mood and affect appropriate  Neurologic:Cranial Nerves II-XII grossly intact  Musculoskeletal:antalgic    Imaging      Study Result    Narrative & Impression   MRI LUMBAR SPINE WITHOUT CONTRAST     INDICATION: M54.50: Low back pain, unspecified.     COMPARISON: 10/14/2021      TECHNIQUE:  Multiplanar, multisequence imaging of the lumbar spine was performed. .        IMAGE QUALITY:  Diagnostic     FINDINGS:     VERTEBRAL BODIES:  There are 5 lumbar type vertebral bodies. Moderate levoscoliosis of the mid lumbar spine centered at the L3 level. Trace grade 1 anterolisthesis of L5 on S1 is similar to the prior study.. Pedicle screws noted at L3-4, similar to the   prior study. Artifact from pedicle screws limits local evaluation. Scattered degenerative endplate changes.  No focally suspicious marrow lesions.  No bone marrow edema or compression abnormality.     SACRUM: Scattered degenerative endplate changes.  No focally suspicious marrow lesions.  No bone marrow edema or compression abnormality.     DISTAL CORD AND CONUS:  Normal size and signal within the distal cord and conus. The conus medullaris terminates at the L1 level.     PARASPINAL SOFT TISSUES: 2.1 cm T2 hyperintense lesion in the liver immediately series 2 image 14 is retrospectively stable from 2021, possibly a cyst or hemangioma.     LOWER THORACIC DISC SPACES:  Normal disc height and signal.  No disc herniation, canal stenosis or foraminal narrowing.     LUMBAR DISC SPACES:     L1-L2: Mild loss of disc height and signal. There is a right subarticular zone protrusion. There is bilateral facet hypertrophy. Mild narrowing of the right subarticular zone and right neural foramen. Central canal patent. Mild narrowing of the left   subarticular zone. This level is similar to the prior study.     L2-L3: There is loss of disc height and signal. There is a right neural foraminal disc protrusion. There is bilateral facet hypertrophy. Severe right neural foraminal narrowing. Central canal mildly narrowed. Mild left neural foraminal narrowing. This   level is similar to the prior study.     L3-L4: Surgically capacious     L4-L5: There is bilateral facet hypertrophy. There is a left neural foraminal disc protrusion. Moderate left neural  foraminal narrowing. Central canal patent. Mild right neural foraminal narrowing. This level is similar to the prior study.     L5-S1: There is uncovering the intervertebral disc space. There is loss of disc height and signal. There is a left subarticular zone/foraminal disc protrusion. Severe left neural foraminal narrowing. Severe narrowing left subarticular zone. Minimal   central canal narrowing. Mild right neural foraminal narrowing. This level is similar to the prior study.     OTHER FINDINGS:  None.     IMPRESSION:     Moderate to advanced multilevel spondylosis and postoperative changes status post posterior fusion L3-4 noted. Spondylotic narrowing is similar to the prior study, severe at L2-3 on the right and L5-S1 on the left and moderate on the left at L4-5.        Workstation performed: [id number]         No orders to display       No orders of the defined types were placed in this encounter.

## 2024-01-24 NOTE — TELEPHONE ENCOUNTER
----- Message from Angela Chen PA-C sent at 1/23/2024  4:19 PM EST -----  Please inform patient that the ultrasound was normal.  He should continue the course of the Omeprazole that was prescribed (he has a prior hx of esophagitis and gastritis on EGD in 2022).  If his discomfort does not improve on the Omeprazole, he should follow up and then would plan for further evaluation with a CT Scan and repeat EGD.

## 2024-01-25 ENCOUNTER — OFFICE VISIT (OUTPATIENT)
Dept: PHYSICAL THERAPY | Facility: CLINIC | Age: 69
End: 2024-01-25
Payer: MEDICARE

## 2024-01-25 DIAGNOSIS — M54.50 CHRONIC MIDLINE LOW BACK PAIN WITHOUT SCIATICA: ICD-10-CM

## 2024-01-25 DIAGNOSIS — G89.29 CHRONIC MIDLINE LOW BACK PAIN WITHOUT SCIATICA: ICD-10-CM

## 2024-01-25 DIAGNOSIS — M75.101 TEAR OF RIGHT SUPRASPINATUS TENDON: Primary | ICD-10-CM

## 2024-01-25 DIAGNOSIS — Z98.890 S/P ARTHROSCOPY OF RIGHT SHOULDER: ICD-10-CM

## 2024-01-25 PROCEDURE — 97140 MANUAL THERAPY 1/> REGIONS: CPT

## 2024-01-25 PROCEDURE — 97110 THERAPEUTIC EXERCISES: CPT

## 2024-01-25 NOTE — PROGRESS NOTES
"Daily Note     Today's date: 2024  Patient name: Toney Motley  : 1955  MRN: [id number]  Referring provider: Oscar Corbett DO  Dx:   Encounter Diagnosis     ICD-10-CM    1. Tear of right supraspinatus tendon  M75.101       2. S/P arthroscopy of right shoulder  Z98.890       3. Chronic midline low back pain without sciatica  M54.50     G89.29           Start Time: 1546  Stop Time: 1625  Total time in clinic (min): 39 minutes    Subjective: Patient reports that his shoulder was sore after last session, but he is continuing to use it.      Objective: See treatment diary below      Assessment: Patient demonstrates consistent R shoulder strength this session.  Patient completed today's session without increase in pain.  UBE performed in order to improve UE muscular endurance.  Discussed with patient transition to HEP.  He has functional strength and ROM.  Educated patient that he should continue to use his R shoulder for ADLs.  Future sessions should continue to progress strengthening exercises as able. Tolerated treatment well. Patient would benefit from continued PT      Plan: Continue per plan of care.      Precautions: HTN, anxiety, chronic pain syndrome, anxiety  DOS: 23    POC expires Unit limit Auth Expiration date PT/OT + Visit Limit?   24 BOMN 23 BOMN   RE due                             Foto             Manuals 12/15 12/18 12/22 1/2 1/8 1/11 1/19 1/23 1/25 12/12   R shoulder mobs  GS  Gr4 post an inf GS  Gr 4 post and inf GS  Gr 4 inf  GS  Gr 4 infer GS  Gr 4  post GS  Gr 4 post and inf GS  Gr 4 post and inf GS  Gr 4 post   R first rib mob                                       Neuro Re-Ed             Table top shoulder taps          X20 ea   Wall ball- up down, sides             Manual scap setting    2x10         Supine serratus press  X20  2# X20  3#          TA contraction X20 3\"            Seated scap retraction     x20 x30       SL scap retraction    x20         ER MRE    "   2x15       Prone scap retraction    x20         Ther Ex             Table slide       X20 w PB X20 flex and ABD X20 flex and ABD    TB row, ext 2x15  black Keis  3x10  15#, 10# Keis  3x10  17#,  15# Keis  3x10  20# ext Row  Keis  3x10  22#     2x15  black   TB ER, IR 2x10  btb Keis  3x10  5#, 6# Keis  3x10  4#, 6.5#  ER  Keis  3x10  6#  ER  Keis  3x10  6# ER  Keis  2x15  4# ER  Keis  2x15  4.5# 2x10  btb   Prone Y, T  x20 x20          SL ER   X20  3#   2x20  2# 2x15  2# 2x15  3# 2x15  3# 2x15  2#   Seated table ER      2x15  rtb 2x10  rtb      Prone shoulder ext  X20  2# X20  #3     2x15  4# 2x15  4# 2x15  2#   Seated DB ER   X30  3#   X30  3# X30  2# X30  3# X30  4# 2x15  2#   Wall slide             Standing scaption  X20  1# Eccentric only 1# P!          Standing ABD             Supine cane flexion             AROM- flex, scaption, ABD   X20 P! Flex x10 verbal cues for scap setting     X20  1#  Flex, ABD X20 flex, ABD   Pt edu   GS- HEP  GS-HEP, progression of tissue healing  GS- soreness, HEP  GS- soreness, transition to HEP    PT re eval GS            UBE 3'3' 6' 3'3' 3'3' 3'3' 3'3' 3'3 3'3' 3'3' 3'3'   Ther Activity             Table transfer w DB             Cone stacking 3x5 3x5  1# 4x5  1#    4x5  1#  4x5  1# 3x5   Gait Training                                       Modalities

## 2024-01-25 NOTE — TELEPHONE ENCOUNTER
Spoke to pt gave results and pt stated he is still having abd pain, an appt was schedule for next week to follow up with Angela MERRILL

## 2024-01-25 NOTE — TELEPHONE ENCOUNTER
Patients GI provider:  DAVID Chen    Number to return call: [phone]    Reason for call: Pt returning Crystal's call. Pt would like a call back.    Scheduled procedure/appointment date if applicable: Apt N/A

## 2024-01-30 ENCOUNTER — OFFICE VISIT (OUTPATIENT)
Dept: PHYSICAL THERAPY | Facility: CLINIC | Age: 69
End: 2024-01-30
Payer: MEDICARE

## 2024-01-30 ENCOUNTER — OFFICE VISIT (OUTPATIENT)
Age: 69
End: 2024-01-30
Payer: MEDICARE

## 2024-01-30 VITALS
WEIGHT: 239 LBS | BODY MASS INDEX: 32.37 KG/M2 | DIASTOLIC BLOOD PRESSURE: 80 MMHG | HEART RATE: 97 BPM | OXYGEN SATURATION: 98 % | SYSTOLIC BLOOD PRESSURE: 128 MMHG | HEIGHT: 72 IN

## 2024-01-30 DIAGNOSIS — R10.13 EPIGASTRIC PAIN: Primary | ICD-10-CM

## 2024-01-30 DIAGNOSIS — G89.29 CHRONIC MIDLINE LOW BACK PAIN WITHOUT SCIATICA: ICD-10-CM

## 2024-01-30 DIAGNOSIS — M54.50 CHRONIC MIDLINE LOW BACK PAIN WITHOUT SCIATICA: ICD-10-CM

## 2024-01-30 DIAGNOSIS — Z98.890 S/P ARTHROSCOPY OF RIGHT SHOULDER: ICD-10-CM

## 2024-01-30 DIAGNOSIS — R11.0 NAUSEA: ICD-10-CM

## 2024-01-30 DIAGNOSIS — K51.919 ULCERATIVE COLITIS WITH COMPLICATION, UNSPECIFIED LOCATION (HCC): ICD-10-CM

## 2024-01-30 DIAGNOSIS — K29.70 GASTRITIS WITHOUT BLEEDING, UNSPECIFIED CHRONICITY, UNSPECIFIED GASTRITIS TYPE: ICD-10-CM

## 2024-01-30 DIAGNOSIS — M75.101 TEAR OF RIGHT SUPRASPINATUS TENDON: Primary | ICD-10-CM

## 2024-01-30 PROCEDURE — 97110 THERAPEUTIC EXERCISES: CPT

## 2024-01-30 PROCEDURE — 99214 OFFICE O/P EST MOD 30 MIN: CPT | Performed by: PHYSICIAN ASSISTANT

## 2024-01-30 PROCEDURE — 97140 MANUAL THERAPY 1/> REGIONS: CPT

## 2024-01-30 RX ORDER — OMEPRAZOLE 40 MG/1
40 CAPSULE, DELAYED RELEASE ORAL 2 TIMES DAILY
Qty: 60 CAPSULE | Refills: 1 | Status: SHIPPED | OUTPATIENT
Start: 2024-01-30 | End: 2024-03-30

## 2024-01-30 RX ORDER — ZOLPIDEM TARTRATE 10 MG/1
TABLET ORAL
COMMUNITY
Start: 2024-01-23

## 2024-01-30 NOTE — PROGRESS NOTES
"Daily Note     Today's date: 2024  Patient name: Toney Motley  : 1955  MRN: [id number]  Referring provider: Oscar Corbett DO  Dx:   Encounter Diagnosis     ICD-10-CM    1. Tear of right supraspinatus tendon  M75.101       2. Chronic midline low back pain without sciatica  M54.50     G89.29       3. S/P arthroscopy of right shoulder  Z98.890           Start Time: 1515  Stop Time: 1558  Total time in clinic (min): 43 minutes    Subjective: Patient reports that his shoulder has been bothering him but he's been using it more. He states that reaching across his body is the most painful.      Objective: See treatment diary below      Assessment: Patient demonstrates improving R shoulder strength this session.  Patient completed today's session without increase in pain.  UBE performed in order to improve UE muscular endurance.  Improved ability to perform cone stacking on shelf today.  Future sessions should continue to progress strengthening exercises as able. Tolerated treatment well. Patient would benefit from continued PT      Plan: Continue per plan of care.      Precautions: HTN, anxiety, chronic pain syndrome, anxiety  DOS: 23    POC expires Unit limit Auth Expiration date PT/OT + Visit Limit?   24 BOMN 23 BOMN   RE due                             Foto             Manuals 12/15 12/18 12/22 1/2 1/8 1/11 1/19 1/23 1/25 1/30   R shoulder mobs  GS  Gr4 post an inf GS  Gr 4 post and inf GS  Gr 4 inf  GS  Gr 4 infer GS  Gr 4  post GS  Gr 4 post and inf GS  Gr 4 post and inf GS  Gr 4 post and inf   R first rib mob                                       Neuro Re-Ed             Table top shoulder taps             Wall ball- up down, sides             Manual scap setting    2x10         Supine serratus press  X20  2# X20  3#          TA contraction X20 3\"            Seated scap retraction     x20 x30       SL scap retraction    x20         ER MRE      2x15       Prone scap retraction    x20  "        Ther Ex             Table slide       X20 w PB X20 flex and ABD X20 flex and ABD    TB row, ext 2x15  black Keis  3x10  15#, 10# Keis  3x10  17#,  15# Keis  3x10  20# ext Row  Keis  3x10  22#        TB ER, IR 2x10  btb Keis  3x10  5#, 6# Keis  3x10  4#, 6.5#  ER  Keis  3x10  6#  ER  Keis  3x10  6# ER  Keis  2x15  4# ER  Keis  2x15  4.5# ER  Keis  2x15  4.5#   Prone Y, T  x20 x20          SL ER   X20  3#   2x20  2# 2x15  2# 2x15  3# 2x15  3# 2x15  4#   Seated table ER      2x15  rtb 2x10  rtb      Prone shoulder ext  X20  2# X20  #3     2x15  4# 2x15  4#    Seated DB ER   X30  3#   X30  3# X30  2# X30  3# X30  4# X30  4#   Wall slide             Standing scaption  X20  1# Eccentric only 1# P!          Standing ABD             Supine cane flexion             AROM- flex, scaption, ABD   X20 P! Flex x10 verbal cues for scap setting     X20  1#  Flex, ABD X20 2#   Pt edu   GS- HEP  GS-HEP, progression of tissue healing  GS- soreness, HEP  GS- soreness, transition to HEP    PT re eval GS            UBE 3'3' 6' 3'3' 3'3' 3'3' 3'3' 3'3 3'3' 3'3' 3'3'   Ther Activity             Table transfer w DB             Cone stacking 3x5 3x5  1# 4x5  1#    4x5  1#  4x5  1# 5x5  1.5#   Gait Training                                       Modalities

## 2024-01-30 NOTE — H&P (VIEW-ONLY)
[facility] - Outpatient Follow-up Note  Toney Motley 68 y.o. male MRN: [id number]  Encounter: [id number]          ASSESSMENT AND PLAN:      1. Epigastric pain  2. Nausea  3. Gastritis without bleeding    Patient presents for follow up of his epigastric discomfort and nausea.  He has a history of LA grade B esophagitis and mild erosive gastritis on prior EGD in 6/2022 (bx negative for h pylori).  He has a history of UC on Balsalazide and chronic constipation controlled on Miralax (recent colonoscopy in June of 2023 showed no active UC).  He was previously taking frequent NSAIDs which he stopped.  He was started on Omeprazole daily one 1 week ago without much improvement yet.  Abdominal ultrasound was normal.  CBC, CMP, and Lipase normal.    Will increase Omeprazole to BID: tx for 8 weeks.  Suspect symptoms secondary to gastritis.  No NSAIDs.  Will check a CT Scan A/P if not improving (order provided to patient).  Additionally, if not improving on the PPI, patient was instructed to contact us and we will plan to repeat the EGD to further investigate.    ______________________________________________________________________    SUBJECTIVE:  Patient is a pleasant 68 year old male who presents to the office for follow up.  He started the Omeprazole one week ago but has not noticed much improvement yet.  He was previously taking NSAIDs for his back pain which he stopped.  He reports aching epigastric discomfort.  He has nausea. No vomiting.  Eating does not worsen the discomfort but he reports his appetite has been less.  No melena or rectal bleeding.  He tried stopping his medication Gabapentin but it did not make a difference.  He does report increased stress.  He does not smoke.      REVIEW OF SYSTEMS IS OTHERWISE NEGATIVE.      Historical Information   Past Medical History:   Diagnosis Date    Back pain     Colon polyp     Hyperlipidemia     Lumbar fracture with cord injury (HCC)     Neck  fracture (HCC)     Previous back surgery     rods in the back    Ulcerative colitis (HCC)      Past Surgical History:   Procedure Laterality Date    BACK SURGERY      LUMBAR FUSION Right 1/20/2016    Procedure: FUSION LUMBAR  POSTERIOR, TLIF L3-4  Right L3-4 Fascet;  Surgeon: Narayan Castro MD;  Location:  MAIN OR;  Service:     MT COLONOSCOPY FLX DX W/COLLJ SPEC WHEN PFRMD N/A 1/24/2019    Procedure: COLONOSCOPY;  Surgeon: Sanjeev Orellana III, MD;  Location: [facility];  Service: Gastroenterology    MT SURGICAL ARTHROSCOPY SHOULDER W/ROTATOR CUFF RPR Right 7/26/2023    Procedure: ARTHROSCOPY SHOULDER- Right shoulder Arthroscopy, supraspinatus and subscapularis repair, biceps tenotomy, extensive debridement;  Surgeon: Oscar Corbett DO;  Location: [facility];  Service: Orthopedics    TONSILLECTOMY       Social History   Social History     Substance and Sexual Activity   Alcohol Use Not Currently    Comment: rarely     Social History     Substance and Sexual Activity   Drug Use No     Social History     Tobacco Use   Smoking Status Never   Smokeless Tobacco Never     Family History   Problem Relation Age of Onset    Parkinsonism Mother     Lung cancer Father        Meds/Allergies       Current Outpatient Medications:     balsalazide (COLAZAL) 750 mg capsule    DULoxetine (CYMBALTA) 60 mg delayed release capsule    ezetimibe (ZETIA) 10 mg tablet    gabapentin (NEURONTIN) 300 mg capsule    metoprolol succinate (TOPROL-XL) 25 mg 24 hr tablet    naloxone (NARCAN) 4 mg/0.1 mL nasal spray    omeprazole (PriLOSEC) 40 MG capsule    omeprazole (PriLOSEC) 40 MG capsule    oxyCODONE (ROXICODONE) 10 MG TABS    traMADol (ULTRAM) 50 mg tablet    zolpidem (AMBIEN) 10 mg tablet    Diclofenac Sodium (VOLTAREN) 1 %    LORazepam (ATIVAN) 1 mg tablet    LORazepam (ATIVAN) 1 mg tablet    methocarbamol (ROBAXIN) 500 mg tablet    temazepam (RESTORIL) 30 mg capsule    Allergies   Allergen Reactions    No Active Allergies             Objective     Blood pressure 128/80, pulse 97, height 6' (1.829 m), weight 108 kg (239 lb), SpO2 98%. Body mass index is 32.41 kg/m².      PHYSICAL EXAM:      General Appearance:   Alert, cooperative, no distress   HEENT:   Normocephalic, atraumatic, anicteric.     Neck:  Supple, symmetrical, trachea midline   Lungs:   Clear to auscultation bilaterally; no rales, rhonchi or wheezing; respirations unlabored    Heart::   Regular rate and rhythm; no murmur, rub, or gallop.   Abdomen:   Soft, non-tender, non-distended; normal bowel sounds; no masses, no organomegaly    Genitalia:   Deferred    Rectal:   Deferred    Extremities:  No cyanosis, clubbing or edema    Pulses:  2+ and symmetric    Skin:  No jaundice, rashes, or lesions    Lymph nodes:  No palpable cervical lymphadenopathy        Lab Results:   No visits with results within 1 Day(s) from this visit.   Latest known visit with results is:   Appointment on 01/22/2024   Component Date Value    WBC 01/22/2024 7.23     RBC 01/22/2024 5.60     Hemoglobin 01/22/2024 15.8     Hematocrit 01/22/2024 47.6     MCV 01/22/2024 85     MCH 01/22/2024 28.2     MCHC 01/22/2024 33.2     RDW 01/22/2024 13.5     Platelets 01/22/2024 273     MPV 01/22/2024 10.7     Creatinine, Ur 01/22/2024 233.7     Albumin,U,Random 01/22/2024 14.8     Albumin Creat Ratio 01/22/2024 6     Sodium 01/22/2024 140     Potassium 01/22/2024 4.8     Chloride 01/22/2024 103     CO2 01/22/2024 30     ANION GAP 01/22/2024 7     BUN 01/22/2024 18     Creatinine 01/22/2024 1.09     Glucose 01/22/2024 85     Calcium 01/22/2024 9.7     AST 01/22/2024 13     ALT 01/22/2024 10     Alkaline Phosphatase 01/22/2024 54     Total Protein 01/22/2024 7.6     Albumin 01/22/2024 4.3     Total Bilirubin 01/22/2024 0.60     eGFR 01/22/2024 70     Lipase 01/22/2024 25          Radiology Results:   US abdomen complete    Result Date: 1/23/2024  Narrative: ABDOMEN ULTRASOUND, COMPLETE INDICATION: R10.13: Epigastric  pain. COMPARISON: Most recent prior ultrasound exam is dated January 13, 2021. TECHNIQUE: Real-time ultrasound of the abdomen was performed with a curvilinear transducer with both volumetric sweeps and still imaging techniques. FINDINGS: PANCREAS: Visualized portions of the pancreas are within normal limits. AORTA AND IVC: Visualized portions are normal for patient age. LIVER: Size: Within normal range. The liver measures 16.8 cm in the midclavicular line. Contour: Surface contour is smooth. Parenchyma: Echogenicity and echotexture are within normal limits. No liver mass identified. Limited imaging of the main portal vein shows it to be patent and hepatopetal. BILIARY: No gallbladder findings. No intrahepatic biliary dilatation. CBD measures mm. No choledocholithiasis. KIDNEY: Right kidney measures 10.9 x 5.0 x 5.1 cm. Volume 147.9 mL Kidney within normal limits. Left kidney measures 10.7 x 5.7 x 5.4 cm. Volume 173.9 mL Kidney within normal limits. SPLEEN: Measures 10.8 x 4.5 x 7.2 cm. Volume 181.2 mL Within normal limits. ASCITES: None.     Impression: Normal abdominal ultrasound exam. Workstation performed: [id number]

## 2024-01-30 NOTE — PROGRESS NOTES
[facility] - Outpatient Follow-up Note  Toney Motley 68 y.o. male MRN: [id number]  Encounter: [id number]          ASSESSMENT AND PLAN:      1. Epigastric pain  2. Nausea  3. Gastritis without bleeding    Patient presents for follow up of his epigastric discomfort and nausea.  He has a history of LA grade B esophagitis and mild erosive gastritis on prior EGD in 6/2022 (bx negative for h pylori).  He has a history of UC on Balsalazide and chronic constipation controlled on Miralax (recent colonoscopy in June of 2023 showed no active UC).  He was previously taking frequent NSAIDs which he stopped.  He was started on Omeprazole daily one 1 week ago without much improvement yet.  Abdominal ultrasound was normal.  CBC, CMP, and Lipase normal.    Will increase Omeprazole to BID: tx for 8 weeks.  Suspect symptoms secondary to gastritis.  No NSAIDs.  Will check a CT Scan A/P if not improving (order provided to patient).  Additionally, if not improving on the PPI, patient was instructed to contact us and we will plan to repeat the EGD to further investigate.    ______________________________________________________________________    SUBJECTIVE:  Patient is a pleasant 68 year old male who presents to the office for follow up.  He started the Omeprazole one week ago but has not noticed much improvement yet.  He was previously taking NSAIDs for his back pain which he stopped.  He reports aching epigastric discomfort.  He has nausea. No vomiting.  Eating does not worsen the discomfort but he reports his appetite has been less.  No melena or rectal bleeding.  He tried stopping his medication Gabapentin but it did not make a difference.  He does report increased stress.  He does not smoke.      REVIEW OF SYSTEMS IS OTHERWISE NEGATIVE.      Historical Information   Past Medical History:   Diagnosis Date    Back pain     Colon polyp     Hyperlipidemia     Lumbar fracture with cord injury (HCC)     Neck  fracture (HCC)     Previous back surgery     rods in the back    Ulcerative colitis (HCC)      Past Surgical History:   Procedure Laterality Date    BACK SURGERY      LUMBAR FUSION Right 1/20/2016    Procedure: FUSION LUMBAR  POSTERIOR, TLIF L3-4  Right L3-4 Fascet;  Surgeon: Narayan Castro MD;  Location:  MAIN OR;  Service:     TX COLONOSCOPY FLX DX W/COLLJ SPEC WHEN PFRMD N/A 1/24/2019    Procedure: COLONOSCOPY;  Surgeon: Sanjeev Orellana III, MD;  Location: [facility];  Service: Gastroenterology    TX SURGICAL ARTHROSCOPY SHOULDER W/ROTATOR CUFF RPR Right 7/26/2023    Procedure: ARTHROSCOPY SHOULDER- Right shoulder Arthroscopy, supraspinatus and subscapularis repair, biceps tenotomy, extensive debridement;  Surgeon: Oscar Corbett DO;  Location: [facility];  Service: Orthopedics    TONSILLECTOMY       Social History   Social History     Substance and Sexual Activity   Alcohol Use Not Currently    Comment: rarely     Social History     Substance and Sexual Activity   Drug Use No     Social History     Tobacco Use   Smoking Status Never   Smokeless Tobacco Never     Family History   Problem Relation Age of Onset    Parkinsonism Mother     Lung cancer Father        Meds/Allergies       Current Outpatient Medications:     balsalazide (COLAZAL) 750 mg capsule    DULoxetine (CYMBALTA) 60 mg delayed release capsule    ezetimibe (ZETIA) 10 mg tablet    gabapentin (NEURONTIN) 300 mg capsule    metoprolol succinate (TOPROL-XL) 25 mg 24 hr tablet    naloxone (NARCAN) 4 mg/0.1 mL nasal spray    omeprazole (PriLOSEC) 40 MG capsule    omeprazole (PriLOSEC) 40 MG capsule    oxyCODONE (ROXICODONE) 10 MG TABS    traMADol (ULTRAM) 50 mg tablet    zolpidem (AMBIEN) 10 mg tablet    Diclofenac Sodium (VOLTAREN) 1 %    LORazepam (ATIVAN) 1 mg tablet    LORazepam (ATIVAN) 1 mg tablet    methocarbamol (ROBAXIN) 500 mg tablet    temazepam (RESTORIL) 30 mg capsule    Allergies   Allergen Reactions    No Active Allergies             Objective     Blood pressure 128/80, pulse 97, height 6' (1.829 m), weight 108 kg (239 lb), SpO2 98%. Body mass index is 32.41 kg/m².      PHYSICAL EXAM:      General Appearance:   Alert, cooperative, no distress   HEENT:   Normocephalic, atraumatic, anicteric.     Neck:  Supple, symmetrical, trachea midline   Lungs:   Clear to auscultation bilaterally; no rales, rhonchi or wheezing; respirations unlabored    Heart::   Regular rate and rhythm; no murmur, rub, or gallop.   Abdomen:   Soft, non-tender, non-distended; normal bowel sounds; no masses, no organomegaly    Genitalia:   Deferred    Rectal:   Deferred    Extremities:  No cyanosis, clubbing or edema    Pulses:  2+ and symmetric    Skin:  No jaundice, rashes, or lesions    Lymph nodes:  No palpable cervical lymphadenopathy        Lab Results:   No visits with results within 1 Day(s) from this visit.   Latest known visit with results is:   Appointment on 01/22/2024   Component Date Value    WBC 01/22/2024 7.23     RBC 01/22/2024 5.60     Hemoglobin 01/22/2024 15.8     Hematocrit 01/22/2024 47.6     MCV 01/22/2024 85     MCH 01/22/2024 28.2     MCHC 01/22/2024 33.2     RDW 01/22/2024 13.5     Platelets 01/22/2024 273     MPV 01/22/2024 10.7     Creatinine, Ur 01/22/2024 233.7     Albumin,U,Random 01/22/2024 14.8     Albumin Creat Ratio 01/22/2024 6     Sodium 01/22/2024 140     Potassium 01/22/2024 4.8     Chloride 01/22/2024 103     CO2 01/22/2024 30     ANION GAP 01/22/2024 7     BUN 01/22/2024 18     Creatinine 01/22/2024 1.09     Glucose 01/22/2024 85     Calcium 01/22/2024 9.7     AST 01/22/2024 13     ALT 01/22/2024 10     Alkaline Phosphatase 01/22/2024 54     Total Protein 01/22/2024 7.6     Albumin 01/22/2024 4.3     Total Bilirubin 01/22/2024 0.60     eGFR 01/22/2024 70     Lipase 01/22/2024 25          Radiology Results:   US abdomen complete    Result Date: 1/23/2024  Narrative: ABDOMEN ULTRASOUND, COMPLETE INDICATION: R10.13: Epigastric  pain. COMPARISON: Most recent prior ultrasound exam is dated January 13, 2021. TECHNIQUE: Real-time ultrasound of the abdomen was performed with a curvilinear transducer with both volumetric sweeps and still imaging techniques. FINDINGS: PANCREAS: Visualized portions of the pancreas are within normal limits. AORTA AND IVC: Visualized portions are normal for patient age. LIVER: Size: Within normal range. The liver measures 16.8 cm in the midclavicular line. Contour: Surface contour is smooth. Parenchyma: Echogenicity and echotexture are within normal limits. No liver mass identified. Limited imaging of the main portal vein shows it to be patent and hepatopetal. BILIARY: No gallbladder findings. No intrahepatic biliary dilatation. CBD measures mm. No choledocholithiasis. KIDNEY: Right kidney measures 10.9 x 5.0 x 5.1 cm. Volume 147.9 mL Kidney within normal limits. Left kidney measures 10.7 x 5.7 x 5.4 cm. Volume 173.9 mL Kidney within normal limits. SPLEEN: Measures 10.8 x 4.5 x 7.2 cm. Volume 181.2 mL Within normal limits. ASCITES: None.     Impression: Normal abdominal ultrasound exam. Workstation performed: [id number]

## 2024-02-01 ENCOUNTER — EVALUATION (OUTPATIENT)
Dept: PHYSICAL THERAPY | Age: 69
End: 2024-02-01
Payer: MEDICARE

## 2024-02-01 ENCOUNTER — OFFICE VISIT (OUTPATIENT)
Dept: PHYSICAL THERAPY | Facility: CLINIC | Age: 69
End: 2024-02-01
Payer: MEDICARE

## 2024-02-01 VITALS — SYSTOLIC BLOOD PRESSURE: 132 MMHG | DIASTOLIC BLOOD PRESSURE: 90 MMHG

## 2024-02-01 DIAGNOSIS — M41.25 OTHER IDIOPATHIC SCOLIOSIS, THORACOLUMBAR REGION: Primary | ICD-10-CM

## 2024-02-01 DIAGNOSIS — M54.50 LOW BACK PAIN ASSOCIATED WITH A SPINAL DISORDER OTHER THAN RADICULOPATHY OR SPINAL STENOSIS: ICD-10-CM

## 2024-02-01 DIAGNOSIS — Z98.1 SPINAL ARTHRODESIS PRESENT: ICD-10-CM

## 2024-02-01 DIAGNOSIS — M75.101 TEAR OF RIGHT SUPRASPINATUS TENDON: Primary | ICD-10-CM

## 2024-02-01 DIAGNOSIS — Z98.890 S/P ARTHROSCOPY OF RIGHT SHOULDER: ICD-10-CM

## 2024-02-01 PROCEDURE — 97110 THERAPEUTIC EXERCISES: CPT

## 2024-02-01 PROCEDURE — 97530 THERAPEUTIC ACTIVITIES: CPT

## 2024-02-01 PROCEDURE — 97140 MANUAL THERAPY 1/> REGIONS: CPT

## 2024-02-01 PROCEDURE — 97162 PT EVAL MOD COMPLEX 30 MIN: CPT

## 2024-02-01 PROCEDURE — 97113 AQUATIC THERAPY/EXERCISES: CPT

## 2024-02-01 NOTE — PROGRESS NOTES
Daily Note     Today's date: 2024  Patient name: Toney Motley  : 1955  MRN: [id number]  Referring provider: Oscar Corbett DO  Dx:   Encounter Diagnosis     ICD-10-CM    1. Tear of right supraspinatus tendon  M75.101       2. S/P arthroscopy of right shoulder  Z98.890           Start Time: 1633  Stop Time: 1708  Total time in clinic (min): 35 minutes    Subjective: Patient reports his shoulder is sore today as he had his aquatic therapy evaluation today for his back and supporting himself on the noodle really bothered his shoulder.      Objective: See treatment diary below      Assessment: Patient demonstrates improving R shoulder strength this session.  Patient completed today's session without increase in pain.  UBE performed in order to improve UE muscular endurance.  Functional strength is sufficient to reach overhead cabinet with 2# weight.  Future sessions should continue to progress strengthening exercises as able. Tolerated treatment fair. Patient would benefit from continued PT      Plan: Continue per plan of care.     Precautions: HTN, anxiety, chronic pain syndrome, anxiety  DOS: 23    POC expires Unit limit Auth Expiration date PT/OT + Visit Limit?   24 BOMN 23 BOMN   RE due                             Foto             Manuals    R shoulder mobs GS  Gr 4 post and inf GS  Gr4 post an inf GS  Gr 4 post and inf GS  Gr 4 inf  GS  Gr 4 infer GS  Gr 4  post GS  Gr 4 post and inf GS  Gr 4 post and inf GS  Gr 4 post and inf   R first rib mob                                       Neuro Re-Ed             Table top shoulder taps             Wall ball- up down, sides             Manual scap setting    2x10         Supine serratus press  X20  2# X20  3#          TA contraction             Seated scap retraction     x20 x30       SL scap retraction X20 w manual resist   x20         ER MRE      2x15       Prone scap retraction    x20          Ther Ex             Table slide X20- flex, ABD      X20 w PB X20 flex and ABD X20 flex and ABD    TB row, ext  Keis  3x10  15#, 10# Keis  3x10  17#,  15# Keis  3x10  20# ext Row  Keis  3x10  22#        TB ER, IR Keis  3x10  5# Keis  3x10  5#, 6# Keis  3x10  4#, 6.5#  ER  Keis  3x10  6#  ER  Keis  3x10  6# ER  Keis  2x15  4# ER  Keis  2x15  4.5# ER  Keis  2x15  4.5#   Prone Y, T  x20 x20          SL ER X30  4#  X20  3#   2x20  2# 2x15  2# 2x15  3# 2x15  3# 2x15  4#   Seated table ER      2x15  rtb 2x10  rtb      Prone shoulder ext  X20  2# X20  #3     2x15  4# 2x15  4#    Seated DB ER X30  4#  X30  3#   X30  3# X30  2# X30  3# X30  4# X30  4#   Wall slide             Standing scaption  X20  1# Eccentric only 1# P!          Standing ABD X15  2# P!            Supine cane flexion X20  2#            AROM- flex, scaption, ABD   X20 P! Flex x10 verbal cues for scap setting     X20  1#  Flex, ABD X20 2#   Pt edu   GS- HEP  GS-HEP, progression of tissue healing  GS- soreness, HEP  GS- soreness, transition to HEP    PT re eval             UBE 3'3' 6' 3'3' 3'3' 3'3' 3'3' 3'3 3'3' 3'3' 3'3'   Ther Activity             Table transfer w DB             Cone stacking 5x5  2# 3x5  1# 4x5  1#    4x5  1#  4x5  1# 5x5  1.5#   Gait Training                                       Modalities

## 2024-02-01 NOTE — PROGRESS NOTES
PT Evaluation     Today's date: 2024  Patient name: Toney Motley  : 1955  MRN: [id number]  Referring provider: Dipak Orlando MD  Dx:   Encounter Diagnosis     ICD-10-CM    1. Other idiopathic scoliosis, thoracolumbar region  M41.25       2. Spinal arthrodesis present  Z98.1       3. Low back pain associated with a spinal disorder other than radiculopathy or spinal stenosis  M54.50           Start Time: 1300  Stop Time: 1415  Total time in clinic (min): 75 minutes    Assessment  Assessment details: After gathering a subjective history along with the completion of a chart review and musculoskeletal screen the pt appears to be a good candidate for aquatic therapy to address s/s presenting from the back. Notable findings from eval include no symptoms radiating into the thigh, leg, or feet from the back and he experiences some relief from position changes. The patients current pain and MAVERICK is secondary to his early dx of scoliosis that was untreated. The pain the patient is experiencing is contributing to activity limitations and participation restrictions. Activity limitations such and prolonged sitting. Participation restrictions such as not being able to maintain normal walking routine or grocery shopping.     The pt will benefit from skilled therapy to manage back pain. Aquatic therapy indicated for this patient to create a relaxing environment and to create an environment to improve strength, WB, and ROM outside of the effects of gravity. Further indications include minimizing fall risk/re-injury and providing traction to the low back. Prior to entry into the pool obtained a BP, that was documented in this episode. Reviewed the rules of the pool, the patients ability to ascend and descend steps, as well as cleared the patient of any s/s that would be contraindicated or a precaution for aquatic therapy.    Pool Contraindications:  Seizures no  Uncompensated CHF - no  Unstable angina - no  Severe  kidney disease- no  Severe PVD - no  Open wounds or occlusive dressings - no  Colostomy - no  Water/airborne infections - no  Risk of bleeding or hemorrhage - no  Lack of bowel or bladder functions - no    Pool Precautions:   Fear of water/inability to swim - no  Respiratory disorders - no  Cardiac dysfunction - no  Small open wounds - no  Impairments: abnormal gait, abnormal muscle firing, abnormal muscle tone, abnormal or restricted ROM, abnormal movement, activity intolerance, impaired physical strength, pain with function and poor posture     Symptom irritability: highUnderstanding of Dx/Px/POC: good   Prognosis: fair    Goals  In 5 visits   1) Pt will increase ambulation in 2 minutes by 40 feet to demonstrate a MDC and work toward LTGs  2) Pt will be able to tolerate a pool session  3) Pt will be able to tolerate 6 minutes of DW hanging with b/l ankle weights of 5# to work toward LTGs  4) Pt will improve L multifidi firing with contralateral leg raise to demonstrate improve neuromuscular control    In 10 visits  1) Pts VAS rating will decrease from 7 at baseline to 5 to decrease pain and improve quality of life   2) Pt will experience at least two days of decreased relief following a pool session   3) Pt will be able to tolerate 10 minutes of DW hanging with b/l ankle weights of 5# to benefit from lumbar spine distraction  4) Pt will demonstrate 100% competency of aquatic program to be appropriate for D/C from therapy and continue with a fitness program   5) Pt will improve abdominal strength from 3+/5 MMT to 4+/5 MMT to help support and stabilize the low back         Plan  Plan details: Pt was in agreement that they will be treated by myself in combination with a PTA. Further, informed that we work as a team, the PTAs follow the POC created by the PT, and I trust them to make safe and reasonable changes when appropriate under their scope of practice.    Pt was educated on the nature of their dx and the  benefits of completing physical therapy. Pt is also aware of the option of completing land and pool therapy for back pain. Additionally, pt is encouraged to ask questions regarding their dx and POC.    Patient would benefit from: skilled physical therapy  Referral necessary: No  Planned therapy interventions: aquatic therapy, activity modification, joint mobilization, IASTM, manual therapy, massage, neuromuscular re-education, patient education, postural training, strengthening, stretching, flexibility, functional ROM exercises, gait training, graded activity, graded exercise, graded motor, home exercise program, IADL retraining, therapeutic activities, therapeutic exercise and therapeutic training  Frequency: 2x week  Duration in visits: 10  Duration in weeks: 5  Plan of Care beginning date: 2/1/2024  Treatment plan discussed with: patient and PTA    Subjective Evaluation    History of Present Illness  Date of onset: 7/26/2023  Mechanism of injury: surgery  Mechanism of injury: Prior to initial PT evaluation pt had R RTC surgery and was sleeping in a recliner, this positioning caused him to have back pain. Pts PMH includes a dx of scoliosis, based on subjective description sounds to be functional, in his 20's he routinely visited a chiropractor to manage the curves but did not continue and has never had therapy for his back prior to today. In 2016 the pt had surgery for L3-L4 spinal levels. The description of the patients pain is recorded below. He has received the opinion of multiple surgeons that believe he is a candidate for a spinal fusion. The pts current level of function and his prognosis from other health care providers has created frustration. The patient is skeptical of aquatic therapy but also hopeful because he wants to decrease his pain and prevent surgery.           Recurrent probem    Quality of life: good    Patient Goals  Patient goals for therapy: decreased pain, independence with ADLs/IADLs,  return to sport/leisure activities and increased motion  Patient goal: walk, grocery store  Pain  Current pain ratin  At best pain ratin  At worst pain rating: 10  Location: L3- down  Quality: dull ache and throbbing  Relieving factors: medications  Aggravating factors: walking, sitting, stair climbing and standing  Progression: worsening    Social Support  Steps to enter house: no  Stairs in house: yes (one HR)   13  Lives in: multiple-level home  Lives with: spouse    Employment status: not working  Hand dominance: right  Exercise history: 20-30 miles/week walking, grocery shopping      Diagnostic Tests  X-ray: abnormal  MRI studies: abnormal  CT scan: abnormal  Treatments  Previous treatment: chiropractic, medication and injection treatment  Current treatment: medication      Objective     Concurrent Complaints  Positive for night pain. Negative for bladder dysfunction, bowel dysfunction and saddle (S4) numbness    Postural Observations  Seated posture: fair  Standing posture: fair      Palpation     Additional Palpation Details  No pain upon palpation of the back mms or spinous process.   Upon palpating multifidi during SLR  R leg lift with appropriate contralateral multifidi firing - normal  L leg lift with unilateral multifidi firing - abnormal     Tenderness     Lumbar Spine  No tenderness in the spinous process.     Neurological Testing     Sensation     Lumbar   Left   Intact: light touch    Right   Intact: light touch    Reflexes   Left   Patellar (L4): normal (2+)  Achilles (S1): normal (2+)    Right   Patellar (L4): normal (2+)  Achilles (S1): normal (2+)    Active Range of Motion   Cervical/Thoracic Spine       Thoracic    Flexion:  with pain Restriction level: minimal  Extension:  with pain Restriction level: minimal  Left lateral flexion:  with pain Restriction level: minimal  Right lateral flexion:  with pain Restriction level: minimal  Left rotation:  Restriction level: minimal  Right  rotation:  with pain Restriction level: minimal    Lumbar   Flexion:  with pain Restriction level: minimal  Extension:  with pain Restriction level: minimal  Left lateral flexion:  with pain Restriction level: minimal  Right lateral flexion:  with pain Restriction level: minimal  Left rotation:  with pain Restriction level: minimal  Right rotation:  with pain Restriction level: minimal    Strength/Myotome Testing     Lumbar   Left   Heel walk: abnormal  Toe walk: normal    Right   Heel walk: normal  Toe walk: normal    Left Hip   Planes of Motion   Flexion: 4+    Right Hip   Planes of Motion   Flexion: 4    Left Knee   Flexion: 4  Extension: 4    Right Knee   Flexion: 4+  Extension: 4+    Left Ankle/Foot   Dorsiflexion: 4+  Plantar flexion: 5    Right Ankle/Foot   Dorsiflexion: 4  Plantar flexion: 5    Muscle Activation   Patient able to activate left multifidus.   Patient unable to activate right multifidus.     Tests     Lumbar     Left   Negative passive SLR and slump test.     Right   Negative passive SLR and slump test.     Ambulation     Ambulation: Stairs   Ascend stairs: independent  Pattern: reciprocal  Railings: without rails  Descend stairs: independent  Pattern: reciprocal  Railings: without rails  Curbs: independent    Observational Gait   Walking speed and stride length within functional limits.     Quality of Movement During Gait   Trunk  Forward lean.     Comments   2 minute walk test: distance 450 ft (137 meters)  Norms for age: 65-69years = (184 meters)     mCBSIT:  EO firm and foam: 30 seconds  EC firm and foam: 30 seconds    Functional Assessment      Squat    Trunk lean left.     General Comments:      Lumbar Comments  Difficulty lowering legs from 90 degrees during abdominal testing 3+/5 MMT             POC expires Unit limit Auth Expiration date PT/OT + Visit Limit?   4/1/24 BOMN  BOMN roselia yr                           Visit/Unit Tracking  AUTH Status:  Date 2/1              No Auth Used 1                Remaining                 FOTO                 Precautions: scoliosis, R RTC surgery   Daily Treatment Diary     Date           Patient education           Water Walk (FW, BW, side) x10’            LE work at wall               Hip FF/ext swing              Toe/heel              Hip ABD/ADD              March             Knee flexion & extension             Squats           UE noodle x3             Push/pull              Rotate              Row forward & back              OH side bend            UE/Core (AROM, paddles, MB)              ABD/ADD              Horizontal Pec Fly              Alt. arm swing (shld flex/ext)              Push pull              Single paddle rotation           SLS (EO, EC, ball toss)            Aqua Step (FW, lat, eccentric)            Core work:              MF press (red, blue, blk)             Kickboard press (blue, red)              Row/ext w/ tubing (red, blue, blk)           Seated on bench             ankle DF/PF              March              ABD/ADD              Knee flexion/extension            DW TX - hang in the deep water              DW ABD/ADD              DW Bicycle              DW Scissor hip flexion/extension              DW DKTC            Stretches              HS at step              Wall stretches              Calf stretch at wall              Other:            Hot Tub - 10 minutes only

## 2024-02-05 ENCOUNTER — APPOINTMENT (EMERGENCY)
Dept: RADIOLOGY | Facility: HOSPITAL | Age: 69
End: 2024-02-05
Payer: MEDICARE

## 2024-02-05 ENCOUNTER — APPOINTMENT (OUTPATIENT)
Dept: PHYSICAL THERAPY | Facility: CLINIC | Age: 69
End: 2024-02-05
Payer: MEDICARE

## 2024-02-05 ENCOUNTER — HOSPITAL ENCOUNTER (OUTPATIENT)
Facility: HOSPITAL | Age: 69
Setting detail: OBSERVATION
Discharge: HOME | End: 2024-02-06
Attending: EMERGENCY MEDICINE | Admitting: STUDENT IN AN ORGANIZED HEALTH CARE EDUCATION/TRAINING PROGRAM
Payer: MEDICARE

## 2024-02-05 DIAGNOSIS — K29.70 GASTRITIS: ICD-10-CM

## 2024-02-05 DIAGNOSIS — M54.50 LUMBAR BACK PAIN: ICD-10-CM

## 2024-02-05 DIAGNOSIS — R10.9 ABDOMINAL PAIN: Primary | ICD-10-CM

## 2024-02-05 DIAGNOSIS — M54.16 LUMBAR RADICULOPATHY: ICD-10-CM

## 2024-02-05 LAB
ALBUMIN SERPL BCP-MCNC: 4.2 G/DL (ref 3.5–5)
ALP SERPL-CCNC: 40 U/L (ref 34–104)
ALT SERPL W P-5'-P-CCNC: 7 U/L (ref 7–52)
ANION GAP SERPL CALCULATED.3IONS-SCNC: 8 MMOL/L
AST SERPL W P-5'-P-CCNC: 11 U/L (ref 13–39)
BASOPHILS # BLD AUTO: 0.03 THOUSANDS/ÂΜL (ref 0–0.1)
BASOPHILS NFR BLD AUTO: 0 % (ref 0–1)
BILIRUB SERPL-MCNC: 0.78 MG/DL (ref 0.2–1)
BUN SERPL-MCNC: 13 MG/DL (ref 5–25)
CALCIUM SERPL-MCNC: 9.5 MG/DL (ref 8.4–10.2)
CHLORIDE SERPL-SCNC: 103 MMOL/L (ref 96–108)
CO2 SERPL-SCNC: 28 MMOL/L (ref 21–32)
CREAT SERPL-MCNC: 0.87 MG/DL (ref 0.6–1.3)
EOSINOPHIL # BLD AUTO: 0.05 THOUSAND/ÂΜL (ref 0–0.61)
EOSINOPHIL NFR BLD AUTO: 1 % (ref 0–6)
ERYTHROCYTE [DISTWIDTH] IN BLOOD BY AUTOMATED COUNT: 13.2 % (ref 11.6–15.1)
GFR SERPL CREATININE-BSD FRML MDRD: 89 ML/MIN/1.73SQ M
GLUCOSE SERPL-MCNC: 96 MG/DL (ref 65–140)
HCT VFR BLD AUTO: 45.7 % (ref 36.5–49.3)
HGB BLD-MCNC: 14.8 G/DL (ref 12–17)
IMM GRANULOCYTES # BLD AUTO: 0.02 THOUSAND/UL (ref 0–0.2)
IMM GRANULOCYTES NFR BLD AUTO: 0 % (ref 0–2)
LIPASE SERPL-CCNC: 22 U/L (ref 11–82)
LYMPHOCYTES # BLD AUTO: 1.57 THOUSANDS/ÂΜL (ref 0.6–4.47)
LYMPHOCYTES NFR BLD AUTO: 20 % (ref 14–44)
MCH RBC QN AUTO: 27.9 PG (ref 26.8–34.3)
MCHC RBC AUTO-ENTMCNC: 32.4 G/DL (ref 31.4–37.4)
MCV RBC AUTO: 86 FL (ref 82–98)
MONOCYTES # BLD AUTO: 0.41 THOUSAND/ÂΜL (ref 0.17–1.22)
MONOCYTES NFR BLD AUTO: 5 % (ref 4–12)
NEUTROPHILS # BLD AUTO: 5.99 THOUSANDS/ÂΜL (ref 1.85–7.62)
NEUTS SEG NFR BLD AUTO: 74 % (ref 43–75)
NRBC BLD AUTO-RTO: 0 /100 WBCS
PLATELET # BLD AUTO: 234 THOUSANDS/UL (ref 149–390)
PMV BLD AUTO: 10.7 FL (ref 8.9–12.7)
POTASSIUM SERPL-SCNC: 3.9 MMOL/L (ref 3.5–5.3)
PROT SERPL-MCNC: 7.1 G/DL (ref 6.4–8.4)
RBC # BLD AUTO: 5.31 MILLION/UL (ref 3.88–5.62)
SODIUM SERPL-SCNC: 139 MMOL/L (ref 135–147)
WBC # BLD AUTO: 8.07 THOUSAND/UL (ref 4.31–10.16)

## 2024-02-05 PROCEDURE — 36415 COLL VENOUS BLD VENIPUNCTURE: CPT | Performed by: EMERGENCY MEDICINE

## 2024-02-05 PROCEDURE — 74177 CT ABD & PELVIS W/CONTRAST: CPT

## 2024-02-05 PROCEDURE — G1004 CDSM NDSC: HCPCS

## 2024-02-05 PROCEDURE — 85025 COMPLETE CBC W/AUTO DIFF WBC: CPT | Performed by: EMERGENCY MEDICINE

## 2024-02-05 PROCEDURE — 96376 TX/PRO/DX INJ SAME DRUG ADON: CPT

## 2024-02-05 PROCEDURE — 80053 COMPREHEN METABOLIC PANEL: CPT | Performed by: EMERGENCY MEDICINE

## 2024-02-05 PROCEDURE — 83690 ASSAY OF LIPASE: CPT | Performed by: EMERGENCY MEDICINE

## 2024-02-05 PROCEDURE — 96374 THER/PROPH/DIAG INJ IV PUSH: CPT

## 2024-02-05 PROCEDURE — 99285 EMERGENCY DEPT VISIT HI MDM: CPT

## 2024-02-05 PROCEDURE — 99222 1ST HOSP IP/OBS MODERATE 55: CPT | Performed by: STUDENT IN AN ORGANIZED HEALTH CARE EDUCATION/TRAINING PROGRAM

## 2024-02-05 PROCEDURE — 93005 ELECTROCARDIOGRAM TRACING: CPT

## 2024-02-05 PROCEDURE — 99285 EMERGENCY DEPT VISIT HI MDM: CPT | Performed by: EMERGENCY MEDICINE

## 2024-02-05 RX ORDER — HYDROMORPHONE HYDROCHLORIDE 2 MG/1
2 TABLET ORAL EVERY 6 HOURS PRN
Status: DISCONTINUED | OUTPATIENT
Start: 2024-02-05 | End: 2024-02-06 | Stop reason: HOSPADM

## 2024-02-05 RX ORDER — HYDROMORPHONE HCL/PF 1 MG/ML
0.5 SYRINGE (ML) INJECTION ONCE
Status: COMPLETED | OUTPATIENT
Start: 2024-02-05 | End: 2024-02-05

## 2024-02-05 RX ORDER — ZOLPIDEM TARTRATE 5 MG/1
10 TABLET ORAL
Status: DISCONTINUED | OUTPATIENT
Start: 2024-02-05 | End: 2024-02-06 | Stop reason: HOSPADM

## 2024-02-05 RX ORDER — METHOCARBAMOL 500 MG/1
500 TABLET, FILM COATED ORAL 3 TIMES DAILY
Status: DISCONTINUED | OUTPATIENT
Start: 2024-02-05 | End: 2024-02-06 | Stop reason: HOSPADM

## 2024-02-05 RX ORDER — ACETAMINOPHEN 325 MG/1
975 TABLET ORAL ONCE
Status: COMPLETED | OUTPATIENT
Start: 2024-02-05 | End: 2024-02-05

## 2024-02-05 RX ORDER — ONDANSETRON 2 MG/ML
4 INJECTION INTRAMUSCULAR; INTRAVENOUS EVERY 6 HOURS PRN
Status: DISCONTINUED | OUTPATIENT
Start: 2024-02-05 | End: 2024-02-06 | Stop reason: HOSPADM

## 2024-02-05 RX ORDER — DIAZEPAM 5 MG/1
10 TABLET ORAL ONCE
Status: COMPLETED | OUTPATIENT
Start: 2024-02-05 | End: 2024-02-05

## 2024-02-05 RX ORDER — MAGNESIUM HYDROXIDE/ALUMINUM HYDROXICE/SIMETHICONE 120; 1200; 1200 MG/30ML; MG/30ML; MG/30ML
30 SUSPENSION ORAL EVERY 6 HOURS PRN
Status: DISCONTINUED | OUTPATIENT
Start: 2024-02-05 | End: 2024-02-06 | Stop reason: HOSPADM

## 2024-02-05 RX ORDER — ENOXAPARIN SODIUM 100 MG/ML
40 INJECTION SUBCUTANEOUS DAILY
Status: DISCONTINUED | OUTPATIENT
Start: 2024-02-06 | End: 2024-02-06 | Stop reason: HOSPADM

## 2024-02-05 RX ORDER — ACETAMINOPHEN 325 MG/1
650 TABLET ORAL EVERY 8 HOURS SCHEDULED
Status: DISCONTINUED | OUTPATIENT
Start: 2024-02-05 | End: 2024-02-06 | Stop reason: HOSPADM

## 2024-02-05 RX ORDER — HYDROMORPHONE HCL/PF 1 MG/ML
0.5 SYRINGE (ML) INJECTION EVERY 6 HOURS PRN
Status: DISCONTINUED | OUTPATIENT
Start: 2024-02-05 | End: 2024-02-06 | Stop reason: HOSPADM

## 2024-02-05 RX ORDER — PANTOPRAZOLE SODIUM 40 MG/1
40 TABLET, DELAYED RELEASE ORAL
Status: DISCONTINUED | OUTPATIENT
Start: 2024-02-06 | End: 2024-02-06 | Stop reason: HOSPADM

## 2024-02-05 RX ORDER — HYDROMORPHONE HYDROCHLORIDE 4 MG/1
4 TABLET ORAL EVERY 6 HOURS PRN
Status: DISCONTINUED | OUTPATIENT
Start: 2024-02-05 | End: 2024-02-06 | Stop reason: HOSPADM

## 2024-02-05 RX ORDER — GABAPENTIN 300 MG/1
300 CAPSULE ORAL 3 TIMES DAILY
Status: DISCONTINUED | OUTPATIENT
Start: 2024-02-05 | End: 2024-02-06 | Stop reason: HOSPADM

## 2024-02-05 RX ORDER — EZETIMIBE 10 MG/1
10 TABLET ORAL DAILY
Status: DISCONTINUED | OUTPATIENT
Start: 2024-02-06 | End: 2024-02-06 | Stop reason: HOSPADM

## 2024-02-05 RX ORDER — MAGNESIUM HYDROXIDE/ALUMINUM HYDROXICE/SIMETHICONE 120; 1200; 1200 MG/30ML; MG/30ML; MG/30ML
30 SUSPENSION ORAL ONCE
Status: COMPLETED | OUTPATIENT
Start: 2024-02-05 | End: 2024-02-05

## 2024-02-05 RX ADMIN — DIAZEPAM 10 MG: 5 TABLET ORAL at 14:14

## 2024-02-05 RX ADMIN — HYDROMORPHONE HYDROCHLORIDE 0.5 MG: 1 INJECTION, SOLUTION INTRAMUSCULAR; INTRAVENOUS; SUBCUTANEOUS at 15:51

## 2024-02-05 RX ADMIN — HYDROMORPHONE HYDROCHLORIDE 0.5 MG: 1 INJECTION, SOLUTION INTRAMUSCULAR; INTRAVENOUS; SUBCUTANEOUS at 17:40

## 2024-02-05 RX ADMIN — IOHEXOL 100 ML: 350 INJECTION, SOLUTION INTRAVENOUS at 15:02

## 2024-02-05 RX ADMIN — HYDROMORPHONE HYDROCHLORIDE 0.5 MG: 1 INJECTION, SOLUTION INTRAMUSCULAR; INTRAVENOUS; SUBCUTANEOUS at 14:13

## 2024-02-05 RX ADMIN — ACETAMINOPHEN 975 MG: 325 TABLET ORAL at 15:51

## 2024-02-05 RX ADMIN — METHOCARBAMOL 500 MG: 500 TABLET ORAL at 20:34

## 2024-02-05 RX ADMIN — ACETAMINOPHEN 650 MG: 325 TABLET, FILM COATED ORAL at 22:28

## 2024-02-05 RX ADMIN — ALUMINUM HYDROXIDE, MAGNESIUM HYDROXIDE, AND SIMETHICONE 30 ML: 200; 200; 20 SUSPENSION ORAL at 14:14

## 2024-02-05 RX ADMIN — GABAPENTIN 300 MG: 300 CAPSULE ORAL at 20:34

## 2024-02-05 RX ADMIN — ALUMINUM HYDROXIDE, MAGNESIUM HYDROXIDE, AND SIMETHICONE 30 ML: 200; 200; 20 SUSPENSION ORAL at 22:28

## 2024-02-05 NOTE — ASSESSMENT & PLAN NOTE
Patient with chronic history of back pain, recently admitted in October for similar complaints when CAT scan was remarkable for foraminal stenosis at the lumbosacral area. Patient was evaluated by neurosurgery during that admission and was recommended outpatient follow-up.  He got second opinion from [facility], and is scheduled for T10-L5 back surgery in March.  Patient was prescribed oxycodone however it is not able to tolerate, he cannot tolerate NSAIDs due to gastric issues, now presented with worsening back pain.  Patient received Dilaudid and Valium in ED with no relief.  Continue multimodal regimen including gabapentin, Robaxin and scheduled Tylenol along with as needed Dilaudid for symptomatic relief.  If symptoms not controlled till tomorrow, consider neurosurgery evaluation.  PT/OT eval requested.

## 2024-02-05 NOTE — H&P
[facility]  H&P  Name: Toney Motley 68 y.o. male I MRN: [id number]  Unit/Bed#: QCC I Date of Admission: 2/5/2024   Date of Service: 2/5/2024 I Hospital Day: 0      Assessment/Plan   * Lumbar radiculopathy  Assessment & Plan  Patient with chronic history of back pain, recently admitted in October for similar complaints when CAT scan was remarkable for foraminal stenosis at the lumbosacral area. Patient was evaluated by neurosurgery during that admission and was recommended outpatient follow-up.  He got second opinion from [facility], and is scheduled for T10-L5 back surgery in March.  Patient was prescribed oxycodone however it is not able to tolerate, he cannot tolerate NSAIDs due to gastric issues, now presented with worsening back pain.  Patient received Dilaudid and Valium in ED with no relief.  Continue multimodal regimen including gabapentin, Robaxin and scheduled Tylenol along with as needed Dilaudid for symptomatic relief.  If symptoms not controlled till tomorrow, consider neurosurgery evaluation.  PT/OT eval requested.     Anxiety  Assessment & Plan  Patient is on Ambien as needed at home, PDMP reviewed.  Continue Ambien at home dosage.    Hypertension  Assessment & Plan  Per chart review patient was discharged on metoprolol however has not been taking at home, blood pressure well-controlled.  Hold on metoprolol for now.    Ulcerative colitis (HCC)  Assessment & Plan  Patient is on Colazal 2250 mg twice daily.  Alternative options related, he refused and would like to hold for 1 day.    Continue PPI at home dosage.           VTE Pharmacologic Prophylaxis: VTE Score: 8 Moderate Risk (Score 3-4) - Pharmacological DVT Prophylaxis Ordered: enoxaparin (Lovenox).  Code Status: Prior full code per pt  Discussion with family: Patient declined call to .     Anticipated Length of Stay: Patient will be admitted on an observation basis with an anticipated length of  stay of less than 2 midnights secondary to back pain needing IV medications.    Total Time Spent on Date of Encounter in care of patient: 60 mins. This time was spent on one or more of the following: performing physical exam; counseling and coordination of care; obtaining or reviewing history; documenting in the medical record; reviewing/ordering tests, medications or procedures; communicating with other healthcare professionals and discussing with patient's family/caregivers.    Chief Complaint: Intractable back pain    History of Present Illness:  Toney Motley is a 68 y.o. male with a PMH of lumbar radiculopathy, ulcerative colitis and anxiety who presents with intractable back pain.  According to patient he has back pain which is chronic/ongoing since many months however noted increased severity since last few days.  Reports pain is dull in nature, 7/10 in intensity, localized in lower back pain, not relieved with Tylenol or oral medications, increased with changing position.  Apparently patient was recently admitted with similar symptoms when he was evaluated by neurosurgery and was recommended physical therapy, he subsequently got second opinion at [facility] where he is scheduled for T10-L5 back surgery in March.  He also complains of epigastric discomfort and is reluctant to try oxycodone and NSAIDs due to same.  On presentation remained hemodynamically stable, CT abdomen pelvis unremarkable for acute intra-abdominal pathology.    Review of Systems:  Review of Systems   Constitutional:  Positive for activity change and appetite change.   Neurological:  Negative for weakness, numbness and headaches.   All other systems reviewed and are negative.      Past Medical and Surgical History:   Past Medical History:   Diagnosis Date    Back pain     Colon polyp     Hyperlipidemia     Lumbar fracture with cord injury (HCC)     Neck fracture (HCC)     Previous back surgery     rods in the back    Ulcerative colitis (HCC)         Past Surgical History:   Procedure Laterality Date    BACK SURGERY      LUMBAR FUSION Right 1/20/2016    Procedure: FUSION LUMBAR  POSTERIOR, TLIF L3-4  Right L3-4 Fascet;  Surgeon: Narayan Castro MD;  Location: [facility];  Service:     WY COLONOSCOPY FLX DX W/COLLJ SPEC WHEN PFRMD N/A 1/24/2019    Procedure: COLONOSCOPY;  Surgeon: Sanjeev Orellana III, MD;  Location: [facility];  Service: Gastroenterology    WY SURGICAL ARTHROSCOPY SHOULDER W/ROTATOR CUFF RPR Right 7/26/2023    Procedure: ARTHROSCOPY SHOULDER- Right shoulder Arthroscopy, supraspinatus and subscapularis repair, biceps tenotomy, extensive debridement;  Surgeon: Oscar Corbett DO;  Location: [facility];  Service: Orthopedics    TONSILLECTOMY         Meds/Allergies:  Prior to Admission medications    Medication Sig Start Date End Date Taking? Authorizing Provider   balsalazide (COLAZAL) 750 mg capsule TAKE 3 CAPSULES BY MOUTH TWICE DAILY 11/21/23   Angela Dorsey PA-C   Diclofenac Sodium (VOLTAREN) 1 %  8/9/23   Historical Provider, MD   DULoxetine (CYMBALTA) 60 mg delayed release capsule Take 60 mg by mouth daily 11/15/23   Historical Provider, MD   ezetimibe (ZETIA) 10 mg tablet Take 10 mg by mouth daily    Historical Provider, MD   gabapentin (NEURONTIN) 300 mg capsule Take 1 capsule (300 mg total) by mouth 3 (three) times a day 10/4/23   Li Cuadra MD   LORazepam (ATIVAN) 1 mg tablet Take 1 tablet (1 mg total) by mouth 60 minutes pre-procedure May repeat x1 30 mins prior if needed  Patient not taking: Reported on 1/9/2024 12/13/23   Kal Carrero PA-C   LORazepam (ATIVAN) 1 mg tablet Take 1 tablet (1 mg total) by mouth 60 minutes pre-procedure May repeat x1 30 mins prior if needed  Patient not taking: Reported on 1/24/2024 12/13/23   Kal Carrero PA-C   methocarbamol (ROBAXIN) 500 mg tablet TAKE 1 TABLET BY MOUTH THREE TIMES A DAY  Patient not taking: Reported on 1/15/2024 11/2/23   Annika Leyva DO   metoprolol succinate  (TOPROL-XL) 25 mg 24 hr tablet Take 25 mg by mouth daily 10/30/23 10/29/24  Historical Provider, MD   naloxone (NARCAN) 4 mg/0.1 mL nasal spray  1/9/24   Historical Provider, MD   omeprazole (PriLOSEC) 40 MG capsule Take 1 capsule (40 mg total) by mouth daily 1/22/24 4/21/24  Angela Chen PA-C   omeprazole (PriLOSEC) 40 MG capsule Take 1 capsule (40 mg total) by mouth 2 (two) times a day 1/30/24 3/30/24  Angela Chen PA-C   oxyCODONE (ROXICODONE) 10 MG TABS  10/19/23   Historical Provider, MD   temazepam (RESTORIL) 30 mg capsule 30 mg  Patient not taking: Reported on 1/9/2024 11/22/23   Historical Provider, MD   traMADol (ULTRAM) 50 mg tablet  11/17/23   Historical Provider, MD   zolpidem (AMBIEN) 10 mg tablet TAKE 1 TABLET BY MOUTH NIGHTLY AS NEEDED FOR SLEEP 1/23/24   Historical Provider, MD HOSKINS have reviewed home medications with patient personally.    Allergies:   Allergies   Allergen Reactions    No Active Allergies        Social History:  Marital Status: /Civil Union   Occupation:   Patient Pre-hospital Living Situation: Home  Patient Pre-hospital Level of Mobility: walks  Patient Pre-hospital Diet Restrictions: none  Substance Use History:   Social History     Substance and Sexual Activity   Alcohol Use Not Currently    Comment: rarely     Social History     Tobacco Use   Smoking Status Never   Smokeless Tobacco Never     Social History     Substance and Sexual Activity   Drug Use No       Family History:  Family History   Problem Relation Age of Onset    Parkinsonism Mother     Lung cancer Father        Physical Exam:     Vitals:   Blood Pressure: 134/79 (02/05/24 1229)  Pulse: 71 (02/05/24 1229)  Temperature: 97.6 °F (36.4 °C) (02/05/24 1229)  Temp Source: Tympanic (02/05/24 1229)  Respirations: 16 (02/05/24 1229)  SpO2: 98 % (02/05/24 1229)    Physical Exam  Constitutional:       Appearance: Normal appearance.   HENT:      Head: Normocephalic and atraumatic.   Eyes:      Extraocular  Movements: Extraocular movements intact.      Conjunctiva/sclera: Conjunctivae normal.   Cardiovascular:      Rate and Rhythm: Normal rate and regular rhythm.      Pulses: Normal pulses.      Heart sounds: Normal heart sounds.   Pulmonary:      Effort: Pulmonary effort is normal.      Breath sounds: Normal breath sounds.   Abdominal:      General: Bowel sounds are normal.      Palpations: Abdomen is soft.   Skin:     General: Skin is warm and dry.   Neurological:      General: No focal deficit present.      Mental Status: He is alert and oriented to person, place, and time.      Comments: Speech normal, motor intact in upper and lower extremities, mild back tenderness elicited on extension of right lower extremity about 30 degree   Psychiatric:      Comments: Very anxious.          Additional Data:     Lab Results:  Results from last 7 days   Lab Units 02/05/24  1413   WBC Thousand/uL 8.07   HEMOGLOBIN g/dL 14.8   HEMATOCRIT % 45.7   PLATELETS Thousands/uL 234   NEUTROS PCT % 74   LYMPHS PCT % 20   MONOS PCT % 5   EOS PCT % 1     Results from last 7 days   Lab Units 02/05/24  1413   SODIUM mmol/L 139   POTASSIUM mmol/L 3.9   CHLORIDE mmol/L 103   CO2 mmol/L 28   BUN mg/dL 13   CREATININE mg/dL 0.87   ANION GAP mmol/L 8   CALCIUM mg/dL 9.5   ALBUMIN g/dL 4.2   TOTAL BILIRUBIN mg/dL 0.78   ALK PHOS U/L 40   ALT U/L 7   AST U/L 11*   GLUCOSE RANDOM mg/dL 96                       Lines/Drains:  Invasive Devices       Peripheral Intravenous Line  Duration             Peripheral IV 02/05/24 Left Antecubital <1 day                        Imaging: Reviewed radiology reports from this admission including: abdominal/pelvic CT  CT abdomen pelvis with contrast   ED Interpretation by Giovanni Ch MD (02/05 1521)   No acute abnormalities.      Final Result by Jase Anguiano MD (02/05 1613)      There is no acute abdominal/pelvic inflammatory process.      There is no evidence of bowel obstruction, free  intraperitoneal air, or drainable ascites.      Stable hepatic hemangioma.               Workstation performed: [id number]             EKG and Other Studies Reviewed on Admission:   EKG: NSR. HR 65.    ** Please Note: This note has been constructed using a voice recognition system. **

## 2024-02-05 NOTE — ASSESSMENT & PLAN NOTE
Patient is on Colazal 2250 mg twice daily.  Alternative options related, he refused and would like to hold for 1 day.    Continue PPI at home dosage.

## 2024-02-05 NOTE — ED PROVIDER NOTES
History  Chief Complaint   Patient presents with    Abdominal Pain     With back pain for the past couple months. Has been evaluated by GI last week and given meds but they are not working. Also, C/o chronic lower back pain for approx 6 months and has Oxy but does not like feeling.      68-year-old male presenting to the emergency department for evaluation of multiple complaints.  Patient states that his chief and primary complaint is low back pain.  Patient states that he has a history of previous back surgery, approximately a year ago the patient had a shoulder surgery which required him to sleep upright in a reclining chair, and subsequent to that had recurrent back pain which has gotten progressively worse over time.  Patient saw neurosurgery locally who recommended 6 weeks of physical therapy, and subsequently went to the [facility] where he was seen by an orthopedic surgeon who recommended surgery, however the patient states that the earliest he could be scheduled for surgery was in early March.  Patient states that he has been having progressive low back pain.  It is not relieved with oxycodone which he does not tolerate, gabapentin, and has not been taking Tylenol because he was told not to because of his stomach.  Patient denies any fever, bowel or bladder incontinence, new trauma, saddle anesthesia.  Patient's second complaint is that he has been having epigastric abdominal discomfort, saw gastroenterology as an outpatient, and was prescribed omeprazole which he says that he has been taking for 2 weeks and has been on effective.  He reports several months of aching epigastric abdominal discomfort.  No nausea, vomiting, diarrhea, weight loss.        Prior to Admission Medications   Prescriptions Last Dose Informant Patient Reported? Taking?   DULoxetine (CYMBALTA) 60 mg delayed release capsule  Self Yes No   Sig: Take 60 mg by mouth daily   Diclofenac Sodium (VOLTAREN) 1 %  Self Yes No   Patient not  taking: Reported on 2024   LORazepam (ATIVAN) 1 mg tablet  Self No No   Sig: Take 1 tablet (1 mg total) by mouth 60 minutes pre-procedure May repeat x1 30 mins prior if needed   Patient not taking: Reported on 2024   LORazepam (ATIVAN) 1 mg tablet  Self No No   Sig: Take 1 tablet (1 mg total) by mouth 60 minutes pre-procedure May repeat x1 30 mins prior if needed   Patient not taking: Reported on 2024   balsalazide (COLAZAL) 750 mg capsule  Self No No   Sig: TAKE 3 CAPSULES BY MOUTH TWICE DAILY   ezetimibe (ZETIA) 10 mg tablet  Self Yes No   Sig: Take 10 mg by mouth daily   gabapentin (NEURONTIN) 300 mg capsule  Self No No   Sig: Take 1 capsule (300 mg total) by mouth 3 (three) times a day   methocarbamol (ROBAXIN) 500 mg tablet  Self No No   Sig: TAKE 1 TABLET BY MOUTH THREE TIMES A DAY   Patient not taking: Reported on 1/15/2024   metoprolol succinate (TOPROL-XL) 25 mg 24 hr tablet  Self Yes No   Sig: Take 25 mg by mouth daily   naloxone (NARCAN) 4 mg/0.1 mL nasal spray  Self Yes No   omeprazole (PriLOSEC) 40 MG capsule  Self No No   Sig: Take 1 capsule (40 mg total) by mouth daily   omeprazole (PriLOSEC) 40 MG capsule   No No   Sig: Take 1 capsule (40 mg total) by mouth 2 (two) times a day   oxyCODONE (ROXICODONE) 10 MG TABS  Self Yes No   temazepam (RESTORIL) 30 mg capsule  Self Yes No   Si mg   Patient not taking: Reported on 2024   traMADol (ULTRAM) 50 mg tablet  Self Yes No   zolpidem (AMBIEN) 10 mg tablet  Self Yes No   Sig: TAKE 1 TABLET BY MOUTH NIGHTLY AS NEEDED FOR SLEEP      Facility-Administered Medications: None       Past Medical History:   Diagnosis Date    Back pain     Colon polyp     Hyperlipidemia     Lumbar fracture with cord injury (HCC)     Neck fracture (HCC)     Previous back surgery     rods in the back    Ulcerative colitis (HCC)        Past Surgical History:   Procedure Laterality Date    BACK SURGERY      LUMBAR FUSION Right 2016    Procedure: FUSION LUMBAR   POSTERIOR, TLIF L3-4  Right L3-4 Fascet;  Surgeon: Narayan Castro MD;  Location:  MAIN OR;  Service:     RI COLONOSCOPY FLX DX W/COLLJ SPEC WHEN PFRMD N/A 1/24/2019    Procedure: COLONOSCOPY;  Surgeon: Sanjeev Orellana III, MD;  Location: [facility];  Service: Gastroenterology    RI SURGICAL ARTHROSCOPY SHOULDER W/ROTATOR CUFF RPR Right 7/26/2023    Procedure: ARTHROSCOPY SHOULDER- Right shoulder Arthroscopy, supraspinatus and subscapularis repair, biceps tenotomy, extensive debridement;  Surgeon: Oscar Corbett DO;  Location: [facility];  Service: Orthopedics    TONSILLECTOMY         Family History   Problem Relation Age of Onset    Parkinsonism Mother     Lung cancer Father      I have reviewed and agree with the history as documented.    E-Cigarette/Vaping    E-Cigarette Use Never User      E-Cigarette/Vaping Substances    Nicotine No     THC No     CBD No     Flavoring No     Other No     Unknown No      Social History     Tobacco Use    Smoking status: Never    Smokeless tobacco: Never   Vaping Use    Vaping status: Never Used   Substance Use Topics    Alcohol use: Not Currently     Comment: rarely    Drug use: No       Review of Systems   All other systems reviewed and are negative.      Physical Exam  Physical Exam  Vitals reviewed.   Constitutional:       Appearance: He is well-developed.   HENT:      Head: Normocephalic and atraumatic.   Eyes:      Extraocular Movements: Extraocular movements intact.      Pupils: Pupils are equal, round, and reactive to light.   Cardiovascular:      Rate and Rhythm: Normal rate and regular rhythm.      Heart sounds: No murmur heard.     No friction rub. No gallop.   Pulmonary:      Breath sounds: No wheezing, rhonchi or rales.   Abdominal:      General: Abdomen is protuberant. Bowel sounds are normal.      Tenderness: There is abdominal tenderness in the epigastric area. There is no right CVA tenderness or left CVA tenderness.   Musculoskeletal:         Arms:    Skin:     General: Skin is warm and dry.      Capillary Refill: Capillary refill takes less than 2 seconds.   Neurological:      General: No focal deficit present.      Mental Status: He is alert and oriented to person, place, and time.      Cranial Nerves: No cranial nerve deficit.      Sensory: Sensation is intact.      Motor: No weakness.      Comments: Patient able to ambulate with a slightly antalgic gait.         Vital Signs  ED Triage Vitals [02/05/24 1229]   Temperature Pulse Respirations Blood Pressure SpO2   97.6 °F (36.4 °C) 71 16 134/79 98 %      Temp Source Heart Rate Source Patient Position - Orthostatic VS BP Location FiO2 (%)   Tympanic Monitor Sitting Left arm --      Pain Score       9           Vitals:    02/05/24 2047 02/05/24 2348 02/06/24 0024 02/06/24 0731   BP: 145/81 141/84 113/73 136/88   Pulse: 62 63 62 55   Patient Position - Orthostatic VS:  Lying           Visual Acuity      ED Medications  Medications   HYDROmorphone (DILAUDID) injection 0.5 mg (0.5 mg Intravenous Given 2/5/24 1413)   diazepam (VALIUM) tablet 10 mg (10 mg Oral Given 2/5/24 1414)   aluminum-magnesium hydroxide-simethicone (MAALOX) oral suspension 30 mL (30 mL Oral Given 2/5/24 1414)   iohexol (OMNIPAQUE) 350 MG/ML injection (MULTI-DOSE) 100 mL (100 mL Intravenous Given 2/5/24 1502)   acetaminophen (TYLENOL) tablet 975 mg (975 mg Oral Given 2/5/24 1551)   HYDROmorphone (DILAUDID) injection 0.5 mg (0.5 mg Intravenous Given 2/5/24 1551)   HYDROmorphone (DILAUDID) injection 0.5 mg (0.5 mg Intravenous Given 2/5/24 1740)   bisacodyl (DULCOLAX) EC tablet 10 mg (10 mg Oral Given 2/6/24 0844)   HYDROmorphone HCl (DILAUDID) injection 0.2 mg (0.2 mg Intravenous Given 2/6/24 0844)       Diagnostic Studies  Results Reviewed       Procedure Component Value Units Date/Time    Comprehensive metabolic panel [[id number]]  (Abnormal) Collected: 02/06/24 0511    Lab Status: Final result Specimen: Blood from Hand, Right Updated:  02/06/24 0623     Sodium 136 mmol/L      Potassium 3.8 mmol/L      Chloride 104 mmol/L      CO2 20 mmol/L      ANION GAP 12 mmol/L      BUN 13 mg/dL      Creatinine 0.80 mg/dL      Glucose 81 mg/dL      Calcium 8.3 mg/dL      AST 13 U/L      ALT 5 U/L      Alkaline Phosphatase 35 U/L      Total Protein 6.2 g/dL      Albumin 3.6 g/dL      Total Bilirubin 0.72 mg/dL      eGFR 93 ml/min/1.73sq m     Narrative:      National Kidney Disease Foundation guidelines for Chronic Kidney Disease (CKD):     Stage 1 with normal or high GFR (GFR > 90 mL/min/1.73 square meters)    Stage 2 Mild CKD (GFR = 60-89 mL/min/1.73 square meters)    Stage 3A Moderate CKD (GFR = 45-59 mL/min/1.73 square meters)    Stage 3B Moderate CKD (GFR = 30-44 mL/min/1.73 square meters)    Stage 4 Severe CKD (GFR = 15-29 mL/min/1.73 square meters)    Stage 5 End Stage CKD (GFR <15 mL/min/1.73 square meters)  Note: GFR calculation is accurate only with a steady state creatinine    CBC (With Platelets) [[id number]]  (Normal) Collected: 02/06/24 0511    Lab Status: Final result Specimen: Blood from Hand, Right Updated: 02/06/24 0558     WBC 6.59 Thousand/uL      RBC 5.32 Million/uL      Hemoglobin 15.1 g/dL      Hematocrit 46.6 %      MCV 88 fL      MCH 28.4 pg      MCHC 32.4 g/dL      RDW 13.3 %      Platelets 207 Thousands/uL      MPV 11.1 fL     Comprehensive metabolic panel [[id number]]  (Abnormal) Collected: 02/05/24 1413    Lab Status: Final result Specimen: Blood from Arm, Left Updated: 02/05/24 1451     Sodium 139 mmol/L      Potassium 3.9 mmol/L      Chloride 103 mmol/L      CO2 28 mmol/L      ANION GAP 8 mmol/L      BUN 13 mg/dL      Creatinine 0.87 mg/dL      Glucose 96 mg/dL      Calcium 9.5 mg/dL      AST 11 U/L      ALT 7 U/L      Alkaline Phosphatase 40 U/L      Total Protein 7.1 g/dL      Albumin 4.2 g/dL      Total Bilirubin 0.78 mg/dL      eGFR 89 ml/min/1.73sq m     Narrative:      National Kidney Disease Foundation guidelines for Chronic  Kidney Disease (CKD):     Stage 1 with normal or high GFR (GFR > 90 mL/min/1.73 square meters)    Stage 2 Mild CKD (GFR = 60-89 mL/min/1.73 square meters)    Stage 3A Moderate CKD (GFR = 45-59 mL/min/1.73 square meters)    Stage 3B Moderate CKD (GFR = 30-44 mL/min/1.73 square meters)    Stage 4 Severe CKD (GFR = 15-29 mL/min/1.73 square meters)    Stage 5 End Stage CKD (GFR <15 mL/min/1.73 square meters)  Note: GFR calculation is accurate only with a steady state creatinine    Lipase [[id number]]  (Normal) Collected: 02/05/24 1413    Lab Status: Final result Specimen: Blood from Arm, Left Updated: 02/05/24 1451     Lipase 22 u/L     CBC and differential [[id number]] Collected: 02/05/24 1413    Lab Status: Final result Specimen: Blood from Arm, Left Updated: 02/05/24 1422     WBC 8.07 Thousand/uL      RBC 5.31 Million/uL      Hemoglobin 14.8 g/dL      Hematocrit 45.7 %      MCV 86 fL      MCH 27.9 pg      MCHC 32.4 g/dL      RDW 13.2 %      MPV 10.7 fL      Platelets 234 Thousands/uL      nRBC 0 /100 WBCs      Neutrophils Relative 74 %      Immat GRANS % 0 %      Lymphocytes Relative 20 %      Monocytes Relative 5 %      Eosinophils Relative 1 %      Basophils Relative 0 %      Neutrophils Absolute 5.99 Thousands/µL      Immature Grans Absolute 0.02 Thousand/uL      Lymphocytes Absolute 1.57 Thousands/µL      Monocytes Absolute 0.41 Thousand/µL      Eosinophils Absolute 0.05 Thousand/µL      Basophils Absolute 0.03 Thousands/µL                    CT abdomen pelvis with contrast   ED Interpretation by Giovanni Ch MD (02/05 1521)   No acute abnormalities.      Final Result by Jase Anguiano MD (02/05 1613)      There is no acute abdominal/pelvic inflammatory process.      There is no evidence of bowel obstruction, free intraperitoneal air, or drainable ascites.      Stable hepatic hemangioma.               Workstation performed: [id number]                    Procedures  ECG 12 Lead Documentation  Only    Date/Time: 2/5/2024 4:04 PM    Performed by: Giovanni Ch MD  Authorized by: Giovanni Ch MD    Patient location:  ED  Interpretation:     Interpretation: normal    Rate:     ECG rate assessment: normal    Rhythm:     Rhythm: sinus rhythm    Ectopy:     Ectopy: PVCs      PVCs:  Infrequent  QRS:     QRS axis:  Normal  Conduction:     Conduction: normal    ST segments:     ST segments:  Normal  T waves:     T waves: normal             ED Course  ED Course as of 02/06/24 1539   Mon Feb 05, 2024   1603 Lipase  Normal   1603 Comprehensive metabolic panel(!)  No significant abnormalities   1603 CBC and differential  Normal               Identification of Seniors at Risk      Flowsheet Row Most Recent Value   (ISAR) Identification of Seniors at Risk    Before the illness or injury that brought you to the Emergency, did you need someone to help you on a regular basis? 0 Filed at: 02/05/2024 1228   In the last 24 hours, have you needed more help than usual? 1 Filed at: 02/05/2024 1228   Have you been hospitalized for one or more nights during the past 6 months? 0 Filed at: 02/05/2024 1228   In general, do you see well? 0 Filed at: 02/05/2024 1228   In general, do you have serious problems with your memory? 0 Filed at: 02/05/2024 1228   Do you take more than three different medications every day? 1 Filed at: 02/05/2024 1228   ISAR Score 2 Filed at: 02/05/2024 1228                        SBIRT 22yo+      Flowsheet Row Most Recent Value   Initial Alcohol Screen: US AUDIT-C     1. How often do you have a drink containing alcohol? 0 Filed at: 02/05/2024 1229   2. How many drinks containing alcohol do you have on a typical day you are drinking?  0 Filed at: 02/05/2024 1229   3a. Male UNDER 65: How often do you have five or more drinks on one occasion? 0 Filed at: 02/05/2024 1229   3b. FEMALE Any Age, or MALE 65+: How often do you have 4 or more drinks on one occassion? 0 Filed at: 02/05/2024 1229   Audit-C  Score 0 Filed at: 02/05/2024 1229   AYESHA: How many times in the past year have you...    Used an illegal drug or used a prescription medication for non-medical reasons? Never Filed at: 02/05/2024 1229                      Medical Decision Making  Epigastric pain, likely GERD, however CT abdomen and pelvis to evaluate for mass.    Low back pain, without any red flags to prompt neuroimaging.  Patient will be medicated with Dilaudid and Valium with reassessment.    Amount and/or Complexity of Data Reviewed  Labs: ordered. Decision-making details documented in ED Course.  Radiology: ordered and independent interpretation performed.    Risk  OTC drugs.  Prescription drug management.  Decision regarding hospitalization.             Disposition  Final diagnoses:   Abdominal pain   Lumbar back pain     Time reflects when diagnosis was documented in both MDM as applicable and the Disposition within this note       Time User Action Codes Description Comment    2/5/2024  5:31 PM Giovanni Ch Add [R10.9] Abdominal pain     2/5/2024  5:31 PM Giovanni Ch Add [M54.50] Lumbar back pain     2/6/2024  8:36 AM Rossy Plummer Add [M54.16] Lumbar radiculopathy     2/6/2024  8:38 AM Rossy Plummer Add [K29.70] Gastritis           ED Disposition       ED Disposition   Admit    Condition   Stable    Date/Time   Mon Feb 5, 2024 1739    Comment   Case was discussed with Pebbles Robbins and the patient's admission status was agreed to be Admission Status: observation status to the service of Dr. Robbins .               Follow-up Information       Follow up With Specialties Details Why Contact Info    Valeriano Torres MD Family Medicine Follow up within 1 week of discharge [address]  [address]  [place] [address]  [phone]      Angela Chen PA-C Gastroenterology, Physician Assistant Follow up call for follow up, needs EGD [address]  [address]  [facility] [facility] [address]  [phone]      [facility]  Follow up Please  call your doctos today and see if they can see you sooner, provide sooner surgery date etc.             Discharge Medication List as of 2/6/2024  9:27 AM        START taking these medications    Details   acetaminophen (TYLENOL) 325 mg tablet Take 3 tablets (975 mg total) by mouth every 8 (eight) hours, Starting Tue 2/6/2024, No Print      aluminum-magnesium hydroxide-simethicone (MAALOX) 2786-3521-445 mg/30 mL suspension Take 30 mL by mouth every 6 (six) hours as needed for indigestion or heartburn, Starting Tue 2/6/2024, No Print      HYDROmorphone (DILAUDID) 2 mg tablet Take 1 tablet (2 mg total) by mouth every 6 (six) hours as needed for severe pain for up to 10 days Max Daily Amount: 8 mg, Starting Tue 2/6/2024, Until Fri 2/16/2024 at 2359, Normal      sucralfate (CARAFATE) 1 g tablet Take 1 tablet (1 g total) by mouth 4 (four) times a day (before meals and at bedtime) for 14 days, Starting Tue 2/6/2024, Until Tue 2/20/2024, Normal           CONTINUE these medications which have CHANGED    Details   methocarbamol (ROBAXIN) 500 mg tablet Take 1 tablet (500 mg total) by mouth 3 (three) times a day for 14 days, Starting Tue 2/6/2024, Until Tue 2/20/2024, Normal           CONTINUE these medications which have NOT CHANGED    Details   balsalazide (COLAZAL) 750 mg capsule TAKE 3 CAPSULES BY MOUTH TWICE DAILY, Starting Tue 11/21/2023, Normal      DULoxetine (CYMBALTA) 60 mg delayed release capsule Take 60 mg by mouth daily, Starting Wed 11/15/2023, Historical Med      ezetimibe (ZETIA) 10 mg tablet Take 10 mg by mouth daily, Historical Med      gabapentin (NEURONTIN) 300 mg capsule Take 1 capsule (300 mg total) by mouth 3 (three) times a day, Starting Wed 10/4/2023, Normal      metoprolol succinate (TOPROL-XL) 25 mg 24 hr tablet Take 25 mg by mouth daily, Starting Mon 10/30/2023, Until Tue 10/29/2024, Historical Med      naloxone (NARCAN) 4 mg/0.1 mL nasal spray Historical Med      omeprazole (PriLOSEC) 40 MG capsule  Take 1 capsule (40 mg total) by mouth 2 (two) times a day, Starting Tue 1/30/2024, Until Sat 3/30/2024, Normal      zolpidem (AMBIEN) 10 mg tablet TAKE 1 TABLET BY MOUTH NIGHTLY AS NEEDED FOR SLEEP, Historical Med           STOP taking these medications       Diclofenac Sodium (VOLTAREN) 1 % Comments:   Reason for Stopping:         LORazepam (ATIVAN) 1 mg tablet Comments:   Reason for Stopping:         LORazepam (ATIVAN) 1 mg tablet Comments:   Reason for Stopping:         oxyCODONE (ROXICODONE) 10 MG TABS Comments:   Reason for Stopping:         temazepam (RESTORIL) 30 mg capsule Comments:   Reason for Stopping:         traMADol (ULTRAM) 50 mg tablet Comments:   Reason for Stopping:               Outpatient Discharge Orders   Ambulatory Referral to Physical Therapy   Standing Status: Future Standing Exp. Date: 02/06/25      Discharge Diet     Activity as tolerated     Call provider for:  persistent nausea or vomiting     Call provider for:  severe uncontrolled pain     Call provider for: active or persistent bleeding     Call provider for:  difficulty breathing, headache or visual disturbances     Call provider for:  persistent dizziness or light-headedness       PDMP Review         Value Time User    PDMP Reviewed  Yes 2/5/2024  7:06 PM Pebbles Robbins MD            ED Provider  Electronically Signed by             Giovanni Ch MD  02/06/24 0379

## 2024-02-05 NOTE — ASSESSMENT & PLAN NOTE
Per chart review patient was discharged on metoprolol however has not been taking at home, blood pressure well-controlled.  Hold on metoprolol for now.

## 2024-02-06 VITALS
SYSTOLIC BLOOD PRESSURE: 136 MMHG | OXYGEN SATURATION: 96 % | DIASTOLIC BLOOD PRESSURE: 88 MMHG | RESPIRATION RATE: 18 BRPM | HEART RATE: 55 BPM | TEMPERATURE: 98 F

## 2024-02-06 PROBLEM — K29.70 GASTRITIS: Status: ACTIVE | Noted: 2024-02-06

## 2024-02-06 LAB
ALBUMIN SERPL BCP-MCNC: 3.6 G/DL (ref 3.5–5)
ALP SERPL-CCNC: 35 U/L (ref 34–104)
ALT SERPL W P-5'-P-CCNC: 5 U/L (ref 7–52)
ANION GAP SERPL CALCULATED.3IONS-SCNC: 12 MMOL/L
AST SERPL W P-5'-P-CCNC: 13 U/L (ref 13–39)
ATRIAL RATE: 57 BPM
BILIRUB SERPL-MCNC: 0.72 MG/DL (ref 0.2–1)
BUN SERPL-MCNC: 13 MG/DL (ref 5–25)
CALCIUM SERPL-MCNC: 8.3 MG/DL (ref 8.4–10.2)
CHLORIDE SERPL-SCNC: 104 MMOL/L (ref 96–108)
CO2 SERPL-SCNC: 20 MMOL/L (ref 21–32)
CREAT SERPL-MCNC: 0.8 MG/DL (ref 0.6–1.3)
ERYTHROCYTE [DISTWIDTH] IN BLOOD BY AUTOMATED COUNT: 13.3 % (ref 11.6–15.1)
GFR SERPL CREATININE-BSD FRML MDRD: 93 ML/MIN/1.73SQ M
GLUCOSE SERPL-MCNC: 81 MG/DL (ref 65–140)
HCT VFR BLD AUTO: 46.6 % (ref 36.5–49.3)
HGB BLD-MCNC: 15.1 G/DL (ref 12–17)
MCH RBC QN AUTO: 28.4 PG (ref 26.8–34.3)
MCHC RBC AUTO-ENTMCNC: 32.4 G/DL (ref 31.4–37.4)
MCV RBC AUTO: 88 FL (ref 82–98)
P AXIS: 62 DEGREES
PLATELET # BLD AUTO: 207 THOUSANDS/UL (ref 149–390)
PMV BLD AUTO: 11.1 FL (ref 8.9–12.7)
POTASSIUM SERPL-SCNC: 3.8 MMOL/L (ref 3.5–5.3)
PR INTERVAL: 190 MS
PROT SERPL-MCNC: 6.2 G/DL (ref 6.4–8.4)
QRS AXIS: 80 DEGREES
QRSD INTERVAL: 98 MS
QT INTERVAL: 428 MS
QTC INTERVAL: 416 MS
RBC # BLD AUTO: 5.32 MILLION/UL (ref 3.88–5.62)
SODIUM SERPL-SCNC: 136 MMOL/L (ref 135–147)
T WAVE AXIS: 240 DEGREES
VENTRICULAR RATE: 57 BPM
WBC # BLD AUTO: 6.59 THOUSAND/UL (ref 4.31–10.16)

## 2024-02-06 PROCEDURE — 93010 ELECTROCARDIOGRAM REPORT: CPT | Performed by: INTERNAL MEDICINE

## 2024-02-06 PROCEDURE — 85027 COMPLETE CBC AUTOMATED: CPT | Performed by: STUDENT IN AN ORGANIZED HEALTH CARE EDUCATION/TRAINING PROGRAM

## 2024-02-06 PROCEDURE — 99239 HOSP IP/OBS DSCHRG MGMT >30: CPT | Performed by: INTERNAL MEDICINE

## 2024-02-06 PROCEDURE — 80053 COMPREHEN METABOLIC PANEL: CPT | Performed by: STUDENT IN AN ORGANIZED HEALTH CARE EDUCATION/TRAINING PROGRAM

## 2024-02-06 PROCEDURE — 97162 PT EVAL MOD COMPLEX 30 MIN: CPT

## 2024-02-06 RX ORDER — HYDROMORPHONE HCL IN WATER/PF 6 MG/30 ML
0.2 PATIENT CONTROLLED ANALGESIA SYRINGE INTRAVENOUS ONCE
Status: COMPLETED | OUTPATIENT
Start: 2024-02-06 | End: 2024-02-06

## 2024-02-06 RX ORDER — BISACODYL 5 MG/1
10 TABLET, DELAYED RELEASE ORAL ONCE
Status: COMPLETED | OUTPATIENT
Start: 2024-02-06 | End: 2024-02-06

## 2024-02-06 RX ORDER — SUCRALFATE 1 G/1
1 TABLET ORAL
Status: DISCONTINUED | OUTPATIENT
Start: 2024-02-06 | End: 2024-02-06 | Stop reason: HOSPADM

## 2024-02-06 RX ORDER — MAGNESIUM HYDROXIDE/ALUMINUM HYDROXICE/SIMETHICONE 120; 1200; 1200 MG/30ML; MG/30ML; MG/30ML
30 SUSPENSION ORAL EVERY 6 HOURS PRN
Start: 2024-02-06

## 2024-02-06 RX ORDER — METHOCARBAMOL 500 MG/1
500 TABLET, FILM COATED ORAL 3 TIMES DAILY
Qty: 42 TABLET | Refills: 0 | Status: SHIPPED | OUTPATIENT
Start: 2024-02-06 | End: 2024-02-20

## 2024-02-06 RX ORDER — HYDROMORPHONE HYDROCHLORIDE 2 MG/1
2 TABLET ORAL EVERY 6 HOURS PRN
Qty: 10 TABLET | Refills: 0 | Status: SHIPPED | OUTPATIENT
Start: 2024-02-06 | End: 2024-02-16

## 2024-02-06 RX ORDER — SUCRALFATE 1 G/1
1 TABLET ORAL
Qty: 56 TABLET | Refills: 0 | Status: SHIPPED | OUTPATIENT
Start: 2024-02-06 | End: 2024-02-20

## 2024-02-06 RX ORDER — ACETAMINOPHEN 325 MG/1
975 TABLET ORAL EVERY 8 HOURS SCHEDULED
Start: 2024-02-06

## 2024-02-06 RX ADMIN — HYDROMORPHONE HYDROCHLORIDE 0.5 MG: 1 INJECTION, SOLUTION INTRAMUSCULAR; INTRAVENOUS; SUBCUTANEOUS at 06:54

## 2024-02-06 RX ADMIN — PANTOPRAZOLE SODIUM 40 MG: 40 TABLET, DELAYED RELEASE ORAL at 06:55

## 2024-02-06 RX ADMIN — HYDROMORPHONE HYDROCHLORIDE 0.5 MG: 1 INJECTION, SOLUTION INTRAMUSCULAR; INTRAVENOUS; SUBCUTANEOUS at 00:06

## 2024-02-06 RX ADMIN — BISACODYL 10 MG: 5 TABLET, COATED ORAL at 08:44

## 2024-02-06 RX ADMIN — ALUMINUM HYDROXIDE, MAGNESIUM HYDROXIDE, AND SIMETHICONE 30 ML: 200; 200; 20 SUSPENSION ORAL at 08:44

## 2024-02-06 RX ADMIN — METHOCARBAMOL 500 MG: 500 TABLET ORAL at 08:44

## 2024-02-06 RX ADMIN — HYDROMORPHONE HYDROCHLORIDE 0.2 MG: 0.2 INJECTION, SOLUTION INTRAMUSCULAR; INTRAVENOUS; SUBCUTANEOUS at 08:44

## 2024-02-06 RX ADMIN — SUCRALFATE 1 G: 1 TABLET ORAL at 08:44

## 2024-02-06 RX ADMIN — GABAPENTIN 300 MG: 300 CAPSULE ORAL at 08:44

## 2024-02-06 RX ADMIN — ZOLPIDEM TARTRATE 10 MG: 5 TABLET ORAL at 00:07

## 2024-02-06 RX ADMIN — ENOXAPARIN SODIUM 40 MG: 40 INJECTION SUBCUTANEOUS at 08:46

## 2024-02-06 NOTE — ASSESSMENT & PLAN NOTE
Patient with history of esophagitis and gastritis on prior EGD in June 2022, biopsy was negative for H. pylori.  Additionally has history of ulcerative colitis maintained on medication, colonoscopy June 2023 without any active disease  Had previously been taking excess NSAIDs in setting of back pain as above  Saw GI on 1/30 for same complaints, was recommended to increase omeprazole to twice daily for 8 weeks and to continue avoiding NSAIDs.  Was recommended for CT of abdomen and pelvis if symptoms not improving  Had CT abdomen pelvis upon admission without any acute abnormalities  Recommend diet as tolerated, continuing PPI twice daily  Added as needed Maalox as well as Carafate 4 times daily  Recommend f/u GI for consideration of EGD

## 2024-02-06 NOTE — DISCHARGE SUMMARY
[facility]  Discharge- Toney Motley 1955, 68 y.o. male MRN: [id number]  Unit/Bed#: CW2 214-02 Encounter: [id number]  Primary Care Provider: Valeriano Torres MD   Date and time admitted to hospital: 2/5/2024  1:01 PM    * Lumbar radiculopathy  Assessment & Plan  Patient with chronic history of back pain, recently admitted in October for similar complaints when CT scan was remarkable for foraminal stenosis at the lumbosacral area. Patient was evaluated by neurosurgery during that admission and was recommended outpatient follow-up. He got second opinion from [facility], and is scheduled for T10-L5 back surgery in March.  Presented with acute worsening of his back pain  He also follows with PT and pain management in outpatient setting  Patient able to ambulate around halls here in hospital. Will dc with PO dilaudid and muscle relaxer. Recommend scheduled tylenol. Recommend avoiding NSAIDs and steroids given gastritis, see below   Continue other home medications  Recommend resuming PT outpatient and instructed him to call surgeon to see if able to get sooner f/u appointment     Gastritis  Assessment & Plan  Patient with history of esophagitis and gastritis on prior EGD in June 2022, biopsy was negative for H. pylori.  Additionally has history of ulcerative colitis maintained on medication, colonoscopy June 2023 without any active disease  Had previously been taking excess NSAIDs in setting of back pain as above  Saw GI on 1/30 for same complaints, was recommended to increase omeprazole to twice daily for 8 weeks and to continue avoiding NSAIDs.  Was recommended for CT of abdomen and pelvis if symptoms not improving  Had CT abdomen pelvis upon admission without any acute abnormalities  Recommend diet as tolerated, continuing PPI twice daily  Added as needed Maalox as well as Carafate 4 times daily  Recommend f/u GI for consideration of EGD     Anxiety  Assessment & Plan  Patient is  on Ambien as needed at home, PDMP reviewed.  Continue Ambien at home dosage.    Hypertension  Assessment & Plan  Per chart review patient was discharged on metoprolol however has not been taking at home, blood pressure well-controlled.  Hold on metoprolol for now.    Ulcerative colitis (HCC)  Assessment & Plan  Patient is on Colazal 2250 mg twice daily.  Alternative options related, he refused and would like to hold for 1 day.    Continue PPI at home dosage.          Medical Problems       Resolved Problems  Date Reviewed: 2/6/2024   None       Discharging Physician / Practitioner: Rossy Plummer PA-C  PCP: Valeriano Torres MD  Admission Date:   Admission Orders (From admission, onward)       Ordered        02/05/24 1731  Place in Observation  Once                          Discharge Date: 02/06/24    Consultations During Hospital Stay:  None    Procedures Performed:   CT abdomen pelvis:Posterior lumbar fixation hardware including bilateral transpedicular screws at the L3 and L4 levels with interconnected rods. Levoscoliosis, apex at L3.     IMPRESSION:     There is no acute abdominal/pelvic inflammatory process.     There is no evidence of bowel obstruction, free intraperitoneal air, or drainable ascites.     Stable hepatic hemangioma.    Significant Findings / Test Results:   See above    Incidental Findings:   See above     Test Results Pending at Discharge (will require follow up):   none     Outpatient Tests Requested:  F/u PCP  F/u neurosurgery  F/u pain management     Complications:  none    Reason for Admission: acute/chronic back pain as well as acute/chronic gastritis    Hospital Course:   Toney Motley is a 68 y.o. male patient who originally presented to the hospital on 2/5/2024 due to acute on chronic lumbar back pain as well as acute on chronic gastritis.    Please see above assessment and plan for specific details.  Ultimately, patient was discharged with short course of p.o. Dilaudid in place of his prior  oxycodone.  Recommended to resume muscle relaxer and add scheduled Tylenol.  He will need to call his surgeon to discuss further.  He already is established with GI and has plan for EGD if symptoms fail to improve related to his gastritis.    Please see above list of diagnoses and related plan for additional information.     Condition at Discharge: stable    Discharge Day Visit / Exam:   Subjective:  still with back pain but overall improved. Ambulating. Has ongoing abdominal bloating and nausea. No vomiting. Tolerates bland diet.   Vitals: Blood Pressure: 136/88 (02/06/24 0731)  Pulse: 55 (02/06/24 0731)  Temperature: 98 °F (36.7 °C) (02/06/24 0731)  Temp Source: Tympanic (02/05/24 1229)  Respirations: 18 (02/05/24 2348)  SpO2: 96 % (02/06/24 0731)  Exam:   Physical Exam  Vitals and nursing note reviewed.   Constitutional:       General: He is not in acute distress.     Comments: On RA    Cardiovascular:      Rate and Rhythm: Normal rate.   Pulmonary:      Effort: No respiratory distress.   Abdominal:      General: There is no distension.      Tenderness: There is no abdominal tenderness.   Musculoskeletal:      Right lower leg: No edema.      Left lower leg: No edema.      Comments: Seen ambulating in [place]    Neurological:      Mental Status: He is oriented to person, place, and time.   Psychiatric:         Mood and Affect: Mood normal.          Discussion with Family: Patient declined call to .     Discharge instructions/Information to patient and family:   See after visit summary for information provided to patient and family.      Provisions for Follow-Up Care:  See after visit summary for information related to follow-up care and any pertinent home health orders.      Mobility at time of Discharge:      HLM Goal achieved. Continue to encourage appropriate mobility.     Disposition:   Home    Planned Readmission: no     Discharge Statement:  I spent 34 minutes discharging the patient. This time  was spent on the day of discharge. I had direct contact with the patient on the day of discharge. Greater than 50% of the total time was spent examining patient, answering all patient questions, arranging and discussing plan of care with patient as well as directly providing post-discharge instructions.  Additional time then spent on discharge activities.    Discharge Medications:  See after visit summary for reconciled discharge medications provided to patient and/or family.      **Please Note: This note may have been constructed using a voice recognition system**

## 2024-02-06 NOTE — PHYSICAL THERAPY NOTE
Physical Therapy Evaluation     Patient's Name: Toney Motley    Admitting Diagnosis  Abdominal pain [R10.9]  Lumbar back pain [M54.50]    Problem List  Patient Active Problem List   Diagnosis    Pain in right hip    Ulcerative colitis (HCC)    Hypertension    Impaired mobility and activities of daily living    Facet arthropathy, lumbar    Status post lumbar spinal fusion    Hx of adenomatous polyp of colon    Ulcerative colitis with rectal bleeding (HCC)    Type III fracture of odontoid process (HCC)    Traumatic compression fracture of T2 thoracic vertebra (HCC)    Left elbow fracture    Sternal fracture    Fall    Acute pain due to trauma    NAFLD (nonalcoholic fatty liver disease)    Chronic bilateral low back pain with bilateral sciatica    Lumbar radiculopathy    Therapeutic opioid induced constipation    Chronic foot pain, right    Right leg pain    Chronic pain syndrome    Neuropathy    Peroneal neuropathy    Right foot drop    Tear of right supraspinatus tendon    Class 2 obesity in adult    Partial tear of right subscapularis tendon, subsequent encounter    Abnormal EKG    Positive D dimer    Anxiety    Gastritis       Past Medical History  Past Medical History:   Diagnosis Date    Back pain     Colon polyp     Hyperlipidemia     Lumbar fracture with cord injury (HCC)     Neck fracture (HCC)     Previous back surgery     rods in the back    Ulcerative colitis (HCC)        Past Surgical History  Past Surgical History:   Procedure Laterality Date    BACK SURGERY      LUMBAR FUSION Right 1/20/2016    Procedure: FUSION LUMBAR  POSTERIOR, TLIF L3-4  Right L3-4 Fascet;  Surgeon: Narayan Castro MD;  Location: [facility];  Service:     UT COLONOSCOPY FLX DX W/COLLJ SPEC WHEN PFRMD N/A 1/24/2019    Procedure: COLONOSCOPY;  Surgeon: Sanjeev Orellana III, MD;  Location: [facility];  Service: Gastroenterology    UT SURGICAL ARTHROSCOPY SHOULDER W/ROTATOR CUFF RPR Right 7/26/2023    Procedure: ARTHROSCOPY SHOULDER-  "Right shoulder Arthroscopy, supraspinatus and subscapularis repair, biceps tenotomy, extensive debridement;  Surgeon: Oscar Corbett DO;  Location: [facility];  Service: Orthopedics    TONSILLECTOMY          02/06/24 0940   PT Last Visit   PT Visit Date 02/06/24   Note Type   Note type Evaluation   Pain Assessment   Pain Assessment Tool 0-10   Pain Score 9   Pain Location/Orientation Location: Back   Hospital Pain Intervention(s) Ambulation/increased activity;Emotional support;Repositioned   Restrictions/Precautions   Weight Bearing Precautions Per Order No   Braces or Orthoses   (none)   Other Precautions   (masimo)   Home Living   Type of Home House   Home Layout Two level   Home Equipment Cane   Additional Comments Prior to this admission patient resided with spouse in a 2 level home. Most recently at this baseline he has been independent with mobility, ADLs, and iADLS. Walk in shower. SPC. Patient has had one aquatic therapy OPPT for his back pain and plans to continue at d/c   Prior Function   Level of Protection Independent with ADLs;Independent with functional mobility;Independent with IADLS   Lives With Spouse   Receives Help From Family   IADLs Independent with driving;Independent with meal prep;Independent with medication management   Falls in the last 6 months 0   General   Additional Pertinent History 68 y.o. male admitted to [facility] on 2/5/2024 with symptoms of: intractable back pain. Chronic x several months however worse in last few days.  MRI of lumbar spine from 12/14/2023: \"Moderate to advanced multilevel spondylosis and postoperative changes status post posterior fusion L3-4 noted. Spondylotic narrowing is similar to the prior study, severe at L2-3 on the right and L5-S1 on the left and moderate on the left at L4-5\". Per orthopedic note from 1/29/2024: \"At this time, I had a long discussion with the patient regarding options for treatment including continued nonoperative measures " "versus an operative intervention. I did discuss if he were to consider an operative intervention in my hands this would involve a posterior only T10 to the pelvis fusion with an L5-S1 TLIF procedure. I did discuss the rationale for the approach, as well as the primary goals, risks, benefits and alternatives. At this point, he would like to proceed with surgery. Will look to set up a surgical date and plan to see him back for preoperative visit. All questions were answered.\"   Family/Caregiver Present No   Cognition   Overall Cognitive Status WFL   Arousal/Participation Alert   Orientation Level Oriented X4   Memory Within functional limits   Following Commands Follows all commands and directions without difficulty   Subjective   Subjective \"I think I need to do the surgery\"   RUE Assessment   RUE Assessment WFL   LUE Assessment   LUE Assessment WFL   RLE Assessment   RLE Assessment WFL  (DF 3+/5; + foot drop (mild) on R LE)   LLE Assessment   LLE Assessment WFL   Bed Mobility   Supine to Sit 6  Modified independent   Sit to Supine 6  Modified independent   Transfers   Sit to Stand 7  Independent   Stand to Sit 7  Independent   Ambulation/Elevation   Gait pattern WNL  (mild reduction in gait speed; mild drop foot R LE)   Gait Assistance 7  Independent   Assistive Device None   Distance 75 feet x 2   Stair Management Assistance Not tested  (did not trial; educated patient to perform in non-reciprocal manner ascending with unaffected LE first and descending with affected LE first)   Balance   Static Sitting Normal   Static Standing Normal   Ambulatory Good   Endurance Deficit   Endurance Deficit Yes   Endurance Deficit Description back pain   Activity Tolerance   Activity Tolerance Patient tolerated treatment well   Nurse Made Aware luiz to see per RN Kaya   Assessment   Prognosis Good   Problem List Decreased mobility;Pain   Assessment PT completed evaluation of 68 y.o. male admitted to [facility] on 2/5/2024 " "with symptoms of: intractable back pain. Chronic x several months however worse in last few days.  MRI of lumbar spine from 12/14/2023: \"Moderate to advanced multilevel spondylosis and postoperative changes status post posterior fusion L3-4 noted. Spondylotic narrowing is similar to the prior study, severe at L2-3 on the right and L5-S1 on the left and moderate on the left at L4-5\". Per orthopedic note from 1/29/2024: \"At this time, I had a long discussion with the patient regarding options for treatment including continued nonoperative measures versus an operative intervention. I did discuss if he were to consider an operative intervention in my hands this would involve a posterior only T10 to the pelvis fusion with an L5-S1 TLIF procedure. I did discuss the rationale for the approach, as well as the primary goals, risks, benefits and alternatives. At this point, he would like to proceed with surgery. Will look to set up a surgical date and plan to see him back for preoperative visit. All questions were answered.\"     Patient's current emerging status includes ongoing back pain and continuous O2/HR monitoring. PMH is significant for lumbar radiculopathy, UC, prior back surgery(2016), and anxiety. Prior to this admission patient resided with spouse in a 2 level home. Most recently at this baseline he has been independent with mobility, ADLs, and iADLS. Walk in shower. SPC. Patient has had one aquatic therapy OPPT for his back pain and plans to continue at d/c.     Current impairments include decreased activity tolerance, limitation in iADL participation (due to pain) and gait deviations (reduced speed). During PT evaluation, patient currently is mod-I for bed mobility and independent for transfers and ambulation. He walked 75 feet x 2 presenting with reduced gait speed, no LOB.     PT educated patient on spinal precautions to assist in minimizing pain with activity (log rolling, no bending/lifting/twisting). Also " encouraged patient to continue to follow up with OPPT (as appropriate per physician) as patient has only gone one time. He presents without inpt PT needs and PT will sign off.   Goals   Patient Goals to have less pain in his back   PT Treatment Day 0   Plan   PT Frequency (S)  Other (Comment)  (d/c inpt PT)   Discharge Recommendation   Rehab Resource Intensity Level, PT III (Minimum Resource Intensity)  (resume ongoing OPPT)   Equipment Recommended   (none)   AM-PAC Basic Mobility Inpatient   Turning in Flat Bed Without Bedrails 4   Lying on Back to Sitting on Edge of Flat Bed Without Bedrails 4   Moving Bed to Chair 4   Standing Up From Chair Using Arms 4   Walk in Room 4   Climb 3-5 Stairs With Railing 4   Basic Mobility Inpatient Raw Score 24   Basic Mobility Standardized Score 57.68   Highest Level Of Mobility   JH-HLM Goal 8: Walk 250 feet or more   JH-HLM Achieved 7: Walk 25 feet or more     The patient's AM-PAC Basic Mobility Inpatient Standardized Score is greater than 42.9, suggesting this patient may benefit from discharge to home. Please also refer to the recommendation of the Physical Therapist for safe discharge planning.        Angela Rodríguez, PT, DPT

## 2024-02-06 NOTE — ASSESSMENT & PLAN NOTE
Patient with chronic history of back pain, recently admitted in October for similar complaints when CT scan was remarkable for foraminal stenosis at the lumbosacral area. Patient was evaluated by neurosurgery during that admission and was recommended outpatient follow-up. He got second opinion from [facility], and is scheduled for T10-L5 back surgery in March.  Presented with acute worsening of his back pain  He also follows with PT and pain management in outpatient setting  Patient able to ambulate around halls here in hospital. Will dc with PO dilaudid and muscle relaxer. Recommend scheduled tylenol. Recommend avoiding NSAIDs and steroids given gastritis, see below   Continue other home medications  Recommend resuming PT outpatient and instructed him to call surgeon to see if able to get sooner f/u appointment

## 2024-02-07 ENCOUNTER — APPOINTMENT (OUTPATIENT)
Dept: PHYSICAL THERAPY | Age: 69
End: 2024-02-07
Payer: MEDICARE

## 2024-02-08 ENCOUNTER — OFFICE VISIT (OUTPATIENT)
Dept: PHYSICAL THERAPY | Facility: CLINIC | Age: 69
End: 2024-02-08
Payer: MEDICARE

## 2024-02-08 DIAGNOSIS — M75.101 TEAR OF RIGHT SUPRASPINATUS TENDON: Primary | ICD-10-CM

## 2024-02-08 DIAGNOSIS — G89.29 CHRONIC MIDLINE LOW BACK PAIN WITHOUT SCIATICA: ICD-10-CM

## 2024-02-08 DIAGNOSIS — Z98.890 S/P ARTHROSCOPY OF RIGHT SHOULDER: ICD-10-CM

## 2024-02-08 DIAGNOSIS — M54.50 CHRONIC MIDLINE LOW BACK PAIN WITHOUT SCIATICA: ICD-10-CM

## 2024-02-08 PROCEDURE — 97110 THERAPEUTIC EXERCISES: CPT

## 2024-02-08 PROCEDURE — 97140 MANUAL THERAPY 1/> REGIONS: CPT

## 2024-02-08 PROCEDURE — 97530 THERAPEUTIC ACTIVITIES: CPT

## 2024-02-08 NOTE — PROGRESS NOTES
PT Discharge           Today's date: 2024  Patient name: Toney Motley  : 1955  MRN: [id number]  Referring provider: Oscar Corbett DO  Dx:   Encounter Diagnosis     ICD-10-CM    1. Tear of right supraspinatus tendon  M75.101       2. S/P arthroscopy of right shoulder  Z98.890       3. Chronic midline low back pain without sciatica  M54.50     G89.29           Start Time: 1550  Stop Time: 1628  Total time in clinic (min): 38 minutes    Assessment  Assessment details: Toney Motley is a pleasant 68 y.o. male who presents s/p R RCR on .  Over the last month, AROM and strength remain the same.  Pain levels remain unchanged.  At this time, patient has functional range and strength in his R shoulder.  Due to lack of progress at this time, further progress is not anticipated.  Patient should be discharged to HEP and gradual return to usual activities. Encouraged patient to contact me with any questions or concerns in the future.        Goals  STGs  Patient will be independent with HEP in 4 weeks -met  Patient will decrease pain by 2 points in 4 weeks -not met  LTGs  Patient will achieve R shoulder AROM equal to L in 8 weeks -not met  Patient will achieve R shoulder strength equal to L in 12 weeks -not met    Plan  Plan details: D/c to HEP  Treatment plan discussed with: patient        Subjective Evaluation    History of Present Illness  Date of surgery: 2023  Mechanism of injury: Pain location: R shoulder into anterior arm/biceps area  Pain severity: 4-0-8  Aggs: moving light wood, ER and ABD  Eases: rest  Irritability: moderate  MAVERICK/timeline: R RCR on  after suffering a fall resulting in supraspinatus tear, patient does note one instance since surgery when he was working on pipes at his house when he felt a sudden sharp pain  Red flags: none  PMH/PSH: lumbar fusion, hx of cervical fractures    - patient reports that he feels about 50% better since starting PT.  He is still having trouble picking  "up objects and reaching overhead    Back assessment  Pain location: L3-5 central lumbar  Pain severity: 0-0-10  Aggs: bending, lifting, twisting, walking  Eases: back brace  Irritability: high  MAVERICK/timeline: pains started about 6-7 years ago when he was picking heavy objects up at work when we felt something \"go\", patient states that he went to hospital and they performed 1 segment lumbar fusion the next day, back was good until recently when he had rotator cuff surgery on 23  Red flags: none   PMH/PSH: L3/4 lumbar fusion    - patient reports that he feels 0% better since starting PT  Patient Goals  Patient goal: get back to normal function  Pain  Current pain ratin  At best pain ratin  At worst pain ratin          Objective     Active Range of Motion     Right Shoulder   Flexion: 130 degrees with pain  Abduction: 110 degrees with pain  External rotation 0°: 60 degrees with pain    Lumbar   Flexion: 75 degrees  Restriction level: minimal  Extension: 50 degrees  with pain Restriction level: moderate  Left lateral flexion: 25 degrees    with pain Restriction level: maximal  Right lateral flexion: 25 degrees  with pain Restriction level: maximal  Left rotation: 5 degrees  with pain Restriction level: maximal  Right rotation: 5 degrees  Restriction level: maximal    Scapular Mobility     Right Shoulder   Scapular Mobility with Shoulder to 90° FF   Scapular elevation: severe    Joint Play     Pain: L1, L2, L3, L4, L5 and S1     Strength/Myotome Testing     Right Shoulder     Planes of Motion   Flexion: 3+   Abduction: 3   External rotation at 0°: 4   Internal rotation at 0°: 4+     Additional Strength Details  Deferred due to pain    Muscle Activation     Additional Muscle Activation Details  Fair TA contraction                     Precautions: HTN, anxiety, chronic pain syndrome, anxiety  DOS: 23    POC expires Unit limit Auth Expiration date PT/OT + Visit Limit?   24 BOMN 23 BOMN   RE " due 2/8                            Foto     1/8        Manuals 2/1 2/8 12/22 1/2 1/8 1/11 1/19 1/23 1/25 1/30   R shoulder mobs GS  Gr 4 post and inf GS  Gr 4  Post and inf GS  Gr 4 post and inf GS  Gr 4 inf  GS  Gr 4 infer GS  Gr 4  post GS  Gr 4 post and inf GS  Gr 4 post and inf GS  Gr 4 post and inf   R first rib mob                                       Neuro Re-Ed             Table top shoulder taps             Wall ball- up down, sides             Manual scap setting    2x10         Supine serratus press   X20  3#          TA contraction             Seated scap retraction     x20 x30       SL scap retraction X20 w manual resist   x20         ER MRE      2x15       Prone scap retraction    x20         Ther Ex             Table slide X20- flex, ABD      X20 w PB X20 flex and ABD X20 flex and ABD    TB row, ext   Keis  3x10  17#,  15# Keis  3x10  20# ext Row  Keis  3x10  22#        TB ER, IR Keis  3x10  5# Keis  3x10  4.5# Keis  3x10  4#, 6.5#  ER  Keis  3x10  6#  ER  Keis  3x10  6# ER  Keis  2x15  4# ER  Keis  2x15  4.5# ER  Keis  2x15  4.5#   Prone Y, T   x20          SL ER X30  4# X30  4# X20  3#   2x20  2# 2x15  2# 2x15  3# 2x15  3# 2x15  4#   Seated table ER      2x15  rtb 2x10  rtb      Prone shoulder ext  X30  4# X20  #3     2x15  4# 2x15  4#    Seated DB ER X30  4# X30  4# X30  3#   X30  3# X30  2# X30  3# X30  4# X30  4#   Wall slide             Standing scaption   Eccentric only 1# P!          Standing ABD X15  2# P!            Supine cane flexion X20  2#            AROM- flex, scaption, ABD   X20 P! Flex x10 verbal cues for scap setting     X20  1#  Flex, ABD X20 2#   Pt edu   GS- HEP  GS-HEP, progression of tissue healing  GS- soreness, HEP  GS- soreness, transition to HEP    PT re eval  GS           UBE 3'3' 3'3' 3'3' 3'3' 3'3' 3'3' 3'3 3'3' 3'3' 3'3'   Ther Activity             Table transfer w DB             [facility]one stacking 5x5  2# 5x5  2# 4x5  1#    4x5  1#  4x5  1# 5x5  1.5#   Gait Training                                        Modalities

## 2024-02-09 ENCOUNTER — OFFICE VISIT (OUTPATIENT)
Dept: PHYSICAL THERAPY | Age: 69
End: 2024-02-09
Payer: MEDICARE

## 2024-02-09 DIAGNOSIS — G89.29 CHRONIC MIDLINE LOW BACK PAIN WITHOUT SCIATICA: ICD-10-CM

## 2024-02-09 DIAGNOSIS — M54.50 CHRONIC MIDLINE LOW BACK PAIN WITHOUT SCIATICA: ICD-10-CM

## 2024-02-09 DIAGNOSIS — Z98.1 SPINAL ARTHRODESIS PRESENT: Primary | ICD-10-CM

## 2024-02-09 DIAGNOSIS — M41.25 OTHER IDIOPATHIC SCOLIOSIS, THORACOLUMBAR REGION: ICD-10-CM

## 2024-02-09 DIAGNOSIS — M54.50 LOW BACK PAIN ASSOCIATED WITH A SPINAL DISORDER OTHER THAN RADICULOPATHY OR SPINAL STENOSIS: ICD-10-CM

## 2024-02-09 PROCEDURE — 97113 AQUATIC THERAPY/EXERCISES: CPT

## 2024-02-09 NOTE — PROGRESS NOTES
Daily Note     Today's date: 2024  Patient name: Toney Motley  : 1955  MRN: [id number]  Referring provider: Dipak Orlando MD  Dx:   Encounter Diagnosis     ICD-10-CM    1. Spinal arthrodesis present  Z98.1       2. Low back pain associated with a spinal disorder other than radiculopathy or spinal stenosis  M54.50       3. Chronic midline low back pain without sciatica  M54.50     G89.29       4. Other idiopathic scoliosis, thoracolumbar region  M41.25           Start Time: 1500  Stop Time: 1600  Total time in clinic (min): 60 minutes    Subjective: pt notes sig LBP today. He did note temporary relief post session but as he was leaving the pain was coming back.       Objective: See treatment diary below      Assessment: Tolerated treatment fairly. Pt was able to tolerate gentle LE ex's and noodle ex's for back and posture. Good relief in DW. Used a small noodle for DWTX and DW ex's today. Some mild  R shoulder discomfort after DW w/ noodle. Relief noted post session but pain was coming back after he changed. Will progress slowly and w/ gentle ex's. Patient would benefit from continued PT      Plan: Continue per plan of care.      Precautions:  Daily Treatment Diary     Date           Patient education           Water Walk (FW, BW, side) x10’  10          LE work at wall               Hip FF/ext swing  2            Toe/heel  1            Hip ABD/ADD  2                        Knee flexion & extension 1            Squats 1          UE noodle x3             Push/pull              Rotate  1            Row forward & back  2            OH side bend            UE/Core (AROM, paddles, MB)              ABD/ADD              Horizontal Pec Fly              Alt. arm swing (shld flex/ext)              Push pull              Single paddle rotation           SLS (EO, EC, ball toss)            Aqua Step (FW, lat, eccentric)            Core work:              MF press (red, blue, blk)             Kickboard  press (blue, red)              Row/ext w/ tubing (red, blue, blk)           Seated on bench             ankle DF/PF  5''x5ea            March  1            ABD/ADD              Knee flexion/extension  1          DW TX - hang in the deep water  5,5            DW ABD/ADD  1            DW Bicycle  5            DW               DW DKTC  1          Stretches              HS at step  nv            Wall stretches              Calf stretch at wall              Other:            Hot Tub - 10 minutes only  10

## 2024-02-12 ENCOUNTER — NURSE TRIAGE (OUTPATIENT)
Age: 69
End: 2024-02-12

## 2024-02-12 ENCOUNTER — OFFICE VISIT (OUTPATIENT)
Dept: PHYSICAL THERAPY | Age: 69
End: 2024-02-12
Payer: MEDICARE

## 2024-02-12 ENCOUNTER — TELEPHONE (OUTPATIENT)
Age: 69
End: 2024-02-12

## 2024-02-12 DIAGNOSIS — M54.50 LOW BACK PAIN ASSOCIATED WITH A SPINAL DISORDER OTHER THAN RADICULOPATHY OR SPINAL STENOSIS: ICD-10-CM

## 2024-02-12 DIAGNOSIS — M75.101 TEAR OF RIGHT SUPRASPINATUS TENDON: ICD-10-CM

## 2024-02-12 DIAGNOSIS — M41.25 OTHER IDIOPATHIC SCOLIOSIS, THORACOLUMBAR REGION: ICD-10-CM

## 2024-02-12 DIAGNOSIS — Z98.1 SPINAL ARTHRODESIS PRESENT: Primary | ICD-10-CM

## 2024-02-12 DIAGNOSIS — G89.29 CHRONIC MIDLINE LOW BACK PAIN WITHOUT SCIATICA: ICD-10-CM

## 2024-02-12 DIAGNOSIS — Z98.890 S/P ARTHROSCOPY OF RIGHT SHOULDER: ICD-10-CM

## 2024-02-12 DIAGNOSIS — M54.50 CHRONIC MIDLINE LOW BACK PAIN WITHOUT SCIATICA: ICD-10-CM

## 2024-02-12 PROCEDURE — 97113 AQUATIC THERAPY/EXERCISES: CPT

## 2024-02-12 NOTE — PROGRESS NOTES
Daily Note     Today's date: 2024  Patient name: Toney Motley  : 1955  MRN: [id number]  Referring provider: Dipak Orlando MD  Dx:   Encounter Diagnosis     ICD-10-CM    1. Spinal arthrodesis present  Z98.1       2. Other idiopathic scoliosis, thoracolumbar region  M41.25       3. Low back pain associated with a spinal disorder other than radiculopathy or spinal stenosis  M54.50       4. Tear of right supraspinatus tendon  M75.101       5. Chronic midline low back pain without sciatica  M54.50     G89.29       6. S/P arthroscopy of right shoulder  Z98.890           Start Time: 1300  Stop Time: 1400  Total time in clinic (min): 60 minutes    Subjective: Patient reports he can't do much due to increased pain LB and R shoulder pain.        Objective: See treatment diary below      Assessment: Tolerated treatment well,  mild  R shoulder discomfort after DW w/ noodle.  No Relief noted post session, tialed ankle 2.5# weights with DWTX but was unable to tolerated 2° increased pain.  Will progress slowly and w/ gentle ex's.  Patient exhibited good technique with therapeutic exercises and would benefit from continued PT    Patient seen 1:1 for 40 minutes and rest of time group setting.        Plan: Continue per plan of care.      Precautions:  Daily Treatment Diary     Date          Patient education           Water Walk (FW, BW, side) x10’  10 10         LE work at wall               Hip FF/ext swing  2 2           Toe/heel  1 1           Hip ABD/ADD  2  1           Knee flexion & extension 1 1           Squats 1 1         UE noodle x3             Push/pull              Rotate  1 1           Row forward & back  2 2           OH side bend            UE/Core (AROM, paddles, MB)              ABD/ADD              Horizontal Pec Fly              Alt. arm swing (shld flex/ext)              Push pull              Single paddle rotation           SLS (EO, EC, ball toss)            Aqua  Step (FW, lat, eccentric)            Core work:              MF press (red, blue, blk)             Kickboard press (blue, red)              Row/ext w/ tubing (red, blue, blk)           Seated on bench             ankle DF/PF  5''x5ea 5''x5           March  1 1           ABD/ADD              Knee flexion/extension  1 1         DW TX - hang in the deep water  5,5 5' 5'           DW ABD/ADD  1 1           DW Bicycle  5 5           DW               DW DKTC  1 1         Stretches              HS at step  nv 2x           Wall stretches              Calf stretch at wall              Other:            Hot Tub - 10 minutes only  10 10

## 2024-02-12 NOTE — TELEPHONE ENCOUNTER
"Last ov 1/30/24 LUCINA Chen, Procedures colon 6/28/23 EGD 6/29/22, Labs 2/6/24, Imaging CT A/P 2/5/24 - recent office note is patient to contact if not improving on PPI and plan to repeat EGD.    Patient was in ER 2/5/24 for back pain and admitted for observation. Discharged on Maalox, Sucralfate for abdominal pain with no improvement. CT scan was completed in ER, please review. He would like to speak with you. I did inform patient that another provider may reach out.      Answer Assessment - Initial Assessment Questions  1. LOCATION: \"Where does it hurt?\"       Upper abdomen, in ER 3/5/24 and admitted for observation.    Protocols used: Abdominal Pain - Male-ADULT-[place]    "

## 2024-02-12 NOTE — TELEPHONE ENCOUNTER
Team. Please let patient know that Diana Chen is out until Wednesday. She mentioned she may schedule an EGD if he is not improving. CT was essentially unremarkable in the emergency room. Thanks!

## 2024-02-12 NOTE — TELEPHONE ENCOUNTER
Regarding: Symptom call  ----- Message from Magdalena Lovett sent at 2/12/2024  9:08 AM EST -----  Patient called in requesting to speak with someone regarding stomach pain. Patient can be reached at the number above, thank you.

## 2024-02-12 NOTE — TELEPHONE ENCOUNTER
Patients GI provider:  Roxann Chen    Number to return call: [phone]    Reason for call: Pt called in and would like to know if he should cancel the Ct scan patient has scheduled for 2/16/2024. Patient states he was in the hospital on 2/5/2024 and at the hospital a ct scan was performed. Please reach out to patient. Patient can be reached at the number above, thank you.    Scheduled procedure/appointment date if applicable: Apt/procedure n/a

## 2024-02-13 ENCOUNTER — PREP FOR PROCEDURE (OUTPATIENT)
Dept: GASTROENTEROLOGY | Facility: CLINIC | Age: 69
End: 2024-02-13

## 2024-02-13 DIAGNOSIS — R10.13 EPIGASTRIC PAIN: Primary | ICD-10-CM

## 2024-02-13 DIAGNOSIS — R11.0 NAUSEA: ICD-10-CM

## 2024-02-13 NOTE — TELEPHONE ENCOUNTER
Spoke with the patient and he scheduled his EGD. Prep instructions discussed verbally.    Scheduled date of EGD(as of today):2/20/24  Physician performing EGD:Esteban  Location of EGD:[facility]  Instructions reviewed with patient by:Milagro CUBA  Clearances:  none

## 2024-02-15 ENCOUNTER — OFFICE VISIT (OUTPATIENT)
Dept: PHYSICAL THERAPY | Age: 69
End: 2024-02-15
Payer: MEDICARE

## 2024-02-15 DIAGNOSIS — M41.25 OTHER IDIOPATHIC SCOLIOSIS, THORACOLUMBAR REGION: ICD-10-CM

## 2024-02-15 DIAGNOSIS — Z98.1 SPINAL ARTHRODESIS PRESENT: Primary | ICD-10-CM

## 2024-02-15 DIAGNOSIS — G89.29 CHRONIC MIDLINE LOW BACK PAIN WITHOUT SCIATICA: ICD-10-CM

## 2024-02-15 DIAGNOSIS — M75.101 TEAR OF RIGHT SUPRASPINATUS TENDON: ICD-10-CM

## 2024-02-15 DIAGNOSIS — M54.50 CHRONIC MIDLINE LOW BACK PAIN WITHOUT SCIATICA: ICD-10-CM

## 2024-02-15 DIAGNOSIS — M54.50 LOW BACK PAIN ASSOCIATED WITH A SPINAL DISORDER OTHER THAN RADICULOPATHY OR SPINAL STENOSIS: ICD-10-CM

## 2024-02-15 DIAGNOSIS — Z98.890 S/P ARTHROSCOPY OF RIGHT SHOULDER: ICD-10-CM

## 2024-02-15 PROCEDURE — 97113 AQUATIC THERAPY/EXERCISES: CPT

## 2024-02-15 NOTE — PROGRESS NOTES
Daily Note     Today's date: 2/15/2024  Patient name: Toney Motley  : 1955  MRN: [id number]  Referring provider: Dipak Orlando MD  Dx:   Encounter Diagnosis     ICD-10-CM    1. Spinal arthrodesis present  Z98.1       2. Tear of right supraspinatus tendon  M75.101       3. Other idiopathic scoliosis, thoracolumbar region  M41.25       4. Chronic midline low back pain without sciatica  M54.50     G89.29       5. Low back pain associated with a spinal disorder other than radiculopathy or spinal stenosis  M54.50       6. S/P arthroscopy of right shoulder  Z98.890           Start Time: 1500  Stop Time: 1600  Total time in clinic (min): 60 minutes    Subjective: patient reports he is getting a third opinion tomorrow for his back.       Objective: See treatment diary below      Assessment: Tolerated treatment fairly well today, patient was moving better in the water peter with water walking, modified program and had patient focus on forward walking due to increased pain with SS. Still having increased LBP throughout session.  Patient demonstrated fatigue post treatment and would benefit from continued PT      Plan: Continue per plan of care.  Progress as tolerated.      Precautions:  Daily Treatment Diary     Date 2/9 2/12 2/15        Patient education           Water Walk (FW, BW, side) x10’  10 10 10' Fwd        LE work at wall               Hip FF/ext swing  2 2 2 ff          Toe/heel  1 1 1          Hip ABD/ADD  2 2 2          March 1 1 1          Knee flexion & extension 1 1 1          Squats 1 1 1        UE noodle x3             Push/pull              Rotate  1 1 1          Row forward & back  2 2 2          OH side bend            UE/Core (AROM, paddles, MB)              ABD/ADD              Horizontal Pec Fly              Alt. arm swing (shld flex/ext)              Push pull              Single paddle rotation           SLS (EO, EC, ball toss)            Aqua Step (FW, lat, eccentric)            Core  "work:              MF press (red, blue, blk)             Kickboard press (blue, red)              Row/ext w/ tubing (red, blue, blk)           Seated on bench             ankle DF/PF  5''x5ea 5''x5 5'x6          March  1 1 1          ABD/ADD              Knee flexion/extension  1 1 1        DW TX - hang in the deep water  5,5 5' 5' 5' 5'           DW ABD/ADD  1 1 1'          DW Bicycle  5 5 5'          DW  ski   1'          DW DKTC  1 1 1'        Stretches              HS at step  nv 2x 2x          Wall stretches              Calf stretch at wall    3x10\"          Other:            Hot Tub - 10 minutes only  10 10 10                    "

## 2024-02-16 ENCOUNTER — TELEPHONE (OUTPATIENT)
Dept: NEUROSURGERY | Facility: CLINIC | Age: 69
End: 2024-02-16

## 2024-02-16 NOTE — TELEPHONE ENCOUNTER
Received call on nurseline from patient looking to come in an sign paperwork for his medical records. Attempted to return call, no answer, left detailed voicemail with phone number for medical records.

## 2024-02-19 ENCOUNTER — APPOINTMENT (OUTPATIENT)
Dept: PHYSICAL THERAPY | Age: 69
End: 2024-02-19
Payer: MEDICARE

## 2024-02-20 ENCOUNTER — ANESTHESIA (OUTPATIENT)
Dept: GASTROENTEROLOGY | Facility: HOSPITAL | Age: 69
End: 2024-02-20

## 2024-02-20 ENCOUNTER — ANESTHESIA EVENT (OUTPATIENT)
Dept: GASTROENTEROLOGY | Facility: HOSPITAL | Age: 69
End: 2024-02-20

## 2024-02-20 ENCOUNTER — HOSPITAL ENCOUNTER (OUTPATIENT)
Dept: GASTROENTEROLOGY | Facility: HOSPITAL | Age: 69
Setting detail: OUTPATIENT SURGERY
Discharge: HOME | End: 2024-02-20
Attending: INTERNAL MEDICINE
Payer: MEDICARE

## 2024-02-20 VITALS
RESPIRATION RATE: 16 BRPM | BODY MASS INDEX: 31.47 KG/M2 | OXYGEN SATURATION: 97 % | WEIGHT: 232.37 LBS | SYSTOLIC BLOOD PRESSURE: 116 MMHG | DIASTOLIC BLOOD PRESSURE: 78 MMHG | HEIGHT: 72 IN | TEMPERATURE: 97.9 F | HEART RATE: 70 BPM

## 2024-02-20 DIAGNOSIS — R10.13 EPIGASTRIC PAIN: ICD-10-CM

## 2024-02-20 DIAGNOSIS — R11.0 NAUSEA: ICD-10-CM

## 2024-02-20 PROCEDURE — 88305 TISSUE EXAM BY PATHOLOGIST: CPT | Performed by: PATHOLOGY

## 2024-02-20 RX ORDER — METOCLOPRAMIDE 10 MG/1
10 TABLET ORAL
Qty: 30 TABLET | Refills: 0 | Status: SHIPPED | OUTPATIENT
Start: 2024-02-20

## 2024-02-20 RX ORDER — SODIUM CHLORIDE, SODIUM LACTATE, POTASSIUM CHLORIDE, CALCIUM CHLORIDE 600; 310; 30; 20 MG/100ML; MG/100ML; MG/100ML; MG/100ML
INJECTION, SOLUTION INTRAVENOUS CONTINUOUS PRN
Status: DISCONTINUED | OUTPATIENT
Start: 2024-02-20 | End: 2024-02-20

## 2024-02-20 RX ORDER — PROPOFOL 10 MG/ML
INJECTION, EMULSION INTRAVENOUS AS NEEDED
Status: DISCONTINUED | OUTPATIENT
Start: 2024-02-20 | End: 2024-02-20

## 2024-02-20 RX ADMIN — SODIUM CHLORIDE, SODIUM LACTATE, POTASSIUM CHLORIDE, AND CALCIUM CHLORIDE: .6; .31; .03; .02 INJECTION, SOLUTION INTRAVENOUS at 13:02

## 2024-02-20 RX ADMIN — PROPOFOL 50 MG: 10 INJECTION, EMULSION INTRAVENOUS at 13:07

## 2024-02-20 RX ADMIN — PROPOFOL 150 MG: 10 INJECTION, EMULSION INTRAVENOUS at 13:05

## 2024-02-20 NOTE — ANESTHESIA PREPROCEDURE EVALUATION
Procedure:  EGD    Relevant Problems   CARDIO   (+) Hypertension      GI/HEPATIC   (+) NAFLD (nonalcoholic fatty liver disease)      MUSCULOSKELETAL   (+) Chronic bilateral low back pain with bilateral sciatica      NEURO/PSYCH   (+) Anxiety   (+) Chronic bilateral low back pain with bilateral sciatica   (+) Chronic foot pain, right   (+) Chronic pain syndrome      Digestive   (+) Ulcerative colitis (HCC)        Physical Exam    Airway    Mallampati score: I  TM Distance: >3 FB  Neck ROM: full     Dental       Cardiovascular  Cardiovascular exam normal    Pulmonary  Pulmonary exam normal     Other Findings        Anesthesia Plan  ASA Score- 2     Anesthesia Type- IV sedation with anesthesia with ASA Monitors.         Additional Monitors:     Airway Plan:            Plan Factors-Exercise tolerance (METS): >4 METS.    Chart reviewed. EKG reviewed. Imaging results reviewed. Existing labs reviewed. Patient summary reviewed.                  Induction- intravenous.    Postoperative Plan- Plan for postoperative opioid use. Planned trial extubation    Informed Consent- Anesthetic plan and risks discussed with patient.  I personally reviewed this patient with the CRNA. Discussed and agreed on the Anesthesia Plan with the CRNA..

## 2024-02-20 NOTE — ANESTHESIA POSTPROCEDURE EVALUATION
Post-Op Assessment Note    CV Status:  Stable  Pain Score: 0    Pain management: adequate       Mental Status:  Sleepy   Hydration Status:  Stable   PONV Controlled:  Controlled   Airway Patency:  Patent and adequate     Post Op Vitals Reviewed: Yes    No anethesia notable event occurred.    Staff: Anesthesiologist, CRNA               BP      Temp      Pulse     Resp      SpO2

## 2024-02-20 NOTE — INTERVAL H&P NOTE
H&P reviewed. After examining the patient I find no changes in the patients condition since the H&P had been written.    Vitals:    02/20/24 1207   BP: 149/88   Pulse: 70   Resp: 16   Temp: 97.7 °F (36.5 °C)   SpO2: 97%

## 2024-02-22 ENCOUNTER — OFFICE VISIT (OUTPATIENT)
Dept: PHYSICAL THERAPY | Age: 69
End: 2024-02-22
Payer: MEDICARE

## 2024-02-22 DIAGNOSIS — M54.50 LOW BACK PAIN ASSOCIATED WITH A SPINAL DISORDER OTHER THAN RADICULOPATHY OR SPINAL STENOSIS: Primary | ICD-10-CM

## 2024-02-22 DIAGNOSIS — Z98.1 SPINAL ARTHRODESIS PRESENT: ICD-10-CM

## 2024-02-22 DIAGNOSIS — G89.29 CHRONIC MIDLINE LOW BACK PAIN WITHOUT SCIATICA: ICD-10-CM

## 2024-02-22 DIAGNOSIS — M54.50 CHRONIC MIDLINE LOW BACK PAIN WITHOUT SCIATICA: ICD-10-CM

## 2024-02-22 DIAGNOSIS — M41.25 OTHER IDIOPATHIC SCOLIOSIS, THORACOLUMBAR REGION: ICD-10-CM

## 2024-02-22 PROCEDURE — 97113 AQUATIC THERAPY/EXERCISES: CPT

## 2024-02-22 NOTE — PROGRESS NOTES
Daily Note     Today's date: 2024  Patient name: Toney Motley  : 1955  MRN: [id number]  Referring provider: Dipak Orlando MD  Dx:   Encounter Diagnosis     ICD-10-CM    1. Low back pain associated with a spinal disorder other than radiculopathy or spinal stenosis  M54.50       2. Spinal arthrodesis present  Z98.1       3. Other idiopathic scoliosis, thoracolumbar region  M41.25       4. Chronic midline low back pain without sciatica  M54.50     G89.29           Start Time: 1300  Stop Time: 1400  Total time in clinic (min): 60 minutes    Subjective: pt notes he had a lot of pain this week in his back and legs. He missed his appt here due to sig pain. Today he c/o back pain 6-7/10.     Objective: See treatment diary below      Assessment: Tolerated treatment fairly well. Minimal relief in the water. Gentle movements and ex's to pt's tolerance. Small noodle used for ex's due to R shoulder discomfort using noodle. Patient would benefit from continued PT.       Plan: Continue per plan of care.      Precautions:  Daily Treatment Diary     Date 2/9 2/12 2/15 2/22       Patient education           Water Walk (FW, BW, side) x10’  10 10 10' Fwd 10'       LE work at wall               Hip FF/ext swing  2 2 2 ff 2         Toe/heel  1 1 1 1         Hip ABD/ADD  2 2 2 2         March 1 1 1 1         Knee flexion & extension 1 1 1 1         Squats 1 1 1 1       UE noodle x3             Push/pull              Rotate  1 1 1 1         Row forward & back  2 2 2 2         OH side bend            UE/Core (AROM, paddles, MB)              ABD/ADD              Horizontal Pec Fly              Alt. arm swing (shld flex/ext)              Push pull              Single paddle rotation           SLS (EO, EC, ball toss)            Aqua Step (FW, lat, eccentric)            Core work:              MF press (red, blue, blk)             Kickboard press (blue, red)              Row/ext w/ tubing (red, blue, blk)           Seated on  "bench             ankle DF/PF  5''x5ea 5''x5 5'x6 10''x5ea         March  1 1 1 1         ABD/ADD     1         Knee flexion/extension  1 1 1 1       DW TX - hang in the deep water  5,5 5' 5' 5' 5'  5,5         DW ABD/ADD  1 1 1' 1         DW Bicycle  5 5 5' 5         DW  ski   1'          DW DKTC  1 1 1'        Stretches              HS at step  nv 2x 2x 2         Wall stretches              Calf stretch at wall    3x10\" x4         Other:            Hot Tub - 10 minutes only  10 10 10 10                     "

## 2024-02-26 PROCEDURE — 88305 TISSUE EXAM BY PATHOLOGIST: CPT | Performed by: PATHOLOGY

## 2024-02-27 ENCOUNTER — TELEPHONE (OUTPATIENT)
Dept: GASTROENTEROLOGY | Facility: CLINIC | Age: 69
End: 2024-02-27

## 2024-02-27 NOTE — TELEPHONE ENCOUNTER
Attempted to call pt but no answer.  VM left with the biopsies results and to call us back if any questions.    Office # provided.        Please tell him the biopsies of his small intestine and his stomach and his esophagus were normal. This is good news.

## 2024-02-27 NOTE — TELEPHONE ENCOUNTER
SPOKE WITH PT, REVIEWED EGD BIOPSY RESULTS. PT REPORTS NO IMPROVEMENT IN EPIGASTRIC PAIN (WORSE IN THE AM), NAUSEA DESPITE TAKING REGLAN 10MG  AT DINNER, SUCRALFATE QID, OMEPRAZOLE BID. ANY RECOMMENDATIONS? PT SCHEDULED FOR F/U IN MAY.

## 2024-02-27 NOTE — TELEPHONE ENCOUNTER
----- Message from Sanjeev Orellana III, MD sent at 2/27/2024  7:30 AM EST -----  Please tell him the biopsies of his small intestine and his stomach and his esophagus were normal.  This is good news.

## 2024-02-28 ENCOUNTER — APPOINTMENT (OUTPATIENT)
Dept: PHYSICAL THERAPY | Age: 69
End: 2024-02-28
Payer: MEDICARE

## 2024-02-29 ENCOUNTER — OFFICE VISIT (OUTPATIENT)
Dept: PHYSICAL THERAPY | Age: 69
End: 2024-02-29
Payer: MEDICARE

## 2024-02-29 DIAGNOSIS — M54.50 CHRONIC MIDLINE LOW BACK PAIN WITHOUT SCIATICA: ICD-10-CM

## 2024-02-29 DIAGNOSIS — G89.29 CHRONIC MIDLINE LOW BACK PAIN WITHOUT SCIATICA: ICD-10-CM

## 2024-02-29 DIAGNOSIS — Z98.1 SPINAL ARTHRODESIS PRESENT: ICD-10-CM

## 2024-02-29 DIAGNOSIS — M54.50 LOW BACK PAIN ASSOCIATED WITH A SPINAL DISORDER OTHER THAN RADICULOPATHY OR SPINAL STENOSIS: Primary | ICD-10-CM

## 2024-02-29 DIAGNOSIS — M41.25 OTHER IDIOPATHIC SCOLIOSIS, THORACOLUMBAR REGION: ICD-10-CM

## 2024-02-29 PROCEDURE — 97113 AQUATIC THERAPY/EXERCISES: CPT

## 2024-02-29 NOTE — PROGRESS NOTES
Daily Note     Today's date: 2024  Patient name: Toney Motley  : 1955  MRN: [id number]  Referring provider: Dipak Orlando MD  Dx:   Encounter Diagnosis     ICD-10-CM    1. Low back pain associated with a spinal disorder other than radiculopathy or spinal stenosis  M54.50       2. Spinal arthrodesis present  Z98.1       3. Other idiopathic scoliosis, thoracolumbar region  M41.25       4. Chronic midline low back pain without sciatica  M54.50     G89.29           Start Time: 1130  Stop Time: 1233  Total time in clinic (min): 63 minutes    Subjective: pt notes some days are better than others. Sig pain continues depending on what he is doing.       Objective: See treatment diary below      Assessment: Tolerated treatment fair. Slow progression w/ program. Difficulty in DW due to R RTC repair in July. Patient would benefit from continued PT      Plan: Continue per plan of care.      Precautions:  Daily Treatment Diary     Date 2/9 2/12 2/15 2/22 2/29      Patient education           Water Walk (FW, BW, side) x10’  10 10 10' Fwd 10' 10,10      LE work at wall               Hip FF/ext swing  2 2 2 ff 2 2        Toe/heel  1 1 1 1 1        Hip ABD/ADD  2 2 2 2 2         1 1 1 1        Knee flexion & extension 1 1 1 1 1        Squats 1 1 1 1 1      UE noodle x3             Push/pull              Rotate  1 1 1 1 1        Row forward & back  2 2 2 2 2        OH side bend            UE/Core (AROM, paddles, MB)              ABD/ADD              Horizontal Pec Fly              Alt. arm swing (shld flex/ext)              Push pull              Single paddle rotation           SLS (EO, EC, ball toss)            Aqua Step (FW, lat, eccentric)            Core work:              MF press (red, blue, blk)             Kickboard press (blue, red)              Row/ext w/ tubing (red, blue, blk)           Seated on bench             ankle DF/PF  5''x5ea 5''x5 5'x6 10''x5ea x10ea         1 1 1 1         "ABD/ADD     1 1        Knee flexion/extension  1 1 1 1 1      DW TX - hang in the deep water  5,5 5' 5' 5' 5'  5,5 5        DW ABD/ADD  1 1 1' 1 2,        DW Bicycle  5 5 5' 5 5        DW  ski   1'          DW DKTC  1 1 1'        Stretches              HS at step  nv 2x 2x 2 2        Wall stretches              Calf stretch at wall    3x10\" x4 x4        Other:            Hot Tub - 10 minutes only  10 10 10 10 10                      "

## 2024-03-01 ENCOUNTER — OFFICE VISIT (OUTPATIENT)
Dept: PHYSICAL THERAPY | Age: 69
End: 2024-03-01
Payer: MEDICARE

## 2024-03-01 DIAGNOSIS — Z98.1 SPINAL ARTHRODESIS PRESENT: ICD-10-CM

## 2024-03-01 DIAGNOSIS — M75.101 TEAR OF RIGHT SUPRASPINATUS TENDON: ICD-10-CM

## 2024-03-01 DIAGNOSIS — M54.50 LOW BACK PAIN ASSOCIATED WITH A SPINAL DISORDER OTHER THAN RADICULOPATHY OR SPINAL STENOSIS: Primary | ICD-10-CM

## 2024-03-01 DIAGNOSIS — M41.25 OTHER IDIOPATHIC SCOLIOSIS, THORACOLUMBAR REGION: ICD-10-CM

## 2024-03-01 DIAGNOSIS — M54.50 CHRONIC MIDLINE LOW BACK PAIN WITHOUT SCIATICA: ICD-10-CM

## 2024-03-01 DIAGNOSIS — G89.29 CHRONIC MIDLINE LOW BACK PAIN WITHOUT SCIATICA: ICD-10-CM

## 2024-03-01 DIAGNOSIS — K29.70 GASTRITIS: ICD-10-CM

## 2024-03-01 DIAGNOSIS — Z98.890 S/P ARTHROSCOPY OF RIGHT SHOULDER: ICD-10-CM

## 2024-03-01 PROCEDURE — 97113 AQUATIC THERAPY/EXERCISES: CPT | Performed by: PHYSICAL THERAPIST

## 2024-03-01 RX ORDER — SUCRALFATE 1 G/1
1 TABLET ORAL 4 TIMES DAILY
Qty: 120 TABLET | Refills: 0 | Status: SHIPPED | OUTPATIENT
Start: 2024-03-01 | End: 2024-03-31

## 2024-03-01 NOTE — TELEPHONE ENCOUNTER
Patient called in today states that the sucralfate is helping a little and would like a refill.  He did 14 days.  I updated script to 30 days if you wanted to renew it.

## 2024-03-01 NOTE — PROGRESS NOTES
Daily Note     Today's date: 3/1/2024  Patient name: Toney Motley  : 1955  MRN: [id number]  Referring provider: Dipak Orlando MD  Dx:   Encounter Diagnosis     ICD-10-CM    1. Low back pain associated with a spinal disorder other than radiculopathy or spinal stenosis  M54.50       2. Chronic midline low back pain without sciatica  M54.50     G89.29       3. Spinal arthrodesis present  Z98.1       4. Tear of right supraspinatus tendon  M75.101       5. Other idiopathic scoliosis, thoracolumbar region  M41.25       6. S/P arthroscopy of right shoulder  Z98.890           Start Time: 1000  Stop Time: 1100  Total time in clinic (min): 60 minutes    Subjective: patient complains of back pain at 7/10      Objective: See treatment diary below      Assessment: Tolerated treatment fair. Patient demonstrated fatigue post treatment, exhibited good technique with therapeutic exercises, and would benefit from continued PT, patient ambulates with forward flexed posture still with complaints of right foot pain and right ankle weakness but some relief with DEW TX        Plan: Progress treatment as tolerated.       Precautions:  Daily Treatment Diary     Date 2/9 2/12 2/15 2/22 2/29 3/1     Patient education           Water Walk (FW, BW, side) x10’  10 10 10' Fwd 10' 10,10 10     LE work at wall               Hip FF/ext swing  2 2 2 ff 2 2 2       Toe/heel  1 1 1 1 1 1       Hip ABD/ADD  2 2 2 2 2 2        1 1 1 1 1       Knee flexion & extension 1 1 1 1 1 1       Squats 1 1 1 1 1 1     UE noodle x3             Push/pull              Rotate  1 1 1 1 1 1       Row forward & back  2 2 2 2 2 2       OH side bend            UE/Core (AROM, paddles, MB)              ABD/ADD              Horizontal Pec Fly              Alt. arm swing (shld flex/ext)              Push pull              Single paddle rotation           SLS (EO, EC, ball toss)            Aqua Step (FW, lat, eccentric)            Core work:                "press (red, blue, blk)             Kickboard press (blue, red)              Row/ext w/ tubing (red, blue, blk)           Seated on bench             ankle DF/PF  5''x5ea 5''x5 5'x6 10''x5ea x10ea x10       March  1 1 1 1 1 1       ABD/ADD     1 1 1       Knee flexion/extension  1 1 1 1 1 1     DW TX - hang in the deep water  5,5 5' 5' 5' 5'  5,5 5 5       DW ABD/ADD  1 1 1' 1 2, 2       DW Bicycle  5 5 5' 5 5 5       DW  ski   1'          DW DKTC  1 1 1'        Stretches              HS at step  nv 2x 2x 2 2 2       Wall stretches              Calf stretch at wall    3x10\" x4 x4 x4       Other:            Hot Tub - 10 minutes only  10 10 10 10 10 10                       "

## 2024-03-06 ENCOUNTER — APPOINTMENT (OUTPATIENT)
Dept: PHYSICAL THERAPY | Age: 69
End: 2024-03-06
Payer: MEDICARE

## 2024-03-07 DIAGNOSIS — R10.13 EPIGASTRIC PAIN: Primary | ICD-10-CM

## 2024-03-07 RX ORDER — TEMAZEPAM 30 MG/1
CAPSULE ORAL
COMMUNITY
Start: 2024-02-06

## 2024-03-07 RX ORDER — DIAZEPAM 10 MG/1
TABLET ORAL
COMMUNITY
Start: 2024-03-06

## 2024-03-07 RX ORDER — DICYCLOMINE HCL 20 MG
20 TABLET ORAL EVERY 6 HOURS
Qty: 45 TABLET | Refills: 2 | Status: SHIPPED | OUTPATIENT
Start: 2024-03-07

## 2024-03-07 RX ORDER — ZOLPIDEM TARTRATE 12.5 MG/1
12.5 TABLET, FILM COATED, EXTENDED RELEASE ORAL EVERY EVENING
COMMUNITY
Start: 2024-02-06

## 2024-03-07 RX ORDER — QUETIAPINE FUMARATE 100 MG/1
TABLET, FILM COATED ORAL
COMMUNITY
Start: 2024-03-05

## 2024-03-08 ENCOUNTER — OFFICE VISIT (OUTPATIENT)
Dept: PHYSICAL THERAPY | Age: 69
End: 2024-03-08
Payer: MEDICARE

## 2024-03-08 ENCOUNTER — OFFICE VISIT (OUTPATIENT)
Dept: GASTROENTEROLOGY | Facility: CLINIC | Age: 69
End: 2024-03-08
Payer: MEDICARE

## 2024-03-08 ENCOUNTER — NURSE TRIAGE (OUTPATIENT)
Age: 69
End: 2024-03-08

## 2024-03-08 ENCOUNTER — TELEPHONE (OUTPATIENT)
Dept: GASTROENTEROLOGY | Facility: CLINIC | Age: 69
End: 2024-03-08

## 2024-03-08 VITALS
RESPIRATION RATE: 18 BRPM | HEIGHT: 72 IN | DIASTOLIC BLOOD PRESSURE: 82 MMHG | BODY MASS INDEX: 31.56 KG/M2 | HEART RATE: 80 BPM | TEMPERATURE: 98 F | SYSTOLIC BLOOD PRESSURE: 122 MMHG | WEIGHT: 233 LBS | OXYGEN SATURATION: 98 %

## 2024-03-08 DIAGNOSIS — Z98.1 SPINAL ARTHRODESIS PRESENT: ICD-10-CM

## 2024-03-08 DIAGNOSIS — R79.9 ABNORMAL FINDING OF BLOOD CHEMISTRY, UNSPECIFIED: ICD-10-CM

## 2024-03-08 DIAGNOSIS — M54.50 LOW BACK PAIN ASSOCIATED WITH A SPINAL DISORDER OTHER THAN RADICULOPATHY OR SPINAL STENOSIS: Primary | ICD-10-CM

## 2024-03-08 DIAGNOSIS — G89.29 CHRONIC MIDLINE LOW BACK PAIN WITHOUT SCIATICA: ICD-10-CM

## 2024-03-08 DIAGNOSIS — R63.4 UNINTENTIONAL WEIGHT LOSS: ICD-10-CM

## 2024-03-08 DIAGNOSIS — F40.240 CLAUSTROPHOBIA: Primary | ICD-10-CM

## 2024-03-08 DIAGNOSIS — M54.50 CHRONIC MIDLINE LOW BACK PAIN WITHOUT SCIATICA: ICD-10-CM

## 2024-03-08 DIAGNOSIS — M41.25 OTHER IDIOPATHIC SCOLIOSIS, THORACOLUMBAR REGION: ICD-10-CM

## 2024-03-08 DIAGNOSIS — Z98.890 S/P ARTHROSCOPY OF RIGHT SHOULDER: ICD-10-CM

## 2024-03-08 DIAGNOSIS — R63.39 FOOD AVERSION: ICD-10-CM

## 2024-03-08 DIAGNOSIS — R10.13 EPIGASTRIC PAIN: Primary | ICD-10-CM

## 2024-03-08 DIAGNOSIS — R11.0 NAUSEA: ICD-10-CM

## 2024-03-08 DIAGNOSIS — M75.101 TEAR OF RIGHT SUPRASPINATUS TENDON: ICD-10-CM

## 2024-03-08 PROCEDURE — G2211 COMPLEX E/M VISIT ADD ON: HCPCS | Performed by: PHYSICIAN ASSISTANT

## 2024-03-08 PROCEDURE — 99214 OFFICE O/P EST MOD 30 MIN: CPT | Performed by: PHYSICIAN ASSISTANT

## 2024-03-08 PROCEDURE — 97150 GROUP THERAPEUTIC PROCEDURES: CPT

## 2024-03-08 PROCEDURE — 97113 AQUATIC THERAPY/EXERCISES: CPT

## 2024-03-08 RX ORDER — LORAZEPAM 1 MG/1
TABLET ORAL
Qty: 2 TABLET | Refills: 0 | Status: SHIPPED | OUTPATIENT
Start: 2024-03-08

## 2024-03-08 NOTE — PROGRESS NOTES
[facility]  Toney Motley 68 y.o. male MRN: [id number]       CC: Follow-up after EGD    HPI: Toney is a 68-year-old male with history of ulcerative colitis on balsalazide, lumbar radiculopathy, and nonalcoholic fatty liver disease.  Patient presents to the office today for follow-up.  Patient was seen by my colleague, Angela Chen PA-C, for abdominal pain.  Patient reports that his symptoms started at the end of last year.  He has lost about 40 pounds overall.  In the beginning, his weight loss was intentional, but lately he has been unable to eat regular meals without having abdominal pain and nausea.  Patient reports that he has abdominal pain that wakes him from sleep.  The abdominal pain does not radiate to his back.  I do not see a recent hemoglobin A1c or TSH.  Positive for weight loss    Patient was sent for abdominal ultrasound in January, which was normal.  He also underwent a CT scan, which did not show any acute abdominal pathology.  In 2021, he underwent a gastric emptying study, which was normal.    Last EGD was in February 2024 revealing LA class A esophagitis and mild erosive gastritis.  Biopsies were negative.  Last colonoscopy was in June 2023 revealing normal colon mucosa and several scattered pseudopolyps.  Patient was recommended a 5-year recall secondary to personal history of inflammatory bowel disease.    Review of Systems:    CONSTITUTIONAL: Denies any fever, chills, or rigors. Positive for weight loss  HEENT: No earache or tinnitus. Denies hearing loss or visual disturbances.  CARDIOVASCULAR: No chest pain or palpitations.   RESPIRATORY: Denies any cough, hemoptysis, shortness of breath or dyspnea on exertion.  GASTROINTESTINAL: As noted in the History of Present Illness.   GENITOURINARY: No problems with urination. Denies any hematuria or dysuria.  NEUROLOGIC: No dizziness or vertigo, denies headaches.   MUSCULOSKELETAL: Denies any muscle or joint pain.   SKIN: Denies skin  rashes or itching.   ENDOCRINE: Denies excessive thirst. Denies intolerance to heat or cold.  PSYCHOSOCIAL: Denies depression or anxiety. Denies any recent memory loss.       Current Outpatient Medications   Medication Sig Dispense Refill    diazepam (VALIUM) 10 mg tablet       QUEtiapine (SEROquel) 100 mg tablet       temazepam (RESTORIL) 30 mg capsule TAKE 2 CAPSULES BY MOUTH AT BEDTIME FOR 30 DAYS      zolpidem (AMBIEN CR) 12.5 MG CR tablet Take 12.5 mg by mouth every evening      acetaminophen (TYLENOL) 325 mg tablet Take 3 tablets (975 mg total) by mouth every 8 (eight) hours      aluminum-magnesium hydroxide-simethicone (MAALOX) 4573-7273-472 mg/30 mL suspension Take 30 mL by mouth every 6 (six) hours as needed for indigestion or heartburn      balsalazide (COLAZAL) 750 mg capsule TAKE 3 CAPSULES BY MOUTH TWICE DAILY 540 capsule 1    dicyclomine (BENTYL) 20 mg tablet Take 1 tablet (20 mg total) by mouth every 6 (six) hours 45 tablet 2    DULoxetine (CYMBALTA) 60 mg delayed release capsule Take 60 mg by mouth daily      ezetimibe (ZETIA) 10 mg tablet Take 10 mg by mouth daily      gabapentin (NEURONTIN) 300 mg capsule Take 1 capsule (300 mg total) by mouth 3 (three) times a day 90 capsule 0    methocarbamol (ROBAXIN) 500 mg tablet Take 1 tablet (500 mg total) by mouth 3 (three) times a day for 14 days 42 tablet 0    metoclopramide (Reglan) 10 mg tablet Take 1 tablet (10 mg total) by mouth daily after dinner 30 tablet 0    metoprolol succinate (TOPROL-XL) 25 mg 24 hr tablet Take 25 mg by mouth daily      naloxone (NARCAN) 4 mg/0.1 mL nasal spray       omeprazole (PriLOSEC) 40 MG capsule Take 1 capsule (40 mg total) by mouth 2 (two) times a day 60 capsule 1    sucralfate (CARAFATE) 1 g tablet Take 1 tablet (1 g total) by mouth 4 (four) times a day 120 tablet 0    zolpidem (AMBIEN) 10 mg tablet TAKE 1 TABLET BY MOUTH NIGHTLY AS NEEDED FOR SLEEP       No current facility-administered medications for this visit.      Past Medical History:   Diagnosis Date    Back pain     Colon polyp     Hyperlipidemia     Lumbar fracture with cord injury (HCC)     Neck fracture (HCC)     Previous back surgery     rods in the back    Ulcerative colitis (HCC)      Past Surgical History:   Procedure Laterality Date    BACK SURGERY      LUMBAR FUSION Right 1/20/2016    Procedure: FUSION LUMBAR  POSTERIOR, TLIF L3-4  Right L3-4 Fascet;  Surgeon: Narayan Castro MD;  Location:  MAIN OR;  Service:     TN COLONOSCOPY FLX DX W/COLLJ SPEC WHEN PFRMD N/A 1/24/2019    Procedure: COLONOSCOPY;  Surgeon: Sanjeev Orellana III, MD;  Location: [facility];  Service: Gastroenterology    TN SURGICAL ARTHROSCOPY SHOULDER W/ROTATOR CUFF RPR Right 7/26/2023    Procedure: ARTHROSCOPY SHOULDER- Right shoulder Arthroscopy, supraspinatus and subscapularis repair, biceps tenotomy, extensive debridement;  Surgeon: Oscar Corbett DO;  Location: [facility];  Service: Orthopedics    TONSILLECTOMY       Social History     Socioeconomic History    Marital status: /Civil Union     Spouse name: Not on file    Number of children: Not on file    Years of education: Not on file    Highest education level: Not on file   Occupational History    Not on file   Tobacco Use    Smoking status: Never    Smokeless tobacco: Never   Vaping Use    Vaping status: Never Used   Substance and Sexual Activity    Alcohol use: Not Currently     Comment: rarely    Drug use: No    Sexual activity: Not Currently   Other Topics Concern    Not on file   Social History Narrative    Not on file     Social Determinants of Health     Financial Resource Strain: Low Risk  (10/6/2023)    Overall Financial Resource Strain (CARDIA)     Difficulty of Paying Living Expenses: Not hard at all   Food Insecurity: Not on file   Transportation Needs: No Transportation Needs (10/6/2023)    PRAPARE - Transportation     Lack of Transportation (Medical): No     Lack of Transportation (Non-Medical): No   Physical  Activity: Not on file   Stress: Not on file   Social Connections: Not on file   Intimate Partner Violence: Not on file   Housing Stability: Not on file     Family History   Problem Relation Age of Onset    Parkinsonism Mother     Lung cancer Father             PHYSICAL EXAM:    There were no vitals filed for this visit.  General Appearance:   Alert and oriented x 3. Cooperative, and in no respiratory distress   HEENT:   Normocephalic, atraumatic, anicteric.     Neck:  Supple, symmetrical, trachea midline   Lungs:   Clear to auscultation bilaterally    Heart::   Regular rate and rhythm   Abdomen:   Soft, non-tender, non-distended; normal bowel sounds; no masses, no organomegaly    Genitalia:   Deferred    Rectal:   Deferred    Extremities:  No cyanosis, clubbing or edema    Pulses:  2+ and symmetric all extremities    Skin:  Skin color, texture, turgor normal, no rashes or lesions    Lymph nodes:  No palpable cervical or supraclavicular lymphadenopathy        Lab Results:   Results from last 6 Months   Lab Units 02/06/24  0511 02/05/24  1413   WBC Thousand/uL 6.59 8.07   HEMOGLOBIN g/dL 15.1 14.8   HEMATOCRIT % 46.6 45.7   PLATELETS Thousands/uL 207 234   NEUTROS PCT %  --  74   LYMPHS PCT %  --  20   MONOS PCT %  --  5   EOS PCT %  --  1     Results from last 6 Months   Lab Units 02/06/24  0511   POTASSIUM mmol/L 3.8   CHLORIDE mmol/L 104   CO2 mmol/L 20*   BUN mg/dL 13   CREATININE mg/dL 0.80   CALCIUM mg/dL 8.3*   ALK PHOS U/L 35   ALT U/L 5*   AST U/L 13         Results from last 6 Months   Lab Units 02/05/24  1413   LIPASE u/L 22       Imaging Studies:   EGD    Result Date: 2/20/2024  Impression: LA grade a reflux esophagitis Mild nonerosive gastritis RECOMMENDATION:  Await pathology results Continue omeprazole Continue Carafate course Reglan 10 mg at bedtime for 1 month Reflux precautions and weight loss   Sanjeev Orellana III, MD       ASSESSMENT and PLAN:      1) Epigastric pain, unintentional weight loss,  "and nausea - In the past, patient had epigastric pain that improved after he was on a regular bowel regimen.  Patient reports that even despite having regular bowel movements, he has new onset more severe epigastric pain with nausea.  Patient had a negative gastric emptying study in the past, had a recent CT and ultrasound without explanation for symptoms.  Patient had mild nonerosive gastritis on his recent endoscopy.  My colleague already had the patient scheduled for a mesenteric Doppler.  He was never a smoker or heavy drinker.  There is no family history of GI malignancy to his knowledge.  His son actually is having his gallbladder removed soon.  - Check A1c and TSH  - We will plan for MRI/MRCP to rule out occult malignancy, patient agrees with this plan  - I asked the patient to keep his appointment for the mesenteric Doppler as well  - We can consider HIDA scan with [facility] in the future, however he is on pain medication due to increased back pain  - For now, I asked the patient to stay on Reglan, we went over blackbox warning as he was not aware    2) UC - In remission, has been on balsalazide for many years.  Encouraged the patient to continue taking this.      Follow up in 1 month.    Portions of the record may have been created with voice recognition software.  Occasional wrong word or \"sound a like\" substitutions may have occurred due to the inherent limitations of voice recognition software.  Read the chart carefully and recognize, using context, where substitutions have occurred.  "

## 2024-03-08 NOTE — TELEPHONE ENCOUNTER
"I reviewed office visit note and no mention of specific dietary changes. Could you advise on what was discussed in order to help patient? Thanks      Answer Assessment - Initial Assessment Questions  1. REASON FOR CALL or QUESTION: \"What is your reason for calling today?\" or \"How can I best help you?\" or \"What question do you have that I can help answer?\"      Patient called regarding dietary changes discussed at office visit.    Protocols used: Information Only Call - No Triage-ADULT-[place]    "
----- Message from Albert Cannon MA sent at 3/8/2024  1:59 PM EST -----  Pt went over today a diet change with the dr. Pt wants to confirm everything of what he is not able to eat    
I spoke with patient and reviewed dietary recommendations.   
When I already signed my note, the patient came back to ask me about diet changes, we talked about low fiber, like avoiding raw fruits and vegetables for now only cooked or boiled down.  We also talked about avoiding fatty cuts of meat like steak and pork.  
Detail Level: Generalized

## 2024-03-08 NOTE — TELEPHONE ENCOUNTER
----- Message from Angela Dorsey PA-C sent at 3/8/2024  9:34 AM EST -----  Please schedule an urgent MRI within 7 days at the [facility].  Thank you!!

## 2024-03-08 NOTE — TELEPHONE ENCOUNTER
Called and spoke with pt. Provided date, time and address of MRI w wo contrast and mrcp. PT asked to see if he can get something sent to the pharmacy to calm him down prior to getting imaging.    Routing to pa

## 2024-03-08 NOTE — PROGRESS NOTES
Daily Note     Today's date: 3/8/2024  Patient name: Toney Motley  : 1955  MRN: [id number]  Referring provider: Dipak Orlando MD  Dx:   Encounter Diagnosis     ICD-10-CM    1. Low back pain associated with a spinal disorder other than radiculopathy or spinal stenosis  M54.50       2. Other idiopathic scoliosis, thoracolumbar region  M41.25       3. Chronic midline low back pain without sciatica  M54.50     G89.29       4. Spinal arthrodesis present  Z98.1           Start Time: 1415  Stop Time: 1505  Total time in clinic (min): 50 minutes    Subjective: pt notes R shoulder is bothering him a lot. No sig change w/ back pain. He states he can't do much in the deep water because of his shoulder. Pt is still debating about going to the [facility] for surgery or staying somewhere local.       Objective: See treatment diary below      Assessment: Tolerated treatment fairly. Difficulty w/ DWTX and ex's due to R shoulder pain using a small noodle in DW. Pt showing improved tolerance for seated and standing ex's for LE strengthening. Improved gait in the water but no sig change w/ symptoms that can come and go. Patient would benefit from continued PT      Plan: Continue per plan of care.      Precautions:  Daily Treatment Diary     Date 2/9 2/12 2/15 2/22 2/29 3/1 3/8    Patient education           Water Walk (FW, BW, side) x10’  10 10 10' Fwd 10' 10,10 10 10,5    LE work at wall               Hip FF/ext swing  2 2 2 ff 2 2 2 2      Toe/heel  1 1 1 1 1 1 1      Hip ABD/ADD  2 2 2 2 2 2 2       1 1 1 1 1 1      Knee flexion & extension 1 1 1 1 1 1 1      Squats 1 1 1 1 1 1 1    UE noodle x3             Push/pull              Rotate  1 1 1 1 1 1 1      Row forward & back  2 2 2 2 2 2 2      OH side bend            UE/Core (AROM, paddles, MB)              ABD/ADD              Horizontal Pec Fly              Alt. arm swing (shld flex/ext)              Push pull              Single paddle rotation          "  SLS (EO, EC, ball toss)            Aqua Step (FW, lat, eccentric)            Core work:              MF press (red, blue, blk)             Kickboard press (blue, red)              Row/ext w/ tubing (red, blue, blk)           Seated on bench             ankle DF/PF  5''x5ea 5''x5 5'x6 10''x5ea x10ea x10 2      March  1 1 1 1 1 1 1      ABD/ADD     1 1 1 1      Knee flexion/extension  1 1 1 1 1 1 1    DW TX - hang in the deep water  5,5 5' 5' 5' 5'  5,5 5 5 5      DW ABD/ADD  1 1 1' 1 2, 2       DW Bicycle  5 5 5' 5 5 5 2,2      DW  ski   1'          DW DKTC  1 1 1'        Stretches              HS at step  nv 2x 2x 2 2 2 2      Wall stretches              Calf stretch at wall    3x10\" x4 x4 x4       Other:            Hot Tub - 10 minutes only  10 10 10 10 10 10 6                        "

## 2024-03-09 ENCOUNTER — APPOINTMENT (OUTPATIENT)
Dept: LAB | Facility: HOSPITAL | Age: 69
End: 2024-03-09
Payer: MEDICARE

## 2024-03-09 DIAGNOSIS — R10.13 EPIGASTRIC PAIN: ICD-10-CM

## 2024-03-09 DIAGNOSIS — R63.39 FOOD AVERSION: ICD-10-CM

## 2024-03-09 DIAGNOSIS — R79.9 ABNORMAL FINDING OF BLOOD CHEMISTRY, UNSPECIFIED: ICD-10-CM

## 2024-03-09 DIAGNOSIS — R11.0 NAUSEA: ICD-10-CM

## 2024-03-09 DIAGNOSIS — R63.4 UNINTENTIONAL WEIGHT LOSS: ICD-10-CM

## 2024-03-09 LAB
EST. AVERAGE GLUCOSE BLD GHB EST-MCNC: 117 MG/DL
HBA1C MFR BLD: 5.7 %
TSH SERPL DL<=0.05 MIU/L-ACNC: 2.67 UIU/ML (ref 0.45–4.5)

## 2024-03-09 PROCEDURE — 84443 ASSAY THYROID STIM HORMONE: CPT

## 2024-03-09 PROCEDURE — 36415 COLL VENOUS BLD VENIPUNCTURE: CPT

## 2024-03-09 PROCEDURE — 83036 HEMOGLOBIN GLYCOSYLATED A1C: CPT

## 2024-03-11 ENCOUNTER — TELEPHONE (OUTPATIENT)
Dept: GASTROENTEROLOGY | Facility: CLINIC | Age: 69
End: 2024-03-11

## 2024-03-11 ENCOUNTER — NURSE TRIAGE (OUTPATIENT)
Age: 69
End: 2024-03-11

## 2024-03-11 ENCOUNTER — HOSPITAL ENCOUNTER (OUTPATIENT)
Dept: MRI IMAGING | Facility: HOSPITAL | Age: 69
Discharge: HOME | End: 2024-03-11
Payer: MEDICARE

## 2024-03-11 ENCOUNTER — APPOINTMENT (OUTPATIENT)
Dept: OCCUPATIONAL THERAPY | Facility: CLINIC | Age: 69
End: 2024-03-11
Payer: MEDICARE

## 2024-03-11 DIAGNOSIS — R63.39 FOOD AVERSION: ICD-10-CM

## 2024-03-11 DIAGNOSIS — R10.13 EPIGASTRIC PAIN: ICD-10-CM

## 2024-03-11 DIAGNOSIS — R11.0 NAUSEA: ICD-10-CM

## 2024-03-11 DIAGNOSIS — R63.4 UNINTENTIONAL WEIGHT LOSS: ICD-10-CM

## 2024-03-11 PROCEDURE — A9585 GADOBUTROL INJECTION: HCPCS | Performed by: PHYSICIAN ASSISTANT

## 2024-03-11 PROCEDURE — 74183 MRI ABD W/O CNTR FLWD CNTR: CPT

## 2024-03-11 RX ORDER — GADOBUTROL 604.72 MG/ML
10 INJECTION INTRAVENOUS
Status: COMPLETED | OUTPATIENT
Start: 2024-03-11 | End: 2024-03-11

## 2024-03-11 RX ADMIN — GADOBUTROL 10 ML: 604.72 INJECTION INTRAVENOUS at 16:28

## 2024-03-11 NOTE — TELEPHONE ENCOUNTER
Called and spoke with pt in regards to his blood work as per Angela. Pt verbalized understanding. He is going today for the MRI. I did inform him to call the office after he was done with the MRI so they can leave us a message so we can call the reading room in the morning.

## 2024-03-11 NOTE — TELEPHONE ENCOUNTER
"Pt has MRI scheduled for today and inquiring if provider sent script to pharmacy for medication to relax self. Pt made aware that script for lorazepam 1 mg PO x 2 was sent to [facility] in [place]. Advised pt to take 1, one hour prior to MRI and second tablet may be taken as needed during imaging.       Reason for Disposition   Caller has medicine question only, adult not sick, and triager answers question    Answer Assessment - Initial Assessment Questions  1. NAME of MEDICATION: \"What medicine are you calling about?\"    Ativan    Protocols used: Medication Question Call-ADULT-[place]    "

## 2024-03-11 NOTE — TELEPHONE ENCOUNTER
----- Message from Angela Dorsey PA-C sent at 3/10/2024  1:15 PM EDT -----  Please let patient know that his blood work shows prediabetes, thyroid function is normal. We will wait for MRI results. Thank you.

## 2024-03-13 ENCOUNTER — TELEPHONE (OUTPATIENT)
Dept: GASTROENTEROLOGY | Facility: CLINIC | Age: 69
End: 2024-03-13

## 2024-03-13 ENCOUNTER — NURSE TRIAGE (OUTPATIENT)
Age: 69
End: 2024-03-13

## 2024-03-13 NOTE — TELEPHONE ENCOUNTER
Called and spoke with pt in regards to his MRI results as per Angela. PT verbalized understanding and he is scheduled for 3/15/2024 for the doppler. Pt still experiencing some abdominal pain.

## 2024-03-13 NOTE — TELEPHONE ENCOUNTER
Reason for Disposition   Information only question and nurse able to answer    Answer Assessment - Initial Assessment Questions  1. REASON FOR CALL or QUESTION:  Patient had MRI done on Monday. Aware the test has not been read by radiology. Will call back on Monday if he has not heard from us.    Protocols used: Information Only Call - No Triage-ADULT-[place]

## 2024-03-13 NOTE — TELEPHONE ENCOUNTER
Called and spoke with pt. Inform him that I called the reading room. We should have results by tomorrow. Pt verbalized understanding.

## 2024-03-13 NOTE — TELEPHONE ENCOUNTER
----- Message from Angela Dorsey PA-C sent at 3/13/2024 10:36 AM EDT -----  Please let patient know that his MRI shows a known liver hemangioma, which is benign.  Otherwise, there is no gallstones or lesions on the pancreas which is really good news.  There is nothing on his MRI that would explain his symptoms.  Please have him continue with the mesenteric Doppler that is already ordered.  Thank you!

## 2024-03-14 ENCOUNTER — EVALUATION (OUTPATIENT)
Dept: PHYSICAL THERAPY | Age: 69
End: 2024-03-14
Payer: MEDICARE

## 2024-03-14 DIAGNOSIS — M54.50 CHRONIC MIDLINE LOW BACK PAIN WITHOUT SCIATICA: ICD-10-CM

## 2024-03-14 DIAGNOSIS — Z98.1 SPINAL ARTHRODESIS PRESENT: ICD-10-CM

## 2024-03-14 DIAGNOSIS — G89.29 CHRONIC MIDLINE LOW BACK PAIN WITHOUT SCIATICA: ICD-10-CM

## 2024-03-14 DIAGNOSIS — M54.50 LOW BACK PAIN ASSOCIATED WITH A SPINAL DISORDER OTHER THAN RADICULOPATHY OR SPINAL STENOSIS: Primary | ICD-10-CM

## 2024-03-14 DIAGNOSIS — M41.25 OTHER IDIOPATHIC SCOLIOSIS, THORACOLUMBAR REGION: ICD-10-CM

## 2024-03-14 PROCEDURE — 97530 THERAPEUTIC ACTIVITIES: CPT

## 2024-03-14 NOTE — PROGRESS NOTES
PT Re-Evaluation     Today's date: 3/14/2024  Patient name: Toney Motley  : 1955  MRN: [id number]  Referring provider: Dipak Orlando MD  Dx:   Encounter Diagnosis     ICD-10-CM    1. Low back pain associated with a spinal disorder other than radiculopathy or spinal stenosis  M54.50       2. Other idiopathic scoliosis, thoracolumbar region  M41.25       3. Chronic midline low back pain without sciatica  M54.50     G89.29       4. Spinal arthrodesis present  Z98.1             Start Time: 1350  Stop Time: 1415  Total time in clinic (min): 25 minutes    Assessment  Assessment details: After gathering a subjective history along with the completion of a chart review and musculoskeletal screen the pt appears to be a good candidate for aquatic therapy to address s/s presenting from the back. Notable findings from eval include no symptoms radiating into the thigh, leg, or feet from the back and he experiences some relief from position changes. The patients current pain and MAVERICK is secondary to his early dx of scoliosis that was untreated. The pain the patient is experiencing is contributing to activity limitations and participation restrictions. Activity limitations such and prolonged sitting. Participation restrictions such as not being able to maintain normal walking routine or grocery shopping.     The pt will benefit from skilled therapy to manage back pain. Aquatic therapy indicated for this patient to create a relaxing environment and to create an environment to improve strength, WB, and ROM outside of the effects of gravity. Further indications include minimizing fall risk/re-injury and providing traction to the low back. Prior to entry into the pool obtained a BP, that was documented in this episode. Reviewed the rules of the pool, the patients ability to ascend and descend steps, as well as cleared the patient of any s/s that would be contraindicated or a precaution for aquatic therapy.    Pool  Contraindications:  Seizures no  Uncompensated CHF - no  Unstable angina - no  Severe kidney disease- no  Severe PVD - no  Open wounds or occlusive dressings - no  Colostomy - no  Water/airborne infections - no  Risk of bleeding or hemorrhage - no  Lack of bowel or bladder functions - no    Pool Precautions:   Fear of water/inability to swim - no  Respiratory disorders - no  Cardiac dysfunction - no  Small open wounds - no    3/14/23  Pt is still not committed to receiving back surgery, despite his subjective note his exercise tolerance and ambulation distance has improved, which was objectively measured with the 2 minute walk test. However, the patients pain rating has increased at rest. Pts chart will remain open dependent on whether or not he receives lumbar surgery, if not the patient can continue to schedule aquatic appointments until he is independent with his program and able to transfer to the aquatic fitness program.  Pt demonstrated an understanding of this .   Impairments: abnormal gait, abnormal muscle firing, abnormal muscle tone, abnormal or restricted ROM, abnormal movement, activity intolerance, impaired physical strength, pain with function and poor posture     Symptom irritability: highUnderstanding of Dx/Px/POC: good   Prognosis: fair    Goals  In 5 visits   1) Pt will increase ambulation in 2 minutes by 40 feet to demonstrate a MDC and work toward LTGs  2) Pt will be able to tolerate a pool session  3) Pt will be able to tolerate 6 minutes of DW hanging with b/l ankle weights of 5# to work toward LTGs  4) Pt will improve L multifidi firing with contralateral leg raise to demonstrate improve neuromuscular control    In 10 visits  1) Pts VAS rating will decrease from 7 at baseline to 5 to decrease pain and improve quality of life   2) Pt will experience at least two days of decreased relief following a pool session   3) Pt will be able to tolerate 10 minutes of DW hanging with b/l ankle weights of  5# to benefit from lumbar spine distraction  4) Pt will demonstrate 100% competency of aquatic program to be appropriate for D/C from therapy and continue with a fitness program   5) Pt will improve abdominal strength from 3+/5 MMT to 4+/5 MMT to help support and stabilize the low back         Plan  Plan details: Pt was in agreement that they will be treated by myself in combination with a PTA. Further, informed that we work as a team, the PTAs follow the POC created by the PT, and I trust them to make safe and reasonable changes when appropriate under their scope of practice.    Pt was educated on the nature of their dx and the benefits of completing physical therapy. Pt is also aware of the option of completing land and pool therapy for back pain. Additionally, pt is encouraged to ask questions regarding their dx and POC.    Patient would benefit from: skilled physical therapy  Referral necessary: No  Planned therapy interventions: aquatic therapy, activity modification, joint mobilization, IASTM, manual therapy, massage, neuromuscular re-education, patient education, postural training, strengthening, stretching, flexibility, functional ROM exercises, gait training, graded activity, graded exercise, graded motor, home exercise program, IADL retraining, therapeutic activities, therapeutic exercise and therapeutic training  Frequency: 2x week  Duration in visits: 10  Duration in weeks: 5  Plan of Care beginning date: 2/1/2024  Treatment plan discussed with: patient and PTA      Subjective Evaluation    History of Present Illness  Date of onset: 7/26/2023  Mechanism of injury: surgery  Mechanism of injury: Prior to initial PT evaluation pt had R RTC surgery and was sleeping in a recliner, this positioning caused him to have back pain. Pts PMH includes a dx of scoliosis, based on subjective description sounds to be functional, in his 20's he routinely visited a chiropractor to manage the curves but did not continue  and has never had therapy for his back prior to today. In 2016 the pt had surgery for L3-L4 spinal levels. The description of the patients pain is recorded below. He has received the opinion of multiple surgeons that believe he is a candidate for a spinal fusion. The pts current level of function and his prognosis from other health care providers has created frustration. The patient is skeptical of aquatic therapy but also hopeful because he wants to decrease his pain and prevent surgery.     3/14/2023  Subjectively patient has not realized a lot of change from the aquatic therapy and he is nervous about re-injury of the shoulder. Pt is scheduled to have lumbar spine surgery at the [facility] in two weeks, he has not committed to the surgery and is requesting time to weigh the pros and cons of this decision before scheduling more aquatic therapy treatments.           Recurrent probem    Quality of life: good    Patient Goals  Patient goals for therapy: decreased pain, independence with ADLs/IADLs, return to sport/leisure activities and increased motion  Patient goal: walk, grocery store,  Pain  Current pain ratin  At best pain rating: 3  At worst pain rating: 10  Location: L3- down  Quality: dull ache and throbbing  Relieving factors: medications  Aggravating factors: walking, sitting, stair climbing and standing  Progression: worsening    Social Support  Steps to enter house: no  Stairs in house: yes (one HR)   13  Lives in: multiple-level home  Lives with: spouse    Employment status: not working  Hand dominance: right  Exercise history: 20-30 miles/week walking, grocery shopping      Diagnostic Tests  X-ray: abnormal  MRI studies: abnormal  CT scan: abnormal  Treatments  Previous treatment: chiropractic, medication and injection treatment  Current treatment: medication        Objective     Concurrent Complaints  Positive for night pain. Negative for bladder dysfunction, bowel dysfunction and saddle (S4)  numbness    Postural Observations  Seated posture: fair  Standing posture: fair      Palpation     Additional Palpation Details  No pain upon palpation of the back mms or spinous process.   Upon palpating multifidi during SLR  R leg lift with appropriate contralateral multifidi firing - normal  L leg lift with unilateral multifidi firing - abnormal     Tenderness     Lumbar Spine  No tenderness in the spinous process.     Neurological Testing     Sensation     Lumbar   Left   Intact: light touch    Right   Intact: light touch    Reflexes   Left   Patellar (L4): normal (2+)  Achilles (S1): normal (2+)    Right   Patellar (L4): normal (2+)  Achilles (S1): normal (2+)    Active Range of Motion   Cervical/Thoracic Spine       Thoracic    Flexion:  with pain Restriction level: minimal  Extension:  with pain Restriction level: minimal  Left lateral flexion:  with pain Restriction level: minimal  Right lateral flexion:  with pain Restriction level: minimal  Left rotation:  Restriction level: minimal  Right rotation:  with pain Restriction level: minimal    Lumbar   Flexion:  with pain Restriction level: minimal  Extension:  with pain Restriction level: minimal  Left lateral flexion:  with pain Restriction level: minimal  Right lateral flexion:  with pain Restriction level: minimal  Left rotation:  with pain Restriction level: minimal  Right rotation:  with pain Restriction level: minimal    Strength/Myotome Testing     Lumbar   Left   Heel walk: abnormal  Toe walk: normal    Right   Heel walk: normal  Toe walk: normal    Left Hip   Planes of Motion   Flexion: 4+    Right Hip   Planes of Motion   Flexion: 4    Left Knee   Flexion: 4  Extension: 4    Right Knee   Flexion: 4+  Extension: 4+    Left Ankle/Foot   Dorsiflexion: 4+  Plantar flexion: 5    Right Ankle/Foot   Dorsiflexion: 4  Plantar flexion: 5    Muscle Activation   Patient able to activate left multifidus.   Patient unable to activate right multifidus.     Tests      Lumbar     Left   Negative passive SLR and slump test.     Right   Negative passive SLR and slump test.     Ambulation     Ambulation: Stairs   Ascend stairs: independent  Pattern: reciprocal  Railings: without rails  Descend stairs: independent  Pattern: reciprocal  Railings: without rails  Curbs: independent    Observational Gait   Gait: antalgic   Walking speed and stride length within functional limits.   Left arm swing: increased  Right arm swing: increased    Additional Observational Gait Details  Increased thoracic rotation.     Quality of Movement During Gait   Trunk  Forward lean.     Pelvis    Pelvis (Right): Positive Trendelenburg.     Comments   2 minute walk test: distance 450 ft (137 meters)   Norms for age: 65-69years = (184 meters)         Functional Assessment      Squat    Trunk lean left.     General Comments:      Lumbar Comments  Difficulty lowering legs from 90 degrees during abdominal testing 3+/5 MMT             POC expires Unit limit Auth Expiration date PT/OT + Visit Limit?   4/1/24 BOMN  BOMN roselia yr                           Visit/Unit Tracking  AUTH Status:  Date 2/1 2/9 2/12 2/15 2/22 2/29 3/1 3/8 3/14      No Auth Used 1 2 3 4 5 6 7 8 9       Remaining                 FOTO                 Precautions:  Daily Treatment Diary     Date 2/9 2/12 2/15 2/22 2/29 3/1 3/8    Patient education           Water Walk (FW, BW, side) x10’  10 10 10' Fwd 10' 10,10 10 10,5    LE work at wall               Hip FF/ext swing  2 2 2 ff 2 2 2 2      Toe/heel  1 1 1 1 1 1 1      Hip ABD/ADD  2 2 2 2 2 2 2      March 1 1 1 1 1 1 1      Knee flexion & extension 1 1 1 1 1 1 1      Squats 1 1 1 1 1 1 1    UE noodle x3             Push/pull              Rotate  1 1 1 1 1 1 1      Row forward & back  2 2 2 2 2 2 2      OH side bend            UE/Core (AROM, paddles, MB)              ABD/ADD              Horizontal Pec Fly              Alt. arm swing (shld flex/ext)              Push pull              Single  "paddle rotation           SLS (EO, EC, ball toss)            Aqua Step (FW, lat, eccentric)            Core work:              MF press (red, blue, blk)             Kickboard press (blue, red)              Row/ext w/ tubing (red, blue, blk)           Seated on bench             ankle DF/PF  5''x5ea 5''x5 5'x6 10''x5ea x10ea x10 2      March  1 1 1 1 1 1 1      ABD/ADD     1 1 1 1      Knee flexion/extension  1 1 1 1 1 1 1    DW TX - hang in the deep water  5,5 5' 5' 5' 5'  5,5 5 5 5      DW ABD/ADD  1 1 1' 1 2, 2       DW Bicycle  5 5 5' 5 5 5 2,2      DW  ski   1'          DW DKTC  1 1 1'        Stretches              HS at step  nv 2x 2x 2 2 2 2      Wall stretches              Calf stretch at wall    3x10\" x4 x4 x4       Other:            Hot Tub - 10 minutes only  10 10 10 10 10 10 6          "

## 2024-03-15 ENCOUNTER — TELEPHONE (OUTPATIENT)
Age: 69
End: 2024-03-15

## 2024-03-15 ENCOUNTER — TELEPHONE (OUTPATIENT)
Dept: GASTROENTEROLOGY | Facility: CLINIC | Age: 69
End: 2024-03-15

## 2024-03-15 ENCOUNTER — HOSPITAL ENCOUNTER (OUTPATIENT)
Dept: NON INVASIVE DIAGNOSTICS | Facility: CLINIC | Age: 69
Discharge: HOME | End: 2024-03-15
Payer: MEDICARE

## 2024-03-15 DIAGNOSIS — R10.13 EPIGASTRIC PAIN: Primary | ICD-10-CM

## 2024-03-15 DIAGNOSIS — R11.0 NAUSEA: Primary | ICD-10-CM

## 2024-03-15 DIAGNOSIS — R11.0 NAUSEA: ICD-10-CM

## 2024-03-15 DIAGNOSIS — R10.13 EPIGASTRIC PAIN: ICD-10-CM

## 2024-03-15 PROCEDURE — 93975 VASCULAR STUDY: CPT

## 2024-03-15 PROCEDURE — 93975 VASCULAR STUDY: CPT | Performed by: SURGERY

## 2024-03-15 RX ORDER — SCOLOPAMINE TRANSDERMAL SYSTEM 1 MG/1
1 PATCH, EXTENDED RELEASE TRANSDERMAL
Qty: 5 PATCH | Refills: 3 | Status: SHIPPED | OUTPATIENT
Start: 2024-03-15 | End: 2024-03-18

## 2024-03-15 NOTE — TELEPHONE ENCOUNTER
Called and poke to patient. Gave patient message citlali Miller. He is in fact interested in doing HIDA scan. Will route message to Angela to put order in. Once order is in will call patient to let him know...

## 2024-03-15 NOTE — TELEPHONE ENCOUNTER
Please let patient know that his Doppler test did not reveal a reason for his abdominal pain.  The only other test that the patient has not done is the HIDA scan with [facility].  He would have to be off of pain medication for 2 to 3 days if he is willing to do this test.    For his nausea, I would like to second him a scopolamine patch which will be put on the back of the ear for 72 hours at a time to help with his symptoms.

## 2024-03-15 NOTE — TELEPHONE ENCOUNTER
Returned call to patient, he is having surgery soon but does not want to schedule testing until after surgery, told him when he receives call just to let them know to schedule after surgery or he can call after or when he is ready to schedule

## 2024-03-15 NOTE — TELEPHONE ENCOUNTER
call.Patients GI provider:  DAVID SMITH    Number to return call: ([phone]     Reason for call: Patient contacted office with questions regarding upcomming appointment and testing his is supposed to get done. He is requesting to specifically speak with leena. Please return patients call.    Scheduled procedure/appointment date if applicable: Apt/procedure

## 2024-03-15 NOTE — TELEPHONE ENCOUNTER
Called and spoke to patient. Let him know order for HIDA scan is in system. Let him know to stop pain me ds 48 hours before test as per leticia. Patient has central scheduling number to schedule  Patient voiced understanding and had no further questions or concerns

## 2024-03-18 ENCOUNTER — EVALUATION (OUTPATIENT)
Dept: OCCUPATIONAL THERAPY | Facility: CLINIC | Age: 69
End: 2024-03-18
Payer: MEDICARE

## 2024-03-18 DIAGNOSIS — M20.022: Primary | ICD-10-CM

## 2024-03-18 PROCEDURE — 97110 THERAPEUTIC EXERCISES: CPT | Performed by: OCCUPATIONAL THERAPIST

## 2024-03-18 PROCEDURE — 97165 OT EVAL LOW COMPLEX 30 MIN: CPT | Performed by: OCCUPATIONAL THERAPIST

## 2024-03-18 RX ORDER — SCOLOPAMINE TRANSDERMAL SYSTEM 1 MG/1
1 PATCH, EXTENDED RELEASE TRANSDERMAL
Qty: 5 PATCH | Refills: 3 | Status: SHIPPED | OUTPATIENT
Start: 2024-03-18

## 2024-03-18 NOTE — LETTER
2024    Kashmir Reveles MD  [address]  [facility] [facility] [address]    Patient: Toney Motley   YOB: 1955   Date of Visit: 3/18/2024     Encounter Diagnosis     ICD-10-CM    1. Acquired boutonniere deformity, left  M20.022           Dear Dr. Reveles:    Thank you for your recent referral of Toney Motley. Please review the attached evaluation summary from Toney's recent visit.     Please verify that you agree with the plan of care by signing the attached order.     If you have any questions or concerns, please do not hesitate to call.     I sincerely appreciate the opportunity to share in the care of one of your patients and hope to have another opportunity to work with you in the near future.     Sincerely,    Lindsay Valles, OT      Referring Provider:     I certify that I have read the below Plan of Care and certify the need for these services furnished under this plan of treatment while under my care.                    Kashmir Reveles MD  [address] [facility] [address]  Via Fax: [phone]        OT Evaluation     Today's date: 3/18/2024  Patient name: Toney Motley  : 1955  MRN: [id number]  Referring provider: Oscar Corbett DO  Dx:   Encounter Diagnosis     ICD-10-CM    1. Acquired boutonniere deformity, left  M20.022                      Assessment  Assessment details: Toney is a 67 y/o RHD male presenting with left fifth boutonniere deformity with at least a 6 month history.  Cause of injury unknown.  Motion of flexion WNL, extension is restricted.  PIP has hard end feel and lateral band glide is limited.   strength is excellent.  No pain reported today with occasional pain of the PIP joint, especially at night.  He is concerned with progression of motion and with increased pain over time.  Evaluation is of low complexity, due to minimal comorbidities and stable clinical presentation.      HEP requirements discussed at length with explanation  of injury and progression of therapy for correction.  Due to upcoming back surgery, patient declines hand therapy due to time and splinting commitment for correction.  He will contact this office after the back surgery should he choose to proceed with hand therapy.      Impairments: abnormal or restricted ROM and lacks appropriate home exercise program    Symptom irritability: lowUnderstanding of Dx/Px/POC: good   Prognosis: good    Goals  STG 2 weeks   Increase digital PIP arc by at least 10 degrees in extension.   Compliance to serial casting    Compliance to HEP for daily lateral band glides.     Reduce pain at rest to less than 2/10    LTG  By discharge   Achieve functional CARRANZA to allow independence in holding small items in hand   Achieve functional extension with minimal lag to allow independence in donning gloves and tucking in clothing.   Pain at rest resolved, pain less than 2/10 with activity achieving independence in self care without increased of pain.    Achieve  strength to at least 50% of the uninvolved achieving independence in opening containers.   Compliance of corrective splinting prn at discharge.    Achieve FOTO goals established at IE.          Plan  Plan details: Therapy not scheduled due to upcoming back surgery and unable to commit to necessary serial casting for correction.   Patient would benefit from: skilled occupational therapy  Planned modality interventions: thermotherapy: hydrocollator packs  Planned therapy interventions: IASTM, joint mobilization, kinesiology taping, manual therapy, massage, patient education, orthotic management and training, orthotic fitting/training, strengthening, stretching, therapeutic activities, therapeutic exercise, graded activity, functional ROM exercises and graded exercise  Other planned therapy interventions: Serial casting f/b corrective splinting for deformity correction.  Plan of Care beginning date: 3/18/2024  Plan of Care expiration date:  4/15/2024  Treatment plan discussed with: patient    Subjective Evaluation    History of Present Illness  Mechanism of injury: Left fifth boutonniere deformity, more than 6 months, unknown cause.           Recurrent probem    Quality of life: good    Patient Goals  Patient goals for therapy: increased motion  Patient goal: resolve occasional pain; able to flatten hand  Pain  Current pain ratin  At worst pain rating: 3  Quality: discomfort  Relieving factors: rest  Exacerbated by: unknown.  Progression: no change    Hand dominance: right    Treatments  No previous or current treatments    Objective     Observations     Left Wrist/Hand   Positive for Boutonniere deformity.     Neurological Testing     Sensation     Wrist/Hand   Left   Intact: light touch    Right   Intact: light touch    Active Range of Motion     Left Digits    Flexion   Little     MCP: 90    PIP: 110    DIP: 60  Extension   Little     MCP: 40    PIP: -62    DIP: 0    Additional Active Range of Motion Details  Fixed boutonniere deformity    Strength/Myotome Testing     Additional Strength Details  Duglas #2  L  98.1 lbs,  R  67.6 lbs (RTC repair, 7 months ago)           Precautions: universal      Date 3/18            Visit 1            Manuals                                                                 Neuro Re-Ed                                                                                                        Ther Ex             HEP Pt ed of casting, splinting required                                                                                                       Ther Activity                                       Gait Training                                       Modalities

## 2024-03-18 NOTE — PROGRESS NOTES
OT Evaluation     Today's date: 3/18/2024  Patient name: Tonye Motley  : 1955  MRN: [id number]  Referring provider: Oscar Corbett DO  Dx:   Encounter Diagnosis     ICD-10-CM    1. Acquired boutonniere deformity, left  M20.022                      Assessment  Assessment details: Toney is a 67 y/o RHD male presenting with left fifth boutonniere deformity with at least a 6 month history.  Cause of injury unknown.  Motion of flexion WNL, extension is restricted.  PIP has hard end feel and lateral band glide is limited.   strength is excellent.  No pain reported today with occasional pain of the PIP joint, especially at night.  He is concerned with progression of motion and with increased pain over time.  Evaluation is of low complexity, due to minimal comorbidities and stable clinical presentation.      HEP requirements discussed at length with explanation of injury and progression of therapy for correction.  Due to upcoming back surgery, patient declines hand therapy due to time and splinting commitment for correction.  He will contact this office after the back surgery should he choose to proceed with hand therapy.      Impairments: abnormal or restricted ROM and lacks appropriate home exercise program    Symptom irritability: lowUnderstanding of Dx/Px/POC: good   Prognosis: good    Goals  STG 2 weeks   Increase digital PIP arc by at least 10 degrees in extension.   Compliance to serial casting    Compliance to HEP for daily lateral band glides.     Reduce pain at rest to less than 2/10    LTG  By discharge   Achieve functional CARRANZA to allow independence in holding small items in hand   Achieve functional extension with minimal lag to allow independence in donning gloves and tucking in clothing.   Pain at rest resolved, pain less than 2/10 with activity achieving independence in self care without increased of pain.    Achieve  strength to at least 50% of the uninvolved achieving independence in opening  containers.   Compliance of corrective splinting prn at discharge.    Achieve FOTO goals established at IE.          Plan  Plan details: Therapy not scheduled due to upcoming back surgery and unable to commit to necessary serial casting for correction.   Patient would benefit from: skilled occupational therapy  Planned modality interventions: thermotherapy: hydrocollator packs  Planned therapy interventions: IASTM, joint mobilization, kinesiology taping, manual therapy, massage, patient education, orthotic management and training, orthotic fitting/training, strengthening, stretching, therapeutic activities, therapeutic exercise, graded activity, functional ROM exercises and graded exercise  Other planned therapy interventions: Serial casting f/b corrective splinting for deformity correction.  Plan of Care beginning date: 3/18/2024  Plan of Care expiration date: 4/15/2024  Treatment plan discussed with: patient    Subjective Evaluation    History of Present Illness  Mechanism of injury: Left fifth boutonniere deformity, more than 6 months, unknown cause.           Recurrent probem    Quality of life: good    Patient Goals  Patient goals for therapy: increased motion  Patient goal: resolve occasional pain; able to flatten hand  Pain  Current pain ratin  At worst pain rating: 3  Quality: discomfort  Relieving factors: rest  Exacerbated by: unknown.  Progression: no change    Hand dominance: right    Treatments  No previous or current treatments    Objective     Observations     Left Wrist/Hand   Positive for Boutonniere deformity.     Neurological Testing     Sensation     Wrist/Hand   Left   Intact: light touch    Right   Intact: light touch    Active Range of Motion     Left Digits    Flexion   Little     MCP: 90    PIP: 110    DIP: 60  Extension   Little     MCP: 40    PIP: -62    DIP: 0    Additional Active Range of Motion Details  Fixed boutonniere deformity    Strength/Myotome Testing     Additional Strength  Details  Duglas #2  L  98.1 lbs,  R  67.6 lbs (RTC repair, 7 months ago)           Precautions: universal      Date 3/18            Visit 1            Manuals                                                                 Neuro Re-Ed                                                                                                        Ther Ex             HEP Pt ed of casting, splinting required                                                                                                       Ther Activity                                       Gait Training                                       Modalities

## 2024-03-25 DIAGNOSIS — K29.70 GASTRITIS: ICD-10-CM

## 2024-03-25 RX ORDER — SUCRALFATE 1 G/1
1 TABLET ORAL 4 TIMES DAILY
Qty: 360 TABLET | Refills: 0 | Status: SHIPPED | OUTPATIENT
Start: 2024-03-25 | End: 2024-06-23

## 2024-03-29 ENCOUNTER — TELEPHONE (OUTPATIENT)
Dept: GASTROENTEROLOGY | Facility: CLINIC | Age: 69
End: 2024-03-29

## 2024-03-30 DIAGNOSIS — R11.0 NAUSEA: ICD-10-CM

## 2024-03-30 DIAGNOSIS — R10.13 EPIGASTRIC PAIN: ICD-10-CM

## 2024-04-01 RX ORDER — METOCLOPRAMIDE 10 MG/1
10 TABLET ORAL
Qty: 30 TABLET | Refills: 0 | Status: SHIPPED | OUTPATIENT
Start: 2024-04-01 | End: 2024-04-08

## 2024-04-05 ENCOUNTER — TELEPHONE (OUTPATIENT)
Dept: GASTROENTEROLOGY | Facility: CLINIC | Age: 69
End: 2024-04-05

## 2024-04-05 NOTE — TELEPHONE ENCOUNTER
[facility] [place] [facility] office appt today with Angela Dorsey..He is scheduled for Monday APRIL 8

## 2024-04-08 ENCOUNTER — OFFICE VISIT (OUTPATIENT)
Dept: GASTROENTEROLOGY | Facility: CLINIC | Age: 69
End: 2024-04-08
Payer: MEDICARE

## 2024-04-08 VITALS
RESPIRATION RATE: 18 BRPM | TEMPERATURE: 97.8 F | HEART RATE: 82 BPM | DIASTOLIC BLOOD PRESSURE: 82 MMHG | HEIGHT: 72 IN | BODY MASS INDEX: 31.15 KG/M2 | SYSTOLIC BLOOD PRESSURE: 122 MMHG | WEIGHT: 230 LBS | OXYGEN SATURATION: 95 %

## 2024-04-08 DIAGNOSIS — M54.16 LUMBAR RADICULOPATHY: ICD-10-CM

## 2024-04-08 DIAGNOSIS — I10 PRIMARY HYPERTENSION: ICD-10-CM

## 2024-04-08 DIAGNOSIS — R11.0 NAUSEA: Primary | ICD-10-CM

## 2024-04-08 DIAGNOSIS — R63.4 UNINTENTIONAL WEIGHT LOSS: ICD-10-CM

## 2024-04-08 DIAGNOSIS — R10.13 EPIGASTRIC PAIN: ICD-10-CM

## 2024-04-08 PROCEDURE — 99214 OFFICE O/P EST MOD 30 MIN: CPT | Performed by: PHYSICIAN ASSISTANT

## 2024-04-08 PROCEDURE — G2211 COMPLEX E/M VISIT ADD ON: HCPCS | Performed by: PHYSICIAN ASSISTANT

## 2024-04-08 RX ORDER — SCOLOPAMINE TRANSDERMAL SYSTEM 1 MG/1
1 PATCH, EXTENDED RELEASE TRANSDERMAL
Qty: 5 PATCH | Refills: 3 | Status: SHIPPED | OUTPATIENT
Start: 2024-04-08

## 2024-04-08 RX ORDER — ESOMEPRAZOLE MAGNESIUM 40 MG/1
40 CAPSULE, DELAYED RELEASE ORAL
Qty: 180 CAPSULE | Refills: 0 | Status: SHIPPED | OUTPATIENT
Start: 2024-04-08

## 2024-04-08 RX ORDER — PROMETHAZINE HYDROCHLORIDE 25 MG/1
25 TABLET ORAL EVERY 6 HOURS
Qty: 120 TABLET | Refills: 0 | Status: SHIPPED | OUTPATIENT
Start: 2024-04-08 | End: 2024-05-08

## 2024-04-08 RX ORDER — OXYCODONE HYDROCHLORIDE AND ACETAMINOPHEN 5; 325 MG/1; MG/1
TABLET ORAL
COMMUNITY
Start: 2024-04-03

## 2024-04-08 NOTE — PROGRESS NOTES
[facility]  Toney Motley 68 y.o. male MRN: [id number]       CC: Follow-up after recent testing    HPI: Toney is a pleasant 68-year-old male with history of ulcerative colitis on balsalazide, lumbar radiculopathy, and nonalcoholic fatty liver disease.  Patient was last seen by me in March for abdominal pain and weight loss.    Patient was sent for abdominal ultrasound in January, which was normal. He also underwent a CT scan, which did not show any acute abdominal pathology. In 2021, he underwent a gastric emptying study, which was normal.  Due to his symptoms and unintentional weight loss, I sent him for an MRI of the abdomen showing no pancreatic lesion.  Mesenteric Doppler also without narrowing.  He is scheduled for a HIDA scan with [facility] on April 12.  His son has gallbladder issues, but was found to have sludge.  He is scheduled for a regular HIDA scan without [facility] it sounds like over their phone conversation today in the office.  He had a recent CT of his back showing severe lumbar radiculopathy extending multiple levels.  He has been under significant stress trying to decide whether or not he should have surgery at the [facility] versus locally as he has undergone several second opinions.    Last EGD was in February 2024 revealing LA class A esophagitis and mild erosive gastritis.  Biopsies were negative.  Last colonoscopy was in June 2023 revealing normal colon mucosa and several scattered pseudopolyps.  Patient was recommended a 5-year recall secondary to personal history of inflammatory bowel disease.     Review of Systems:    CONSTITUTIONAL: Denies any fever, chills, or rigors. Good appetite, and no recent weight loss.  HEENT: No earache or tinnitus. Denies hearing loss or visual disturbances.  CARDIOVASCULAR: No chest pain or palpitations.   RESPIRATORY: Denies any cough, hemoptysis, shortness of breath or dyspnea on exertion.  GASTROINTESTINAL: As noted in the History of Present Illness.    GENITOURINARY: No problems with urination. Denies any hematuria or dysuria.  NEUROLOGIC: No dizziness or vertigo, denies headaches.   MUSCULOSKELETAL: Denies any muscle or joint pain.   SKIN: Denies skin rashes or itching.   ENDOCRINE: Denies excessive thirst. Denies intolerance to heat or cold.  PSYCHOSOCIAL: Denies depression or anxiety. Denies any recent memory loss.       Current Outpatient Medications   Medication Sig Dispense Refill    acetaminophen (TYLENOL) 325 mg tablet Take 3 tablets (975 mg total) by mouth every 8 (eight) hours      aluminum-magnesium hydroxide-simethicone (MAALOX) 1649-9675-920 mg/30 mL suspension Take 30 mL by mouth every 6 (six) hours as needed for indigestion or heartburn      balsalazide (COLAZAL) 750 mg capsule TAKE 3 CAPSULES BY MOUTH TWICE DAILY 540 capsule 1    diazepam (VALIUM) 10 mg tablet       dicyclomine (BENTYL) 20 mg tablet Take 1 tablet (20 mg total) by mouth every 6 (six) hours 45 tablet 2    DULoxetine (CYMBALTA) 60 mg delayed release capsule Take 60 mg by mouth daily      ezetimibe (ZETIA) 10 mg tablet Take 10 mg by mouth daily      gabapentin (NEURONTIN) 300 mg capsule Take 1 capsule (300 mg total) by mouth 3 (three) times a day 90 capsule 0    LORazepam (ATIVAN) 1 mg tablet Take 1 tablet 1 hour prior to MRI appointment, take second tablet as needed during the appointment 2 tablet 0    metoclopramide (REGLAN) 10 mg tablet TAKE 1 TABLET (10 MG TOTAL) BY MOUTH DAILY AFTER DINNER 30 tablet 0    metoprolol succinate (TOPROL-XL) 25 mg 24 hr tablet Take 25 mg by mouth daily      naloxone (NARCAN) 4 mg/0.1 mL nasal spray       oxyCODONE-acetaminophen (PERCOCET) 5-325 mg per tablet TAKE 1 TABLET BY MOUTH EVERY 6 HOURS AS NEEDED FOR MODERATE PAIN (4-6) MAX DAILY AMOUNT: 4 TABLETS      QUEtiapine (SEROquel) 100 mg tablet       scopolamine (TRANSDERM-SCOP) 1 mg/3 days TD 72 hr patch PLACE 1 PATCH ON THE SKIN OVER 72 HOURS EVERY THIRD DAY 5 patch 3    sucralfate (CARAFATE) 1 g  tablet Take 1 tablet (1 g total) by mouth 4 (four) times a day 360 tablet 0    temazepam (RESTORIL) 30 mg capsule TAKE 2 CAPSULES BY MOUTH AT BEDTIME FOR 30 DAYS      zolpidem (AMBIEN CR) 12.5 MG CR tablet Take 12.5 mg by mouth every evening      zolpidem (AMBIEN) 10 mg tablet TAKE 1 TABLET BY MOUTH NIGHTLY AS NEEDED FOR SLEEP      methocarbamol (ROBAXIN) 500 mg tablet Take 1 tablet (500 mg total) by mouth 3 (three) times a day for 14 days 42 tablet 0    omeprazole (PriLOSEC) 40 MG capsule Take 1 capsule (40 mg total) by mouth 2 (two) times a day 60 capsule 1     No current facility-administered medications for this visit.     Past Medical History:   Diagnosis Date    Back pain     Colon polyp     Hyperlipidemia     Lumbar fracture with cord injury (HCC)     Neck fracture (HCC)     Previous back surgery     rods in the back    Ulcerative colitis (HCC)      Past Surgical History:   Procedure Laterality Date    BACK SURGERY      LUMBAR FUSION Right 1/20/2016    Procedure: FUSION LUMBAR  POSTERIOR, TLIF L3-4  Right L3-4 Fascet;  Surgeon: Narayan Castro MD;  Location:  MAIN OR;  Service:     MN COLONOSCOPY FLX DX W/COLLJ SPEC WHEN PFRMD N/A 1/24/2019    Procedure: COLONOSCOPY;  Surgeon: Sanjeev Orellana III, MD;  Location: [facility];  Service: Gastroenterology    MN SURGICAL ARTHROSCOPY SHOULDER W/ROTATOR CUFF RPR Right 7/26/2023    Procedure: ARTHROSCOPY SHOULDER- Right shoulder Arthroscopy, supraspinatus and subscapularis repair, biceps tenotomy, extensive debridement;  Surgeon: Oscar Corbett DO;  Location: [facility];  Service: Orthopedics    TONSILLECTOMY       Social History     Socioeconomic History    Marital status: /Civil Union     Spouse name: None    Number of children: None    Years of education: None    Highest education level: None   Occupational History    None   Tobacco Use    Smoking status: Never    Smokeless tobacco: Never   Vaping Use    Vaping status: Never Used   Substance and Sexual  Activity    Alcohol use: Not Currently     Comment: rarely    Drug use: No    Sexual activity: Not Currently   Other Topics Concern    None   Social History Narrative    None     Social Determinants of Health     Financial Resource Strain: Low Risk  (10/6/2023)    Overall Financial Resource Strain (CARDIA)     Difficulty of Paying Living Expenses: Not hard at all   Food Insecurity: Not on file   Transportation Needs: No Transportation Needs (10/6/2023)    PRAPARE - Transportation     Lack of Transportation (Medical): No     Lack of Transportation (Non-Medical): No   Physical Activity: Not on file   Stress: Not on file   Social Connections: Not on file   Intimate Partner Violence: Not on file   Housing Stability: Not on file     Family History   Problem Relation Age of Onset    Parkinsonism Mother     Lung cancer Father             PHYSICAL EXAM:    There were no vitals filed for this visit.  General Appearance:   Alert and oriented x 3. Cooperative, and in no respiratory distress   HEENT:   Normocephalic, atraumatic, anicteric.     Neck:  Supple, symmetrical, trachea midline   Lungs:   Clear to auscultation bilaterally    Heart::   Regular rate and rhythm   Abdomen:   Soft, non-tender, non-distended; normal bowel sounds; no masses, no organomegaly    Genitalia:   Deferred    Rectal:   Deferred    Extremities:  No cyanosis, clubbing or edema    Pulses:  2+ and symmetric all extremities    Skin:  Skin color, texture, turgor normal, no rashes or lesions    Lymph nodes:  No palpable cervical or supraclavicular lymphadenopathy        Lab Results:   Results from last 6 Months   Lab Units 02/06/24  0511 02/05/24  1413   WBC Thousand/uL 6.59 8.07   HEMOGLOBIN g/dL 15.1 14.8   HEMATOCRIT % 46.6 45.7   PLATELETS Thousands/uL 207 234   NEUTROS PCT %  --  74   LYMPHS PCT %  --  20   MONOS PCT %  --  5   EOS PCT %  --  1     Results from last 6 Months   Lab Units 02/06/24  0511   POTASSIUM mmol/L 3.8   CHLORIDE mmol/L 104   CO2  "mmol/L 20*   BUN mg/dL 13   CREATININE mg/dL 0.80   CALCIUM mg/dL 8.3*   ALK PHOS U/L 35   ALT U/L 5*   AST U/L 13         Results from last 6 Months   Lab Units 02/05/24  1413   LIPASE u/L 22       Imaging Studies:   No results found.    ASSESSMENT and PLAN:      1) Epigastric pain, unintentional weight loss and persistent nausea - Not improved with scopolamine patch or Reglan.  He has had extensive workup recently showing no pathology to explain his symptoms.  He has significant spinal pathology. I was able to reference his CT scan that was done in the beginning of this month at [facility].  I am concerned that perhaps this could also be playing a role into his symptoms as he is in significant pain from his back in general.  - HIDA scan with [facility] scheduled this Friday, he is able to hold his pain medication for 2 days.  We discussed the indication for testing.  - Phenergan every 6 hours, will try to submit for Nexium twice daily      Follow up in 1 month.      Portions of the record may have been created with voice recognition software.  Occasional wrong word or \"sound a like\" substitutions may have occurred due to the inherent limitations of voice recognition software.  Read the chart carefully and recognize, using context, where substitutions have occurred.  "

## 2024-04-09 ENCOUNTER — TELEPHONE (OUTPATIENT)
Age: 69
End: 2024-04-09

## 2024-04-09 NOTE — TELEPHONE ENCOUNTER
PA for scopolamine     Submitted via    [x]CMM-KEY [id number]  []SurescriRUN-Case ID #   []Faxed to plan   []Other website   []Phone call Case ID #     Office notes sent, clinical questions answered. Awaiting determination    Turnaround time for your insurance to make a decision on your Prior Authorization can take 7-21 business days.

## 2024-04-12 ENCOUNTER — TELEPHONE (OUTPATIENT)
Dept: GASTROENTEROLOGY | Facility: CLINIC | Age: 69
End: 2024-04-12

## 2024-04-12 ENCOUNTER — TELEPHONE (OUTPATIENT)
Dept: UROLOGY | Facility: CLINIC | Age: 69
End: 2024-04-12

## 2024-04-12 ENCOUNTER — HOSPITAL ENCOUNTER (OUTPATIENT)
Dept: NUCLEAR MEDICINE | Facility: HOSPITAL | Age: 69
End: 2024-04-12
Payer: MEDICARE

## 2024-04-12 DIAGNOSIS — R11.0 NAUSEA: ICD-10-CM

## 2024-04-12 DIAGNOSIS — R10.13 EPIGASTRIC PAIN: ICD-10-CM

## 2024-04-12 PROCEDURE — 78227 HEPATOBIL SYST IMAGE W/DRUG: CPT

## 2024-04-12 PROCEDURE — A9537 TC99M MEBROFENIN: HCPCS

## 2024-04-12 RX ORDER — SINCALIDE 5 UG/5ML
0.02 INJECTION, POWDER, LYOPHILIZED, FOR SOLUTION INTRAVENOUS ONCE
Status: COMPLETED | OUTPATIENT
Start: 2024-04-12 | End: 2024-04-12

## 2024-04-12 RX ADMIN — SINCALIDE 2.1 MCG: 5 INJECTION, POWDER, LYOPHILIZED, FOR SOLUTION INTRAVENOUS at 12:02

## 2024-04-12 NOTE — TELEPHONE ENCOUNTER
PA for scopolamine Approved   Date(s) approved until 12/31/2024  Case #    Patient advised by [] Ingenicard Americahart Message                      [] Phone call       Pharmacy advised by [x]Fax                                     []Phone call    Approval letter scanned into Media Yes

## 2024-04-12 NOTE — TELEPHONE ENCOUNTER
----- Message from Angela Dorsey PA-C sent at 4/8/2024  1:48 PM EDT -----  Hi team, can we call the radiology reading room for HIDA scan results.  He got it done at 1030 on Friday.  Thank you.!

## 2024-04-15 ENCOUNTER — APPOINTMENT (EMERGENCY)
Dept: CT IMAGING | Facility: HOSPITAL | Age: 69
End: 2024-04-15
Payer: MEDICARE

## 2024-04-15 ENCOUNTER — TELEPHONE (OUTPATIENT)
Dept: GASTROENTEROLOGY | Facility: CLINIC | Age: 69
End: 2024-04-15

## 2024-04-15 ENCOUNTER — HOSPITAL ENCOUNTER (EMERGENCY)
Facility: HOSPITAL | Age: 69
Discharge: HOME | End: 2024-04-15
Attending: EMERGENCY MEDICINE
Payer: MEDICARE

## 2024-04-15 ENCOUNTER — PREP FOR PROCEDURE (OUTPATIENT)
Dept: GASTROENTEROLOGY | Facility: CLINIC | Age: 69
End: 2024-04-15

## 2024-04-15 ENCOUNTER — NURSE TRIAGE (OUTPATIENT)
Age: 69
End: 2024-04-15

## 2024-04-15 VITALS
SYSTOLIC BLOOD PRESSURE: 124 MMHG | HEIGHT: 72 IN | OXYGEN SATURATION: 97 % | BODY MASS INDEX: 31.02 KG/M2 | DIASTOLIC BLOOD PRESSURE: 72 MMHG | HEART RATE: 74 BPM | TEMPERATURE: 97.4 F | WEIGHT: 229 LBS | RESPIRATION RATE: 17 BRPM

## 2024-04-15 DIAGNOSIS — R11.0 NAUSEA: Primary | ICD-10-CM

## 2024-04-15 DIAGNOSIS — R63.4 UNINTENTIONAL WEIGHT LOSS: ICD-10-CM

## 2024-04-15 DIAGNOSIS — G89.29 CHRONIC BILATERAL UPPER ABDOMINAL PAIN: Primary | ICD-10-CM

## 2024-04-15 DIAGNOSIS — R10.12 CHRONIC BILATERAL UPPER ABDOMINAL PAIN: Primary | ICD-10-CM

## 2024-04-15 DIAGNOSIS — R10.11 CHRONIC BILATERAL UPPER ABDOMINAL PAIN: Primary | ICD-10-CM

## 2024-04-15 DIAGNOSIS — R10.13 EPIGASTRIC PAIN: ICD-10-CM

## 2024-04-15 LAB
ALBUMIN SERPL BCP-MCNC: 4.2 G/DL (ref 3.5–5)
ALP SERPL-CCNC: 51 U/L (ref 34–104)
ALT SERPL W P-5'-P-CCNC: 10 U/L (ref 7–52)
ANION GAP SERPL CALCULATED.3IONS-SCNC: 8 MMOL/L (ref 4–13)
AST SERPL W P-5'-P-CCNC: 12 U/L (ref 13–39)
BASOPHILS # BLD AUTO: 0.03 THOUSANDS/ÂΜL (ref 0–0.1)
BASOPHILS NFR BLD AUTO: 0 % (ref 0–1)
BILIRUB SERPL-MCNC: 0.73 MG/DL (ref 0.2–1)
BUN SERPL-MCNC: 17 MG/DL (ref 5–25)
CALCIUM SERPL-MCNC: 9.2 MG/DL (ref 8.4–10.2)
CHLORIDE SERPL-SCNC: 106 MMOL/L (ref 96–108)
CO2 SERPL-SCNC: 25 MMOL/L (ref 21–32)
CREAT SERPL-MCNC: 1.01 MG/DL (ref 0.6–1.3)
EOSINOPHIL # BLD AUTO: 0.08 THOUSAND/ÂΜL (ref 0–0.61)
EOSINOPHIL NFR BLD AUTO: 1 % (ref 0–6)
ERYTHROCYTE [DISTWIDTH] IN BLOOD BY AUTOMATED COUNT: 13.2 % (ref 11.6–15.1)
GFR SERPL CREATININE-BSD FRML MDRD: 77 ML/MIN/1.73SQ M
GLUCOSE SERPL-MCNC: 86 MG/DL (ref 65–140)
HCT VFR BLD AUTO: 47.7 % (ref 36.5–49.3)
HGB BLD-MCNC: 15.7 G/DL (ref 12–17)
IMM GRANULOCYTES # BLD AUTO: 0.02 THOUSAND/UL (ref 0–0.2)
IMM GRANULOCYTES NFR BLD AUTO: 0 % (ref 0–2)
LIPASE SERPL-CCNC: 25 U/L (ref 11–82)
LYMPHOCYTES # BLD AUTO: 1.48 THOUSANDS/ÂΜL (ref 0.6–4.47)
LYMPHOCYTES NFR BLD AUTO: 22 % (ref 14–44)
MCH RBC QN AUTO: 27.8 PG (ref 26.8–34.3)
MCHC RBC AUTO-ENTMCNC: 32.9 G/DL (ref 31.4–37.4)
MCV RBC AUTO: 84 FL (ref 82–98)
MONOCYTES # BLD AUTO: 0.32 THOUSAND/ÂΜL (ref 0.17–1.22)
MONOCYTES NFR BLD AUTO: 5 % (ref 4–12)
NEUTROPHILS # BLD AUTO: 4.76 THOUSANDS/ÂΜL (ref 1.85–7.62)
NEUTS SEG NFR BLD AUTO: 72 % (ref 43–75)
NRBC BLD AUTO-RTO: 0 /100 WBCS
PLATELET # BLD AUTO: 257 THOUSANDS/UL (ref 149–390)
PMV BLD AUTO: 10.3 FL (ref 8.9–12.7)
POTASSIUM SERPL-SCNC: 4.7 MMOL/L (ref 3.5–5.3)
PROT SERPL-MCNC: 7.1 G/DL (ref 6.4–8.4)
RBC # BLD AUTO: 5.65 MILLION/UL (ref 3.88–5.62)
SODIUM SERPL-SCNC: 139 MMOL/L (ref 135–147)
WBC # BLD AUTO: 6.69 THOUSAND/UL (ref 4.31–10.16)

## 2024-04-15 PROCEDURE — 99284 EMERGENCY DEPT VISIT MOD MDM: CPT

## 2024-04-15 PROCEDURE — 80053 COMPREHEN METABOLIC PANEL: CPT | Performed by: EMERGENCY MEDICINE

## 2024-04-15 PROCEDURE — 99284 EMERGENCY DEPT VISIT MOD MDM: CPT | Performed by: EMERGENCY MEDICINE

## 2024-04-15 PROCEDURE — 83690 ASSAY OF LIPASE: CPT | Performed by: EMERGENCY MEDICINE

## 2024-04-15 PROCEDURE — 85025 COMPLETE CBC W/AUTO DIFF WBC: CPT | Performed by: EMERGENCY MEDICINE

## 2024-04-15 PROCEDURE — 36415 COLL VENOUS BLD VENIPUNCTURE: CPT | Performed by: EMERGENCY MEDICINE

## 2024-04-15 PROCEDURE — 70450 CT HEAD/BRAIN W/O DYE: CPT

## 2024-04-15 RX ORDER — MAGNESIUM HYDROXIDE/ALUMINUM HYDROXICE/SIMETHICONE 120; 1200; 1200 MG/30ML; MG/30ML; MG/30ML
30 SUSPENSION ORAL ONCE
Status: COMPLETED | OUTPATIENT
Start: 2024-04-15 | End: 2024-04-15

## 2024-04-15 RX ORDER — LIDOCAINE HYDROCHLORIDE 20 MG/ML
15 SOLUTION OROPHARYNGEAL ONCE
Status: COMPLETED | OUTPATIENT
Start: 2024-04-15 | End: 2024-04-15

## 2024-04-15 RX ADMIN — ALUMINUM HYDROXIDE, MAGNESIUM HYDROXIDE, AND DIMETHICONE 30 ML: 200; 20; 200 SUSPENSION ORAL at 14:19

## 2024-04-15 RX ADMIN — LIDOCAINE HYDROCHLORIDE 15 ML: 20 SOLUTION ORAL; TOPICAL at 14:19

## 2024-04-15 NOTE — ED PROVIDER NOTES
"History  Chief Complaint   Patient presents with    Abdominal Pain     Ambulatory w/complaint of abdominal pain which started x6 months ago; sent to ED by GI for further evaluation & brain scan \"they told me it could be neurological\"; denies N/V, fever; further complains of back pain w/back surgery being scheduled by neurosurgeon 1 May 2024         68-year-old male, presents with epigastric abdominal pain and nausea.,  Patient has been following extensively with GI for this and no exact etiology has been found.  He has had abdominal MRIs,  MRCP, CT's, multiple rounds of blood work, today called him saying he was having severe nausea and pain and they advised to come to the hospital to get a scan of his brain to make a central cause such as a cerebellar lesion.,  There is no focal neurological deficit, no change in his discomfort no falls no injury no trauma.  No bowel or bladder incontinence no blood in the stool no blood in the urine      Abdominal Pain  Associated symptoms: no chest pain, no chills, no constipation, no cough, no diarrhea, no dysuria, no fever, no nausea, no shortness of breath, no sore throat and no vomiting        Prior to Admission Medications   Prescriptions Last Dose Informant Patient Reported? Taking?   DULoxetine (CYMBALTA) 60 mg delayed release capsule  Self Yes No   Sig: Take 60 mg by mouth daily   LORazepam (ATIVAN) 1 mg tablet   No No   Sig: Take 1 tablet 1 hour prior to MRI appointment, take second tablet as needed during the appointment   QUEtiapine (SEROquel) 100 mg tablet   Yes No   acetaminophen (TYLENOL) 325 mg tablet   No No   Sig: Take 3 tablets (975 mg total) by mouth every 8 (eight) hours   aluminum-magnesium hydroxide-simethicone (MAALOX) 5823-7154-004 mg/30 mL suspension   No No   Sig: Take 30 mL by mouth every 6 (six) hours as needed for indigestion or heartburn   balsalazide (COLAZAL) 750 mg capsule  Self No No   Sig: TAKE 3 CAPSULES BY MOUTH TWICE DAILY   diazepam (VALIUM) " 10 mg tablet   Yes No   dicyclomine (BENTYL) 20 mg tablet   No No   Sig: Take 1 tablet (20 mg total) by mouth every 6 (six) hours   esomeprazole (NexIUM) 40 MG capsule   No No   Sig: Take 1 capsule (40 mg total) by mouth 2 (two) times a day before meals   ezetimibe (ZETIA) 10 mg tablet  Self Yes No   Sig: Take 10 mg by mouth daily   gabapentin (NEURONTIN) 300 mg capsule  Self No No   Sig: Take 1 capsule (300 mg total) by mouth 3 (three) times a day   methocarbamol (ROBAXIN) 500 mg tablet   No No   Sig: Take 1 tablet (500 mg total) by mouth 3 (three) times a day for 14 days   metoprolol succinate (TOPROL-XL) 25 mg 24 hr tablet  Self Yes No   Sig: Take 25 mg by mouth daily   naloxone (NARCAN) 4 mg/0.1 mL nasal spray  Self Yes No   oxyCODONE-acetaminophen (PERCOCET) 5-325 mg per tablet   Yes No   Sig: TAKE 1 TABLET BY MOUTH EVERY 6 HOURS AS NEEDED FOR MODERATE PAIN (4-6) MAX DAILY AMOUNT: 4 TABLETS   promethazine (PHENERGAN) 25 mg tablet   No No   Sig: Take 1 tablet (25 mg total) by mouth every 6 (six) hours   scopolamine (TRANSDERM-SCOP) 1 mg/3 days TD 72 hr patch   No No   Sig: Place 1 patch on the skin over 72 hours every third day   sucralfate (CARAFATE) 1 g tablet   No No   Sig: Take 1 tablet (1 g total) by mouth 4 (four) times a day   temazepam (RESTORIL) 30 mg capsule   Yes No   Sig: TAKE 2 CAPSULES BY MOUTH AT BEDTIME FOR 30 DAYS   zolpidem (AMBIEN CR) 12.5 MG CR tablet   Yes No   Sig: Take 12.5 mg by mouth every evening   zolpidem (AMBIEN) 10 mg tablet  Self Yes No   Sig: TAKE 1 TABLET BY MOUTH NIGHTLY AS NEEDED FOR SLEEP      Facility-Administered Medications: None       Past Medical History:   Diagnosis Date    Back pain     Colon polyp     Hyperlipidemia     Lumbar fracture with cord injury (HCC)     Neck fracture (HCC)     Previous back surgery     rods in the back    Ulcerative colitis (HCC)        Past Surgical History:   Procedure Laterality Date    BACK SURGERY      LUMBAR FUSION Right 1/20/2016     Procedure: FUSION LUMBAR  POSTERIOR, TLIF L3-4  Right L3-4 Fascet;  Surgeon: Narayan Castro MD;  Location: [facility];  Service:     [place] COLONOSCOPY FLX DX W/COLLJ SPEC WHEN PFRMD N/A 1/24/2019    Procedure: COLONOSCOPY;  Surgeon: Sanjeev Orellana III, MD;  Location: [facility];  Service: Gastroenterology    [place] SURGICAL ARTHROSCOPY SHOULDER W/ROTATOR CUFF RPR Right 7/26/2023    Procedure: ARTHROSCOPY SHOULDER- Right shoulder Arthroscopy, supraspinatus and subscapularis repair, biceps tenotomy, extensive debridement;  Surgeon: Oscar Corbett DO;  Location: [facility];  Service: Orthopedics    TONSILLECTOMY         Family History   Problem Relation Age of Onset    Parkinsonism Mother     Lung cancer Father      I have reviewed and agree with the history as documented.    E-Cigarette/Vaping    E-Cigarette Use Never User      E-Cigarette/Vaping Substances    Nicotine No     THC No     CBD No     Flavoring No     Other No     Unknown No      Social History     Tobacco Use    Smoking status: Never    Smokeless tobacco: Never   Vaping Use    Vaping status: Never Used   Substance Use Topics    Alcohol use: Not Currently     Comment: rarely    Drug use: No       Review of Systems   Constitutional:  Negative for activity change, chills, diaphoresis and fever.   HENT:  Negative for congestion, sinus pressure and sore throat.    Eyes:  Negative for pain and visual disturbance.   Respiratory:  Negative for cough, chest tightness, shortness of breath, wheezing and stridor.    Cardiovascular:  Negative for chest pain and palpitations.   Gastrointestinal:  Positive for abdominal pain. Negative for abdominal distention, constipation, diarrhea, nausea and vomiting.   Genitourinary:  Negative for dysuria and frequency.   Musculoskeletal:  Negative for neck pain and neck stiffness.   Skin:  Negative for rash.   Neurological:  Negative for dizziness, speech difficulty, light-headedness, numbness and headaches.       Physical  Exam  Physical Exam  Vitals reviewed.   Constitutional:       General: He is not in acute distress.     Appearance: He is well-developed. He is not diaphoretic.   HENT:      Head: Normocephalic and atraumatic.      Right Ear: External ear normal.      Left Ear: External ear normal.      Nose: Nose normal.   Eyes:      General:         Right eye: No discharge.         Left eye: No discharge.      Pupils: Pupils are equal, round, and reactive to light.   Neck:      Trachea: No tracheal deviation.   Cardiovascular:      Rate and Rhythm: Normal rate and regular rhythm.      Heart sounds: Normal heart sounds. No murmur heard.  Pulmonary:      Effort: Pulmonary effort is normal. No respiratory distress.      Breath sounds: Normal breath sounds. No stridor.   Abdominal:      General: There is no distension.      Palpations: Abdomen is soft.      Tenderness: There is abdominal tenderness in the epigastric area. There is no guarding or rebound.   Musculoskeletal:         General: Normal range of motion.      Cervical back: Normal range of motion and neck supple.   Skin:     General: Skin is warm and dry.      Coloration: Skin is not pale.      Findings: No erythema.   Neurological:      General: No focal deficit present.      Mental Status: He is alert and oriented to person, place, and time.         Vital Signs  ED Triage Vitals [04/15/24 1254]   Temperature Pulse Respirations Blood Pressure SpO2   (!) 97.4 °F (36.3 °C) 74 17 124/72 97 %      Temp Source Heart Rate Source Patient Position - Orthostatic VS BP Location FiO2 (%)   Oral Monitor Sitting Left arm --      Pain Score       8           Vitals:    04/15/24 1254   BP: 124/72   Pulse: 74   Patient Position - Orthostatic VS: Sitting         Visual Acuity      ED Medications  Medications   aluminum-magnesium hydroxide-simethicone (MAALOX) oral suspension 30 mL (30 mL Oral Given 4/15/24 1419)   Lidocaine Viscous HCl (XYLOCAINE) 2 % mucosal solution 15 mL (15 mL Swish &  Spit Given 4/15/24 1419)       Diagnostic Studies  Results Reviewed       Procedure Component Value Units Date/Time    Comprehensive metabolic panel [[id number]]  (Abnormal) Collected: 04/15/24 1329    Lab Status: Final result Specimen: Blood from Arm, Left Updated: 04/15/24 1347     Sodium 139 mmol/L      Potassium 4.7 mmol/L      Chloride 106 mmol/L      CO2 25 mmol/L      ANION GAP 8 mmol/L      BUN 17 mg/dL      Creatinine 1.01 mg/dL      Glucose 86 mg/dL      Calcium 9.2 mg/dL      AST 12 U/L      ALT 10 U/L      Alkaline Phosphatase 51 U/L      Total Protein 7.1 g/dL      Albumin 4.2 g/dL      Total Bilirubin 0.73 mg/dL      eGFR 77 ml/min/1.73sq m     Narrative:      National Kid[employer] guidelines for Chronic Kidney Disease (CKD):     Stage 1 with normal or high GFR (GFR > 90 mL/min/1.73 square meters)    Stage 2 Mild CKD (GFR = 60-89 mL/min/1.73 square meters)    Stage 3A Moderate CKD (GFR = 45-59 mL/min/1.73 square meters)    Stage 3B Moderate CKD (GFR = 30-44 mL/min/1.73 square meters)    Stage 4 Severe CKD (GFR = 15-29 mL/min/1.73 square meters)    Stage 5 End Stage CKD (GFR <15 mL/min/1.73 square meters)  Note: GFR calculation is accurate only with a steady state creatinine    Lipase [[id number]]  (Normal) Collected: 04/15/24 1329    Lab Status: Final result Specimen: Blood from Arm, Left Updated: 04/15/24 1347     Lipase 25 u/L     CBC and differential [[id number]]  (Abnormal) Collected: 04/15/24 1329    Lab Status: Final result Specimen: Blood from Arm, Left Updated: 04/15/24 1333     WBC 6.69 Thousand/uL      RBC 5.65 Million/uL      Hemoglobin 15.7 g/dL      Hematocrit 47.7 %      MCV 84 fL      MCH 27.8 pg      MCHC 32.9 g/dL      RDW 13.2 %      MPV 10.3 fL      Platelets 257 Thousands/uL      nRBC 0 /100 WBCs      Segmented % 72 %      Immature Grans % 0 %      Lymphocytes % 22 %      Monocytes % 5 %      Eosinophils Relative 1 %      Basophils Relative 0 %      Absolute Neutrophils  4.76 Thousands/µL      Absolute Immature Grans 0.02 Thousand/uL      Absolute Lymphocytes 1.48 Thousands/µL      Absolute Monocytes 0.32 Thousand/µL      Eosinophils Absolute 0.08 Thousand/µL      Basophils Absolute 0.03 Thousands/µL                    CT head without contrast    (Results Pending)              Procedures  Procedures         ED Course  ED Course as of 04/15/24 1537   Mon Apr 15, 2024   1524 Explained to the patient I do not see anything very abnormal on his CAT scan, he really just wants to go home at this point, will discharge, understands radiologist has not read filled stands there is possibility of missed pathology, he understands agrees to return if CAT scan read abnormal               Identification of Seniors at Risk      Flowsheet Row Most Recent Value   (ISAR) Identification of Seniors at Risk    Before the illness or injury that brought you to the Emergency, did you need someone to help you on a regular basis? 0 Filed at: 04/15/2024 1256   In the last 24 hours, have you needed more help than usual? 0 Filed at: 04/15/2024 1256   Have you been hospitalized for one or more nights during the past 6 months? 0 Filed at: 04/15/2024 1256   In general, do you see well? 0 Filed at: 04/15/2024 1256   In general, do you have serious problems with your memory? 0 Filed at: 04/15/2024 1256   Do you take more than three different medications every day? 0 Filed at: 04/15/2024 1256   ISAR Score 0 Filed at: 04/15/2024 1256                        SBIRT 22yo+      Flowsheet Row Most Recent Value   Initial Alcohol Screen: US AUDIT-C     1. How often do you have a drink containing alcohol? 0 Filed at: 04/15/2024 1256   2. How many drinks containing alcohol do you have on a typical day you are drinking?  0 Filed at: 04/15/2024 1256   3b. FEMALE Any Age, or MALE 65+: How often do you have 4 or more drinks on one occassion? 0 Filed at: 04/15/2024 1256   Audit-C Score 0 Filed at: 04/15/2024 1256   AYESHA: How many  times in the past year have you...    Used an illegal drug or used a prescription medication for non-medical reasons? Never Filed at: 04/15/2024 1256                      Medical Decision Making        Initial ED assessment:   68-year-old male, epigastric pain for multiple months following with GI but no exact etiology found he was sent in for head 2 to evaluate for central causes    Initial DDx includes but is not limited to:   Central causes such as cerebellar lesion, pancreatitis, alcohol related, GERD, gastritis, acid reflux, anxiety    Initial ED plan:   Blood work, CT head, ultimate decision pending to workup           Amount and/or Complexity of Data Reviewed  Labs: ordered.  Radiology: ordered.    Risk  OTC drugs.  Prescription drug management.             Disposition  Final diagnoses:   Chronic bilateral upper abdominal pain     Time reflects when diagnosis was documented in both MDM as applicable and the Disposition within this note       Time User Action Codes Description Comment    4/15/2024  3:25 PM Miguel Ledezma Add [R10.11,  R10.12,  G89.29] Chronic bilateral upper abdominal pain           ED Disposition       ED Disposition   Discharge    Condition   Stable    Date/Time   Mon Apr 15, 2024 1525    Comment   Toney Motley discharge to home/self care.                   Follow-up Information       Follow up With Specialties Details Why Contact Info    Valeriano Torres MD Family Medicine Go to  If symptoms worsen [address] [place] [id number] [phone]              Discharge Medication List as of 4/15/2024  3:25 PM        CONTINUE these medications which have NOT CHANGED    Details   acetaminophen (TYLENOL) 325 mg tablet Take 3 tablets (975 mg total) by mouth every 8 (eight) hours, Starting Tue 2/6/2024, No Print      aluminum-magnesium hydroxide-simethicone (MAALOX) 5933-0413-731 mg/30 mL suspension Take 30 mL by mouth every 6 (six) hours as needed for indigestion or heartburn,  Starting Tue 2/6/2024, No Print      balsalazide (COLAZAL) 750 mg capsule TAKE 3 CAPSULES BY MOUTH TWICE DAILY, Starting Tue 11/21/2023, Normal      diazepam (VALIUM) 10 mg tablet Historical Med      dicyclomine (BENTYL) 20 mg tablet Take 1 tablet (20 mg total) by mouth every 6 (six) hours, Starting Thu 3/7/2024, Normal      DULoxetine (CYMBALTA) 60 mg delayed release capsule Take 60 mg by mouth daily, Starting Wed 11/15/2023, Historical Med      esomeprazole (NexIUM) 40 MG capsule Take 1 capsule (40 mg total) by mouth 2 (two) times a day before meals, Starting Mon 4/8/2024, Normal      ezetimibe (ZETIA) 10 mg tablet Take 10 mg by mouth daily, Historical Med      gabapentin (NEURONTIN) 300 mg capsule Take 1 capsule (300 mg total) by mouth 3 (three) times a day, Starting Wed 10/4/2023, Normal      LORazepam (ATIVAN) 1 mg tablet Take 1 tablet 1 hour prior to MRI appointment, take second tablet as needed during the appointment, Normal      methocarbamol (ROBAXIN) 500 mg tablet Take 1 tablet (500 mg total) by mouth 3 (three) times a day for 14 days, Starting Tue 2/6/2024, Until Tue 2/20/2024, Normal      metoprolol succinate (TOPROL-XL) 25 mg 24 hr tablet Take 25 mg by mouth daily, Starting Mon 10/30/2023, Until Tue 10/29/2024, Historical Med      naloxone (NARCAN) 4 mg/0.1 mL nasal spray Historical Med      oxyCODONE-acetaminophen (PERCOCET) 5-325 mg per tablet TAKE 1 TABLET BY MOUTH EVERY 6 HOURS AS NEEDED FOR MODERATE PAIN (4-6) MAX DAILY AMOUNT: 4 TABLETS, Historical Med      promethazine (PHENERGAN) 25 mg tablet Take 1 tablet (25 mg total) by mouth every 6 (six) hours, Starting Mon 4/8/2024, Until Wed 5/8/2024, Normal      QUEtiapine (SEROquel) 100 mg tablet Historical Med      scopolamine (TRANSDERM-SCOP) 1 mg/3 days TD 72 hr patch Place 1 patch on the skin over 72 hours every third day, Starting Mon 4/8/2024, Normal      sucralfate (CARAFATE) 1 g tablet Take 1 tablet (1 g total) by mouth 4 (four) times a day,  Starting Mon 3/25/2024, Until Sun 6/23/2024, Normal      temazepam (RESTORIL) 30 mg capsule TAKE 2 CAPSULES BY MOUTH AT BEDTIME FOR 30 DAYS, Historical Med      zolpidem (AMBIEN CR) 12.5 MG CR tablet Take 12.5 mg by mouth every evening, Starting Tue 2/6/2024, Historical Med      zolpidem (AMBIEN) 10 mg tablet TAKE 1 TABLET BY MOUTH NIGHTLY AS NEEDED FOR SLEEP, Historical Med             No discharge procedures on file.    PDMP Review         Value Time User    PDMP Reviewed  Yes 2/5/2024  7:06 PM Pebbles Robbins MD            ED Provider  Electronically Signed by             Miguel Ledezma DO  04/15/24 0207

## 2024-04-15 NOTE — ED TRIAGE NOTES
"Ambulatory w/complaint of abdominal pain which started x6 months ago; sent to ED by GI for further evaluation & brain scan \"they told me it could be neurological\"; denies N/V, fever; further complains of back pain w/back surgery being scheduled by neurosurgeon 1 May 2024  "

## 2024-04-15 NOTE — TELEPHONE ENCOUNTER
Left message for patient. Advised ED evaluation and may need testing for central causes of nausea with imaging of the brain. Otherwise, I spoke with Dr. Orellana and he agreed we could try EGD with Botox.

## 2024-04-15 NOTE — TELEPHONE ENCOUNTER
Pt returning Angela's call, pt is on his way to ED and would like to have EGD with botox done if provider feels this will help. Scheduling please contact pt to schedule EGD.

## 2024-04-16 ENCOUNTER — NURSE TRIAGE (OUTPATIENT)
Age: 69
End: 2024-04-16

## 2024-04-16 ENCOUNTER — TELEPHONE (OUTPATIENT)
Dept: GASTROENTEROLOGY | Facility: CLINIC | Age: 69
End: 2024-04-16

## 2024-04-16 NOTE — TELEPHONE ENCOUNTER
Julio César Humphrey, I spoke with Dr. Orellana. Please schedule EGD with Botox for Thursday or Friday

## 2024-04-16 NOTE — TELEPHONE ENCOUNTER
Spoke with pt he is asking what he can do about his abdominal pain, I advised per provider they will call him to schedule EGD with botox as next step.

## 2024-04-17 DIAGNOSIS — R10.13 EPIGASTRIC PAIN: Primary | ICD-10-CM

## 2024-04-17 DIAGNOSIS — R11.0 NAUSEA: ICD-10-CM

## 2024-04-17 RX ORDER — METOCLOPRAMIDE 10 MG/1
10 TABLET ORAL EVERY 8 HOURS PRN
Qty: 90 TABLET | Refills: 0 | Status: SHIPPED | OUTPATIENT
Start: 2024-04-17 | End: 2024-05-17

## 2024-04-17 NOTE — TELEPHONE ENCOUNTER
He has pain medication at home that was prescribed for his back. He can take the Reglan 10 mg that was prescribed by Dr. Orellana every 8 hours.

## 2024-04-17 NOTE — TELEPHONE ENCOUNTER
Pt calling in, I reviewed message from Angela. He is on a different pain medication for his back now (not sure of the name but has tyleol in it). He is taking promethazine every 6 hours and does not have reglan at home.     Reports he may go over to [facility]

## 2024-04-17 NOTE — TELEPHONE ENCOUNTER
Pt calling in, he is wondering if there is anything Angela can give him for his upper abdominal pain. I advised the next step was the EGD with botox. Pt explained he understood but that is a week away.   I advised I would send message to PA.

## 2024-04-18 ENCOUNTER — TELEPHONE (OUTPATIENT)
Age: 69
End: 2024-04-18

## 2024-04-18 NOTE — TELEPHONE ENCOUNTER
Pt. Calling asking a medication question, all questions answered, advised pt. To stop Phenergan and start Reglan and that pt. Should continue taking Nexium as prescribed, pt verbalized understanding, no further review needed

## 2024-04-22 ENCOUNTER — NURSE TRIAGE (OUTPATIENT)
Age: 69
End: 2024-04-22

## 2024-04-22 DIAGNOSIS — R10.13 EPIGASTRIC PAIN: Primary | ICD-10-CM

## 2024-04-22 DIAGNOSIS — R11.0 NAUSEA: ICD-10-CM

## 2024-04-22 RX ORDER — ONDANSETRON 4 MG/1
4 TABLET, FILM COATED ORAL EVERY 8 HOURS PRN
Qty: 90 TABLET | Refills: 0 | Status: SHIPPED | OUTPATIENT
Start: 2024-04-22 | End: 2024-05-07

## 2024-04-22 NOTE — TELEPHONE ENCOUNTER
Pt calling to let provider know he was in [facility] ER Friday for abdominal pain. They gave him Ondansetron ODT and it seems to be helping him better than the reglan. EGD scheduled for 4/24.

## 2024-04-24 ENCOUNTER — HOSPITAL ENCOUNTER (OUTPATIENT)
Dept: GASTROENTEROLOGY | Facility: HOSPITAL | Age: 69
Setting detail: OUTPATIENT SURGERY
Discharge: HOME | End: 2024-04-24
Payer: MEDICARE

## 2024-04-24 ENCOUNTER — ANESTHESIA (OUTPATIENT)
Dept: GASTROENTEROLOGY | Facility: HOSPITAL | Age: 69
End: 2024-04-24

## 2024-04-24 ENCOUNTER — ANESTHESIA EVENT (OUTPATIENT)
Dept: GASTROENTEROLOGY | Facility: HOSPITAL | Age: 69
End: 2024-04-24

## 2024-04-24 VITALS
SYSTOLIC BLOOD PRESSURE: 115 MMHG | HEIGHT: 72 IN | OXYGEN SATURATION: 93 % | WEIGHT: 230.38 LBS | TEMPERATURE: 97.7 F | HEART RATE: 60 BPM | RESPIRATION RATE: 12 BRPM | DIASTOLIC BLOOD PRESSURE: 70 MMHG | BODY MASS INDEX: 31.2 KG/M2

## 2024-04-24 DIAGNOSIS — R10.13 EPIGASTRIC PAIN: ICD-10-CM

## 2024-04-24 DIAGNOSIS — R11.0 NAUSEA: ICD-10-CM

## 2024-04-24 DIAGNOSIS — R63.4 UNINTENTIONAL WEIGHT LOSS: ICD-10-CM

## 2024-04-24 PROCEDURE — 43236 UPPR GI SCOPE W/SUBMUC INJ: CPT | Performed by: INTERNAL MEDICINE

## 2024-04-24 PROCEDURE — 43239 EGD BIOPSY SINGLE/MULTIPLE: CPT | Performed by: INTERNAL MEDICINE

## 2024-04-24 RX ORDER — PROPOFOL 10 MG/ML
INJECTION, EMULSION INTRAVENOUS AS NEEDED
Status: DISCONTINUED | OUTPATIENT
Start: 2024-04-24 | End: 2024-04-24

## 2024-04-24 RX ORDER — LIDOCAINE HYDROCHLORIDE 20 MG/ML
INJECTION, SOLUTION EPIDURAL; INFILTRATION; INTRACAUDAL; PERINEURAL AS NEEDED
Status: DISCONTINUED | OUTPATIENT
Start: 2024-04-24 | End: 2024-04-24

## 2024-04-24 RX ORDER — SODIUM CHLORIDE, SODIUM LACTATE, POTASSIUM CHLORIDE, CALCIUM CHLORIDE 600; 310; 30; 20 MG/100ML; MG/100ML; MG/100ML; MG/100ML
INJECTION, SOLUTION INTRAVENOUS CONTINUOUS PRN
Status: DISCONTINUED | OUTPATIENT
Start: 2024-04-24 | End: 2024-04-24

## 2024-04-24 RX ADMIN — PROPOFOL 150 MG: 10 INJECTION, EMULSION INTRAVENOUS at 12:03

## 2024-04-24 RX ADMIN — SODIUM CHLORIDE, SODIUM LACTATE, POTASSIUM CHLORIDE, AND CALCIUM CHLORIDE: .6; .31; .03; .02 INJECTION, SOLUTION INTRAVENOUS at 11:55

## 2024-04-24 RX ADMIN — ONABOTULINUMTOXINA 100 UNITS: 100 INJECTION, POWDER, LYOPHILIZED, FOR SOLUTION INTRADERMAL; INTRAMUSCULAR at 12:04

## 2024-04-24 RX ADMIN — LIDOCAINE HYDROCHLORIDE 100 MG: 20 INJECTION, SOLUTION EPIDURAL; INFILTRATION; INTRACAUDAL; PERINEURAL at 12:03

## 2024-04-24 RX ADMIN — PROPOFOL 20 MG: 10 INJECTION, EMULSION INTRAVENOUS at 12:04

## 2024-04-24 NOTE — H&P
History and Physical - [facility]  Toney Motley 68 y.o. male MRN: [id number]                  HPI: Toney Motley is a 68 y.o. year old male who presents for endoscopy with botulinum toxin injection for treatment of nausea and delayed stomach emptying      REVIEW OF SYSTEMS: Per the HPI, and otherwise unremarkable.    Historical Information   Past Medical History:   Diagnosis Date    Back pain     Colon polyp     Hyperlipidemia     Lumbar fracture with cord injury (HCC)     Neck fracture (HCC)     Previous back surgery     rods in the back    Ulcerative colitis (HCC)      Past Surgical History:   Procedure Laterality Date    BACK SURGERY      LUMBAR FUSION Right 1/20/2016    Procedure: FUSION LUMBAR  POSTERIOR, TLIF L3-4  Right L3-4 Fascet;  Surgeon: Narayan Castro MD;  Location:  [facility] OR;  Service:     [place] COLONOSCOPY FLX DX W/COLLJ SPEC WHEN PFRMD N/A 1/24/2019    Procedure: COLONOSCOPY;  Surgeon: Sanjeev Orellana III, MD;  Location: [facility];  Service: Gastroenterology    [place] SURGICAL ARTHROSCOPY SHOULDER W/ROTATOR CUFF RPR Right 7/26/2023    Procedure: ARTHROSCOPY SHOULDER- Right shoulder Arthroscopy, supraspinatus and subscapularis repair, biceps tenotomy, extensive debridement;  Surgeon: Oscar Corbett DO;  Location[facility];  Service: Orthopedics    TONSILLECTOMY       Social History   Social History     Substance and Sexual Activity   Alcohol Use Not Currently    Comment: rarely     Social History     Substance and Sexual Activity   Drug Use No     Social History     Tobacco Use   Smoking Status Never   Smokeless Tobacco Never     Family History   Problem Relation Age of Onset    Parkinsonism Mother     Lung cancer Father        Meds/Allergies     (Not in a hospital admission)      Allergies   Allergen Reactions    No Active Allergies        Objective     Blood pressure 133/83, pulse 65, temperature 98 °F (36.7 °C), temperature source Tympanic, resp. rate 16, height 6' (1.829 m),  weight 104 kg (230 lb 6.1 oz), SpO2 98%.      PHYSICAL EXAM    /83   Pulse 65   Temp 98 °F (36.7 °C) (Tympanic)   Resp 16   Ht 6' (1.829 m)   Wt 104 kg (230 lb 6.1 oz)   SpO2 98%   BMI 31.25 kg/m²       Gen: NAD  CV: RRR  CHEST: Clear  ABD: soft, NT/ND  EXT: no edema      ASSESSMENT/PLAN:  This is a 68 y.o. year old male here for egd, and he is stable and optimized for his procedure.

## 2024-04-24 NOTE — ANESTHESIA POSTPROCEDURE EVALUATION
Post-Op Assessment Note    CV Status:  Stable  Pain Score: 0    Pain management: adequate       Mental Status:  Alert and awake   Hydration Status:  Euvolemic   PONV Controlled:  Controlled   Airway Patency:  Patent     Post Op Vitals Reviewed: Yes    No anethesia notable event occurred.    Staff: CRNA               /69 (04/24/24 1217)    Temp 97.7 °F (36.5 °C) (04/24/24 1217)    Pulse 58 (04/24/24 1217)   Resp 12 (04/24/24 1217)    SpO2 91 % (04/24/24 1217)

## 2024-04-25 ENCOUNTER — TELEPHONE (OUTPATIENT)
Age: 69
End: 2024-04-25

## 2024-04-25 NOTE — TELEPHONE ENCOUNTER
Caller: Self    Doctor: Aric    Reason for call: Patient wants to know if is ok to pull start a  or weed whacker. States he still experiences pain with certain activties    Call back#: [phone]

## 2024-05-02 RX ORDER — ONDANSETRON 4 MG/1
TABLET, ORALLY DISINTEGRATING ORAL
COMMUNITY
Start: 2024-04-19

## 2024-05-05 DIAGNOSIS — R10.13 EPIGASTRIC PAIN: ICD-10-CM

## 2024-05-05 DIAGNOSIS — R11.0 NAUSEA: ICD-10-CM

## 2024-05-06 ENCOUNTER — OFFICE VISIT (OUTPATIENT)
Dept: GASTROENTEROLOGY | Facility: CLINIC | Age: 69
End: 2024-05-06
Payer: MEDICARE

## 2024-05-06 VITALS
RESPIRATION RATE: 18 BRPM | HEART RATE: 84 BPM | DIASTOLIC BLOOD PRESSURE: 82 MMHG | OXYGEN SATURATION: 95 % | WEIGHT: 230 LBS | TEMPERATURE: 98 F | HEIGHT: 72 IN | SYSTOLIC BLOOD PRESSURE: 134 MMHG | BODY MASS INDEX: 31.15 KG/M2

## 2024-05-06 DIAGNOSIS — M54.41 CHRONIC BILATERAL LOW BACK PAIN WITH BILATERAL SCIATICA: ICD-10-CM

## 2024-05-06 DIAGNOSIS — E78.5 HYPERLIPIDEMIA, UNSPECIFIED HYPERLIPIDEMIA TYPE: Primary | ICD-10-CM

## 2024-05-06 DIAGNOSIS — R10.13 EPIGASTRIC PAIN: ICD-10-CM

## 2024-05-06 DIAGNOSIS — R11.0 NAUSEA: ICD-10-CM

## 2024-05-06 DIAGNOSIS — R63.4 UNINTENTIONAL WEIGHT LOSS: ICD-10-CM

## 2024-05-06 DIAGNOSIS — M54.42 CHRONIC BILATERAL LOW BACK PAIN WITH BILATERAL SCIATICA: ICD-10-CM

## 2024-05-06 DIAGNOSIS — G89.29 CHRONIC BILATERAL LOW BACK PAIN WITH BILATERAL SCIATICA: ICD-10-CM

## 2024-05-06 PROCEDURE — 99214 OFFICE O/P EST MOD 30 MIN: CPT | Performed by: PHYSICIAN ASSISTANT

## 2024-05-06 PROCEDURE — G2211 COMPLEX E/M VISIT ADD ON: HCPCS | Performed by: PHYSICIAN ASSISTANT

## 2024-05-06 RX ORDER — PROMETHAZINE HYDROCHLORIDE 25 MG/1
TABLET ORAL EVERY 12 HOURS
COMMUNITY

## 2024-05-06 RX ORDER — EZETIMIBE 10 MG/1
10 TABLET ORAL DAILY
Qty: 90 TABLET | Refills: 2 | Status: SHIPPED | OUTPATIENT
Start: 2024-05-06 | End: 2024-08-04

## 2024-05-06 RX ORDER — OMEPRAZOLE 40 MG/1
CAPSULE, DELAYED RELEASE ORAL EVERY 24 HOURS
COMMUNITY
End: 2024-05-06

## 2024-05-06 RX ORDER — METOCLOPRAMIDE 10 MG/1
TABLET ORAL
Qty: 90 TABLET | Refills: 1 | Status: SHIPPED | OUTPATIENT
Start: 2024-05-06

## 2024-05-06 RX ORDER — ESOMEPRAZOLE MAGNESIUM 40 MG/1
40 CAPSULE, DELAYED RELEASE ORAL
Qty: 180 CAPSULE | Refills: 3 | Status: SHIPPED | OUTPATIENT
Start: 2024-05-06

## 2024-05-06 NOTE — PROGRESS NOTES
[facility]  Toney Motley 68 y.o. male MRN: [id number]       CC: Follow-up after EGD with Botox    HPI: Toney is a pleasant 68-year-old male with history of ulcerative colitis, lumbar radiculopathy, and nonalcoholic fatty liver disease.  Patient was last seen by me in April for intractable abdominal pain, weight loss and nausea.  Patient had failed nearly all outpatient treatments, and I had discussed this case with Dr. Orellana.  We decided on EGD with Botox to the pylorus.  Patient had this performed about 2 weeks ago, and feels that his symptoms are much improved.  He went to the emergency room at [facility] around that time and was prescribed Nexium, which he thinks could be helping as well.  He underwent extensive workup. MRI of the abdomen showing no pancreatic lesion. Mesenteric Doppler also without narrowing.  He had a HIDA scan with [facility] that was negative for biliary dyskinesia. He had a recent CT of his back showing severe lumbar radiculopathy extending multiple levels. He has been under significant stress.  However, has decided to have surgery locally at [facility] at the end of the month.  Fortunately the patient's weight is stable at 230 lb.    Last colonoscopy was in June 2023 revealing normal colon mucosa and several scattered pseudopolyps.  Patient was recommended a 5-year recall secondary to personal history of inflammatory bowel disease.      Review of Systems:    CONSTITUTIONAL: Denies any fever, chills, or rigors.  HEENT: No earache or tinnitus. Denies hearing loss or visual disturbances.  CARDIOVASCULAR: No chest pain or palpitations.   RESPIRATORY: Denies any cough, hemoptysis, shortness of breath or dyspnea on exertion.  GASTROINTESTINAL: As noted in the History of Present Illness.   GENITOURINARY: No problems with urination. Denies any hematuria or dysuria.  NEUROLOGIC: No dizziness or vertigo, denies headaches.   MUSCULOSKELETAL: Denies any muscle or joint pain.   SKIN:  Denies skin rashes or itching.   ENDOCRINE: Denies excessive thirst. Denies intolerance to heat or cold.  PSYCHOSOCIAL: Denies depression or anxiety. Denies any recent memory loss.       Current Outpatient Medications   Medication Sig Dispense Refill    acetaminophen (TYLENOL) 325 mg tablet Take 3 tablets (975 mg total) by mouth every 8 (eight) hours      aluminum-magnesium hydroxide-simethicone (MAALOX) 3965-1387-637 mg/30 mL suspension Take 30 mL by mouth every 6 (six) hours as needed for indigestion or heartburn      balsalazide (COLAZAL) 750 mg capsule TAKE 3 CAPSULES BY MOUTH TWICE DAILY 540 capsule 1    diazepam (VALIUM) 10 mg tablet       dicyclomine (BENTYL) 20 mg tablet Take 1 tablet (20 mg total) by mouth every 6 (six) hours 45 tablet 2    DULoxetine (CYMBALTA) 60 mg delayed release capsule Take 60 mg by mouth daily      esomeprazole (NexIUM) 40 MG capsule Take 1 capsule (40 mg total) by mouth 2 (two) times a day before meals 180 capsule 3    ezetimibe (ZETIA) 10 mg tablet Take 1 tablet (10 mg total) by mouth daily 90 tablet 2    gabapentin (NEURONTIN) 300 mg capsule Take 1 capsule (300 mg total) by mouth 3 (three) times a day 90 capsule 0    LORazepam (ATIVAN) 1 mg tablet Take 1 tablet 1 hour prior to MRI appointment, take second tablet as needed during the appointment 2 tablet 0    metoclopramide (REGLAN) 10 mg tablet TAKE 1 TABLET BY MOUTH EVERY 8 HOURS AS NEEDED (ABDOMINAL PAIN, NAUSEA, VOMITING) 90 tablet 1    metoprolol succinate (TOPROL-XL) 25 mg 24 hr tablet Take 25 mg by mouth daily      naloxone (NARCAN) 4 mg/0.1 mL nasal spray       ondansetron (ZOFRAN) 4 mg tablet Take 1 tablet (4 mg total) by mouth every 8 (eight) hours as needed for nausea or vomiting 90 tablet 0    ondansetron (ZOFRAN-ODT) 4 mg disintegrating tablet take 1 tablet by mouth every 8 hours as needed for nausea and vomiting      oxyCODONE-acetaminophen (PERCOCET) 5-325 mg per tablet TAKE 1 TABLET BY MOUTH EVERY 6 HOURS AS NEEDED  FOR MODERATE PAIN (4-6) MAX DAILY AMOUNT: 4 TABLETS      promethazine (PHENERGAN) 25 mg tablet Every 12 hours      QUEtiapine (SEROquel) 100 mg tablet       scopolamine (TRANSDERM-SCOP) 1 mg/3 days TD 72 hr patch Place 1 patch on the skin over 72 hours every third day 5 patch 3    sucralfate (CARAFATE) 1 g tablet Take 1 tablet (1 g total) by mouth 4 (four) times a day 360 tablet 0    temazepam (RESTORIL) 30 mg capsule TAKE 2 CAPSULES BY MOUTH AT BEDTIME FOR 30 DAYS      zolpidem (AMBIEN CR) 12.5 MG CR tablet Take 12.5 mg by mouth every evening      zolpidem (AMBIEN) 10 mg tablet TAKE 1 TABLET BY MOUTH NIGHTLY AS NEEDED FOR SLEEP      methocarbamol (ROBAXIN) 500 mg tablet Take 1 tablet (500 mg total) by mouth 3 (three) times a day for 14 days 42 tablet 0     No current facility-administered medications for this visit.     Past Medical History:   Diagnosis Date    Back pain     Colon polyp     Hyperlipidemia     Lumbar fracture with cord injury (HCC)     Neck fracture (HCC)     Previous back surgery     rods in the back    Ulcerative colitis (HCC)      Past Surgical History:   Procedure Laterality Date    BACK SURGERY      LUMBAR FUSION Right 1/20/2016    Procedure: FUSION LUMBAR  POSTERIOR, TLIF L3-4  Right L3-4 Fascet;  Surgeon: Narayan Castro MD;  Location:  MAIN OR;  Service:     DE COLONOSCOPY FLX DX W/COLLJ SPEC WHEN PFRMD N/A 1/24/2019    Procedure: COLONOSCOPY;  Surgeon: Sanjeev Orellana III, MD;  Location: [facility];  Service: Gastroenterology    DE SURGICAL ARTHROSCOPY SHOULDER W/ROTATOR CUFF RPR Right 7/26/2023    Procedure: ARTHROSCOPY SHOULDER- Right shoulder Arthroscopy, supraspinatus and subscapularis repair, biceps tenotomy, extensive debridement;  Surgeon: Oscar Corbett DO;  Location: [facility];  Service: Orthopedics    TONSILLECTOMY       Social History     Socioeconomic History    Marital status: /Civil Union     Spouse name: None    Number of children: None    Years of education:  None    Highest education level: None   Occupational History    None   Tobacco Use    Smoking status: Never    Smokeless tobacco: Never   Vaping Use    Vaping status: Never Used   Substance and Sexual Activity    Alcohol use: Not Currently     Comment: rarely    Drug use: No    Sexual activity: Not Currently   Other Topics Concern    None   Social History Narrative    None     Social Determinants of Health     Financial Resource Strain: Low Risk  (10/6/2023)    Overall Financial Resource Strain (CARDIA)     Difficulty of Paying Living Expenses: Not hard at all   Food Insecurity: Not on file   Transportation Needs: No Transportation Needs (10/6/2023)    PRAPARE - Transportation     Lack of Transportation (Medical): No     Lack of Transportation (Non-Medical): No   Physical Activity: Not on file   Stress: Not on file   Social Connections: Not on file   Intimate Partner Violence: Not on file   Housing Stability: Not on file     Family History   Problem Relation Age of Onset    Parkinsonism Mother     Lung cancer Father             PHYSICAL EXAM:    Vitals:    05/06/24 1159   BP: 134/82   BP Location: Left arm   Patient Position: Sitting   Pulse: 84   Resp: 18   Temp: 98 °F (36.7 °C)   TempSrc: Tympanic   SpO2: 95%   Weight: 104 kg (230 lb)   Height: 6' (1.829 m)     General Appearance:   Alert and oriented x 3. Cooperative, and in no respiratory distress   HEENT:   Normocephalic, atraumatic, anicteric.     Neck:  Supple, symmetrical, trachea midline   Lungs:   Clear to auscultation bilaterally    Heart::   Regular rate and rhythm   Abdomen:   Soft, non-tender, non-distended; normal bowel sounds; no masses, no organomegaly    Genitalia:   Deferred    Rectal:   Deferred    Extremities:  No cyanosis, clubbing or edema    Pulses:  2+ and symmetric all extremities    Skin:  Skin color, texture, turgor normal, no rashes or lesions    Lymph nodes:  No palpable cervical or supraclavicular lymphadenopathy        Lab Results:    Results from last 6 Months   Lab Units 04/15/24  1329   WBC Thousand/uL 6.69   HEMOGLOBIN g/dL 15.7   HEMATOCRIT % 47.7   PLATELETS Thousands/uL 257   SEGS PCT % 72   LYMPHO PCT % 22   MONO PCT % 5   EOS PCT % 1     Results from last 6 Months   Lab Units 05/01/24  1027 04/19/24  1352   POTASSIUM mmol/L 5.0 4.3   CHLORIDE mmol/L 100 106   CO2 mmol/L 31 26   BUN mg/dL 20 17   CREATININE mg/dL 1.17 1.04   CALCIUM mg/dL 9.5 9.2   ALK PHOS U/L  --  44   ALT U/L  --  8   AST U/L  --  12     Results from last 6 Months   Lab Units 05/01/24  1027   INR  0.9     Results from last 6 Months   Lab Units 04/15/24  1329   LIPASE u/L 25       Imaging Studies:   EGD Botox    Result Date: 4/24/2024  Impression: Mild erosive gastritis Status post botulinum toxin injection of the pylorus RECOMMENDATION: Continue PPI Continue Reglan and Zofran Reflux precautions Small frequent meals Stop balsalazide   Sanjeev Orellana III, MD       ASSESSMENT and PLAN:      1) Epigastric pain, unintentional weight loss and persistent nausea - Fortunately, patient is improving after Botox injection to the pylorus and Nexium.  Dr. Orellana instructed the patient to stop balsalazide and Phenergan.  He failed Reglan. He may have underlying gastroparesis that has developed. His last GES was in 2021, which was normal.  - Patient has extensive back surgery scheduled at [facility] at the end of the month   - When he comes for follow-up, would like to send him for GES  - Otherwise, continue Nexium twice daily  - Encouraged patient to call back with any concerns or cuevas ein symptoms  - We discussed that it can take up to 4 weeks to see the full effect of Botox, fortunately doing much better so far    - Low residue diet  - Monitor weight closely     2) HLD - Sent refill of cholesterol medication since his PCP retired. New referral for PCP made, Dr. Carballo.     Follow up in 8 weeks.      Portions of the record may have been created with voice recognition software.   "Occasional wrong word or \"sound a like\" substitutions may have occurred due to the inherent limitations of voice recognition software.  Read the chart carefully and recognize, using context, where substitutions have occurred.  "

## 2024-05-07 RX ORDER — ONDANSETRON 4 MG/1
TABLET, FILM COATED ORAL
Qty: 90 TABLET | Refills: 0 | Status: SHIPPED | OUTPATIENT
Start: 2024-05-07

## 2024-06-05 DIAGNOSIS — R11.0 NAUSEA: ICD-10-CM

## 2024-06-05 RX ORDER — ONDANSETRON 4 MG/1
TABLET, FILM COATED ORAL
Qty: 90 TABLET | Refills: 0 | Status: SHIPPED | OUTPATIENT
Start: 2024-06-05

## 2024-06-25 ENCOUNTER — NURSE TRIAGE (OUTPATIENT)
Age: 69
End: 2024-06-25

## 2024-06-25 NOTE — TELEPHONE ENCOUNTER
"Pt. C/o constipation, taking oxycodone post surgery, using Miralax 1 capful daily and senokot 50 mg bid, pt. LBM 6 days ago despite medication, denies n/v, denies rectal pain, c/o abdominal fullness, passing gas without difficulty, pt. Has a rocha cath currently, denies blood in stool, advised to increase Miralax to 1 capful twice daily but he reports has has tried doing that , pt. Is not having relief, pt. Is asking for something stronger, did advise that weaning off of oxycodone would benefit him but pt. Reports he still needs pain meds,  please advise       Reason for Disposition   Unable to have a bowel movement (BM) without using a laxative, suppository, or enema    Answer Assessment - Initial Assessment Questions  1. STOOL PATTERN OR FREQUENCY: \"How often do you pass bowel movements (BMs)?\"  (Normal range: tid to q 3 days)  \"When was the last BM passed?\"        daily  2. STRAINING: \"Do you have to strain to have a BM?\"       Denies   3. RECTAL PAIN: \"Does your rectum hurt when the stool comes out?\" If Yes, ask: \"Do you have hemorrhoids? How bad is the pain?\"  (Scale 1-10; or mild, moderate, severe)      Denies   4. STOOL COMPOSITION: \"Are the stools hard?\"       Hard   5. BLOOD ON STOOLS: \"Has there been any blood on the toilet tissue or on the surface of the BM?\" If Yes, ask: \"When was the last time?\"       Denies   6. CHRONIC CONSTIPATION: \"Is this a new problem for you?\"  If no, ask: \"How long have you had this problem?\" (days, weeks, months)       Denies   7. CHANGES IN DIET OR HYDRATION: \"Have there been any recent changes in your diet?\" \"How much fluids are you drinking consuming on a daily basis?\"  \"How much have you had to drink today?\"      Denies   8. MEDICATIONS: \"Have you been taking any new medications?\" \"Are you taking any narcotic pain medications?\" (e.g., Vicoden, Percocet, morphine, dilaudid)      Percocet   9. LAXATIVES: \"Have you been using any stool softeners, laxatives, or enemas?\"  If " "yes, ask \"What, how often, and when was the last time?\"  Miralax and Senokot   10.ACTIVITY:  \"How much walking do you do every day? on a daily basis?\"  \"Has your activity level decreased in the past week?\"         Semi active   11. CAUSE: \"What do you think is causing the constipation?\"         Pain medications   12. OTHER SYMPTOMS: \"Do you have any other symptoms?\" (e.g., abdominal pain, bloating, fever, vomiting)        Abdominal bloating   13. MEDICAL HISTORY: \"Do you have a history of hemorrhoids, rectal fissures, or rectal surgery or rectal abscess?\"          Denies    Protocols used: Constipation-ADULT-[place]    "

## 2024-06-26 NOTE — TELEPHONE ENCOUNTER
I would advise Colace 100 mg twice daily, and Miralax twice daily. He can also try a Dulcolax suppository daily

## 2024-07-10 ENCOUNTER — OFFICE VISIT (OUTPATIENT)
Dept: GASTROENTEROLOGY | Facility: CLINIC | Age: 69
End: 2024-07-10
Payer: MEDICARE

## 2024-07-10 VITALS
DIASTOLIC BLOOD PRESSURE: 78 MMHG | OXYGEN SATURATION: 96 % | HEART RATE: 78 BPM | HEIGHT: 72 IN | WEIGHT: 213 LBS | SYSTOLIC BLOOD PRESSURE: 128 MMHG | BODY MASS INDEX: 28.85 KG/M2 | TEMPERATURE: 98.3 F

## 2024-07-10 DIAGNOSIS — R63.0 POOR APPETITE: ICD-10-CM

## 2024-07-10 DIAGNOSIS — R11.0 NAUSEA: ICD-10-CM

## 2024-07-10 DIAGNOSIS — R63.4 UNINTENTIONAL WEIGHT LOSS: ICD-10-CM

## 2024-07-10 DIAGNOSIS — R10.13 EPIGASTRIC PAIN: ICD-10-CM

## 2024-07-10 DIAGNOSIS — G47.01 INSOMNIA DUE TO MEDICAL CONDITION: ICD-10-CM

## 2024-07-10 DIAGNOSIS — M54.16 LUMBAR RADICULOPATHY: Primary | ICD-10-CM

## 2024-07-10 PROCEDURE — 99214 OFFICE O/P EST MOD 30 MIN: CPT | Performed by: PHYSICIAN ASSISTANT

## 2024-07-10 PROCEDURE — G2211 COMPLEX E/M VISIT ADD ON: HCPCS | Performed by: PHYSICIAN ASSISTANT

## 2024-07-10 RX ORDER — DOCUSATE SODIUM 50MG AND SENNOSIDES 8.6MG 8.6; 5 MG/1; MG/1
1 TABLET, FILM COATED ORAL 2 TIMES DAILY
COMMUNITY
Start: 2024-06-18

## 2024-07-10 RX ORDER — SULFAMETHOXAZOLE AND TRIMETHOPRIM 800; 160 MG/1; MG/1
TABLET ORAL
COMMUNITY
Start: 2024-06-12

## 2024-07-10 RX ORDER — OXYCODONE HYDROCHLORIDE 5 MG/1
TABLET ORAL
COMMUNITY
Start: 2024-06-24

## 2024-07-10 RX ORDER — METHOCARBAMOL 750 MG/1
TABLET, FILM COATED ORAL
COMMUNITY
Start: 2024-05-23

## 2024-07-10 RX ORDER — ESOMEPRAZOLE MAGNESIUM 40 MG/1
40 CAPSULE, DELAYED RELEASE ORAL
Qty: 180 CAPSULE | Refills: 3 | Status: SHIPPED | OUTPATIENT
Start: 2024-07-10

## 2024-07-10 RX ORDER — OXYBUTYNIN CHLORIDE 10 MG/1
10 TABLET, EXTENDED RELEASE ORAL DAILY
COMMUNITY
Start: 2024-07-03 | End: 2024-07-23

## 2024-07-10 RX ORDER — PANTOPRAZOLE SODIUM 40 MG/1
40 TABLET, DELAYED RELEASE ORAL DAILY
COMMUNITY
Start: 2024-06-18 | End: 2024-07-10

## 2024-07-10 RX ORDER — CEPHALEXIN 500 MG/1
CAPSULE ORAL
COMMUNITY
Start: 2024-06-30

## 2024-07-10 RX ORDER — TAMSULOSIN HYDROCHLORIDE 0.4 MG/1
0.4 CAPSULE ORAL
COMMUNITY
Start: 2024-05-30 | End: 2025-05-30

## 2024-07-11 NOTE — PROGRESS NOTES
" [facility]  Toney Motley 69 y.o. male MRN: [id number]       CC: Follow-up    HPI: Toney is a pleasant 69-year-old male with history of ulcerative colitis, lumbar radiculopathy, and nonalcoholic fatty liver disease.  With regard to patient's recent surgical history, he recently underwent L2-L3, L4-L5, L5-S1 posterior lumbar interbody fusion with pedicle screws, rods and cage at [facility] in May 2024.  Patient reports that he was debating on having the procedure done at [facility] versus the [facility] however was uncomfortable with the fact that postoperatively he would be far in distance from the surgeon.  His hospital course was complicated by postop DVT on Eliquis and postop urinary retention with catheter.  Patient has been experiencing severe back pain, as well as lack of appetite, nausea, and generalized abdominal pain.  Prior to surgery, the patient's baseline weight was around 250 to 260 pounds.  Patient now weighs 213 pounds.  He has lost more than 30 pounds in the course of this year secondary to constellation of symptoms.  He has undergone extensive workup from a GI standpoint including CTA, MRCP, CT head, HIDA scan with CCK, right upper quadrant ultrasound, and endoscopy with Botox to the pylorus.  Patient was hospitalized again after his surgery, and he had repeat abdominal imaging including CTA and right upper quadrant ultrasound.  Only new finding was development of sludge.  With regard to medication, patient is trialed Reglan, Protonix, scopolamine patch, Phenergan, Compazine, Bentyl and Levsin.  Patient reports that he is on lower dose of pain medication at this time as he does not want to \"get hooked on\" opioids again.  Patient denies bloody or black stools.  His bowel movements are now regular after he was started on a bowel regimen postop.  Patient reports that he also has a hard time sleeping at night secondary to his back pain.    Last colonoscopy was in June 2023 " revealing normal colon mucosa and several scattered pseudopolyps.  Patient was recommended a 5-year recall secondary to personal history of inflammatory bowel disease.       Review of Systems:    CONSTITUTIONAL: Denies any fever, chills, or rigors.  Positive for poor appetite and positive for weight loss.  HEENT: No earache or tinnitus. Denies hearing loss or visual disturbances.  CARDIOVASCULAR: No chest pain or palpitations.   RESPIRATORY: Denies any cough, hemoptysis, shortness of breath or dyspnea on exertion.  GASTROINTESTINAL: As noted in the History of Present Illness.   GENITOURINARY: Positive for pain around the penis with relation to urinary catheter.  Denies hematuria.  NEUROLOGIC: No dizziness or vertigo, denies headaches.   MUSCULOSKELETAL: Positive for back pain.  SKIN: Denies skin rashes or itching.   ENDOCRINE: Denies excessive thirst. Denies intolerance to heat or cold.  PSYCHOSOCIAL: Denies depression or anxiety. Denies any recent memory loss.  Positive for poor sleep.      Current Outpatient Medications   Medication Sig Dispense Refill    acetaminophen (TYLENOL) 325 mg tablet Take 3 tablets (975 mg total) by mouth every 8 (eight) hours      esomeprazole (NexIUM) 40 MG capsule Take 1 capsule (40 mg total) by mouth 2 (two) times a day before meals 180 capsule 3    ezetimibe (ZETIA) 10 mg tablet Take 1 tablet (10 mg total) by mouth daily 90 tablet 2    ondansetron (ZOFRAN) 4 mg tablet TAKE 1 TABLET BY MOUTH EVERY 8 HOURS AS NEEDED FOR NAUSEA AND VOMITING 90 tablet 0    oxyCODONE (ROXICODONE) 5 immediate release tablet TAKE 1 TABLET BY MOUTH EVERY 8 HOURS AS NEEDED FOR SEVERE PAIN (PAIN SCORE 7-10). MAX/DAY: 15 MG      QUEtiapine (SEROquel) 100 mg tablet       temazepam (RESTORIL) 30 mg capsule TAKE 2 CAPSULES BY MOUTH AT BEDTIME FOR 30 DAYS      zolpidem (AMBIEN) 10 mg tablet TAKE 1 TABLET BY MOUTH NIGHTLY AS NEEDED FOR SLEEP      aluminum-magnesium hydroxide-simethicone (MAALOX) 2119-3629-231 mg/30  mL suspension Take 30 mL by mouth every 6 (six) hours as needed for indigestion or heartburn (Patient not taking: Reported on 7/10/2024)      balsalazide (COLAZAL) 750 mg capsule TAKE 3 CAPSULES BY MOUTH TWICE DAILY (Patient not taking: Reported on 7/10/2024) 540 capsule 1    cephalexin (KEFLEX) 500 mg capsule take 1 capsule by mouth four times a day for 7 days (Patient not taking: Reported on 7/10/2024)      diazepam (VALIUM) 10 mg tablet  (Patient not taking: Reported on 7/10/2024)      dicyclomine (BENTYL) 20 mg tablet Take 1 tablet (20 mg total) by mouth every 6 (six) hours (Patient not taking: Reported on 7/10/2024) 45 tablet 2    DULoxetine (CYMBALTA) 60 mg delayed release capsule Take 60 mg by mouth daily (Patient not taking: Reported on 7/10/2024)      LORazepam (ATIVAN) 1 mg tablet Take 1 tablet 1 hour prior to MRI appointment, take second tablet as needed during the appointment (Patient not taking: Reported on 7/10/2024) 2 tablet 0    methocarbamol (ROBAXIN) 750 mg tablet TAKE 2 TABLETS (1,500 MG TOTAL) BY MOUTH 4 (FOUR) TIMES A DAY FOR 10 DAYS. (Patient not taking: Reported on 7/10/2024)      metoprolol succinate (TOPROL-XL) 25 mg 24 hr tablet Take 25 mg by mouth daily (Patient not taking: Reported on 7/10/2024)      mupirocin (BACTROBAN) 2 % ointment PLEASE SEE ATTACHED FOR DETAILED DIRECTIONS (Patient not taking: Reported on 7/10/2024)      naloxone (NARCAN) 4 mg/0.1 mL nasal spray  (Patient not taking: Reported on 7/10/2024)      ondansetron (ZOFRAN-ODT) 4 mg disintegrating tablet take 1 tablet by mouth every 8 hours as needed for nausea and vomiting (Patient not taking: Reported on 7/10/2024)      oxybutynin (DITROPAN-XL) 10 MG 24 hr tablet Take 10 mg by mouth daily (Patient not taking: Reported on 7/10/2024)      oxyCODONE-acetaminophen (PERCOCET) 5-325 mg per tablet TAKE 1 TABLET BY MOUTH EVERY 6 HOURS AS NEEDED FOR MODERATE PAIN (4-6) MAX DAILY AMOUNT: 4 TABLETS (Patient not taking: Reported on  7/10/2024)      promethazine (PHENERGAN) 25 mg tablet Every 12 hours (Patient not taking: Reported on 7/10/2024)      Senexon-S 8.6-50 MG per tablet Take 1 tablet by mouth 2 (two) times a day (Patient not taking: Reported on 7/10/2024)      sucralfate (CARAFATE) 1 g tablet Take 1 tablet (1 g total) by mouth 4 (four) times a day (Patient not taking: Reported on 7/10/2024) 360 tablet 0    sulfamethoxazole-trimethoprim (BACTRIM DS) 800-160 mg per tablet TAKE 1 TABLET (160 MG OF TRIMETHOPRIM TOTAL) BY MOUTH 2 (TWO) TIMES A DAY FOR 2 DAYS. (Patient not taking: Reported on 7/10/2024)      tamsulosin (FLOMAX) 0.4 mg Take 0.4 mg by mouth (Patient not taking: Reported on 7/10/2024)      zolpidem (AMBIEN CR) 12.5 MG CR tablet Take 12.5 mg by mouth every evening (Patient not taking: Reported on 7/10/2024)       No current facility-administered medications for this visit.     Past Medical History:   Diagnosis Date    Back pain     Colon polyp     Hyperlipidemia     Lumbar fracture with cord injury (HCC)     Neck fracture (HCC)     Previous back surgery     rods in the back    Ulcerative colitis (HCC)      Past Surgical History:   Procedure Laterality Date    BACK SURGERY  05/20/2024    LUMBAR FUSION Right 01/20/2016    Procedure: FUSION LUMBAR  POSTERIOR, TLIF L3-4  Right L3-4 Fascet;  Surgeon: Narayan Castro MD;  Location:  MAIN OR;  Service:     ME COLONOSCOPY FLX DX W/COLLJ SPEC WHEN PFRMD N/A 01/24/2019    Procedure: COLONOSCOPY;  Surgeon: Sanjeev Orellana III, MD;  Location: [facility];  Service: Gastroenterology    ME SURGICAL ARTHROSCOPY SHOULDER W/ROTATOR CUFF RPR Right 07/26/2023    Procedure: ARTHROSCOPY SHOULDER- Right shoulder Arthroscopy, supraspinatus and subscapularis repair, biceps tenotomy, extensive debridement;  Surgeon: Oscar Corbett DO;  Location: [facility];  Service: Orthopedics    TONSILLECTOMY       Social History     Socioeconomic History    Marital status: /Civil Union     Spouse name:  None    Number of children: None    Years of education: None    Highest education level: None   Occupational History    None   Tobacco Use    Smoking status: Never    Smokeless tobacco: Never   Vaping Use    Vaping status: Never Used   Substance and Sexual Activity    Alcohol use: Not Currently     Comment: rarely    Drug use: No    Sexual activity: Not Currently   Other Topics Concern    None   Social History Narrative    None     Social Determinants of Health     Financial Resource Strain: Low Risk  (6/18/2024)    Received from [facility]    Overall Financial Resource Strain (CARDIA)     Difficulty of Paying Living Expenses: Not very hard   Food Insecurity: No Food Insecurity (6/18/2024)    Received from [facility]    Hunger Vital Sign     Worried About Running Out of Food in the Last Year: Never true     Ran Out of Food in the Last Year: Never true   Transportation Needs: No Transportation Needs (6/18/2024)    Received from [facility]    PRAPARE - Transportation     Lack of Transportation (Medical): No     Lack of Transportation (Non-Medical): No   Physical Activity: Not on file   Stress: Not on file   Social Connections: Not on file   Intimate Partner Violence: Not At Risk (6/18/2024)    Received from [facility]    Humiliation, Afraid, Rape, and Kick questionnaire     Fear of Current or Ex-Partner: No     Emotionally Abused: No     Physically Abused: No     Sexually Abused: No   Housing Stability: Low Risk  (6/18/2024)    Received from [facility]    Housing Stability Vital Sign     Unable to Pay for Housing in the Last Year: No     Number of Times Moved in the Last Year: 1     Homeless in the Last Year: No     Family History   Problem Relation Age of Onset    Parkinsonism Mother     Lung cancer Father             PHYSICAL EXAM:    Vitals:    07/10/24 1400   BP: 128/78   BP Location: Right arm   Patient Position: Sitting    Cuff Size: Large   Pulse: 78   Temp: 98.3 °F (36.8 °C)   TempSrc: Temporal   SpO2: 96%   Weight: 96.6 kg (213 lb)   Height: 6' (1.829 m)     General Appearance:   Alert and oriented x 3. Cooperative, and in no respiratory distress.  Temporal wasting.   HEENT:   Normocephalic, atraumatic, anicteric.     Neck:  Supple, symmetrical, trachea midline   Lungs:   Clear to auscultation bilaterally    Heart::   Regular rate and rhythm   Abdomen:   Soft, non-tender, non-distended; normal bowel sounds; no masses, no organomegaly    Genitalia:   Deferred    Rectal:   Deferred    Extremities:  No cyanosis, clubbing or edema    Pulses:  2+ and symmetric all extremities    Skin:  Skin color, texture, turgor normal, no rashes or lesions    Lymph nodes:  No palpable cervical or supraclavicular lymphadenopathy        Lab Results:   Results from last 6 Months   Lab Units 04/15/24  1329   WBC Thousand/uL 6.69   HEMOGLOBIN g/dL 15.7   HEMATOCRIT % 47.7   PLATELETS Thousands/uL 257   SEGS PCT % 72   LYMPHO PCT % 22   MONO PCT % 5   EOS PCT % 1     Results from last 6 Months   Lab Units 06/30/24  0852   POTASSIUM mmol/L 3.5   CHLORIDE mmol/L 99*   CO2 mmol/L 24   BUN mg/dL 18   CREATININE mg/dL 1.00   CALCIUM mg/dL 9.0   ALK PHOS U/L 79   ALT U/L 6   AST U/L 14     Results from last 6 Months   Lab Units 06/17/24  1948   INR  1.1     Results from last 6 Months   Lab Units 04/15/24  1329   LIPASE u/L 25       Imaging Studies:   EGD Botox    Result Date: 4/24/2024  Impression: Mild erosive gastritis Status post botulinum toxin injection of the pylorus RECOMMENDATION: Continue PPI Continue Reglan and Zofran Reflux precautions Small frequent meals Stop balsalazide   Sanjeev Orellana III, MD       ASSESSMENT and PLAN:      1) Unintentional weight loss, severe back and abdominal pain, nausea status post extensive lumbar fusion surgery- Voiced my concern regarding patient's continued weight loss, now down to 213 lb.  Even though his BMI is  "still considered to be overweight, this is very unlike the patient.  He has had extensive workup as stated above in HPI.  Discussed case with Dr. Orellana.  He agrees that patient has had extensive workup from a GI standpoint regarding imaging and treatment.  He did not feel that additional imaging would be warranted.  He agreed that a trial of Remeron may be beneficial, and as I discussed with the patient during our visit that it would also help with sleep and appetite.  I spoke with patient and with his permission I also spoke with his wife, Joelle.  Patient is of course reluctant to go on higher doses of opioids as he does not want to be dependent on them like he was many years ago.  I put in another referral to pain management at [facility] for any other suggestions.  Patient appreciated this, and has a very good relationship with his pain management doctor at [facility].  We discussed that Remeron does have a black box warning of suicidal ideation, and I made both patient and patient's wife aware.  Patient had stated that he feels that the benefits outweigh the risk at this time. I also discussed with patient's wife that we discussed the possibility of enteral feeding tube can use to lose weight as on exam he has evidence of temporal wasting already.  Greater than 1 hour and 30 minutes was spent on patient's case between review of records, patient visit, discussion with attending, and discussion with patient's wife.      Follow up in 3 weeks.      Portions of the record may have been created with voice recognition software.  Occasional wrong word or \"sound a like\" substitutions may have occurred due to the inherent limitations of voice recognition software.  Read the chart carefully and recognize, using context, where substitutions have occurred.  "

## 2024-07-12 ENCOUNTER — TELEPHONE (OUTPATIENT)
Dept: GASTROENTEROLOGY | Facility: CLINIC | Age: 69
End: 2024-07-12

## 2024-07-12 DIAGNOSIS — R63.4 UNINTENTIONAL WEIGHT LOSS: ICD-10-CM

## 2024-07-12 DIAGNOSIS — R10.13 EPIGASTRIC PAIN: ICD-10-CM

## 2024-07-12 DIAGNOSIS — R11.0 NAUSEA: ICD-10-CM

## 2024-07-12 RX ORDER — MIRTAZAPINE 7.5 MG/1
7.5 TABLET, FILM COATED ORAL
Qty: 60 TABLET | Refills: 2 | Status: SHIPPED | OUTPATIENT
Start: 2024-07-12

## 2024-07-12 NOTE — TELEPHONE ENCOUNTER
Patient was prescribed Nexium at another network, and was getting it fine, he had told me that the pharmacy was not going to give it to him this time.

## 2024-07-12 NOTE — TELEPHONE ENCOUNTER
----- Message from Angela Dorsey PA-C sent at 7/12/2024 12:05 PM EDT -----  Can someone call patient's psychiatrist, Dr. Bhakta to let him know that we want to put the patient on Remeron to help with sleep and appetite.  His phone number is [phone].  Had permission from both patient and his wife.

## 2024-07-12 NOTE — TELEPHONE ENCOUNTER
[facility]  Mon- thur 9-5 pm  Left DETAILED msg   I will follow up on Monday to make sure the doctor received this msg          Angela Dorsey PA-C  [facility]  Can someone call patient's psychiatrist, Dr. Bhakta to let him know that we want to put the patient on Remeron to help with sleep and appetite.  His phone number is [phone].  Had permission from both patient and his wife.

## 2024-07-15 ENCOUNTER — OFFICE VISIT (OUTPATIENT)
Dept: OBGYN CLINIC | Facility: CLINIC | Age: 69
End: 2024-07-15
Payer: MEDICARE

## 2024-07-15 VITALS
DIASTOLIC BLOOD PRESSURE: 77 MMHG | WEIGHT: 213.8 LBS | SYSTOLIC BLOOD PRESSURE: 115 MMHG | BODY MASS INDEX: 28.96 KG/M2 | HEART RATE: 74 BPM | HEIGHT: 72 IN

## 2024-07-15 DIAGNOSIS — M65.352 TRIGGER LITTLE FINGER OF LEFT HAND: ICD-10-CM

## 2024-07-15 DIAGNOSIS — Z98.890 S/P ARTHROSCOPY OF RIGHT SHOULDER: Primary | ICD-10-CM

## 2024-07-15 PROCEDURE — 99213 OFFICE O/P EST LOW 20 MIN: CPT | Performed by: ORTHOPAEDIC SURGERY

## 2024-07-15 NOTE — PROGRESS NOTES
Patient Name:  Toney Motley  MRN:  [id number]    Assessment & Plan     1. S/P arthroscopy of right shoulder  2. Trigger little finger of left hand  -     Ambulatory Referral to Orthopedic Surgery; Future      Approximately 1 year s/p Right shoulder arthroscopic rotator cuff repair, biceps tenotomy, extensive debridement performed on 07/26/2023.  Overall, patient improved from previous appointments  Advised patient to continue home exercises while sitting and with limitations provided by spine surgeon.   Again, advised patient he can likely have some post operative stiffness from arthroscopic rotator cuff repair  Advised patient wearing a post operative sling would not cause lumbar spine pathology which would necessitate lumbar surgery. Advised it likely exacerbated preexisting symptoms/condition as patient had previous back surgeries.   If patient's shoulder pain and weakness persisted or worsened, can consider new x-rays and possibly new MRI.   Placed referral to Hand surgery for evaluation of locked Left small finger trigger finger  Follow up as needed    History of the Present Illness   Toney Motley is a 69 y.o. male approximately 1 year s/p Right shoulder arthroscopic rotator cuff repair, biceps tenotomy, extensive debridement performed on 07/26/2023. Today, patient reports his shoulder is doing alright. He admits to having recent back surgery on 05/20/2024, because of persistent pain. He admits his low back pain is similar compared to before surgery. Patient denies recent home exercises for the shoulder secondary to recent back surgery. He admits his shoulder is feeling much better compared to before surgery.         Review of Systems     Review of Systems   Constitutional:  Negative for chills and fever.   HENT:  Negative for ear pain and sore throat.    Eyes:  Negative for pain and visual disturbance.   Respiratory:  Negative for cough and shortness of breath.    Cardiovascular:  Negative for chest pain and  palpitations.   Gastrointestinal:  Negative for abdominal pain and vomiting.   Genitourinary:  Negative for dysuria and hematuria.   Musculoskeletal:  Negative for arthralgias and back pain.   Skin:  Negative for color change and rash.   Neurological:  Negative for seizures and syncope.   All other systems reviewed and are negative.      Physical Exam     /77   Pulse 74   Ht 6' (1.829 m)   Wt 97 kg (213 lb 12.8 oz)   BMI 29.00 kg/m²     Right Shoulder:   Surgical incisions well healed  Active range of motion   140 degrees forward flexion  120 degrees abduction  70 degrees external rotation   3 level restriction internal rotation    There is no tenderness present over the shoulder.   Forward flexion testing 4/5  External rotation testing 4+/5  Internal rotation testing 4+/5  The patient is neurovascularly intact distally in the extremity.      Data Review     I have personally reviewed pertinent films in PACS, and my interpretation follows.    No new images     Social History     Tobacco Use    Smoking status: Never    Smokeless tobacco: Never   Vaping Use    Vaping status: Never Used   Substance Use Topics    Alcohol use: Not Currently     Comment: rarely    Drug use: No           Procedures  None     Ruby Alegria PA-C

## 2024-07-15 NOTE — TELEPHONE ENCOUNTER
"I followed up this call, I spoke with Isadora  Who stated she:  \"Spoke with dr Bhakta, he stated \" that was absolutely fine. It wont interact with anything\"  "

## 2024-07-18 NOTE — TELEPHONE ENCOUNTER
Called & spoke to ginny @ radiology reading room. He stated he does not see any results & that it looks like patient did not have it done? Please advise. Thank you !   Head, normocephalic, atraumatic, Face, Face within normal limits, Ears, External ears within normal limits, Nose/Nasopharynx, External nose  normal appearance, nares patent, no nasal discharge, Mouth and Throat, Oral cavity appearance normal, Breath odor normal, Lips, Appearance normal

## 2024-07-30 ENCOUNTER — APPOINTMENT (OUTPATIENT)
Dept: LAB | Facility: HOSPITAL | Age: 69
End: 2024-07-30
Payer: MEDICARE

## 2024-07-30 DIAGNOSIS — R89.2 NONSPECIFIC ABNORMAL TOXICOLOGICAL FINDING: ICD-10-CM

## 2024-07-30 DIAGNOSIS — E66.9 CLASS 1 OBESITY IN ADULT, UNSPECIFIED BMI, UNSPECIFIED OBESITY TYPE, UNSPECIFIED WHETHER SERIOUS COMORBIDITY PRESENT: ICD-10-CM

## 2024-07-30 DIAGNOSIS — E03.9 MYXEDEMA HEART DISEASE: ICD-10-CM

## 2024-07-30 DIAGNOSIS — F33.2 SEVERE RECURRENT MAJOR DEPRESSION WITHOUT PSYCHOTIC FEATURES (HCC): ICD-10-CM

## 2024-07-30 DIAGNOSIS — G89.4 CHRONIC PAIN SYNDROME: ICD-10-CM

## 2024-07-30 DIAGNOSIS — I51.9 MYXEDEMA HEART DISEASE: ICD-10-CM

## 2024-07-30 DIAGNOSIS — Z51.81 MONITORING FOR LONG-TERM ANTICOAGULANT USE: ICD-10-CM

## 2024-07-30 DIAGNOSIS — E55.9 VITAMIN D DEFICIENCY DISEASE: ICD-10-CM

## 2024-07-30 DIAGNOSIS — Z79.01 MONITORING FOR LONG-TERM ANTICOAGULANT USE: ICD-10-CM

## 2024-07-30 DIAGNOSIS — F41.0 PANIC DISORDER WITHOUT AGORAPHOBIA: ICD-10-CM

## 2024-07-30 DIAGNOSIS — Z79.899 ENCOUNTER FOR LONG-TERM (CURRENT) USE OF OTHER MEDICATIONS: ICD-10-CM

## 2024-07-30 LAB
25(OH)D3 SERPL-MCNC: 31.4 NG/ML (ref 30–100)
ALBUMIN SERPL BCG-MCNC: 4.3 G/DL (ref 3.5–5)
ALP SERPL-CCNC: 71 U/L (ref 34–104)
ALT SERPL W P-5'-P-CCNC: 8 U/L (ref 7–52)
ANION GAP SERPL CALCULATED.3IONS-SCNC: 6 MMOL/L (ref 4–13)
AST SERPL W P-5'-P-CCNC: 10 U/L (ref 13–39)
BASOPHILS # BLD AUTO: 0.02 THOUSANDS/ÂΜL (ref 0–0.1)
BASOPHILS NFR BLD AUTO: 0 % (ref 0–1)
BILIRUB SERPL-MCNC: 0.56 MG/DL (ref 0.2–1)
BUN SERPL-MCNC: 19 MG/DL (ref 5–25)
CALCIUM SERPL-MCNC: 9.4 MG/DL (ref 8.4–10.2)
CHLORIDE SERPL-SCNC: 104 MMOL/L (ref 96–108)
CHOLEST SERPL-MCNC: 167 MG/DL
CO2 SERPL-SCNC: 30 MMOL/L (ref 21–32)
CREAT SERPL-MCNC: 0.9 MG/DL (ref 0.6–1.3)
EOSINOPHIL # BLD AUTO: 0.13 THOUSAND/ÂΜL (ref 0–0.61)
EOSINOPHIL NFR BLD AUTO: 2 % (ref 0–6)
ERYTHROCYTE [DISTWIDTH] IN BLOOD BY AUTOMATED COUNT: 15.5 % (ref 11.6–15.1)
EST. AVERAGE GLUCOSE BLD GHB EST-MCNC: 103 MG/DL
FOLATE SERPL-MCNC: 13.2 NG/ML
GFR SERPL CREATININE-BSD FRML MDRD: 88 ML/MIN/1.73SQ M
GLUCOSE P FAST SERPL-MCNC: 93 MG/DL (ref 65–99)
HBA1C MFR BLD: 5.2 %
HCT VFR BLD AUTO: 40.3 % (ref 36.5–49.3)
HDLC SERPL-MCNC: 52 MG/DL
HGB BLD-MCNC: 12.8 G/DL (ref 12–17)
IMM GRANULOCYTES # BLD AUTO: 0.03 THOUSAND/UL (ref 0–0.2)
IMM GRANULOCYTES NFR BLD AUTO: 0 % (ref 0–2)
LDLC SERPL CALC-MCNC: 95 MG/DL (ref 0–100)
LYMPHOCYTES # BLD AUTO: 1.33 THOUSANDS/ÂΜL (ref 0.6–4.47)
LYMPHOCYTES NFR BLD AUTO: 15 % (ref 14–44)
MCH RBC QN AUTO: 25.9 PG (ref 26.8–34.3)
MCHC RBC AUTO-ENTMCNC: 31.8 G/DL (ref 31.4–37.4)
MCV RBC AUTO: 81 FL (ref 82–98)
MONOCYTES # BLD AUTO: 0.48 THOUSAND/ÂΜL (ref 0.17–1.22)
MONOCYTES NFR BLD AUTO: 6 % (ref 4–12)
NEUTROPHILS # BLD AUTO: 6.64 THOUSANDS/ÂΜL (ref 1.85–7.62)
NEUTS SEG NFR BLD AUTO: 77 % (ref 43–75)
NONHDLC SERPL-MCNC: 115 MG/DL
NRBC BLD AUTO-RTO: 0 /100 WBCS
PLATELET # BLD AUTO: 233 THOUSANDS/UL (ref 149–390)
PMV BLD AUTO: 10.6 FL (ref 8.9–12.7)
POTASSIUM SERPL-SCNC: 4.3 MMOL/L (ref 3.5–5.3)
PROLACTIN SERPL-MCNC: 2.66 NG/ML
PROT SERPL-MCNC: 7.5 G/DL (ref 6.4–8.4)
RBC # BLD AUTO: 4.95 MILLION/UL (ref 3.88–5.62)
SODIUM SERPL-SCNC: 140 MMOL/L (ref 135–147)
T3 SERPL-MCNC: 0.9 NG/ML
TRIGL SERPL-MCNC: 100 MG/DL
TSH SERPL DL<=0.05 MIU/L-ACNC: 1.78 UIU/ML (ref 0.45–4.5)
VIT B12 SERPL-MCNC: 250 PG/ML (ref 180–914)
WBC # BLD AUTO: 8.63 THOUSAND/UL (ref 4.31–10.16)

## 2024-07-30 PROCEDURE — 82746 ASSAY OF FOLIC ACID SERUM: CPT

## 2024-07-30 PROCEDURE — 80061 LIPID PANEL: CPT

## 2024-07-30 PROCEDURE — 84443 ASSAY THYROID STIM HORMONE: CPT

## 2024-07-30 PROCEDURE — 36415 COLL VENOUS BLD VENIPUNCTURE: CPT

## 2024-07-30 PROCEDURE — 84146 ASSAY OF PROLACTIN: CPT

## 2024-07-30 PROCEDURE — 84480 ASSAY TRIIODOTHYRONINE (T3): CPT

## 2024-07-30 PROCEDURE — 80053 COMPREHEN METABOLIC PANEL: CPT

## 2024-07-30 PROCEDURE — 82306 VITAMIN D 25 HYDROXY: CPT

## 2024-07-30 PROCEDURE — 83036 HEMOGLOBIN GLYCOSYLATED A1C: CPT

## 2024-07-30 PROCEDURE — 85025 COMPLETE CBC W/AUTO DIFF WBC: CPT

## 2024-07-30 PROCEDURE — 82607 VITAMIN B-12: CPT

## 2024-08-02 ENCOUNTER — TELEPHONE (OUTPATIENT)
Dept: GASTROENTEROLOGY | Facility: CLINIC | Age: 69
End: 2024-08-02

## 2024-08-02 ENCOUNTER — OFFICE VISIT (OUTPATIENT)
Dept: GASTROENTEROLOGY | Facility: CLINIC | Age: 69
End: 2024-08-02
Payer: MEDICARE

## 2024-08-02 VITALS
OXYGEN SATURATION: 93 % | TEMPERATURE: 97.5 F | SYSTOLIC BLOOD PRESSURE: 124 MMHG | RESPIRATION RATE: 18 BRPM | DIASTOLIC BLOOD PRESSURE: 82 MMHG | HEART RATE: 93 BPM | BODY MASS INDEX: 28.55 KG/M2 | HEIGHT: 72 IN | WEIGHT: 210.8 LBS

## 2024-08-02 DIAGNOSIS — R63.0 POOR APPETITE: ICD-10-CM

## 2024-08-02 DIAGNOSIS — G47.01 INSOMNIA DUE TO MEDICAL CONDITION: ICD-10-CM

## 2024-08-02 DIAGNOSIS — R63.4 UNINTENTIONAL WEIGHT LOSS: ICD-10-CM

## 2024-08-02 DIAGNOSIS — K59.09 CHRONIC CONSTIPATION: Primary | ICD-10-CM

## 2024-08-02 PROCEDURE — 99213 OFFICE O/P EST LOW 20 MIN: CPT | Performed by: PHYSICIAN ASSISTANT

## 2024-08-02 PROCEDURE — G2211 COMPLEX E/M VISIT ADD ON: HCPCS | Performed by: PHYSICIAN ASSISTANT

## 2024-08-02 RX ORDER — LINACLOTIDE 72 UG/1
1 CAPSULE, GELATIN COATED ORAL DAILY
Qty: 30 CAPSULE | Refills: 2 | Status: SHIPPED | OUTPATIENT
Start: 2024-08-02

## 2024-08-02 RX ORDER — MIRTAZAPINE 15 MG/1
15 TABLET, ORALLY DISINTEGRATING ORAL
Qty: 30 TABLET | Refills: 0 | Status: SHIPPED | OUTPATIENT
Start: 2024-08-02

## 2024-08-02 RX ORDER — METOCLOPRAMIDE 10 MG/1
TABLET ORAL
COMMUNITY
Start: 2024-07-19

## 2024-08-02 NOTE — PROGRESS NOTES
[facility]  Toney Motley 69 y.o. male MRN: [id number]       CC: Follow-up for unintentional weight loss    HPI: Toney is a pleasant 69-year-old male with history of ulcerative colitis, lumbar radiculopathy, and nonalcoholic fatty liver disease.  With regard to patient's recent surgical history, he recently underwent L2-L3, L4-L5, L5-S1 posterior lumbar interbody fusion with pedicle screws, rods and cage at [facility] in May 2024.  Patient is currently on Eliquis for postop DVT.  Patient presents to the office today accompanied by his wife.  Prior to surgery, the patient's baseline weight was around 250 to 260 pounds.  Today, the patient has lost some more weight since I last saw him.  Down to 210 pounds today.  Patient reports that he is still having significant back pain, which he was told was to be expected after surgery.  Patient was having significant back pain even prior.  Patient reports that he feels that the mirtazapine is helping slightly, would like to increase dosage.  He is having significant constipation.     He has undergone extensive workup from a GI standpoint including CTA, MRCP, CT head, HIDA scan with CCK, right upper quadrant ultrasound, and endoscopy with Botox to the pylorus.  Last colonoscopy was in June 2023 revealing normal colon mucosa and several scattered pseudopolyps.  Patient was recommended a 5-year recall secondary to personal history of inflammatory bowel disease.       Review of Systems:    CONSTITUTIONAL: Denies any fever, chills, or rigors. Good appetite, and no recent weight loss.  HEENT: No earache or tinnitus. Denies hearing loss or visual disturbances.  CARDIOVASCULAR: No chest pain or palpitations.   RESPIRATORY: Denies any cough, hemoptysis, shortness of breath or dyspnea on exertion.  GASTROINTESTINAL: As noted in the History of Present Illness.   GENITOURINARY: No problems with urination. Denies any hematuria or dysuria.  NEUROLOGIC: No dizziness or  vertigo, denies headaches.   MUSCULOSKELETAL: Denies any muscle or joint pain.   SKIN: Denies skin rashes or itching.   ENDOCRINE: Denies excessive thirst. Denies intolerance to heat or cold.  PSYCHOSOCIAL: Denies depression or anxiety. Denies any recent memory loss.       Current Outpatient Medications   Medication Sig Dispense Refill    acetaminophen (TYLENOL) 325 mg tablet Take 3 tablets (975 mg total) by mouth every 8 (eight) hours      aluminum-magnesium hydroxide-simethicone (MAALOX) 5742-2214-644 mg/30 mL suspension Take 30 mL by mouth every 6 (six) hours as needed for indigestion or heartburn      balsalazide (COLAZAL) 750 mg capsule TAKE 3 CAPSULES BY MOUTH TWICE DAILY 540 capsule 1    cephalexin (KEFLEX) 500 mg capsule       diazepam (VALIUM) 10 mg tablet       dicyclomine (BENTYL) 20 mg tablet Take 1 tablet (20 mg total) by mouth every 6 (six) hours 45 tablet 2    DULoxetine (CYMBALTA) 60 mg delayed release capsule Take 60 mg by mouth daily      esomeprazole (NexIUM) 40 MG capsule Take 1 capsule (40 mg total) by mouth 2 (two) times a day before meals 180 capsule 3    ezetimibe (ZETIA) 10 mg tablet Take 1 tablet (10 mg total) by mouth daily 90 tablet 2    LORazepam (ATIVAN) 1 mg tablet Take 1 tablet 1 hour prior to MRI appointment, take second tablet as needed during the appointment 2 tablet 0    methocarbamol (ROBAXIN) 750 mg tablet       metoclopramide (REGLAN) 10 mg tablet TAKE 1 TABLET BY MOUTH EVERY 8 HOURS AS NEEDED (ABDOMINAL PAIN, NAUSEA, VOMITING)      metoprolol succinate (TOPROL-XL) 25 mg 24 hr tablet Take 25 mg by mouth daily      mirtazapine (REMERON) 7.5 MG tablet Take 1 tablet (7.5 mg total) by mouth daily at bedtime 60 tablet 2    mupirocin (BACTROBAN) 2 % ointment       naloxone (NARCAN) 4 mg/0.1 mL nasal spray       ondansetron (ZOFRAN) 4 mg tablet TAKE 1 TABLET BY MOUTH EVERY 8 HOURS AS NEEDED FOR NAUSEA AND VOMITING 90 tablet 0    ondansetron (ZOFRAN-ODT) 4 mg disintegrating tablet        oxyCODONE (ROXICODONE) 5 immediate release tablet TAKE 1 TABLET BY MOUTH EVERY 8 HOURS AS NEEDED FOR SEVERE PAIN (PAIN SCORE 7-10). MAX/DAY: 15 MG      oxyCODONE-acetaminophen (PERCOCET) 5-325 mg per tablet       promethazine (PHENERGAN) 25 mg tablet Every 12 hours      QUEtiapine (SEROquel) 100 mg tablet       Senexon-S 8.6-50 MG per tablet Take 1 tablet by mouth 2 (two) times a day      sulfamethoxazole-trimethoprim (BACTRIM DS) 800-160 mg per tablet       tamsulosin (FLOMAX) 0.4 mg Take 0.4 mg by mouth      temazepam (RESTORIL) 30 mg capsule TAKE 2 CAPSULES BY MOUTH AT BEDTIME FOR 30 DAYS      zolpidem (AMBIEN CR) 12.5 MG CR tablet Take 12.5 mg by mouth every evening      zolpidem (AMBIEN) 10 mg tablet TAKE 1 TABLET BY MOUTH NIGHTLY AS NEEDED FOR SLEEP      oxybutynin (DITROPAN-XL) 10 MG 24 hr tablet Take 10 mg by mouth daily      sucralfate (CARAFATE) 1 g tablet Take 1 tablet (1 g total) by mouth 4 (four) times a day (Patient not taking: Reported on 7/10/2024) 360 tablet 0     No current facility-administered medications for this visit.     Past Medical History:   Diagnosis Date    Back pain     Colon polyp     Hyperlipidemia     Lumbar fracture with cord injury (HCC)     Neck fracture (HCC)     Previous back surgery     rods in the back    Ulcerative colitis (HCC)      Past Surgical History:   Procedure Laterality Date    BACK SURGERY  05/20/2024    LUMBAR FUSION Right 01/20/2016    Procedure: FUSION LUMBAR  POSTERIOR, TLIF L3-4  Right L3-4 Fascet;  Surgeon: Narayan Castro MD;  Location:  MAIN OR;  Service:     OH COLONOSCOPY FLX DX W/COLLJ SPEC WHEN PFRMD N/A 01/24/2019    Procedure: COLONOSCOPY;  Surgeon: Sanjeev Orellana III, MD;  Location: [facility];  Service: Gastroenterology    OH SURGICAL ARTHROSCOPY SHOULDER W/ROTATOR CUFF RPR Right 07/26/2023    Procedure: ARTHROSCOPY SHOULDER- Right shoulder Arthroscopy, supraspinatus and subscapularis repair, biceps tenotomy, extensive debridement;  Surgeon:  Oscar Corbett DO;  Location: [facility] OR;  Service: Orthopedics    TONSILLECTOMY       Social History     Socioeconomic History    Marital status: /Civil Union     Spouse name: None    Number of children: None    Years of education: None    Highest education level: None   Occupational History    None   Tobacco Use    Smoking status: Never    Smokeless tobacco: Never   Vaping Use    Vaping status: Never Used   Substance and Sexual Activity    Alcohol use: Not Currently     Comment: rarely    Drug use: No    Sexual activity: Not Currently   Other Topics Concern    None   Social History Narrative    None     Social Determinants of Health     Financial Resource Strain: Low Risk  (6/18/2024)    Received from [facility]    Overall Financial Resource Strain (CARDIA)     Difficulty of Paying Living Expenses: Not very hard   Food Insecurity: No Food Insecurity (6/18/2024)    Received from [facility]    Hunger Vital Sign     Worried About Running Out of Food in the Last Year: Never true     Ran Out of Food in the Last Year: Never true   Transportation Needs: No Transportation Needs (6/18/2024)    Received from [facility]    PRAPARE - Transportation     Lack of Transportation (Medical): No     Lack of Transportation (Non-Medical): No   Physical Activity: Not on file   Stress: Not on file   Social Connections: Not on file   Intimate Partner Violence: Not At Risk (6/18/2024)    Received from [facility]    Humiliation, Afraid, Rape, and Kick questionnaire     Fear of Current or Ex-Partner: No     Emotionally Abused: No     Physically Abused: No     Sexually Abused: No   Housing Stability: Low Risk  (6/18/2024)    Received from [facility]    Housing Stability Vital Sign     Unable to Pay for Housing in the Last Year: No     Number of Times Moved in the Last Year: 1     Homeless in the Last Year: No     Family History   Problem  Relation Age of Onset    Parkinsonism Mother     Lung cancer Father             PHYSICAL EXAM:    Vitals:    08/02/24 1534   BP: 124/82   BP Location: Left arm   Patient Position: Sitting   Cuff Size: Standard   Pulse: 93   Resp: 18   Temp: 97.5 °F (36.4 °C)   TempSrc: Tympanic   SpO2: 93%   Weight: 95.6 kg (210 lb 12.8 oz)   Height: 6' (1.829 m)     General Appearance:   Alert and oriented x 3. Cooperative, and in no respiratory distress.  Temporal wasting.   HEENT:   Normocephalic, atraumatic, anicteric.     Neck:  Supple, symmetrical, trachea midline   Lungs:   Clear to auscultation bilaterally    Heart::   Regular rate and rhythm   Abdomen:   Soft, non-tender, non-distended; normal bowel sounds; no masses, no organomegaly    Genitalia:   Deferred    Rectal:   Deferred    Extremities:  No cyanosis, clubbing or edema    Pulses:  2+ and symmetric all extremities    Skin:  Skin color, texture, turgor normal, no rashes or lesions    Lymph nodes:  No palpable cervical or supraclavicular lymphadenopathy        Lab Results:   Results from last 6 Months   Lab Units 07/30/24  1359   WBC Thousand/uL 8.63   HEMOGLOBIN g/dL 12.8   HEMATOCRIT % 40.3   PLATELETS Thousands/uL 233   SEGS PCT % 77*   LYMPHO PCT % 15   MONO PCT % 6   EOS PCT % 2     Results from last 6 Months   Lab Units 07/30/24  1359   POTASSIUM mmol/L 4.3   CHLORIDE mmol/L 104   CO2 mmol/L 30   BUN mg/dL 19   CREATININE mg/dL 0.90   CALCIUM mg/dL 9.4   ALK PHOS U/L 71   ALT U/L 8   AST U/L 10*     Results from last 6 Months   Lab Units 06/17/24  1948   INR  1.1     Results from last 6 Months   Lab Units 04/15/24  1329   LIPASE u/L 25       Imaging Studies:   EGD Botox    Result Date: 4/24/2024  Impression: Mild erosive gastritis Status post botulinum toxin injection of the pylorus RECOMMENDATION: Continue PPI Continue Reglan and Zofran Reflux precautions Small frequent meals Stop balsalazide   Sanjeev Orellana III, MD       ASSESSMENT and PLAN:      1)  "Unintentional weight loss, severe back and abdominal pain, nausea status post extensive lumbar fusion surgery - Patient lost 3 pounds since I last saw him.  Patient, patient's wife and I discussed how a PEG tube is placed in the event patient continues to lose weight without being able to sustain his own nutrition.  Previously discussed case with Dr. Orellana, who felt that workup was extensive and completed at both our network and at [facility] where the patient had spine surgery..  - Patient would like to increase his mirtazapine, send increase dosage to the pharmacy at 15 mg  - Will trial Linzess 72 mcg  - If patient continues to lose weight, consideration of feeding tube placement  - I asked patient or patient's wife to contact us if patient's weight goes closer to 200 pounds and under  - Patient and patient's wife agree with plan      Follow up in 4 weeks.    Portions of the record may have been created with voice recognition software.  Occasional wrong word or \"sound a like\" substitutions may have occurred due to the inherent limitations of voice recognition software.  Read the chart carefully and recognize, using context, where substitutions have occurred.  "

## 2024-08-05 ENCOUNTER — TELEPHONE (OUTPATIENT)
Dept: GASTROENTEROLOGY | Facility: CLINIC | Age: 69
End: 2024-08-05

## 2024-08-05 RX ORDER — LINACLOTIDE 72 UG/1
72 CAPSULE, GELATIN COATED ORAL DAILY
Qty: 20 CAPSULE | Refills: 0 | Status: SHIPPED | COMMUNITY
Start: 2024-08-05

## 2024-08-05 NOTE — TELEPHONE ENCOUNTER
Left message advising patient he has been scheduled for a follow up visit on 8/30 at 11:30 with Angela in [place] and to call if this is an issue.

## 2024-08-05 NOTE — TELEPHONE ENCOUNTER
----- Message from Angela Dorsey PA-C sent at 8/2/2024  4:57 PM EDT -----  Hi please schedule patient in an urgent slot on Aug 30 Friday. Thank violet

## 2024-08-06 ENCOUNTER — OFFICE VISIT (OUTPATIENT)
Dept: OBGYN CLINIC | Facility: CLINIC | Age: 69
End: 2024-08-06
Payer: MEDICARE

## 2024-08-06 VITALS
SYSTOLIC BLOOD PRESSURE: 123 MMHG | WEIGHT: 210 LBS | HEART RATE: 81 BPM | BODY MASS INDEX: 28.44 KG/M2 | HEIGHT: 72 IN | DIASTOLIC BLOOD PRESSURE: 70 MMHG

## 2024-08-06 DIAGNOSIS — G56.22 ULNAR NEUROPATHY OF LEFT UPPER EXTREMITY: Primary | ICD-10-CM

## 2024-08-06 DIAGNOSIS — M24.542 CONTRACTURE OF JOINT OF FINGER OF LEFT HAND: ICD-10-CM

## 2024-08-06 PROCEDURE — 99214 OFFICE O/P EST MOD 30 MIN: CPT | Performed by: STUDENT IN AN ORGANIZED HEALTH CARE EDUCATION/TRAINING PROGRAM

## 2024-08-06 PROCEDURE — 64450 NJX AA&/STRD OTHER PN/BRANCH: CPT | Performed by: STUDENT IN AN ORGANIZED HEALTH CARE EDUCATION/TRAINING PROGRAM

## 2024-08-06 NOTE — PROGRESS NOTES
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 male with left small finger PIP contracture unknown cause and left ulnar neuropathy  Anatomy discussed with the pt today and explained that at this time he has a significant contracture of the joint.  Treatment options and expected outcomes were discussed.  The patient verbalized understanding of exam findings and treatment plan.   The patient was given the opportunity to ask questions.  Questions were answered to the patient's satisfaction.  The patient decided to move forward with intra-office manipulation today under digital block. This was performed and PIP improved to 30 shy full extension passively.  Explained importance of going to therapy to work on maintaining this and to get extension splinting.   In addition explained that with the atrophy on exam, highly suggest he get an EMG and we will order this stat. Explained that if this is compressed in at the elbow or wrist, we can do surgery to release it and give it the best chance to prevent worsening.       Follow Up:  After testing       To Do Next Visit:  Re-evaluation of current issue      Discussions:  The anatomy and physiology of the diagnosis(es) was(were) discussed with the patient today in the office. Treatment options and recommendations were discussed, as well as expected future outcomes.  and Cubital Tunnel Syndrome: The anatomy and physiology of cubital tunnel syndrome were discussed with the patient today in the office.  Typically, increased elbow flexion activities decrease blood flow within the intraneural spaces, resulting in a feeling of numbness, tingling,  weakness, or clumsiness within the hand and fingers.  Occasionally, anatomic structures such as medial elbow osteophytes, the medial head of the triceps, were subluxing ulnar nerve may result in increased pressure or aggravation at the cubital tunnel.  Typical signs and symptoms usually include numbness and tingling within the ring and small finger, weakness with , and weakness with pinch.  Conservative treatment and includes nocturnal bracing to keep the elbow in a semi-extended position, activity modification, therapy, and avoiding excessive elbow flexion activities.  Vitamin B6 one tablet daily over the counter may helpful to reduce symptoms.  A majority of patients typically respond to conservative treatment over a period of approximately 3-6 months.  EMG/NCV testing of the ulnar nerve at the elbow is not as reliable as carpal tunnel syndrome.  Surgical intervention in the form of in situ release of the ulnar nerve at the elbow or ulnar nerve transposition may be required in up to 20% of patients.       Sukhdev Whitman MD  Attending, Orthopaedic Surgery  Hand, Wrist, and Elbow Surgery  [facility]    ______________________________________________________________________________________________    CHIEF COMPLAINT:  Chief Complaint   Patient presents with   • Left Little Finger - Locking     Patients left little finger is locked. Patient had XR at [facility]       SUBJECTIVE:  Patient is a 69 y.o. RHD male who presents today for evaluation and treatment of left small finger contracture. Pt states this has been present for about 6 months, worsening with time. He denies any trauma prior to this. States he was told previously that this should just improve after a couple months and that the pain would dissipate, however it has not. In addition, pt notes wasting away of some of the muscles in his hand. States this has also been present for about 6 months. At first denies n/t, but at one point  describes this in the ring and small fingers.      Occupation: Retired     I have personally reviewed all the relevant PMH, PSH, SH, FH, Medications and allergies      PAST MEDICAL HISTORY:  Past Medical History:   Diagnosis Date   • Back pain    • Colon polyp    • Hyperlipidemia    • Lumbar fracture with cord injury (HCC)    • Neck fracture (HCC)    • Previous back surgery     rods in the back   • Ulcerative colitis (HCC)        PAST SURGICAL HISTORY:  Past Surgical History:   Procedure Laterality Date   • BACK SURGERY  05/20/2024   • LUMBAR FUSION Right 01/20/2016    Procedure: FUSION LUMBAR  POSTERIOR, TLIF L3-4  Right L3-4 Fascet;  Surgeon: Narayan Castro MD;  Location:  MAIN OR;  Service:    • NV COLONOSCOPY FLX DX W/COLLJ SPEC WHEN PFRMD N/A 01/24/2019    Procedure: COLONOSCOPY;  Surgeon: Sanjeev Orellana III, MD;  Location: [facility];  Service: Gastroenterology   • NV SURGICAL ARTHROSCOPY SHOULDER W/ROTATOR CUFF RPR Right 07/26/2023    Procedure: ARTHROSCOPY SHOULDER- Right shoulder Arthroscopy, supraspinatus and subscapularis repair, biceps tenotomy, extensive debridement;  Surgeon: Oscar Corbett DO;  Location: [facility];  Service: Orthopedics   • TONSILLECTOMY         FAMILY HISTORY:  Family History   Problem Relation Age of Onset   • Parkinsonism Mother    • Lung cancer Father        SOCIAL HISTORY:  Social History     Tobacco Use   • Smoking status: Never   • Smokeless tobacco: Never   Vaping Use   • Vaping status: Never Used   Substance Use Topics   • Alcohol use: Not Currently     Comment: rarely   • Drug use: No       MEDICATIONS:    Current Outpatient Medications:   •  acetaminophen (TYLENOL) 325 mg tablet, Take 3 tablets (975 mg total) by mouth every 8 (eight) hours, Disp: , Rfl:   •  aluminum-magnesium hydroxide-simethicone (MAALOX) 5403-9517-511 mg/30 mL suspension, Take 30 mL by mouth every 6 (six) hours as needed for indigestion or heartburn, Disp: , Rfl:   •  balsalazide (COLAZAL) 750  mg capsule, TAKE 3 CAPSULES BY MOUTH TWICE DAILY, Disp: 540 capsule, Rfl: 1  •  cephalexin (KEFLEX) 500 mg capsule, , Disp: , Rfl:   •  diazepam (VALIUM) 10 mg tablet, , Disp: , Rfl:   •  dicyclomine (BENTYL) 20 mg tablet, Take 1 tablet (20 mg total) by mouth every 6 (six) hours, Disp: 45 tablet, Rfl: 2  •  DULoxetine (CYMBALTA) 60 mg delayed release capsule, Take 60 mg by mouth daily, Disp: , Rfl:   •  esomeprazole (NexIUM) 40 MG capsule, Take 1 capsule (40 mg total) by mouth 2 (two) times a day before meals, Disp: 180 capsule, Rfl: 3  •  linaCLOtide (Linzess) 72 MCG CAPS, Take 1 capsule by mouth daily, Disp: 30 capsule, Rfl: 2  •  linaCLOtide (Linzess) 72 MCG CAPS, Take 72 mcg by mouth daily, Disp: 20 capsule, Rfl: 0  •  LORazepam (ATIVAN) 1 mg tablet, Take 1 tablet 1 hour prior to MRI appointment, take second tablet as needed during the appointment, Disp: 2 tablet, Rfl: 0  •  methocarbamol (ROBAXIN) 750 mg tablet, , Disp: , Rfl:   •  metoclopramide (REGLAN) 10 mg tablet, TAKE 1 TABLET BY MOUTH EVERY 8 HOURS AS NEEDED (ABDOMINAL PAIN, NAUSEA, VOMITING), Disp: , Rfl:   •  metoprolol succinate (TOPROL-XL) 25 mg 24 hr tablet, Take 25 mg by mouth daily, Disp: , Rfl:   •  mirtazapine (REMERON SOL-TAB) 15 mg disintegrating tablet, Take 1 tablet (15 mg total) by mouth daily at bedtime, Disp: 30 tablet, Rfl: 0  •  mupirocin (BACTROBAN) 2 % ointment, , Disp: , Rfl:   •  ondansetron (ZOFRAN) 4 mg tablet, TAKE 1 TABLET BY MOUTH EVERY 8 HOURS AS NEEDED FOR NAUSEA AND VOMITING, Disp: 90 tablet, Rfl: 0  •  ondansetron (ZOFRAN-ODT) 4 mg disintegrating tablet, , Disp: , Rfl:   •  oxyCODONE (ROXICODONE) 5 immediate release tablet, TAKE 1 TABLET BY MOUTH EVERY 8 HOURS AS NEEDED FOR SEVERE PAIN (PAIN SCORE 7-10). MAX/DAY: 15 MG, Disp: , Rfl:   •  oxyCODONE-acetaminophen (PERCOCET) 5-325 mg per tablet, , Disp: , Rfl:   •  promethazine (PHENERGAN) 25 mg tablet, Every 12 hours, Disp: , Rfl:   •  QUEtiapine (SEROquel) 100 mg tablet, ,  Disp: , Rfl:   •  Senexon-S 8.6-50 MG per tablet, Take 1 tablet by mouth 2 (two) times a day, Disp: , Rfl:   •  sulfamethoxazole-trimethoprim (BACTRIM DS) 800-160 mg per tablet, , Disp: , Rfl:   •  tamsulosin (FLOMAX) 0.4 mg, Take 0.4 mg by mouth, Disp: , Rfl:   •  temazepam (RESTORIL) 30 mg capsule, TAKE 2 CAPSULES BY MOUTH AT BEDTIME FOR 30 DAYS, Disp: , Rfl:   •  zolpidem (AMBIEN CR) 12.5 MG CR tablet, Take 12.5 mg by mouth every evening, Disp: , Rfl:   •  zolpidem (AMBIEN) 10 mg tablet, TAKE 1 TABLET BY MOUTH NIGHTLY AS NEEDED FOR SLEEP, Disp: , Rfl:   •  ezetimibe (ZETIA) 10 mg tablet, Take 1 tablet (10 mg total) by mouth daily, Disp: 90 tablet, Rfl: 2  •  naloxone (NARCAN) 4 mg/0.1 mL nasal spray, , Disp: , Rfl:   •  oxybutynin (DITROPAN-XL) 10 MG 24 hr tablet, Take 10 mg by mouth daily, Disp: , Rfl:   •  sucralfate (CARAFATE) 1 g tablet, Take 1 tablet (1 g total) by mouth 4 (four) times a day (Patient not taking: Reported on 7/10/2024), Disp: 360 tablet, Rfl: 0    ALLERGIES:  Allergies   Allergen Reactions   • No Active Allergies            REVIEW OF SYSTEMS:  Musculoskeletal:        As noted in HPI.   All other systems reviewed and are negative.    VITALS:  Vitals:    08/06/24 1348   BP: 123/70   Pulse: 81       LABS:  HgA1c:   Lab Results   Component Value Date    HGBA1C 5.2 07/30/2024     BMP:   Lab Results   Component Value Date    CALCIUM 9.4 07/30/2024    K 4.3 07/30/2024    CO2 30 07/30/2024     07/30/2024    BUN 19 07/30/2024    CREATININE 0.90 07/30/2024       _____________________________________________________  PHYSICAL EXAMINATION:  General: Well developed and well nourished, alert & oriented x 3, appears comfortable  Psychiatric: Normal  HEENT: Normocephalic, Atraumatic Trachea Midline, No torticollis  Pulmonary: No audible wheezing or respiratory distress   Abdomen/GI: Non tender, non distended   Cardiovascular: No pitting edema, 2+ radial pulse   Skin: No masses, erythema, lacerations,  fluctation, ulcerations  Neurovascular: Sensation Intact to the Median, Ulnar, Radial Nerve, Motor Intact to the Median, Ulnar, Radial Nerve, and Pulses Intact  Musculoskeletal: Normal, except as noted in detailed exam and in HPI.      MUSCULOSKELETAL EXAMINATION:  Left Hand:  Significant atrophy of the 1st dorsal interosseous.  Able to cross fingers  Finger abduction 4/5  Mildly + Tinel's cubital tunnel.  Small finger with flexion contracture.  PIP 70 shy full extension.  PROM 65 shy full extension with pain.   No evidence of Dupuytren's cords/nodules.       Post-manipulation:  Actively PIP 55 shy full extension.  Passively PIP 30 shy full extension.   ___________________________________________________  STUDIES REVIEWED:  Xrays of the left hand were reviewed and independently interpreted in PACS by Dr. Whitman and demonstrate no acute osseous abnormalities and no significant degenerative changes about the small PIP joint.           PROCEDURES PERFORMED:  Nerve block    Date/Time: 8/6/2024 2:00 PM    Performed by: Sukhdev Whitman MD  Authorized by: Sukhdev Whitman MD    Patient location:  [facility] Protocol:  Consent: Verbal consent obtained.  Risks and benefits: risks, benefits and alternatives were discussed  Consent given by: patient  Patient understanding: patient states understanding of the procedure being performed  Patient identity confirmed: verbally with patient    Indications:     Indications:  Procedural anesthesia  Location:     Body area:  Upper extremity    Upper extremity nerve:  Digital    Nerve type:  Peripheral  Pre-procedure details:     Skin preparation:  Alcohol  Procedure details (see MAR for exact dosages):     Block needle gauge:  25 G    Anesthetic injected:  Lidocaine 1% w/o epi (8ml)    Additive injected:  None    Injection procedure:  Anatomic landmarks identified, introduced needle and negative aspiration for blood  Post-procedure details:     Dressing:  Sterile  dressing    Outcome:  Anesthesia achieved    Patient tolerance of procedure:  Tolerated well, no immediate complications         _____________________________________________________      Scribe Attestation    I,:  Joanie Orellana PA-C am acting as a scribe while in the presence of the attending physician.:       I,:  Sukhdev Whitman MD personally performed the services described in this documentation    as scribed in my presence.:

## 2024-08-07 ENCOUNTER — APPOINTMENT (OUTPATIENT)
Dept: LAB | Facility: HOSPITAL | Age: 69
End: 2024-08-07
Payer: MEDICARE

## 2024-08-07 DIAGNOSIS — G89.4 CHRONIC PAIN SYNDROME: ICD-10-CM

## 2024-08-07 PROCEDURE — 80346 BENZODIAZEPINES1-12: CPT

## 2024-08-07 PROCEDURE — 80361 OPIATES 1 OR MORE: CPT

## 2024-08-07 PROCEDURE — 80307 DRUG TEST PRSMV CHEM ANLYZR: CPT

## 2024-08-07 PROCEDURE — 80365 DRUG SCREENING OXYCODONE: CPT

## 2024-08-08 ENCOUNTER — EVALUATION (OUTPATIENT)
Dept: PHYSICAL THERAPY | Facility: CLINIC | Age: 69
End: 2024-08-08
Payer: MEDICARE

## 2024-08-08 DIAGNOSIS — Z98.1 S/P LUMBAR FUSION: ICD-10-CM

## 2024-08-08 DIAGNOSIS — M54.16 LUMBAR RADICULOPATHY: Primary | ICD-10-CM

## 2024-08-08 PROCEDURE — 97161 PT EVAL LOW COMPLEX 20 MIN: CPT | Performed by: PHYSICAL THERAPIST

## 2024-08-08 NOTE — LETTER
2024    Max Valdez MD  [address]  [address]  [facility] [address]    Patient: Toney Motley   YOB: 1955   Date of Visit: 2024     Encounter Diagnosis     ICD-10-CM    1. Lumbar radiculopathy  M54.16 PT plan of care cert/re-cert      2. S/P lumbar fusion  Z98.1 PT plan of care cert/re-cert          Dear Dr. Valdez:    Thank you for your recent referral of Toney Motley. Please review the attached evaluation summary from Toney's recent visit.     Please verify that you agree with the plan of care by signing the attached order.     If you have any questions or concerns, please do not hesitate to call.     I sincerely appreciate the opportunity to share in the care of one of your patients and hope to have another opportunity to work with you in the near future.       Sincerely,    Mikey Veras, PT      Referring Provider:      I certify that I have read the below Plan of Care and certify the need for these services furnished under this plan of treatment while under my care.                    Max Valdez MD  [address]  [address]  [facility] [address]  Via Fax: [phone]          PT Evaluation     Today's date: 2024  Patient name: Toney Motley  : 1955  MRN: [id number]  Referring provider: Gurinder Valdez MD    Dx:   Encounter Diagnosis     ICD-10-CM    1. Lumbar radiculopathy  M54.16       2. S/P lumbar fusion  Z98.1                      Assessment  Impairments: abnormal or restricted ROM, impaired physical strength, lacks appropriate home exercise program and pain with function    Assessment details: Toney Motley is a 69 y.o. male who presents with pain, decreased strength, and decreased ROM.  Due to these impairments, patient has difficulty performing a/iadls and recreational activities.  Patient's clinical presentation is consistent with their referring diagnosis of S/P lumbar fusion.  Patient would benefit from skilled physical  therapy to address their aforementioned impairments, improve their level of function and to improve their overall quality of life.     Goals  Short term goals - to be achieved in 4 weeks:    Decrease pain 20-50%.   Increase strength by 1/2 grade.   Improve L/S ROM by 25%.  Long term goals - to be achieved by discharge:    Performance in related household activities is improved to maximal level of function.    IADL performance in related activities is improved to maximal level of function.    Sitting tolerance is improved to maximal level of function.     Standing tolerance is improved to maximal level of function.     Plan    Planned therapy interventions: manual therapy, neuromuscular re-education, patient/caregiver education, postural training, strengthening, stretching, therapeutic activities, therapeutic exercise and home exercise program    Frequency: 2x week  Duration in weeks: 6  Plan of Care beginning date: 2024  Plan of Care expiration date: 2024  Treatment plan discussed with: patient        Subjective Evaluation    History of Present Illness  Mechanism of injury: Patient refers to PT S/P L2-3,L4-5,L5-S1 posterior lumbar fusion on 24.  Patient states some continued pain in his low back when transferring from supine to sitting.  Patient states mild pain with ambulation.  Patient denies numbness and tingling in bilateral LE's.  Patient states he has not been performing any lifting activities as per physician instruction.  Patient states minimal difficulty with stair climbing activity.  Patient is retired.    Pain  Current pain rating: 3  At best pain ratin  At worst pain ratin          Objective     Neurological Testing     Sensation     Lumbar   Left   Intact: light touch    Right   Intact: light touch    Active Range of Motion     Lumbar   Flexion:  Restriction level: maximal  Extension:  Restriction level: maximal  Left lateral flexion:  Restriction level: maximal  Right lateral  flexion:  Restriction level: maximal    Strength/Myotome Testing     Left Hip   Planes of Motion   Flexion: 4-    Right Hip   Planes of Motion   Flexion: 4-    Left Knee   Flexion: 4+  Extension: 4+    Right Knee   Flexion: 4+  Extension: 4+    Left Ankle/Foot   Dorsiflexion: 4+  Plantar flexion: 4+    Right Ankle/Foot   Dorsiflexion: 4+  Plantar flexion: 4+             Precautions: L2-3,L4-5,L5-S1 fusion - 5/20/24, HTN, L/S fusion - 2016  POC expires Unit limit Auth Expiration date PT/OT/ST + Visit Limit?   9/20/24 N/A  BOMN                           Visit/Unit Tracking  AUTH Status:  Date                Used                Remaining                   Manuals 8/8                                                                Neuro Re-Ed             Sup TA activation HEP            Sup TA with marches HEP            Sup TA with alt UE/LE             TB Multifidus press                                                    Ther Ex             Bike             Mini squats HEP            TB rows             TB lat pull downs             Stand hip ext             Stand hip abd                                       Ther Activity                                       Gait Training                                       Modalities

## 2024-08-08 NOTE — PROGRESS NOTES
PT Evaluation     Today's date: 2024  Patient name: Toney Motley  : 1955  MRN: [id number]  Referring provider: Gurinder Valdez MD    Dx:   Encounter Diagnosis     ICD-10-CM    1. Lumbar radiculopathy  M54.16       2. S/P lumbar fusion  Z98.1                      Assessment  Impairments: abnormal or restricted ROM, impaired physical strength, lacks appropriate home exercise program and pain with function    Assessment details: Toney Motley is a 69 y.o. male who presents with pain, decreased strength, and decreased ROM.  Due to these impairments, patient has difficulty performing a/iadls and recreational activities.  Patient's clinical presentation is consistent with their referring diagnosis of S/P lumbar fusion.  Patient would benefit from skilled physical therapy to address their aforementioned impairments, improve their level of function and to improve their overall quality of life.     Goals  Short term goals - to be achieved in 4 weeks:    Decrease pain 20-50%.   Increase strength by 1/2 grade.   Improve L/S ROM by 25%.  Long term goals - to be achieved by discharge:    Performance in related household activities is improved to maximal level of function.    IADL performance in related activities is improved to maximal level of function.    Sitting tolerance is improved to maximal level of function.     Standing tolerance is improved to maximal level of function.     Plan    Planned therapy interventions: manual therapy, neuromuscular re-education, patient/caregiver education, postural training, strengthening, stretching, therapeutic activities, therapeutic exercise and home exercise program    Frequency: 2x week  Duration in weeks: 6  Plan of Care beginning date: 2024  Plan of Care expiration date: 2024  Treatment plan discussed with: patient        Subjective Evaluation    History of Present Illness  Mechanism of injury: Patient refers to PT S/P L2-3,L4-5,L5-S1 posterior lumbar fusion on  24.  Patient states some continued pain in his low back when transferring from supine to sitting.  Patient states mild pain with ambulation.  Patient denies numbness and tingling in bilateral LE's.  Patient states he has not been performing any lifting activities as per physician instruction.  Patient states minimal difficulty with stair climbing activity.  Patient is retired.    Pain  Current pain rating: 3  At best pain ratin  At worst pain ratin          Objective     Neurological Testing     Sensation     Lumbar   Left   Intact: light touch    Right   Intact: light touch    Active Range of Motion     Lumbar   Flexion:  Restriction level: maximal  Extension:  Restriction level: maximal  Left lateral flexion:  Restriction level: maximal  Right lateral flexion:  Restriction level: maximal    Strength/Myotome Testing     Left Hip   Planes of Motion   Flexion: 4-    Right Hip   Planes of Motion   Flexion: 4-    Left Knee   Flexion: 4+  Extension: 4+    Right Knee   Flexion: 4+  Extension: 4+    Left Ankle/Foot   Dorsiflexion: 4+  Plantar flexion: 4+    Right Ankle/Foot   Dorsiflexion: 4+  Plantar flexion: 4+             Precautions: L2-3,L4-5,L5-S1 fusion - 24, HTN, L/S fusion -   POC expires Unit limit Auth Expiration date PT/OT/ST + Visit Limit?   24 N/A  BOMN                           Visit/Unit Tracking  AUTH Status:  Date                Used                Remaining                   Manuals                                                                 Neuro Re-Ed             Sup TA activation HEP            Sup TA with marches HEP            Sup TA with alt UE/LE             TB Multifidus press                                                    Ther Ex             Bike             Mini squats HEP            TB rows             TB lat pull downs             Stand hip ext             Stand hip abd                                       Ther Activity                                        Gait Training                                       Modalities

## 2024-08-09 ENCOUNTER — TELEPHONE (OUTPATIENT)
Age: 69
End: 2024-08-09

## 2024-08-09 NOTE — TELEPHONE ENCOUNTER
Caller: Patient    Doctor: Dr. Whitman    Reason for call: Patient calling stating that he saw Dr. Whitman on 8/6/24 and he was suppose to hear from someone in regard to scheduling an EMG.  Patient stating that he has not heard from anyone at this time.      Call back#: [phone]

## 2024-08-13 ENCOUNTER — EVALUATION (OUTPATIENT)
Dept: OCCUPATIONAL THERAPY | Facility: CLINIC | Age: 69
End: 2024-08-13
Payer: MEDICARE

## 2024-08-13 DIAGNOSIS — M24.542 CONTRACTURE OF JOINT OF FINGER OF LEFT HAND: ICD-10-CM

## 2024-08-13 PROCEDURE — 97165 OT EVAL LOW COMPLEX 30 MIN: CPT

## 2024-08-13 PROCEDURE — L3933 FO W/O JOINTS CF: HCPCS

## 2024-08-13 NOTE — PROGRESS NOTES
OT Evaluation     Today's date: 2024  Patient name: Toney Motley  : 1955  MRN: [id number]  Referring provider: Brandi Rivera PA-C  Dx:   Encounter Diagnosis     ICD-10-CM    1. 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 RHD male presenting with a 70 degree flexion contracture of the PIP joint of the L 5th digit. Pt reports the contracture has gradually worsened over the past year with associated pain over the ulnar hand and 5th digit progressively worsening over the past 6 months. Pt underwent a manipulation of the joint (under digital nerve block) approximately 1 week ago. AROM has improved minimally according to the patient.   Pt c/o increased pain across the volar ulnar hand and 5th digit, which increases with attempted gripping activities. He presents with significant atrophy over the 1st web space and hypothenar eminence. L hand  and lateral strength have decreased when compared to the uninjured R hand. PIP joint extension of the 5th digit improves by 15 degrees when the hand is positioned in an intrinsic plus position. There is no evidence of clawing noted in the L RF.  Functional use of the L hand is significantly limited. Pt would benefit from continued skilled OT to improve AROM, strength and overall hand function. Pt was fitted with a custom volar gutter digital extension splint. He was instructed to don the splint during all activities and at night; remove for hourly ROM exercises and hygiene. Pt demo good understanding of HEP and  instructed to stop use of splint and exercises if pain increases.  Barriers to therapy: none  Understanding of Dx/Px/POC: good     Prognosis: fair    Goals  STGs (to be achieved in 4 weeks):  1. Pt will be independent and compliant with HEP and orthotic/splint use  2. Improve passive PIP joint extension by 30 degrees  3. Improve active PIP joint extension by 15 degrees    LTGs (to be achieved by discharge):  1. Pt will be independent with symptom management to maintain progress after discharge  2. Improve active PIP joint extension to -30 degrees  3. Improve L  strength by 10%    Plan  Patient would benefit from: OT eval and skilled occupational therapy  Planned modality interventions: ultrasound and thermotherapy: hydrocollator packs    Planned therapy interventions: joint mobilization, IASTM, nerve gliding, orthotic fitting/training, orthotic management and training, patient/caregiver education, functional ROM exercises, graded exercise, home exercise program, strengthening, stretching, therapeutic activities and therapeutic exercise    Frequency: 1x week  Duration in weeks: 6  Plan of Care beginning date: 2024  Plan of Care expiration date: 2024  Treatment plan discussed with: patient        Subjective Evaluation    History of Present Illness  Mechanism of injury: Pt noticed gradually worsening flexion contracture of L SF over the past year. He reports his pain started approximately 6 months ago. Pt is 7 days s/p manipulation in [facility] office to correct PIP joint flexion contracture.    Pt is scheduled for an EMG in September.    PMH: s/p lumbar fusion 24, HTN, Chronic low back pain          Recurrent probem    Quality of life: good    Patient Goals  Patient goals for therapy: decreased pain, increased motion, increased strength and independence with ADLs/IADLs    Pain  Current pain ratin  At best pain ratin  At worst pain ratin  Location: L 5th digit  Quality: throbbing  Alleviating  factors: biofreeze, holding finger.  Exacerbated by: using the hand, gripping.  Progression: worsening    Social Support  Lives with: spouse    Employment status: not working (retired)  Hand dominance: right    Treatments  No previous or current treatments  Current treatment: occupational therapy        Objective     Observations     Left Wrist/Hand   Positive for atrophy.     Additional Observation Details  (+) atrophy 1st web space, hypothenar eminence    (+) L SF PIP joint flexion contracture     Neurological Testing     Additional Neurological Details  TBA next visit    Active Range of Motion     Left Digits    Flexion   Little     MCP: 90    PIP: 90    DIP: 22  Extension   Little     MCP: 16    PIP: -70    DIP: 0    Additional Active Range of Motion Details  PIP ext = -55 (intrinsic plus position)    Passive Range of Motion     Left Digits   Extension   Little     PIP: -40    Strength/Myotome Testing     Left Wrist/Hand      (2nd hand position)     Trial 1: 26    Trial 2: 27.1    Trial 3: 26.8    Average: 26.63    Thumb Strength  Key/Lateral Pinch     Trial 1: 6    Trial 2: 6    Trial 3: 5    Average: 5.67    Right Wrist/Hand      (2nd hand position)     Trial 1: 62.1    Trial 2: 55.5    Trial 3: 52.2    Average: 56.6    Thumb Strength   Key/Lateral Pinch     Trial 1: 19    Trial 2: 19    Trial 3: 18    Average: 18.67             Precautions: s/p lumbar fusion (6/30/24), chronic low back pain, HTN    POC expires Unit limit Auth Expiration date PT/OT/ST + Visit Limit?   9/24 BOMN N/A BOMN                           Visit/Unit Tracking  AUTH Status:  Date               No auth required Used                Remaining                        Manuals 8/13            visit 1            splinting             Joint mobs NV            IASTM/STM                                       Neuro Re-Ed             CYNTHIA NV                                                                                          Ther Ex              PT educ/HEP Splint use, digital AROM            PROM 1:1 NV            Blocking NV            Rev blocking NV                                                                Ther Activity                                                                              Modalities

## 2024-08-14 ENCOUNTER — OFFICE VISIT (OUTPATIENT)
Dept: PHYSICAL THERAPY | Facility: CLINIC | Age: 69
End: 2024-08-14
Payer: MEDICARE

## 2024-08-14 DIAGNOSIS — Z98.1 S/P LUMBAR FUSION: ICD-10-CM

## 2024-08-14 DIAGNOSIS — M54.16 LUMBAR RADICULOPATHY: Primary | ICD-10-CM

## 2024-08-14 LAB
1OH-MIDAZOLAM UR CFM-MCNC: NOT DETECTED NG/MG CREAT
6MAM UR QL SCN: NEGATIVE NG/ML
7AMINOCLONAZEPAM UR CFM-MCNC: NOT DETECTED NG/MG CREAT
A-OH ALPRAZ UR QL CFM: NOT DETECTED NG/MG CREAT
ACCEPTABLE CREAT UR QL: 134 MG/DL
ALPRAZ/CREAT UR CFM: NOT DETECTED NG/MG CREAT
AMPHET UR QL SCN: NEGATIVE NG/ML
BARBITURATES UR QL SCN: NEGATIVE NG/ML
BENZODIAZ UR QL SCN: ABNORMAL NG/ML
BUPRENORPHINE UR QL CFM: NEGATIVE NG/ML
CANNABINOIDS UR QL SCN: NEGATIVE NG/ML
CARISOPRODOL UR QL: NEGATIVE NG/ML
CLONAZEPAM/CREAT UR CFM: NOT DETECTED NG/MG CREAT
COCAINE+BZE UR QL SCN: NEGATIVE NG/ML
CODEINE UR QL CFM: NOT DETECTED NG/MG CREAT
DHC UR CFM-MCNC: NOT DETECTED NG/MG CREAT
DIAZEPAM/CREAT UR: NOT DETECTED NG/MG CREAT
ETHYL GLUCURONIDE UR QL SCN: NEGATIVE NG/ML
FENTANYL UR QL SCN: NEGATIVE NG/ML
FLUNITRAZEPAM UR-MCNC: NOT DETECTED NG/MG CREAT
GABAPENTIN SERPLBLD QL SCN: NEGATIVE UG/ML
HYDROCODONE UR QL CFM: NOT DETECTED NG/MG CREAT
HYDROMORPHONE UR QL CFM: NOT DETECTED NG/MG CREAT
LORAZEPAM UR CFM-MCNC: NOT DETECTED NG/MG CREAT
METHADONE UR QL SCN: NEGATIVE NG/ML
MIDAZOLAM/CREAT UR CFM: NOT DETECTED NG/MG CREAT
MORPHINE UR QL CFM: NOT DETECTED NG/MG CREAT
NITRITE UR QL STRIP: NEGATIVE UG/ML
NORCODEINE/CREAT UR CFM: NOT DETECTED NG/MG CREAT
NORDIAZEPAM UR CFM-MCNC: NOT DETECTED NG/MG CREAT
NORFLUNITRAZEPAM UR-MCNC: NOT DETECTED NG/MG CREAT
NORHYDROCODONE UR CFM-MCNC: NOT DETECTED NG/MG CREAT
NORMORPHINE: NOT DETECTED NG/MG CREAT
NOROXYCODONE UR CFM-MCNC: 2169 NG/MG CREAT
NOROXYMORPHONE/CREAT UR CFM: 525 NG/MG CREAT
OH-ETHYLFLURAZ UR CFM-MCNC: NOT DETECTED NG/MG CREAT
OH-TRIAZOLAM UR CFM-MCNC: NOT DETECTED NG/MG CREAT
OPIATES UR QL SCN: NEGATIVE NG/ML
OXAZEPAM CTO UR CFM-MCNC: >1493 NG/MG CREAT
OXYCODONE UR QL CFM: 789 NG/MG CREAT
OXYCODONE+OXYMORPHONE UR QL SCN: ABNORMAL NG/ML
OXYMORPHONE UR QL CFM: 946 NG/MG CREAT
PCP UR QL SCN: NEGATIVE NG/ML
PROPOXYPH UR QL SCN: NEGATIVE NG/ML
SPECIMEN PH ACCEPTABLE UR: 6.6 (ref 4.5–8.9)
TAPENTADOL UR QL SCN: NEGATIVE NG/ML
TEMAZEPAM UR CFM-MCNC: >1493 NG/MG CREAT
TRAMADOL UR QL SCN: NEGATIVE NG/ML

## 2024-08-14 PROCEDURE — 97112 NEUROMUSCULAR REEDUCATION: CPT | Performed by: PHYSICAL THERAPIST

## 2024-08-14 PROCEDURE — 97110 THERAPEUTIC EXERCISES: CPT | Performed by: PHYSICAL THERAPIST

## 2024-08-14 NOTE — PROGRESS NOTES
"Daily Note     Today's date: 2024  Patient name: Toney Motley  : 1955  MRN: [id number]  Referring provider: Brandi Rivera PA-C  Dx:   Encounter Diagnosis     ICD-10-CM    1. Lumbar radiculopathy  M54.16       2. S/P lumbar fusion  Z98.1                      Subjective: Patient states moderate difficulty with completion of HEP activities.       Objective: See treatment diary below      Assessment: Patient performed all activities without significant c/o pain; demonstrated moderate fatigue at completion of treatment session.       Plan: Continue per plan of care.      Precautions: L2-3,L4-5,L5-S1 fusion - 24, HTN, L/S fusion -   POC expires Unit limit Auth Expiration date PT/OT/ST + Visit Limit?   24 N/A  BOMN                           Visit/Unit Tracking  AUTH Status:  Date                Used                Remaining                   Manuals                                                                Neuro Re-Ed             Sup TA activation HEP 20x3\"           Sup TA with marches HEP 20x ea.           Sup TA with alt UE/LE  20x ea.           TB Multifidus press  BTB 20x3\" ea.           Sup TA with iso hip add  20x5\"                                     Ther Ex             Bike  5 min           Mini squats HEP 2x10           TB rows  BTB 2x10           TB lat pull downs  BTB 20x3\"           Stand hip ext  2x10 ea.           Stand hip abd  2x10 ea.           Sup hip rolls   10x5\"                        Ther Activity                                       Gait Training                                       Modalities                                            "

## 2024-08-19 ENCOUNTER — OFFICE VISIT (OUTPATIENT)
Dept: PHYSICAL THERAPY | Facility: CLINIC | Age: 69
End: 2024-08-19
Payer: MEDICARE

## 2024-08-19 ENCOUNTER — TELEPHONE (OUTPATIENT)
Dept: GASTROENTEROLOGY | Facility: CLINIC | Age: 69
End: 2024-08-19

## 2024-08-19 DIAGNOSIS — Z98.1 S/P LUMBAR FUSION: ICD-10-CM

## 2024-08-19 DIAGNOSIS — M54.16 LUMBAR RADICULOPATHY: Primary | ICD-10-CM

## 2024-08-19 PROCEDURE — 97112 NEUROMUSCULAR REEDUCATION: CPT | Performed by: PHYSICAL THERAPIST

## 2024-08-19 PROCEDURE — 97110 THERAPEUTIC EXERCISES: CPT | Performed by: PHYSICAL THERAPIST

## 2024-08-19 NOTE — TELEPHONE ENCOUNTER
As per Angela she will call pt tomorrow at noon to discuss his symptoms.      Spoke to pt. Pt was ok with provider calling him at noon on 8/20/24

## 2024-08-19 NOTE — TELEPHONE ENCOUNTER
Reason for call: Pt calling stating he is returning a call. I called clinical team and spoke with Mery and explain that patient was returning a call. Connected patient over but no respond. Mery stated she will call patient back.

## 2024-08-19 NOTE — PROGRESS NOTES
"Daily Note     Today's date: 2024  Patient name: Toney Motley  : 1955  MRN: [id number]  Referring provider: Brandi Rivera PA-C  Dx:   Encounter Diagnosis     ICD-10-CM    1. Lumbar radiculopathy  M54.16       2. S/P lumbar fusion  Z98.1                      Subjective: Patient stated moderate soreness prior to treatment session.       Objective: See treatment diary below      Assessment: Patient continues to demonstrate moderate challenge with DLS activities; no c/o at completion of treatment session.       Plan: Continue per plan of care.      Precautions: L2-3,L4-5,L5-S1 fusion - 24, HTN, L/S fusion -   POC expires Unit limit Auth Expiration date PT/OT/ST + Visit Limit?   24 N/A  BOMN                           Visit/Unit Tracking  AUTH Status:  Date                Used                Remaining                   Manuals                                                               Neuro Re-Ed             Sup TA activation HEP 20x3\" 20x3\"          Sup TA with marches HEP 20x ea. 20x ea.          Sup TA with alt UE/LE  20x ea. 20x ea.          TB Multifidus press  BTB 20x3\" ea. BTB 20x3\" ea.          Sup TA with iso hip add  20x5\" 20x5\"                                    Ther Ex             Bike  5 min 5 min          Mini squats HEP 2x10 2x10          TB rows  BTB 2x10 BTB 2x10          TB lat pull downs  BTB 20x3\" BTB 2x10          Stand hip ext  2x10 ea. 2x10 ea.          Stand hip abd  2x10 ea. 2x10 ea.          Sup hip rolls   10x5\" 10x5\"                       Ther Activity                                       Gait Training                                       Modalities                                            "

## 2024-08-20 ENCOUNTER — OFFICE VISIT (OUTPATIENT)
Dept: OCCUPATIONAL THERAPY | Facility: CLINIC | Age: 69
End: 2024-08-20
Payer: MEDICARE

## 2024-08-20 DIAGNOSIS — R63.39 FOOD AVERSION: ICD-10-CM

## 2024-08-20 DIAGNOSIS — R63.0 POOR APPETITE: ICD-10-CM

## 2024-08-20 DIAGNOSIS — M24.542 CONTRACTURE OF JOINT OF FINGER OF LEFT HAND: Primary | ICD-10-CM

## 2024-08-20 DIAGNOSIS — K58.1 IRRITABLE BOWEL SYNDROME WITH CONSTIPATION: ICD-10-CM

## 2024-08-20 DIAGNOSIS — R63.4 UNINTENTIONAL WEIGHT LOSS: Primary | ICD-10-CM

## 2024-08-20 DIAGNOSIS — R10.13 EPIGASTRIC PAIN: ICD-10-CM

## 2024-08-20 PROCEDURE — 97140 MANUAL THERAPY 1/> REGIONS: CPT

## 2024-08-20 PROCEDURE — 97110 THERAPEUTIC EXERCISES: CPT

## 2024-08-20 RX ORDER — LINACLOTIDE 72 UG/1
1 CAPSULE, GELATIN COATED ORAL DAILY
Qty: 30 CAPSULE | Refills: 2 | Status: SHIPPED | OUTPATIENT
Start: 2024-08-20

## 2024-08-20 NOTE — PROGRESS NOTES
"Daily Note     Today's date: 2024  Patient name: Toney Motley  : 1955  MRN: [id number]  Referring provider: Brandi Rivera PA-C  Dx:   Encounter Diagnosis     ICD-10-CM    1. 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 Pt presents with improved PIP joint extension, however he continues to c/o increased pain throughout ulnar hand. Issued LMB splint for daytime use. Pt instructed to don 10-15 min (as tolerated) for extension stretch during the day. Also provided pt with HEP for blocking, reverse blocking, and CYNTHIA exercises. Pt instructed to continue to don volar gutter splint for night time extension stretch. Patient would benefit from continued OT      Plan: Continue per plan of care.  Progress treatment as tolerated.       Precautions: s/p lumbar fusion (24), chronic low back pain, HTN    POC expires Unit limit Auth Expiration date PT/OT/ST + Visit Limit?    BOMN N/A BOMN                           Visit/Unit Tracking  AUTH Status:  Date               No auth required Used                Remaining                    Access Code: [email or web address]  URL: [email or web address]  Date: 2024  Prepared by: Sade Ribeiro    Exercises  - Ulnar Nerve Glide- Full Arm  - 3 x daily - 7 x weekly - 2 sets - 10 reps  - Hand AROM PIP Blocking  - 3 x daily - 7 x weekly - 2 sets - 10 reps  - PIP Extension with Reverse Blocking  - 3 x daily - 7 x weekly - 2 sets - 10 reps    Manuals            visit 1 2           splinting  LMB           Joint mobs NV 5'           IASTM/STM  5'                                     Neuro Re-Ed             CYNTHIA NV                                                                                          Ther Ex  30'           PT educ/HEP Splint use, " digital AROM Access Code: [email or web address]           PROM 1:1 NV 10'           Blocking NV 15x           Rev blocking NV 15x           CYNTHIA  5x           Digit ext stretch  FB rolls 15x                                     Ther Activity                                                                              Modalities             P  5'

## 2024-08-20 NOTE — TELEPHONE ENCOUNTER
Scheduled Gastric emptying study for 8/26/24 @ 7:30 at [facility].       Called pt LMOM advising his upcoming GE appt and relayed studying instruction and to stop Reglan 2 to 3 days prior to the test. Told if any questions to call the office.

## 2024-08-20 NOTE — TELEPHONE ENCOUNTER
Spoke with patient.  He weighs 208 pounds.  I am going to send him for an gastric emptying study as we still technically do not have a diagnosis for his symptoms.  If he does truly have gastroparesis, then he would be eligible for other treatments like pyloroplasty and Botox again.    Please assist the patient in scheduling.  He is willing to travel to [facility] if necessary.  He will need to stop Reglan 2 to 3 days prior to the test.  Thank you

## 2024-08-21 ENCOUNTER — OFFICE VISIT (OUTPATIENT)
Dept: PHYSICAL THERAPY | Facility: CLINIC | Age: 69
End: 2024-08-21
Payer: MEDICARE

## 2024-08-21 DIAGNOSIS — Z98.1 S/P LUMBAR FUSION: ICD-10-CM

## 2024-08-21 DIAGNOSIS — M54.16 LUMBAR RADICULOPATHY: Primary | ICD-10-CM

## 2024-08-21 PROCEDURE — 97110 THERAPEUTIC EXERCISES: CPT | Performed by: PHYSICAL THERAPIST

## 2024-08-21 PROCEDURE — 97112 NEUROMUSCULAR REEDUCATION: CPT | Performed by: PHYSICAL THERAPIST

## 2024-08-21 NOTE — PROGRESS NOTES
"Daily Note     Today's date: 2024  Patient name: Toney Motley  : 1955  MRN: [id number]  Referring provider: Brandi Rivera PA-C  Dx:   Encounter Diagnosis     ICD-10-CM    1. Lumbar radiculopathy  M54.16       2. S/P lumbar fusion  Z98.1           Start Time: 1530  Stop Time: 1608  Total time in clinic (min): 38 minutes    Subjective: Patient reports increased pain and stiffness today. He requested to hold on theraband exercises as it was very hard on his hands LV. Typically patient feels increased soreness post PT.       Objective: See treatment diary below      Assessment: Due to patient report, activity was modified with addition of stretching and limiting exercises to plinth today. Patient was challenged most with TVA+hip ABD exercise with dec resistance recommended for NV. Patient noted similar soreness post session today compared to previous sessions. Resume/progress activity, as able.       Plan: Continue per plan of care.      Precautions: L2-3,L4-5,L5-S1 fusion - 24, HTN, L/S fusion -   POC expires Unit limit Auth Expiration date PT/OT/ST + Visit Limit?   24 N/A  BOMN                           Visit/Unit Tracking  AUTH Status:  Date                Used                Remaining                   Manuals                                                              Neuro Re-Ed             Sup TA activation HEP 20x3\" 20x3\" 20x3\"          Sup TA with marches HEP 20x ea. 20x ea. 20x ea         Sup TA with alt UE/LE  20x ea. 20x ea.          TB Multifidus press  BTB 20x3\" ea. BTB 20x3\" ea.                       Sup TA with iso hip add  20x5\" 20x5\" 20x5\"         Sup TA c hip ABD    Green 20x5\" Red?                     Ther Ex             Bike  5 min 5 min 5 min         Mini squats HEP 2x10 2x10          TB rows  BTB 2x10 BTB 2x10          TB lat pull downs  BTB 20x3\" BTB 2x10          Stand hip ext  2x10 ea. 2x10 ea.          Stand hip abd  2x10 ea. 2x10 ea.        " "  Sup hip rolls   10x5\" 10x5\"          SKTC c towel    5x10\" ea         Piriformis stretch    5x10\" ea         LTR    20x5\"                      Ther Activity                                       Gait Training                                       Modalities                                              "

## 2024-08-24 DIAGNOSIS — G47.01 INSOMNIA DUE TO MEDICAL CONDITION: ICD-10-CM

## 2024-08-24 DIAGNOSIS — R63.4 UNINTENTIONAL WEIGHT LOSS: ICD-10-CM

## 2024-08-24 DIAGNOSIS — R63.0 POOR APPETITE: ICD-10-CM

## 2024-08-25 RX ORDER — MIRTAZAPINE 15 MG/1
15 TABLET, ORALLY DISINTEGRATING ORAL
Qty: 90 TABLET | Refills: 1 | Status: SHIPPED | OUTPATIENT
Start: 2024-08-25 | End: 2024-08-30

## 2024-08-26 ENCOUNTER — OFFICE VISIT (OUTPATIENT)
Dept: PHYSICAL THERAPY | Facility: CLINIC | Age: 69
End: 2024-08-26
Payer: MEDICARE

## 2024-08-26 ENCOUNTER — HOSPITAL ENCOUNTER (OUTPATIENT)
Dept: NUCLEAR MEDICINE | Facility: HOSPITAL | Age: 69
Discharge: HOME | End: 2024-08-26
Payer: MEDICARE

## 2024-08-26 DIAGNOSIS — Z98.1 S/P LUMBAR FUSION: ICD-10-CM

## 2024-08-26 DIAGNOSIS — M54.16 LUMBAR RADICULOPATHY: Primary | ICD-10-CM

## 2024-08-26 DIAGNOSIS — R10.13 EPIGASTRIC PAIN: ICD-10-CM

## 2024-08-26 DIAGNOSIS — R63.0 POOR APPETITE: ICD-10-CM

## 2024-08-26 DIAGNOSIS — R63.4 UNINTENTIONAL WEIGHT LOSS: ICD-10-CM

## 2024-08-26 DIAGNOSIS — R63.39 FOOD AVERSION: ICD-10-CM

## 2024-08-26 PROCEDURE — 78264 GASTRIC EMPTYING IMG STUDY: CPT

## 2024-08-26 PROCEDURE — 97112 NEUROMUSCULAR REEDUCATION: CPT | Performed by: PHYSICAL THERAPIST

## 2024-08-26 PROCEDURE — A9541 TC99M SULFUR COLLOID: HCPCS

## 2024-08-26 PROCEDURE — 97110 THERAPEUTIC EXERCISES: CPT | Performed by: PHYSICAL THERAPIST

## 2024-08-26 NOTE — PROGRESS NOTES
"Daily Note     Today's date: 2024  Patient name: Toney Motley  : 1955  MRN: [id number]  Referring provider: Brandi Rivera PA-C  Dx:   Encounter Diagnosis     ICD-10-CM    1. Lumbar radiculopathy  M54.16       2. S/P lumbar fusion  Z98.1                      Subjective: Patient stated moderate pain in his low back prior to treatment session.       Objective: See treatment diary below      Assessment: Patient performed addition of sitting ball rolls for L/S flexion without significant c/o pain.        Plan: Continue per plan of care.      Precautions: L2-3,L4-5,L5-S1 fusion - 24, HTN, L/S fusion -   POC expires Unit limit Auth Expiration date PT/OT/ST + Visit Limit?   24 N/A  BOMN                           Visit/Unit Tracking  AUTH Status:  Date                Used                Remaining                   Manuals                                                             Neuro Re-Ed             Sup TA activation HEP 20x3\" 20x3\" 20x3\"  20x5\"        Sup TA with marches HEP 20x ea. 20x ea. 20x ea 20x ea.        Sup TA with alt UE/LE  20x ea. 20x ea.  20x ea.        TB Multifidus press  BTB 20x3\" ea. BTB 20x3\" ea.  BTB 20x3\" ea.                     Sup TA with iso hip add  20x5\" 20x5\" 20x5\" 20x5\"                                  Ther Ex             Bike  5 min 5 min 5 min 5 min        Mini squats HEP 2x10 2x10          TB rows  BTB 2x10 BTB 2x10  BTB 2x10        TB lat pull downs  BTB 20x3\" BTB 2x10  BTB 2x10        Stand hip ext  2x10 ea. 2x10 ea.  2x10 ea.        Stand hip abd  2x10 ea. 2x10 ea.  2x10 ea.        Sup hip rolls   10x5\" 10x5\"  10x5\"        Sitting forward Ball roll outs      20x5\"                                               Ther Activity                                       Gait Training                                       Modalities                                              "

## 2024-08-27 ENCOUNTER — TELEPHONE (OUTPATIENT)
Dept: GASTROENTEROLOGY | Facility: CLINIC | Age: 69
End: 2024-08-27

## 2024-08-27 NOTE — TELEPHONE ENCOUNTER
Led patient and got . LMOM with message as per Angela SIDHU asking if he took Reglan before the Gastric Emptying Study Test. Asked patient to please call us back and let us know. Left office # as well.....

## 2024-08-27 NOTE — TELEPHONE ENCOUNTER
----- Message from Angela Dorsey PA-C sent at 8/26/2024  3:02 PM EDT -----  Please ask patient if he was taking Reglan aka metoclopramide prior to the emptying test.

## 2024-08-28 ENCOUNTER — TELEPHONE (OUTPATIENT)
Dept: GASTROENTEROLOGY | Facility: CLINIC | Age: 69
End: 2024-08-28

## 2024-08-29 ENCOUNTER — OFFICE VISIT (OUTPATIENT)
Dept: PHYSICAL THERAPY | Facility: CLINIC | Age: 69
End: 2024-08-29
Payer: MEDICARE

## 2024-08-29 ENCOUNTER — APPOINTMENT (OUTPATIENT)
Dept: PHYSICAL THERAPY | Facility: CLINIC | Age: 69
End: 2024-08-29
Payer: MEDICARE

## 2024-08-29 DIAGNOSIS — Z98.1 S/P LUMBAR FUSION: ICD-10-CM

## 2024-08-29 DIAGNOSIS — M54.16 LUMBAR RADICULOPATHY: Primary | ICD-10-CM

## 2024-08-29 PROCEDURE — 97112 NEUROMUSCULAR REEDUCATION: CPT | Performed by: PHYSICAL THERAPIST

## 2024-08-29 PROCEDURE — 97110 THERAPEUTIC EXERCISES: CPT | Performed by: PHYSICAL THERAPIST

## 2024-08-29 NOTE — PROGRESS NOTES
"Daily Note     Today's date: 2024  Patient name: Toney Motley  : 1955  MRN: [id number]  Referring provider: Brandi Rivera PA-C  Dx:   Encounter Diagnosis     ICD-10-CM    1. Lumbar radiculopathy  M54.16       2. S/P lumbar fusion  Z98.1                      Subjective: Patient stated increased pain in his UE today secondary to receiving EMG.        Objective: See treatment diary below      Assessment: Patient declined performing exercises as listed secondary to UE soreness from EMG.       Plan: Continue per plan of care.      Precautions: L2-3,L4-5,L5-S1 fusion - 24, HTN, L/S fusion -   POC expires Unit limit Auth Expiration date PT/OT/ST + Visit Limit?   24 N/A  BOMN                           Visit/Unit Tracking  AUTH Status:  Date                Used                Remaining                   Manuals                                                            Neuro Re-Ed             Sup TA activation HEP 20x3\" 20x3\" 20x3\"  20x5\" 20x5\"       Sup TA with marches HEP 20x ea. 20x ea. 20x ea 20x ea. 20x ea.       Sup TA with alt UE/LE  20x ea. 20x ea.  20x ea. 20x ea.       TB Multifidus press  BTB 20x3\" ea. BTB 20x3\" ea.  BTB 20x3\" ea. np                    Sup TA with iso hip add  20x5\" 20x5\" 20x5\" 20x5\" 20x5\"                                 Ther Ex             Bike  5 min 5 min 5 min 5 min 5 min       Mini squats HEP 2x10 2x10          TB rows  BTB 2x10 BTB 2x10  BTB 2x10 Decl.       TB lat pull downs  BTB 20x3\" BTB 2x10  BTB 2x10 Decl.       Stand hip ext  2x10 ea. 2x10 ea.  2x10 ea. GTB 2x10 ea       Stand hip abd  2x10 ea. 2x10 ea.  2x10 ea. GTB 2x10 ea       Sup hip rolls   10x5\" 10x5\"  10x5\" 10x5\"       Sitting forward Ball roll outs      20x5\" 20x5\"                                              Ther Activity                                       Gait Training                                       Modalities                                              "

## 2024-08-30 ENCOUNTER — OFFICE VISIT (OUTPATIENT)
Dept: GASTROENTEROLOGY | Facility: CLINIC | Age: 69
End: 2024-08-30
Payer: MEDICARE

## 2024-08-30 VITALS
RESPIRATION RATE: 18 BRPM | TEMPERATURE: 97.1 F | WEIGHT: 210.8 LBS | SYSTOLIC BLOOD PRESSURE: 122 MMHG | DIASTOLIC BLOOD PRESSURE: 76 MMHG | BODY MASS INDEX: 28.55 KG/M2 | HEIGHT: 72 IN | OXYGEN SATURATION: 97 % | HEART RATE: 86 BPM

## 2024-08-30 DIAGNOSIS — R11.0 NAUSEA: ICD-10-CM

## 2024-08-30 DIAGNOSIS — R10.13 EPIGASTRIC PAIN: ICD-10-CM

## 2024-08-30 DIAGNOSIS — R63.39 FOOD AVERSION: ICD-10-CM

## 2024-08-30 DIAGNOSIS — R63.4 UNINTENTIONAL WEIGHT LOSS: ICD-10-CM

## 2024-08-30 DIAGNOSIS — R63.0 POOR APPETITE: Primary | ICD-10-CM

## 2024-08-30 PROCEDURE — 99213 OFFICE O/P EST LOW 20 MIN: CPT | Performed by: PHYSICIAN ASSISTANT

## 2024-08-30 PROCEDURE — G2211 COMPLEX E/M VISIT ADD ON: HCPCS | Performed by: PHYSICIAN ASSISTANT

## 2024-08-30 RX ORDER — OMEPRAZOLE 40 MG/1
40 CAPSULE, DELAYED RELEASE ORAL DAILY
COMMUNITY

## 2024-08-30 RX ORDER — IBUPROFEN 800 MG/1
TABLET, FILM COATED ORAL
COMMUNITY

## 2024-08-30 RX ORDER — DOXEPIN HYDROCHLORIDE 25 MG/1
1 CAPSULE ORAL
COMMUNITY

## 2024-08-30 RX ORDER — MIRTAZAPINE 30 MG/1
30 TABLET, ORALLY DISINTEGRATING ORAL
Qty: 60 TABLET | Refills: 1 | Status: SHIPPED | OUTPATIENT
Start: 2024-08-30

## 2024-08-30 RX ORDER — TRAZODONE HYDROCHLORIDE 50 MG/1
TABLET, FILM COATED ORAL
COMMUNITY

## 2024-08-30 RX ORDER — BUPRENORPHINE AND NALOXONE 8; 2 MG/1; MG/1
FILM, SOLUBLE BUCCAL; SUBLINGUAL
COMMUNITY

## 2024-08-30 RX ORDER — BUSPIRONE HYDROCHLORIDE 5 MG/1
1 TABLET ORAL 2 TIMES DAILY
COMMUNITY

## 2024-08-30 RX ORDER — MECLIZINE HCL 12.5 MG 12.5 MG/1
1 TABLET ORAL 3 TIMES DAILY PRN
COMMUNITY

## 2024-08-30 RX ORDER — APIXABAN 5 MG/1
5 TABLET, FILM COATED ORAL 2 TIMES DAILY
COMMUNITY
Start: 2024-08-14

## 2024-08-30 RX ORDER — MELOXICAM 15 MG/1
1 TABLET ORAL DAILY
COMMUNITY

## 2024-08-30 RX ORDER — ESOMEPRAZOLE MAGNESIUM 40 MG/1
40 CAPSULE, DELAYED RELEASE ORAL
Qty: 180 CAPSULE | Refills: 3 | Status: SHIPPED | OUTPATIENT
Start: 2024-08-30

## 2024-08-30 RX ORDER — ALPRAZOLAM 0.25 MG
TABLET ORAL
COMMUNITY

## 2024-08-30 RX ORDER — ALPRAZOLAM 0.5 MG
TABLET ORAL
COMMUNITY

## 2024-09-02 NOTE — PROGRESS NOTES
[facility]  Toney Motley 69 y.o. male MRN: [id number]       CC:    HPI: Toney is a pleasant 69-year-old male with history of ulcerative colitis, lumbar radiculopathy, and nonalcoholic fatty liver disease.  With regard to patient's recent surgical history, he recently underwent L2-L3, L4-L5, L5-S1 posterior lumbar interbody fusion with pedicle screws, rods and cage at [facility] in May 2024.  Patient is currently on Eliquis for postop DVT. Prior to surgery, patient weighed baseline 250-260 lb. Prior to surgery and now being post op, he is down to 210. He is similar weight according to our scale during our last visit. He states that he continues to have significant back pain, but he also has associated nausea and epigastric discomfort. His repeat GES is normal, and he emptied at 3 hours. He confirmed he was not taking metoclopramide prior to the test. He is taking the Remeron I prescribed. He is able to eat sweets, but otherwise not gaining any weight. Following with pain management at [facility]. Patient was told that he was going to have a significantly long recovery given the extent of his back surgery per the patient.     He has undergone extensive workup from a GI standpoint including CTA, MRCP, CT head, HIDA scan with CCK, right upper quadrant ultrasound, and endoscopy with Botox to the pylorus.  Last colonoscopy was in June 2023 revealing normal colon mucosa and several scattered pseudopolyps. Patient was recommended a 5-year recall secondary to personal history of inflammatory bowel disease.       Review of Systems:    CONSTITUTIONAL: Denies any fever, chills, or rigors. Good appetite, and no recent weight loss.  HEENT: No earache or tinnitus. Denies hearing loss or visual disturbances.  CARDIOVASCULAR: No chest pain or palpitations.   RESPIRATORY: Denies any cough, hemoptysis, shortness of breath or dyspnea on exertion.  GASTROINTESTINAL: As noted in the History of Present Illness.    GENITOURINARY: No problems with urination. Denies any hematuria or dysuria.  NEUROLOGIC: No dizziness or vertigo, denies headaches.   MUSCULOSKELETAL: Denies any muscle or joint pain.   SKIN: Denies skin rashes or itching.   ENDOCRINE: Denies excessive thirst. Denies intolerance to heat or cold.  PSYCHOSOCIAL: Denies depression or anxiety. Denies any recent memory loss.       Current Outpatient Medications   Medication Sig Dispense Refill    acetaminophen (TYLENOL) 325 mg tablet Take 3 tablets (975 mg total) by mouth every 8 (eight) hours      ALPRAZolam (XANAX) 0.25 mg tablet take 1 tablet by mouth twice a day if needed for anxiety for up to 10 days      ALPRAZolam (XANAX) 0.5 mg tablet take 1 tablet by mouth if needed for anxiety FOR IMAGING for up to 1 dose      aluminum-magnesium hydroxide-simethicone (MAALOX) 9029-5585-513 mg/30 mL suspension Take 30 mL by mouth every 6 (six) hours as needed for indigestion or heartburn      balsalazide (COLAZAL) 750 mg capsule TAKE 3 CAPSULES BY MOUTH TWICE DAILY 540 capsule 1    buprenorphine-naloxone (Suboxone) 8-2 mg PLACE 1 FILM UNDER THE TONGUE TWICE DAILY      busPIRone (BUSPAR) 5 mg tablet Take 1 tablet by mouth 2 (two) times a day      cephalexin (KEFLEX) 500 mg capsule       diazepam (VALIUM) 10 mg tablet       Diclofenac Sodium (VOLTAREN) 1 % apply 2 grams topically four times a day to affected area      dicyclomine (BENTYL) 20 mg tablet Take 1 tablet (20 mg total) by mouth every 6 (six) hours 45 tablet 2    doxepin (SINEquan) 25 mg capsule Take 1 capsule by mouth daily at bedtime      DULoxetine (CYMBALTA) 60 mg delayed release capsule Take 60 mg by mouth daily      Eliquis 5 MG Take 5 mg by mouth 2 (two) times a day      esomeprazole (NexIUM) 40 MG capsule Take 1 capsule (40 mg total) by mouth 2 (two) times a day before meals 180 capsule 3    ibuprofen (MOTRIN) 800 mg tablet take 1 tablet by mouth every 8 hours if needed for mild pain with...  (REFER TO  PRESCRIPTION NOTES).      linaCLOtide (Linzess) 72 MCG CAPS Take 1 capsule by mouth daily 30 capsule 2    LORazepam (ATIVAN) 1 mg tablet Take 1 tablet 1 hour prior to MRI appointment, take second tablet as needed during the appointment 2 tablet 0    meclizine (ANTIVERT) 12.5 MG tablet Take 1 tablet by mouth 3 (three) times a day as needed      meloxicam (MOBIC) 15 mg tablet Take 1 tablet by mouth daily      methocarbamol (ROBAXIN) 750 mg tablet       metoclopramide (REGLAN) 10 mg tablet TAKE 1 TABLET BY MOUTH EVERY 8 HOURS AS NEEDED (ABDOMINAL PAIN, NAUSEA, VOMITING)      metoprolol succinate (TOPROL-XL) 25 mg 24 hr tablet Take 25 mg by mouth daily      mirtazapine (REMERON SOL-TAB) 30 mg dispersible tablet Take 1 tablet (30 mg total) by mouth daily at bedtime 60 tablet 1    mupirocin (BACTROBAN) 2 % ointment       omeprazole (PriLOSEC) 40 MG capsule Take 40 mg by mouth daily      ondansetron (ZOFRAN) 4 mg tablet TAKE 1 TABLET BY MOUTH EVERY 8 HOURS AS NEEDED FOR NAUSEA AND VOMITING 90 tablet 0    ondansetron (ZOFRAN-ODT) 4 mg disintegrating tablet       oxyCODONE (ROXICODONE) 5 immediate release tablet TAKE 1 TABLET BY MOUTH EVERY 8 HOURS AS NEEDED FOR SEVERE PAIN (PAIN SCORE 7-10). MAX/DAY: 15 MG      oxyCODONE-acetaminophen (PERCOCET) 5-325 mg per tablet       promethazine (PHENERGAN) 25 mg tablet Every 12 hours      sulfamethoxazole-trimethoprim (BACTRIM DS) 800-160 mg per tablet       tamsulosin (FLOMAX) 0.4 mg Take 0.4 mg by mouth      temazepam (RESTORIL) 30 mg capsule TAKE 2 CAPSULES BY MOUTH AT BEDTIME FOR 30 DAYS      traZODone (DESYREL) 50 mg tablet TAKE 1/2 TABLET BY MOUTH NIGHTLY      zolpidem (AMBIEN CR) 12.5 MG CR tablet Take 12.5 mg by mouth every evening      zolpidem (AMBIEN) 10 mg tablet TAKE 1 TABLET BY MOUTH NIGHTLY AS NEEDED FOR SLEEP      ezetimibe (ZETIA) 10 mg tablet Take 1 tablet (10 mg total) by mouth daily 90 tablet 2    naloxone (NARCAN) 4 mg/0.1 mL nasal spray  (Patient not taking:  Reported on 8/6/2024)      oxybutynin (DITROPAN-XL) 10 MG 24 hr tablet Take 10 mg by mouth daily      QUEtiapine (SEROquel) 100 mg tablet       Senexon-S 8.6-50 MG per tablet Take 1 tablet by mouth 2 (two) times a day      sucralfate (CARAFATE) 1 g tablet Take 1 tablet (1 g total) by mouth 4 (four) times a day (Patient not taking: Reported on 7/10/2024) 360 tablet 0     No current facility-administered medications for this visit.     Past Medical History:   Diagnosis Date    Back pain     Colon polyp     Hyperlipidemia     Lumbar fracture with cord injury (HCC)     Neck fracture (HCC)     Previous back surgery     rods in the back    Ulcerative colitis (HCC)      Past Surgical History:   Procedure Laterality Date    BACK SURGERY  05/20/2024    LUMBAR FUSION Right 01/20/2016    Procedure: FUSION LUMBAR  POSTERIOR, TLIF L3-4  Right L3-4 Fascet;  Surgeon: Narayan Castro MD;  Location:  MAIN OR;  Service:     NC COLONOSCOPY FLX DX W/COLLJ SPEC WHEN PFRMD N/A 01/24/2019    Procedure: COLONOSCOPY;  Surgeon: Sanjeev Orellana III, MD;  Location: [facility];  Service: Gastroenterology    NC SURGICAL ARTHROSCOPY SHOULDER W/ROTATOR CUFF RPR Right 07/26/2023    Procedure: ARTHROSCOPY SHOULDER- Right shoulder Arthroscopy, supraspinatus and subscapularis repair, biceps tenotomy, extensive debridement;  Surgeon: Oscar Corbett DO;  Location: [facility];  Service: Orthopedics    TONSILLECTOMY       Social History     Socioeconomic History    Marital status: /Civil Union     Spouse name: None    Number of children: None    Years of education: None    Highest education level: None   Occupational History    None   Tobacco Use    Smoking status: Never    Smokeless tobacco: Never   Vaping Use    Vaping status: Never Used   Substance and Sexual Activity    Alcohol use: Not Currently     Comment: rarely    Drug use: No    Sexual activity: Not Currently   Other Topics Concern    None   Social History Narrative    None      Social Determinants of Health     Financial Resource Strain: Low Risk  (6/18/2024)    Received from [facility]    Overall Financial Resource Strain (CARDIA)     Difficulty of Paying Living Expenses: Not very hard   Food Insecurity: No Food Insecurity (6/18/2024)    Received from [facility]    Hunger Vital Sign     Worried About Running Out of Food in the Last Year: Never true     Ran Out of Food in the Last Year: Never true   Transportation Needs: No Transportation Needs (6/18/2024)    Received from [facility]    PRAPARE - Transportation     Lack of Transportation (Medical): No     Lack of Transportation (Non-Medical): No   Physical Activity: Not on file   Stress: Not on file   Social Connections: Not on file   Intimate Partner Violence: Not At Risk (6/18/2024)    Received from [facility]    Humiliation, Afraid, Rape, and Kick questionnaire     Fear of Current or Ex-Partner: No     Emotionally Abused: No     Physically Abused: No     Sexually Abused: No   Housing Stability: Low Risk  (6/18/2024)    Received from [facility]    Housing Stability Vital Sign     Unable to Pay for Housing in the Last Year: No     Number of Times Moved in the Last Year: 1     Homeless in the Last Year: No     Family History   Problem Relation Age of Onset    Parkinsonism Mother     Lung cancer Father             PHYSICAL EXAM:    Vitals:    08/30/24 1133   BP: 122/76   BP Location: Left arm   Patient Position: Sitting   Cuff Size: Standard   Pulse: 86   Resp: 18   Temp: (!) 97.1 °F (36.2 °C)   TempSrc: Tympanic   SpO2: 97%   Weight: 95.6 kg (210 lb 12.8 oz)   Height: 6' (1.829 m)     General Appearance:   Alert and oriented x 3. Cooperative, and in no respiratory distress   HEENT:   Normocephalic, atraumatic, anicteric.     Neck:  Supple, symmetrical, trachea midline   Lungs:   Clear to auscultation bilaterally    Heart::   Regular rate and  "rhythm   Abdomen:   Soft, non-tender, non-distended; normal bowel sounds; no masses, no organomegaly    Genitalia:   Deferred    Rectal:   Deferred    Extremities:  No cyanosis, clubbing or edema    Pulses:  2+ and symmetric all extremities    Skin:  Skin color, texture, turgor normal, no rashes or lesions    Lymph nodes:  No palpable cervical or supraclavicular lymphadenopathy        Lab Results:   Results from last 6 Months   Lab Units 07/30/24  1359   WBC Thousand/uL 8.63   HEMOGLOBIN g/dL 12.8   HEMATOCRIT % 40.3   PLATELETS Thousands/uL 233   SEGS PCT % 77*   LYMPHO PCT % 15   MONO PCT % 6   EOS PCT % 2     Results from last 6 Months   Lab Units 07/30/24  1359   POTASSIUM mmol/L 4.3   CHLORIDE mmol/L 104   CO2 mmol/L 30   BUN mg/dL 19   CREATININE mg/dL 0.90   CALCIUM mg/dL 9.4   ALK PHOS U/L 71   ALT U/L 8   AST U/L 10*     Results from last 6 Months   Lab Units 06/17/24  1948   INR  1.1     Results from last 6 Months   Lab Units 04/15/24  1329   LIPASE u/L 25       Imaging Studies:   NM gastric emptying    Result Date: 8/26/2024  Impression: Abnormal rapid gastric emptying value at 30 minutes, otherwise normal gastric emptying values. Workstation performed: [id number]       ASSESSMENT and PLAN:      1) Unintentional weight loss, severe back pain, nausea and epigastric discomfort; status post extensive lumbar fusion surgery - Patient weight at 210 lb, similar to last visit. Other than his significant back pain, he have been unable to find another organic cause for his symptoms from a GI standpoint. I will be sending him to one of my other attendings to see if there are any other recommendations or work-up to be performed. Will increase his Remeron for now. If he continues to lose weight unintentionally, consideration of PEG. Patient agrees with plan and appreciative of the work-up done thus far.       Portions of the record may have been created with voice recognition software.  Occasional wrong word or \"sound " "a like\" substitutions may have occurred due to the inherent limitations of voice recognition software.  Read the chart carefully and recognize, using context, where substitutions have occurred.  "

## 2024-09-04 ENCOUNTER — OFFICE VISIT (OUTPATIENT)
Dept: PHYSICAL THERAPY | Facility: CLINIC | Age: 69
End: 2024-09-04
Payer: MEDICARE

## 2024-09-04 DIAGNOSIS — M54.16 LUMBAR RADICULOPATHY: Primary | ICD-10-CM

## 2024-09-04 PROCEDURE — 97112 NEUROMUSCULAR REEDUCATION: CPT | Performed by: PHYSICAL THERAPIST

## 2024-09-04 PROCEDURE — 97110 THERAPEUTIC EXERCISES: CPT | Performed by: PHYSICAL THERAPIST

## 2024-09-04 NOTE — PROGRESS NOTES
"Daily Note     Today's date: 2024  Patient name: Toney Motley  : 1955  MRN: [id number]  Referring provider: Brandi Rivera PA-C  Dx:   Encounter Diagnosis     ICD-10-CM    1. Lumbar radiculopathy  M54.16                      Subjective: Patient states no significant change at this time.       Objective: See treatment diary below      Assessment: Patient performed addition of supine Pball press without c/o pain; declined performing exercises as listed secondary to hand dysfunction.       Plan: Continue per plan of care.      Precautions: L2-3,L4-5,L5-S1 fusion - 24, HTN, L/S fusion -   POC expires Unit limit Auth Expiration date PT/OT/ST + Visit Limit?   24 N/A  BOMN                           Visit/Unit Tracking  AUTH Status:  Date                Used                Remaining                   Manuals                                                           Neuro Re-Ed             Sup TA activation HEP 20x3\" 20x3\" 20x3\"  20x5\" 20x5\" 20x5\"      Sup TA with marches HEP 20x ea. 20x ea. 20x ea 20x ea. 20x ea. 20x ea.      Sup TA with alt UE/LE  20x ea. 20x ea.  20x ea. 20x ea. 20x ea.      TB Multifidus press  BTB 20x3\" ea. BTB 20x3\" ea.  BTB 20x3\" ea. np Decl.                   Sup TA with iso hip add  20x5\" 20x5\" 20x5\" 20x5\" 20x5\" 20x5\"      Sup TA with Pball presses       20x5\"                   Ther Ex             Bike  5 min 5 min 5 min 5 min 5 min 5 min      Mini squats HEP 2x10 2x10          TB rows  BTB 2x10 BTB 2x10  BTB 2x10 Decl. Decl.      TB lat pull downs  BTB 20x3\" BTB 2x10  BTB 2x10 Decl. Decl.      Stand hip ext  2x10 ea. 2x10 ea.  2x10 ea. GTB 2x10 ea GTB 2x10 ea      Stand hip abd  2x10 ea. 2x10 ea.  2x10 ea. GTB 2x10 ea GTB 2x10 ea      Sup hip rolls   10x5\" 10x5\"  10x5\" 10x5\" 10x5\"      Sitting forward Ball roll outs      20x5\" 20x5\" 20x5\"                                             Ther Activity                                       Gait " Training                                       Modalities

## 2024-09-05 ENCOUNTER — APPOINTMENT (OUTPATIENT)
Dept: OCCUPATIONAL THERAPY | Facility: CLINIC | Age: 69
End: 2024-09-05
Payer: MEDICARE

## 2024-09-11 ENCOUNTER — OFFICE VISIT (OUTPATIENT)
Dept: PHYSICAL THERAPY | Facility: CLINIC | Age: 69
End: 2024-09-11
Payer: MEDICARE

## 2024-09-11 DIAGNOSIS — M54.16 LUMBAR RADICULOPATHY: Primary | ICD-10-CM

## 2024-09-11 DIAGNOSIS — Z98.1 S/P LUMBAR FUSION: ICD-10-CM

## 2024-09-11 PROCEDURE — 97110 THERAPEUTIC EXERCISES: CPT | Performed by: PHYSICAL THERAPIST

## 2024-09-11 PROCEDURE — 97112 NEUROMUSCULAR REEDUCATION: CPT | Performed by: PHYSICAL THERAPIST

## 2024-09-11 NOTE — PROGRESS NOTES
"Daily Note     Today's date: 2024  Patient name: Toney Motley  : 1955  MRN: [id number]  Referring provider: Brandi Rivera PA-C  Dx:   Encounter Diagnosis     ICD-10-CM    1. Lumbar radiculopathy  M54.16       2. S/P lumbar fusion  Z98.1                      Subjective: Patient stated pain level 6/10 prior to treatment session.       Objective: See treatment diary below      Assessment: Patient performed activities without significant c/o pain; continued to hold exercises as listed secondary to hand dysfunction.       Plan: Continue per plan of care.      Precautions: L2-3,L4-5,L5-S1 fusion - 24, HTN, L/S fusion -   POC expires Unit limit Auth Expiration date PT/OT/ST + Visit Limit?   24 N/A  BOMN                           Visit/Unit Tracking  AUTH Status:  Date                Used                Remaining                   Manuals                                                          Neuro Re-Ed             Sup TA activation HEP 20x3\" 20x3\" 20x3\"  20x5\" 20x5\" 20x5\" 20x5\"     Sup TA with marches HEP 20x ea. 20x ea. 20x ea 20x ea. 20x ea. 20x ea. 20x ea.     Sup TA with alt UE/LE  20x ea. 20x ea.  20x ea. 20x ea. 20x ea. 20x ea.     TB Multifidus press  BTB 20x3\" ea. BTB 20x3\" ea.  BTB 20x3\" ea. np Decl. np                  Sup TA with iso hip add  20x5\" 20x5\" 20x5\" 20x5\" 20x5\" 20x5\" 20x5\"     Sup TA with Pball presses       20x5\" 20x5\"                  Ther Ex             Bike  5 min 5 min 5 min 5 min 5 min 5 min 5 min     Mini squats HEP 2x10 2x10          TB rows  BTB 2x10 BTB 2x10  BTB 2x10 Decl. Decl. np     TB lat pull downs  BTB 20x3\" BTB 2x10  BTB 2x10 Decl. Decl. np     Stand hip ext  2x10 ea. 2x10 ea.  2x10 ea. GTB 2x10 ea GTB 2x10 ea BTB 2x10 ea.     Stand hip abd  2x10 ea. 2x10 ea.  2x10 ea. GTB 2x10 ea GTB 2x10 ea BTB 2x10 ea.     Sup hip rolls   10x5\" 10x5\"  10x5\" 10x5\" 10x5\" 20x5\"     Sitting forward Ball roll outs      20x5\" 20x5\" " "20x5\" 20x5\"                                            Ther Activity                                       Gait Training                                       Modalities                                              "

## 2024-09-12 ENCOUNTER — APPOINTMENT (OUTPATIENT)
Dept: OCCUPATIONAL THERAPY | Facility: CLINIC | Age: 69
End: 2024-09-12
Payer: MEDICARE

## 2024-09-16 ENCOUNTER — OFFICE VISIT (OUTPATIENT)
Dept: PHYSICAL THERAPY | Facility: CLINIC | Age: 69
End: 2024-09-16
Payer: MEDICARE

## 2024-09-16 DIAGNOSIS — M54.16 LUMBAR RADICULOPATHY: Primary | ICD-10-CM

## 2024-09-16 DIAGNOSIS — Z98.1 S/P LUMBAR FUSION: ICD-10-CM

## 2024-09-16 PROCEDURE — 97110 THERAPEUTIC EXERCISES: CPT | Performed by: PHYSICAL THERAPIST

## 2024-09-16 PROCEDURE — 97112 NEUROMUSCULAR REEDUCATION: CPT | Performed by: PHYSICAL THERAPIST

## 2024-09-18 ENCOUNTER — OFFICE VISIT (OUTPATIENT)
Dept: PHYSICAL THERAPY | Facility: CLINIC | Age: 69
End: 2024-09-18
Payer: MEDICARE

## 2024-09-18 DIAGNOSIS — Z98.1 S/P LUMBAR FUSION: ICD-10-CM

## 2024-09-18 DIAGNOSIS — M54.16 LUMBAR RADICULOPATHY: Primary | ICD-10-CM

## 2024-09-18 PROCEDURE — 97110 THERAPEUTIC EXERCISES: CPT | Performed by: PHYSICAL THERAPIST

## 2024-09-18 PROCEDURE — 97530 THERAPEUTIC ACTIVITIES: CPT | Performed by: PHYSICAL THERAPIST

## 2024-09-18 PROCEDURE — 97112 NEUROMUSCULAR REEDUCATION: CPT | Performed by: PHYSICAL THERAPIST

## 2024-09-18 NOTE — LETTER
2024    Max Valdez MD  [address]  [address]  [facility] [address]    Patient: Toney Motley   YOB: 1955   Date of Visit: 2024     Encounter Diagnosis     ICD-10-CM    1. Lumbar radiculopathy  M54.16       2. S/P lumbar fusion  Z98.1           Dear Dr. Valdez:    Thank you for your recent referral of Toney Motley. Please review the attached evaluation summary from Toney's recent visit.     Please verify that you agree with the plan of care by signing the attached order.     If you have any questions or concerns, please do not hesitate to call.     I sincerely appreciate the opportunity to share in the care of one of your patients and hope to have another opportunity to work with you in the near future.       Sincerely,    Mikey Veras, PT      Referring Provider:      I certify that I have read the below Plan of Care and certify the need for these services furnished under this plan of treatment while under my care.                    Max Valdez MD  [address]  [address]  [facility] [address]  Via Fax: [phone]          PT Re-Evaluation     Today's date: 2024  Patient name: Toney Motley  : 1955  MRN: [id number]  Referring provider: Gurinder Valdez MD    Dx:   Encounter Diagnosis     ICD-10-CM    1. Lumbar radiculopathy  M54.16       2. S/P lumbar fusion  Z98.1                      Assessment    Assessment details: Patient has been seen for a total of ten PT treatment sessions since initial evaluation on 24.  Patient demonstrates improved L/S ROM, improved strength, and improved functional ability since beginning PT treatment.  Patient would benefit from continued skilled PT treatment to address remaining deficits and to facilitate return to prior level of function.     Goals  Short term goals - to be achieved in 4 weeks:    Decrease pain 20-50% - partially met   Increase strength by 1/2 grade - partially met   Improve L/S  ROM by 25% - met  Long term goals - to be achieved by discharge:    Performance in related household activities is improved to maximal level of function - partially met   IADL performance in related activities is improved to maximal level of function - partially met   Sitting tolerance is improved to maximal level of function - partially met   Standing tolerance is improved to maximal level of function - partially met      Plan    Planned therapy interventions: neuromuscular re-education, patient/caregiver education, postural training, stretching, strengthening, therapeutic activities, therapeutic exercise and home exercise program    Frequency: 2x week  Duration in weeks: 6  Plan of Care beginning date: 2024  Plan of Care expiration date: 2024  Treatment plan discussed with: patient        Subjective Evaluation    History of Present Illness  Mechanism of injury: Patient states his low back feels approximately 60% improved since beginning PT treatment.  Patient states improved ability with bed mobility and ambulation since beginning PT treatment.    Pain  Current pain ratin  At best pain rating: 3  At worst pain rating: 10          Objective     General Comments:      Lumbar Comments  Active Range of Motion:  Lumbar:  Flexion:  Restriction level: moderate  Extension:  Restriction level: moderate   Left lateral flexion:  Restriction level: moderate  Right lateral flexion:  Restriction level: moderate     Strength/Myotome Testing      Left Hip   Planes of Motion   Flexion: 4     Right Hip   Planes of Motion   Flexion: 4     Left Knee   Flexion: 5  Extension: 5     Right Knee   Flexion: 5  Extension: 5     Left Ankle/Foot   Dorsiflexion: 5  Plantar flexion: 5     Right Ankle/Foot   Dorsiflexion: 5  Plantar flexion: 5             Precautions: L2-3,L4-5,L5-S1 fusion - 24, HTN, L/S fusion -   POC expires Unit limit Auth Expiration date PT/OT/ST + Visit Limit?   24 N/A   BOMN                      "                      Visit/Unit Tracking  AUTH Status:  Date                             Used                             Remaining                               Manuals 8/8 8/14 8/19 8/21 8/26 8/29 9/4 9/11 9/16 9/18                                                   Re-eval                    KK, PT                           Neuro Re-Ed                       Sup TA activation HEP 20x3\" 20x3\" 20x3\"  20x5\" 20x5\" 20x5\" 20x5\" 20x5\"  np   Sup TA with marches HEP 20x ea. 20x ea. 20x ea 20x ea. 20x ea. 20x ea. 20x ea. 20x ea.  20x ea.   Sup TA with alt UE/LE   20x ea. 20x ea.   20x ea. 20x ea. 20x ea. 20x ea. 20x ea. 20x ea.   Sup TA with SLR flex          2x10 ea.   TB Multifidus press   BTB 20x3\" ea. BTB 20x3\" ea.   BTB 20x3\" ea. np Decl. np np  np                           Sup TA with iso hip add   20x5\" 20x5\" 20x5\" 20x5\" 20x5\" 20x5\" 20x5\" 20x5\"  20x5\"   Sup TA with Pball presses             20x5\" 20x5\" 20x5\"  20x5\"   Sidestepping w/ TB resist                   BTB   5 laps    BTB   5 laps   Ther Ex                       Bike   5 min 5 min 5 min 5 min 5 min 5 min 5 min TM 6'  TM 6'   Mini squats HEP 2x10 2x10                 TB rows   BTB 2x10 BTB 2x10   BTB 2x10 Decl. Decl. np np  np   TB lat pull downs   BTB 20x3\" BTB 2x10   BTB 2x10 Decl. Decl. np np  np   Stand hip ext   2x10 ea. 2x10 ea.   2x10 ea. GTB 2x10 ea GTB 2x10 ea BTB 2x10 ea. BTB 2x10 ea.  BTB 2x10 ea.   Stand hip abd   2x10 ea. 2x10 ea.   2x10 ea. GTB 2x10 ea GTB 2x10 ea BTB 2x10 ea. BTB 2x10 ea.  BTB 2x10 ea.   Sup hip rolls    10x5\" 10x5\"   10x5\" 10x5\" 10x5\" 20x5\" 20x5\"  20x5\"   Sitting forward Ball roll outs          20x5\" 20x5\" 20x5\" 20x5\" 20x5\"  20x5\"                                                                           Ther Activity                                                                       Gait Training                                                                       Modalities                                                  "

## 2024-09-18 NOTE — PROGRESS NOTES
PT Re-Evaluation     Today's date: 2024  Patient name: Toney Motley  : 1955  MRN: [id number]  Referring provider: Gurinder Valdez MD    Dx:   Encounter Diagnosis     ICD-10-CM    1. Lumbar radiculopathy  M54.16       2. S/P lumbar fusion  Z98.1                      Assessment    Assessment details: Patient has been seen for a total of ten PT treatment sessions since initial evaluation on 24.  Patient demonstrates improved L/S ROM, improved strength, and improved functional ability since beginning PT treatment.  Patient would benefit from continued skilled PT treatment to address remaining deficits and to facilitate return to prior level of function.     Goals  Short term goals - to be achieved in 4 weeks:    Decrease pain 20-50% - partially met   Increase strength by 1/2 grade - partially met   Improve L/S ROM by 25% - met  Long term goals - to be achieved by discharge:    Performance in related household activities is improved to maximal level of function - partially met   IADL performance in related activities is improved to maximal level of function - partially met   Sitting tolerance is improved to maximal level of function - partially met   Standing tolerance is improved to maximal level of function - partially met      Plan    Planned therapy interventions: neuromuscular re-education, patient/caregiver education, postural training, stretching, strengthening, therapeutic activities, therapeutic exercise and home exercise program    Frequency: 2x week  Duration in weeks: 6  Plan of Care beginning date: 2024  Plan of Care expiration date: 2024  Treatment plan discussed with: patient        Subjective Evaluation    History of Present Illness  Mechanism of injury: Patient states his low back feels approximately 60% improved since beginning PT treatment.  Patient states improved ability with bed mobility and ambulation since beginning PT treatment.    Pain  Current pain ratin  At best  "pain rating: 3  At worst pain rating: 10          Objective     General Comments:      Lumbar Comments  Active Range of Motion:  Lumbar:  Flexion:  Restriction level: moderate  Extension:  Restriction level: moderate   Left lateral flexion:  Restriction level: moderate  Right lateral flexion:  Restriction level: moderate     Strength/Myotome Testing      Left Hip   Planes of Motion   Flexion: 4     Right Hip   Planes of Motion   Flexion: 4     Left Knee   Flexion: 5  Extension: 5     Right Knee   Flexion: 5  Extension: 5     Left Ankle/Foot   Dorsiflexion: 5  Plantar flexion: 5     Right Ankle/Foot   Dorsiflexion: 5  Plantar flexion: 5             Precautions: L2-3,L4-5,L5-S1 fusion - 5/20/24, HTN, L/S fusion - 2016  POC expires Unit limit Auth Expiration date PT/OT/ST + Visit Limit?   11/1/24 N/A   BOMN                                           Visit/Unit Tracking  AUTH Status:  Date                             Used                             Remaining                               Manuals 8/8 8/14 8/19 8/21 8/26 8/29 9/4 9/11 9/16 9/18                                                   Re-eval                    KK, PT                           Neuro Re-Ed                       Sup TA activation HEP 20x3\" 20x3\" 20x3\"  20x5\" 20x5\" 20x5\" 20x5\" 20x5\"  np   Sup TA with marches HEP 20x ea. 20x ea. 20x ea 20x ea. 20x ea. 20x ea. 20x ea. 20x ea.  20x ea.   Sup TA with alt UE/LE   20x ea. 20x ea.   20x ea. 20x ea. 20x ea. 20x ea. 20x ea. 20x ea.   Sup TA with SLR flex          2x10 ea.   TB Multifidus press   BTB 20x3\" ea. BTB 20x3\" ea.   BTB 20x3\" ea. np Decl. np np  np                           Sup TA with iso hip add   20x5\" 20x5\" 20x5\" 20x5\" 20x5\" 20x5\" 20x5\" 20x5\"  20x5\"   Sup TA with Pball presses             20x5\" 20x5\" 20x5\"  20x5\"   Sidestepping w/ TB resist                   BTB   5 laps    BTB   5 laps   Ther Ex                       Bike   5 min 5 min 5 min 5 min 5 min 5 min 5 min TM 6'  TM 6'   Mini squats " "HEP 2x10 2x10                 TB rows   BTB 2x10 BTB 2x10   BTB 2x10 Decl. Decl. np np  np   TB lat pull downs   BTB 20x3\" BTB 2x10   BTB 2x10 Decl. Decl. np np  np   Stand hip ext   2x10 ea. 2x10 ea.   2x10 ea. GTB 2x10 ea GTB 2x10 ea BTB 2x10 ea. BTB 2x10 ea.  BTB 2x10 ea.   Stand hip abd   2x10 ea. 2x10 ea.   2x10 ea. GTB 2x10 ea GTB 2x10 ea BTB 2x10 ea. BTB 2x10 ea.  BTB 2x10 ea.   Sup hip rolls    10x5\" 10x5\"   10x5\" 10x5\" 10x5\" 20x5\" 20x5\"  20x5\"   Sitting forward Ball roll outs          20x5\" 20x5\" 20x5\" 20x5\" 20x5\"  20x5\"                                                                           Ther Activity                                                                       Gait Training                                                                       Modalities                                                                             "

## 2024-09-19 ENCOUNTER — APPOINTMENT (OUTPATIENT)
Dept: OCCUPATIONAL THERAPY | Facility: CLINIC | Age: 69
End: 2024-09-19
Payer: MEDICARE

## 2024-09-19 ENCOUNTER — APPOINTMENT (OUTPATIENT)
Dept: LAB | Facility: HOSPITAL | Age: 69
End: 2024-09-19
Payer: MEDICARE

## 2024-09-19 DIAGNOSIS — G89.4 CHRONIC PAIN SYNDROME: ICD-10-CM

## 2024-09-19 PROCEDURE — 80346 BENZODIAZEPINES1-12: CPT

## 2024-09-19 PROCEDURE — 80365 DRUG SCREENING OXYCODONE: CPT

## 2024-09-19 PROCEDURE — 80307 DRUG TEST PRSMV CHEM ANLYZR: CPT

## 2024-09-19 PROCEDURE — 80361 OPIATES 1 OR MORE: CPT

## 2024-09-22 LAB
1OH-MIDAZOLAM UR CFM-MCNC: NOT DETECTED NG/MG CREAT
6MAM UR QL SCN: NEGATIVE NG/ML
7AMINOCLONAZEPAM UR CFM-MCNC: NOT DETECTED NG/MG CREAT
A-OH ALPRAZ UR QL CFM: NOT DETECTED NG/MG CREAT
ACCEPTABLE CREAT UR QL: 125 MG/DL
ALPRAZ/CREAT UR CFM: NOT DETECTED NG/MG CREAT
AMPHET UR QL SCN: NEGATIVE NG/ML
BARBITURATES UR QL SCN: NEGATIVE NG/ML
BENZODIAZ UR QL SCN: NORMAL NG/ML
BENZODIAZEPINES: ABNORMAL
BUPRENORPHINE UR QL CFM: NEGATIVE NG/ML
CANNABINOIDS UR QL SCN: NEGATIVE NG/ML
CARISOPRODOL UR QL: NEGATIVE NG/ML
CLONAZEPAM/CREAT UR CFM: NOT DETECTED NG/MG CREAT
COCAINE+BZE UR QL SCN: NEGATIVE NG/ML
CODEINE UR QL CFM: NOT DETECTED NG/MG CREAT
DHC UR CFM-MCNC: NOT DETECTED NG/MG CREAT
DIAZEPAM/CREAT UR: NOT DETECTED NG/MG CREAT
ETHYL GLUCURONIDE UR QL SCN: NEGATIVE NG/ML
FENTANYL UR QL SCN: NEGATIVE NG/ML
FLUNITRAZEPAM UR-MCNC: NOT DETECTED NG/MG CREAT
GABAPENTIN SERPLBLD QL SCN: NEGATIVE UG/ML
HYDROCODONE UR QL CFM: NOT DETECTED NG/MG CREAT
HYDROMORPHONE UR QL CFM: NOT DETECTED NG/MG CREAT
LORAZEPAM UR CFM-MCNC: NOT DETECTED NG/MG CREAT
METHADONE UR QL SCN: NEGATIVE NG/ML
MIDAZOLAM/CREAT UR CFM: NOT DETECTED NG/MG CREAT
MORPHINE UR QL CFM: NOT DETECTED NG/MG CREAT
NITRITE UR QL STRIP: NEGATIVE UG/ML
NORCODEINE/CREAT UR CFM: NOT DETECTED NG/MG CREAT
NORDIAZEPAM UR CFM-MCNC: NOT DETECTED NG/MG CREAT
NORFLUNITRAZEPAM UR-MCNC: NOT DETECTED NG/MG CREAT
NORHYDROCODONE UR CFM-MCNC: NOT DETECTED NG/MG CREAT
NORMORPHINE: NOT DETECTED NG/MG CREAT
NOROXYCODONE UR CFM-MCNC: 1512 NG/MG CREAT
NOROXYMORPHONE/CREAT UR CFM: 340 NG/MG CREAT
OH-ETHYLFLURAZ UR CFM-MCNC: NOT DETECTED NG/MG CREAT
OH-TRIAZOLAM UR CFM-MCNC: NOT DETECTED NG/MG CREAT
OPIATE CLASS: NEGATIVE
OPIATES UR QL SCN: NORMAL NG/ML
OXAZEPAM CTO UR CFM-MCNC: >1600 NG/MG CREAT
OXYCODONE CLASS: ABNORMAL
OXYCODONE UR QL CFM: 336 NG/MG CREAT
OXYCODONE+OXYMORPHONE UR QL SCN: NORMAL NG/ML
OXYMORPHONE UR QL CFM: 385 NG/MG CREAT
PCP UR QL SCN: NEGATIVE NG/ML
PROPOXYPH UR QL SCN: NEGATIVE NG/ML
SPECIMEN PH ACCEPTABLE UR: 7 (ref 4.5–8.9)
TAPENTADOL UR QL SCN: NEGATIVE NG/ML
TEMAZEPAM UR CFM-MCNC: >1600 NG/MG CREAT
TRAMADOL UR QL SCN: NEGATIVE NG/ML

## 2024-09-23 ENCOUNTER — OFFICE VISIT (OUTPATIENT)
Dept: PHYSICAL THERAPY | Facility: CLINIC | Age: 69
End: 2024-09-23
Payer: MEDICARE

## 2024-09-23 DIAGNOSIS — M54.16 LUMBAR RADICULOPATHY: Primary | ICD-10-CM

## 2024-09-23 DIAGNOSIS — Z98.1 S/P LUMBAR FUSION: ICD-10-CM

## 2024-09-23 PROCEDURE — 97110 THERAPEUTIC EXERCISES: CPT | Performed by: PHYSICAL THERAPIST

## 2024-09-23 PROCEDURE — 97112 NEUROMUSCULAR REEDUCATION: CPT | Performed by: PHYSICAL THERAPIST

## 2024-09-23 NOTE — PROGRESS NOTES
"Daily Note     Today's date: 2024  Patient name: Toney Motley  : 1955  MRN: [id number]  Referring provider: Brandi Rivera PA-C  Dx:   Encounter Diagnosis     ICD-10-CM    1. Lumbar radiculopathy  M54.16       2. S/P lumbar fusion  Z98.1                      Subjective: Patient stated minimal pain prior to treatment session.       Objective: See treatment diary below      Assessment: Patient demonstrates moderate difficulty with SLR flexion activity.       Plan: Continue per plan of care.      Precautions: L2-3,L4-5,L5-S1 fusion - 24, HTN, L/S fusion -   POC expires Unit limit Auth Expiration date PT/OT/ST + Visit Limit?   24 N/A   BOMN                                           Visit/Unit Tracking  AUTH Status:  Date                             Used                             Remaining                               Manuals                                                    Re-eval                    KK, PT                           Neuro Re-Ed                       Sup TA activation  20x3\" 20x3\" 20x3\"  20x5\" 20x5\" 20x5\" 20x5\" 20x5\"  np   Sup TA with marches 30x ea. 20x ea. 20x ea. 20x ea 20x ea. 20x ea. 20x ea. 20x ea. 20x ea.  20x ea.   Sup TA with alt UE/LE 30x ea. 20x ea. 20x ea.   20x ea. 20x ea. 20x ea. 20x ea. 20x ea. 20x ea.   Sup TA with SLR flex 2x10 ea.         2x10 ea.   TB Multifidus press   BTB 20x3\" ea. BTB 20x3\" ea.   BTB 20x3\" ea. np Decl. np np  np                           Sup TA with iso hip add  30x5\" 20x5\" 20x5\" 20x5\" 20x5\" 20x5\" 20x5\" 20x5\" 20x5\"  20x5\"   Sup TA with Pball presses  30x5\"           20x5\" 20x5\" 20x5\"  20x5\"   Sidestepping w/ TB resist  BTB     5 laps                 BTB   5 laps    BTB   5 laps   Ther Ex                       Bike TM 6' 5 min 5 min 5 min 5 min 5 min 5 min 5 min TM 6'  TM 6'   Mini squats  2x10 2x10                 TB rows   BTB 2x10 BTB 2x10   BTB 2x10 Decl. Decl. np np  np   TB lat " "pull downs   BTB 20x3\" BTB 2x10   BTB 2x10 Decl. Decl. np np  np   Stand hip ext  BTB 3x10 ea. 2x10 ea. 2x10 ea.   2x10 ea. GTB 2x10 ea GTB 2x10 ea BTB 2x10 ea. BTB 2x10 ea.  BTB 2x10 ea.   Stand hip abd  BTB 3x10 ea. 2x10 ea. 2x10 ea.   2x10 ea. GTB 2x10 ea GTB 2x10 ea BTB 2x10 ea. BTB 2x10 ea.  BTB 2x10 ea.   Sup hip rolls   20x5\" 10x5\" 10x5\"   10x5\" 10x5\" 10x5\" 20x5\" 20x5\"  20x5\"   Sitting forward Ball roll outs   20x5\"       20x5\" 20x5\" 20x5\" 20x5\" 20x5\"  20x5\"                                                                           Ther Activity                                                                       Gait Training                                                                       Modalities                                                                                  "

## 2024-09-26 ENCOUNTER — OFFICE VISIT (OUTPATIENT)
Dept: PHYSICAL THERAPY | Facility: CLINIC | Age: 69
End: 2024-09-26
Payer: MEDICARE

## 2024-09-26 ENCOUNTER — APPOINTMENT (OUTPATIENT)
Dept: OCCUPATIONAL THERAPY | Facility: CLINIC | Age: 69
End: 2024-09-26
Payer: MEDICARE

## 2024-09-26 DIAGNOSIS — Z98.1 S/P LUMBAR FUSION: ICD-10-CM

## 2024-09-26 DIAGNOSIS — M54.16 LUMBAR RADICULOPATHY: Primary | ICD-10-CM

## 2024-09-26 PROCEDURE — 97112 NEUROMUSCULAR REEDUCATION: CPT | Performed by: PHYSICAL THERAPIST

## 2024-09-26 PROCEDURE — 97110 THERAPEUTIC EXERCISES: CPT | Performed by: PHYSICAL THERAPIST

## 2024-09-26 NOTE — PROGRESS NOTES
"Daily Note     Today's date: 2024  Patient name: Toney Motley  : 1955  MRN: [id number]  Referring provider: Brandi Rivera PA-C  Dx:   Encounter Diagnosis     ICD-10-CM    1. Lumbar radiculopathy  M54.16       2. S/P lumbar fusion  Z98.1                      Subjective: Patient stated moderate soreness prior to treatment session.       Objective: See treatment diary below      Assessment: Patient demonstrated appropriate level of challenge with all activities.        Plan: Continue per plan of care.      Precautions: L2-3,L4-5,L5-S1 fusion - 24, HTN, L/S fusion -   POC expires Unit limit Auth Expiration date PT/OT/ST + Visit Limit?   24 N/A   BOMN                                           Visit/Unit Tracking  AUTH Status:  Date                             Used                             Remaining                               Manuals                                                    Re-eval                    KK, PT                           Neuro Re-Ed                       Sup TA activation   20x3\" 20x3\"  20x5\" 20x5\" 20x5\" 20x5\" 20x5\"  np   Sup TA with marches 30x ea. 30x ea. 20x ea. 20x ea 20x ea. 20x ea. 20x ea. 20x ea. 20x ea.  20x ea.   Sup TA with alt UE/LE 30x ea. 30x ea. 20x ea.   20x ea. 20x ea. 20x ea. 20x ea. 20x ea. 20x ea.   Sup TA with SLR flex 2x10 ea. 2x10 ea.        2x10 ea.   TB Multifidus press    BTB 20x3\" ea.   BTB 20x3\" ea. np Decl. np np  np                          Sup TA with iso hip add  30x5\" 30x5\" 20x5\" 20x5\" 20x5\" 20x5\" 20x5\" 20x5\" 20x5\"  20x5\"   Sup TA with Pball presses  30x5\" 30x5\"         20x5\" 20x5\" 20x5\"  20x5\"   Sidestepping w/ TB resist  BTB     5 laps  BTB     5 laps               BTB   5 laps    BTB   5 laps   Ther Ex                       Bike TM 6' 6 min 5 min 5 min 5 min 5 min 5 min 5 min TM 6'  TM 6'   Mini squats   2x10                 TB rows    BTB 2x10   BTB 2x10 Decl. Decl. np np  np   TB " "lat pull downs    BTB 2x10   BTB 2x10 Decl. Decl. np np  np   Stand hip ext  BTB 3x10 ea. BTB 3x10 ea. 2x10 ea.   2x10 ea. GTB 2x10 ea GTB 2x10 ea BTB 2x10 ea. BTB 2x10 ea.  BTB 2x10 ea.   Stand hip abd  BTB 3x10 ea. BTB 3x10 ea. 2x10 ea.   2x10 ea. GTB 2x10 ea GTB 2x10 ea BTB 2x10 ea. BTB 2x10 ea.  BTB 2x10 ea.   Sup hip rolls   20x5\" 20x5\" 10x5\"   10x5\" 10x5\" 10x5\" 20x5\" 20x5\"  20x5\"   Sitting forward Ball roll outs   20x5\" 20x5\"     20x5\" 20x5\" 20x5\" 20x5\" 20x5\"  20x5\"                                                                           Ther Activity                                                                       Gait Training                                                                       Modalities                                                                                  "

## 2024-09-30 ENCOUNTER — OFFICE VISIT (OUTPATIENT)
Dept: GASTROENTEROLOGY | Facility: CLINIC | Age: 69
End: 2024-09-30
Payer: MEDICARE

## 2024-09-30 VITALS
HEART RATE: 76 BPM | HEIGHT: 72 IN | RESPIRATION RATE: 18 BRPM | WEIGHT: 220.4 LBS | OXYGEN SATURATION: 98 % | TEMPERATURE: 98.9 F | DIASTOLIC BLOOD PRESSURE: 84 MMHG | BODY MASS INDEX: 29.85 KG/M2 | SYSTOLIC BLOOD PRESSURE: 124 MMHG

## 2024-09-30 DIAGNOSIS — R10.9 ABDOMINAL PAIN, UNSPECIFIED ABDOMINAL LOCATION: ICD-10-CM

## 2024-09-30 DIAGNOSIS — R11.0 NAUSEA: ICD-10-CM

## 2024-09-30 DIAGNOSIS — K82.8 DYSKINESIA OF GALLBLADDER: ICD-10-CM

## 2024-09-30 DIAGNOSIS — R10.13 EPIGASTRIC PAIN: Primary | ICD-10-CM

## 2024-09-30 DIAGNOSIS — K30 FUNCTIONAL DYSPEPSIA: ICD-10-CM

## 2024-09-30 PROCEDURE — 99214 OFFICE O/P EST MOD 30 MIN: CPT | Performed by: INTERNAL MEDICINE

## 2024-09-30 PROCEDURE — G2211 COMPLEX E/M VISIT ADD ON: HCPCS | Performed by: INTERNAL MEDICINE

## 2024-09-30 RX ORDER — PREGABALIN 50 MG/1
50 CAPSULE ORAL 3 TIMES DAILY
COMMUNITY
Start: 2024-09-23

## 2024-09-30 RX ORDER — METOCLOPRAMIDE 10 MG/1
10 TABLET ORAL 4 TIMES DAILY PRN
Qty: 120 TABLET | Refills: 0 | Status: SHIPPED | OUTPATIENT
Start: 2024-09-30

## 2024-09-30 RX ORDER — AMITRIPTYLINE HYDROCHLORIDE 10 MG/1
10 TABLET ORAL
Qty: 60 TABLET | Refills: 2 | Status: SHIPPED | OUTPATIENT
Start: 2024-09-30

## 2024-09-30 NOTE — PROGRESS NOTES
[facility] - Outpatient Note  Toney Motley 69 y.o. male MRN: [id number]  Encounter: [id number]      ASSESSMENT AND PLAN:    Toney Motley is a 69 y.o. 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 Patient has went underwent extensive and impressive workup including EGD, colonoscopy, CTA, MRI/MRCP, gastric emptying study without cause for abdominal pain.  Other differentials include biliary dyskinesia as he did have HIDA scan which shows hyperkinetic gallbladder with ejection fraction of 95% and previous ultrasound with gallbladder sludge.  Interestingly he has had very rapid gastric emptying (roughly 70% at 1 hour!?) confirmed twice which can be seen with cyclical vomiting syndrome (which he does not have) but could point to idiopathic dumping syndrome not associated with surgery though his symptoms don't seem quite consistent with this diagnosis. Rapid gastric emptying can also be seen with EPI though he doesn't have classic diarrhea typically seen with EPI. He tells me he has a BM every 3 days so there may be a component of constipation but then again he is not eating very much. Other less likely causes include atypical presentations of SIBO, lactose intolerance, Crohns, acute intermittent prophyria. Can consider checking MRE as he does have UC to ensure there is no small bowel crohns.  Fortunately he has gained 10 pounds since last visit (by eating candy).  Given extensive workup so far without clear cause of symptoms, will place general surgery referral for hyperkinetic gallbladder to assess if cholecystectomy would be a good option.  Previous right upper quadrant ultrasound also had  layering sludge.  Trial Elavil 10 mg for functional dyspepsia.  Will consider uptitrating in the future.  Can consider buspirone in the future.  Refilled Reglan which seems to be helping somewhat  Avoid dairy 2-4 weeks which she seems to have daily  Check SIBO breath test  Increase miralax to 2 caps daily with goal BM every day  Check fecal elastase for EPI which has been a/w rapid gastric emptying.  There was previous MRI shows no evidence of chronic pancreatitis but if his fecal elastase is low can consider referral for EUS especially given the weight loss  Check stool HP.  I am not sure if he is ever been tested for H. pylori off of PPI.  PPI can affect stool H. pylori and can affect EGD biopsies as well.  Check porphyria  Check food allergy testing and although these are not accurate they do have a high negative predictive value. If specific food items are positive can consider elimination trial with these.  Consider checking MR enterography given known history of ulcerative colitis and to rule out any small bowel etiology causing abdominal pain including Crohn's/strictures  Consider tumeric, Iberogast or possibly even dronabinol in future for nausea  Can consider Movantik in the future not necesarily for its laxative effect but to see if blocking the opioid receptors in the gut helps will help with symptoms  Follow-up with me in 1 to 2 months.          There are no diagnoses linked to this encounter.  ______________________________________________________________________    SUBJECTIVE:      Gained 10+ lbs since last visit eating candy    Epigastric pain for 5+ years, daily, ache, can last hours to days, not necessarily related to food but sometimes is, not related to BM's    +Nausea    No vomiting    Oxycodone on oipoids for 6+ years    BM every 3 days    Better with peptomismol    Lost 65lbs in a year unintentioally    Lyrica, Ambien      I reviewed prior external notes    I reviewed previous lab results and  images      REVIEW OF SYSTEMS:     REVIEW OF ALL OTHER SYSTEMS IS OTHERWISE NEGATIVE.      Historical Information   Past Medical History:   Diagnosis Date    Back pain     Colon polyp     Hyperlipidemia     Lumbar fracture with cord injury (HCC)     Neck fracture (HCC)     Previous back surgery     rods in the back    Ulcerative colitis (HCC)      Past Surgical History:   Procedure Laterality Date    BACK SURGERY  05/20/2024    LUMBAR FUSION Right 01/20/2016    Procedure: FUSION LUMBAR  POSTERIOR, TLIF L3-4  Right L3-4 Fascet;  Surgeon: Narayan Castro MD;  Location:  MAIN OR;  Service:     VT COLONOSCOPY FLX DX W/COLLJ SPEC WHEN PFRMD N/A 01/24/2019    Procedure: COLONOSCOPY;  Surgeon: Sanjeev Orellana III, MD;  Location: [facility];  Service: Gastroenterology    VT SURGICAL ARTHROSCOPY SHOULDER W/ROTATOR CUFF RPR Right 07/26/2023    Procedure: ARTHROSCOPY SHOULDER- Right shoulder Arthroscopy, supraspinatus and subscapularis repair, biceps tenotomy, extensive debridement;  Surgeon: Oscar Corbett DO;  Location: [facility];  Service: Orthopedics    TONSILLECTOMY       Social History   Social History     Substance and Sexual Activity   Alcohol Use Not Currently    Comment: rarely     Social History     Substance and Sexual Activity   Drug Use No     Social History     Tobacco Use   Smoking Status Never   Smokeless Tobacco Never     Family History   Problem Relation Age of Onset    Parkinsonism Mother     Lung cancer Father        Meds/Allergies       Current Outpatient Medications:     acetaminophen (TYLENOL) 325 mg tablet    ALPRAZolam (XANAX) 0.25 mg tablet    amitriptyline (ELAVIL) 10 mg tablet    Eliquis 5 MG    methocarbamol (ROBAXIN) 750 mg tablet    metoclopramide (REGLAN) 10 mg tablet    ondansetron (ZOFRAN) 4 mg tablet    oxyCODONE (ROXICODONE) 5 immediate release tablet    oxyCODONE-acetaminophen (PERCOCET) 5-325 mg per tablet    pregabalin (LYRICA) 50 mg capsule    QUEtiapine (SEROquel) 100 mg  tablet    temazepam (RESTORIL) 30 mg capsule    zolpidem (AMBIEN) 10 mg tablet    ALPRAZolam (XANAX) 0.5 mg tablet    aluminum-magnesium hydroxide-simethicone (MAALOX) 8153-6739-043 mg/30 mL suspension    balsalazide (COLAZAL) 750 mg capsule    buprenorphine-naloxone (Suboxone) 8-2 mg    busPIRone (BUSPAR) 5 mg tablet    cephalexin (KEFLEX) 500 mg capsule    diazepam (VALIUM) 10 mg tablet    Diclofenac Sodium (VOLTAREN) 1 %    dicyclomine (BENTYL) 20 mg tablet    doxepin (SINEquan) 25 mg capsule    DULoxetine (CYMBALTA) 60 mg delayed release capsule    esomeprazole (NexIUM) 40 MG capsule    ezetimibe (ZETIA) 10 mg tablet    ibuprofen (MOTRIN) 800 mg tablet    linaCLOtide (Linzess) 72 MCG CAPS    LORazepam (ATIVAN) 1 mg tablet    meclizine (ANTIVERT) 12.5 MG tablet    meloxicam (MOBIC) 15 mg tablet    metoprolol succinate (TOPROL-XL) 25 mg 24 hr tablet    mirtazapine (REMERON SOL-TAB) 30 mg dispersible tablet    mupirocin (BACTROBAN) 2 % ointment    naloxone (NARCAN) 4 mg/0.1 mL nasal spray    omeprazole (PriLOSEC) 40 MG capsule    ondansetron (ZOFRAN-ODT) 4 mg disintegrating tablet    oxybutynin (DITROPAN-XL) 10 MG 24 hr tablet    promethazine (PHENERGAN) 25 mg tablet    Senexon-S 8.6-50 MG per tablet    sucralfate (CARAFATE) 1 g tablet    sulfamethoxazole-trimethoprim (BACTRIM DS) 800-160 mg per tablet    tamsulosin (FLOMAX) 0.4 mg    traZODone (DESYREL) 50 mg tablet    zolpidem (AMBIEN CR) 12.5 MG CR tablet    Allergies   Allergen Reactions    No Active Allergies            Objective     Blood pressure 124/84, pulse 76, temperature 98.9 °F (37.2 °C), temperature source Tympanic, resp. rate 18, height 6' (1.829 m), weight 100 kg (220 lb 6.4 oz), SpO2 98%. Body mass index is 29.89 kg/m².      PHYSICAL EXAM:      General Appearance:   Alert, cooperative, no distress   HEENT:   Normocephalic, atraumatic, anicteric.     Neck:  Supple, symmetrical, trachea midline   Lungs:   Clear to auscultation bilaterally; no rales,  rhonchi or wheezing; respirations unlabored    Heart::   Regular rate and rhythm; no murmur, rub, or gallop.   Abdomen:   Soft, non-tender, non-distended; normal bowel sounds; no masses, no organomegaly    Genitalia:   Deferred    Rectal:   Deferred    Extremities:  No cyanosis, clubbing or edema    Pulses:  2+ and symmetric    Skin:  No jaundice, rashes, or lesions    Lymph nodes:  No palpable cervical lymphadenopathy        Lab Results:   No visits with results within 1 Day(s) from this visit.   Latest known visit with results is:   Appointment on 09/19/2024   Component Date Value    Buprenorphine 09/19/2024 Negative     ETHYL GLUCURONIDE 09/19/2024 Negative     Amphetamine Screen, Urine 09/19/2024 Negative     Barbiturate Screen, Ur 09/19/2024 Negative     Benzodiazepine Screen, U* 09/19/2024 Comment     Cocaine Metabolite 09/19/2024 Negative     PCP Scrn, Ur 09/19/2024 Negative     Cannabinoid Scrn, Ur 09/19/2024 Negative     6-Acetylmorphine, Urine 09/19/2024 Negative     Opiates Screen, Urine 09/19/2024 Comment     OXYCODONE/OXYMORPHONE 09/19/2024 Comment     Tapentadol 09/19/2024 Negative     FENTANYL 09/19/2024 Negative     Methadone Screen, Urine 09/19/2024 Negative     Propoxyphene Screen, Uri* 09/19/2024 Negative     Tramadol Scrn, Ur 09/19/2024 Negative     CARISOPRODOL 09/19/2024 Negative     GABAPENTIN, IA 09/19/2024 Negative     CREATININE 09/19/2024 125     pH 09/19/2024 7.0     Nitrites Urine 09/19/2024 Negative     DESMETHYLDIAZEPAM 09/19/2024 Not Detected     OXAZEPAM 09/19/2024 >1600     Temazepam Quantification* 09/19/2024 >1600     alpha-Hydroxyalprazolam * 09/19/2024 Not Detected     HYDROXYETHYLFLURAZEPAM 09/19/2024 Not Detected     Lorazepam Quantification* 09/19/2024 Not Detected     ALPHA-HYDROXYTRIAZOLAM 09/19/2024 Not Detected     ALPHA-HYDROXYMIDAZOLAM 09/19/2024 Not Detected     7-Amino-Clonazepam Quant* 09/19/2024 Not Detected     BENZODIAZEPINES 09/19/2024 ++POSITIVE++ (A)      DIAZEPAM 09/19/2024 Not Detected     ALPRAZOLAM 09/19/2024 Not Detected     CLONAZEPAM 09/19/2024 Not Detected     MIDAZOLAM 09/19/2024 Not Detected     FLUNITRAZEPAM 09/19/2024 Not Detected     DESMETHYLFLUNITRAZEPAM 09/19/2024 Not Detected     Codeine Quantification 09/19/2024 Not Detected     Morphine Quantification 09/19/2024 Not Detected     Hydrocodone Quantificati* 09/19/2024 Not Detected     Hydromorphone Quantifica* 09/19/2024 Not Detected     OPIATE CLASS 09/19/2024 Negative     NORMORPHINE 09/19/2024 Not Detected     NORCODEINE 09/19/2024 Not Detected     DIHYDROCODEINE 09/19/2024 Not Detected     NORHYDROCODONE 09/19/2024 Not Detected     Oxycodone Quantification 09/19/2024 336     Oxymorphone Quantificati* 09/19/2024 385     OXYCODONE CLASS 09/19/2024 ++POSITIVE++ (A)     NOROXYCODONE 09/19/2024 1512     NOROXYMORPHONE 09/19/2024 340          Radiology Results:   XR elbow 3+ vw left    Result Date: 9/13/2024  Narrative: This result has an attachment that is not available. Report limited to Orthopaedic interpretation Three views of the left elbow are obtained.  There is no evidence of fracture, dislocation, or significant arthritis present. No evidence of posterior fat pad sign. Normal bone mineralization is present. Lateral epicondyle calcification seen.

## 2024-10-02 ENCOUNTER — OFFICE VISIT (OUTPATIENT)
Dept: PHYSICAL THERAPY | Facility: CLINIC | Age: 69
End: 2024-10-02
Payer: MEDICARE

## 2024-10-02 DIAGNOSIS — M54.16 LUMBAR RADICULOPATHY: Primary | ICD-10-CM

## 2024-10-02 DIAGNOSIS — Z98.1 S/P LUMBAR FUSION: ICD-10-CM

## 2024-10-02 PROCEDURE — 97112 NEUROMUSCULAR REEDUCATION: CPT | Performed by: PHYSICAL THERAPIST

## 2024-10-02 PROCEDURE — 97110 THERAPEUTIC EXERCISES: CPT | Performed by: PHYSICAL THERAPIST

## 2024-10-02 NOTE — PROGRESS NOTES
"Daily Note     Today's date: 10/2/2024  Patient name: Toney Motley  : 1955  MRN: [id number]  Referring provider: Brandi Rivera PA-C  Dx:   Encounter Diagnosis     ICD-10-CM    1. Lumbar radiculopathy  M54.16       2. S/P lumbar fusion  Z98.1                      Subjective: Patient states no significant change at this time.       Objective: See treatment diary below      Assessment: Patient continues to demonstrate moderate difficulty with SLR flexion.        Plan: Continue per plan of care.      Precautions: L2-3,L4-5,L5-S1 fusion - 24, HTN, L/S fusion -   POC expires Unit limit Auth Expiration date PT/OT/ST + Visit Limit?   24 N/A   BOMN                                           Visit/Unit Tracking  AUTH Status:  Date                             Used                             Remaining                               Manuals 9/23 9/26 10/2 8/21 8/26 8/29 9/4 9/11 9/16  9/18                                                   Re-eval                    KK, PT                           Neuro Re-Ed                       Sup TA activation    20x3\"  20x5\" 20x5\" 20x5\" 20x5\" 20x5\"  np   Sup TA with marches 30x ea. 30x ea. 30x ea. 20x ea 20x ea. 20x ea. 20x ea. 20x ea. 20x ea.  20x ea.   Sup TA with alt UE/LE 30x ea. 30x ea. 30x ea.   20x ea. 20x ea. 20x ea. 20x ea. 20x ea. 20x ea.   Sup TA with SLR flex 2x10 ea. 2x10 ea. 2x10 ea.       2x10 ea.   TB Multifidus press       BTB 20x3\" ea. np Decl. np np  np                         Sup TA with iso hip add  30x5\" 30x5\" 30x5\" 20x5\" 20x5\" 20x5\" 20x5\" 20x5\" 20x5\"  20x5\"   Sup TA with Pball presses  30x5\" 30x5\"  30x5\"       20x5\" 20x5\" 20x5\"  20x5\"   Sidestepping w/ TB resist  BTB     5 laps  BTB     5 laps  np             BTB   5 laps    BTB   5 laps   Ther Ex                      Bike TM 6' 6 min TM 6' 5 min 5 min 5 min 5 min 5 min TM 6'  TM 6'   Mini squats                    TB rows       BTB 2x10 Decl. Decl. np np  np   TB lat pull downs       BTB 2x10 " "Decl. Decl. np np  np   Stand hip ext  BTB 3x10 ea. BTB 3x10 ea. BTB 3x10 ea.   2x10 ea. GTB 2x10 ea GTB 2x10 ea BTB 2x10 ea. BTB 2x10 ea.  BTB 2x10 ea.   Stand hip abd  BTB 3x10 ea. BTB 3x10 ea. BTB 3x10 ea.   2x10 ea. GTB 2x10 ea GTB 2x10 ea BTB 2x10 ea. BTB 2x10 ea.  BTB 2x10 ea.   Sup hip rolls   20x5\" 20x5\" 20x5\"   10x5\" 10x5\" 10x5\" 20x5\" 20x5\"  20x5\"   Sitting forward Ball roll outs   20x5\" 20x5\" 20x5\"  20x5\" 20x5\" 20x5\" 20x5\" 20x5\"  20x5\"                                                                           Ther Activity                                                                       Gait Training                                                                       Modalities                                                                                  "

## 2024-10-03 ENCOUNTER — APPOINTMENT (OUTPATIENT)
Dept: OCCUPATIONAL THERAPY | Facility: CLINIC | Age: 69
End: 2024-10-03
Payer: MEDICARE

## 2024-10-03 ENCOUNTER — CONSULT (OUTPATIENT)
Dept: SURGERY | Facility: CLINIC | Age: 69
End: 2024-10-03
Payer: MEDICARE

## 2024-10-03 VITALS
BODY MASS INDEX: 29.64 KG/M2 | WEIGHT: 218.8 LBS | HEART RATE: 73 BPM | DIASTOLIC BLOOD PRESSURE: 68 MMHG | OXYGEN SATURATION: 97 % | TEMPERATURE: 96.8 F | SYSTOLIC BLOOD PRESSURE: 110 MMHG | HEIGHT: 72 IN

## 2024-10-03 DIAGNOSIS — K82.8 DYSKINESIA OF GALLBLADDER: ICD-10-CM

## 2024-10-03 DIAGNOSIS — R11.0 NAUSEA: Primary | ICD-10-CM

## 2024-10-03 PROBLEM — K51.911 ULCERATIVE COLITIS WITH RECTAL BLEEDING (HCC): Status: RESOLVED | Noted: 2019-01-14 | Resolved: 2024-10-03

## 2024-10-03 PROBLEM — R79.89 POSITIVE D DIMER: Status: RESOLVED | Noted: 2023-08-04 | Resolved: 2024-10-03

## 2024-10-03 PROBLEM — G62.9 NEUROPATHY: Status: RESOLVED | Noted: 2021-11-02 | Resolved: 2024-10-03

## 2024-10-03 PROCEDURE — 99203 OFFICE O/P NEW LOW 30 MIN: CPT | Performed by: SURGERY

## 2024-10-03 NOTE — PROGRESS NOTES
Assessment/Plan:     Diagnoses and all orders for this visit:    Nausea    Dyskinesia of gallbladder  -     Ambulatory Referral to General Surgery     Patient's predominant symptom is nausea with no abdominal pain.  Patient has a gallbladder ejection fraction of 95% with no reproduction of pain with CCK.  His ultrasound showed sludge.  The possibility that these symptoms are due to gallbladder is very low.  Patient also had a DVT in May 2024.  Taking him off anticoagulation at this time will be very risky.    Upon analysis of risk-benefit ratio I do not think a laparoscopic cholecystectomy is indicated at present time.    Advised the patient to wait until December, once he is off anticoagulation.  If his nausea still persists, we may consider doing a cholecystectomy.    Subjective:      Patient ID: Toney Motley is a 69 y.o. male.  Retired printer    HPI  Patient presents to the office with a 1 year history of nausea.  Patient states that this nausea is a persistent complaint.  It is present even when he wakes up in the morning.  There is no relationship of nausea to eating.  He has no abdominal pain.  He has no fatty food intolerance.  Because of the nausea he has poor appetite.  He states that he lost about 70 pounds in weight in 1 year.  He has put some weight back on by eating candy.    He has been investigated extensively including having EGD, colonoscopy, Botox injection of lower esophageal sphincter and pyloric sphincter during EGD, ultrasound, CTA, nuclear medicine gallbladder scan which showed ejection fraction of 95% with no reproduction of symptoms, MRI.    Patient has history of ulcerative colitis which is inactive at present time.  He is on medications.  His last colonoscopy done in 2013 did not show any active colitis.    The following portions of the patient's history were reviewed and updated as appropriate: allergies, current medications, past family history, past medical history, past social history,  past surgical history, and problem list.    Review of Systems    Essential hypertension    Patient underwent extensive lumbar spine fusion surgery in May 2024.  This was followed by a right leg DVT for which he is on Eliquis.    Patient is on polypharmacy and this itself may be the cause of his nausea    Objective:    /68 (BP Location: Right arm, Patient Position: Sitting, Cuff Size: Standard)   Pulse 73   Temp (!) 96.8 °F (36 °C) (Tympanic)   Ht 6' (1.829 m)   Wt 99.2 kg (218 lb 12.8 oz)   SpO2 97%   BMI 29.67 kg/m²      Physical Exam    No pallor or icterus  No cervical lymphadenopathy    Heart sounds are normal  Chest is clear to auscultation  Abdominal exam reveals no masses or tenderness  External genitalia are normal    Extremity exam is normal

## 2024-10-14 ENCOUNTER — APPOINTMENT (OUTPATIENT)
Dept: PHYSICAL THERAPY | Facility: CLINIC | Age: 69
End: 2024-10-14
Payer: MEDICARE

## 2024-10-15 NOTE — PROGRESS NOTES
"Daily Note     Today's date: 10/15/2024  Patient name: Toney Motley  : 1955  MRN: [id number]  Referring provider: Brandi Rivera PA-C  Dx: No diagnosis found.               Subjective: ***      Objective: See treatment diary below      Assessment: Tolerated treatment {Tolerated treatment :}. Patient {assessment:}      Plan: {PLAN:}     Precautions: L2-3,L4-5,L5-S1 fusion - 24, HTN, L/S fusion -   POC expires Unit limit Auth Expiration date PT/OT/ST + Visit Limit?   24 N/A   BOMN                                           Visit/Unit Tracking  AUTH Status:  Date                             Used                             Remaining                               Manuals 9/23 9/26 10/2 10/16 8/26 8/29 9/4 9/11 9/16  9/18                                                 Re-eval                   KK, PT                          Neuro Re-Ed                      Sup TA activation     20x5\" 20x5\" 20x5\" 20x5\" 20x5\"  np   Sup TA with marches 30x ea. 30x ea. 30x ea.  20x ea. 20x ea. 20x ea. 20x ea. 20x ea.  20x ea.   Sup TA with alt UE/LE 30x ea. 30x ea. 30x ea.  20x ea. 20x ea. 20x ea. 20x ea. 20x ea. 20x ea.   Sup TA with SLR flex 2x10 ea. 2x10 ea. 2x10 ea.       2x10 ea.   TB Multifidus press      BTB 20x3\" ea. np Decl. np np  np                        Sup TA with iso hip add  30x5\" 30x5\" 30x5\"  20x5\" 20x5\" 20x5\" 20x5\" 20x5\"  20x5\"   Sup TA with Pball presses  30x5\" 30x5\"  30x5\"      20x5\" 20x5\" 20x5\"  20x5\"   Sidestepping w/ TB resist  BTB     5 laps  BTB     5 laps  np            BTB   5 laps    BTB   5 laps   Ther Ex                     Bike TM 6' 6 min TM 6'  5 min 5 min 5 min 5 min TM 6'  TM 6'   Mini squats                    TB rows       BTB 2x10 Decl. Decl. np np  np   TB lat pull downs       BTB 2x10 Decl. Decl. np np  np   Stand hip ext  BTB 3x10 ea. BTB 3x10 ea. BTB 3x10 ea.   2x10 ea. GTB 2x10 ea GTB 2x10 ea BTB 2x10 ea. BTB 2x10 ea.  BTB 2x10 ea.   Stand hip " "abd  BTB 3x10 ea. BTB 3x10 ea. BTB 3x10 ea.   2x10 ea. GTB 2x10 ea GTB 2x10 ea BTB 2x10 ea. BTB 2x10 ea.  BTB 2x10 ea.   Sup hip rolls   20x5\" 20x5\" 20x5\"   10x5\" 10x5\" 10x5\" 20x5\" 20x5\"  20x5\"   Sitting forward Ball roll outs   20x5\" 20x5\" 20x5\"  20x5\" 20x5\" 20x5\" 20x5\" 20x5\"  20x5\"                                                                           Ther Activity                                                                       Gait Training                                                                       Modalities                                                                                    "

## 2024-10-16 ENCOUNTER — APPOINTMENT (OUTPATIENT)
Dept: PHYSICAL THERAPY | Facility: CLINIC | Age: 69
End: 2024-10-16
Payer: MEDICARE

## 2024-10-16 DIAGNOSIS — M54.16 LUMBAR RADICULOPATHY: Primary | ICD-10-CM

## 2024-10-16 DIAGNOSIS — Z98.1 S/P LUMBAR FUSION: ICD-10-CM

## 2024-10-18 ENCOUNTER — APPOINTMENT (OUTPATIENT)
Dept: LAB | Facility: HOSPITAL | Age: 69
End: 2024-10-18
Payer: MEDICARE

## 2024-10-18 DIAGNOSIS — R10.13 EPIGASTRIC PAIN: ICD-10-CM

## 2024-10-18 DIAGNOSIS — R11.0 NAUSEA: ICD-10-CM

## 2024-10-18 PROCEDURE — 86001 ALLERGEN SPECIFIC IGG: CPT

## 2024-10-18 NOTE — PROGRESS NOTES
"Daily Note     Today's date: 10/21/2024  Patient name: Toney Motley  : 1955  MRN: [id number]  Referring provider: Brandi Rivera PA-C  Dx:   Encounter Diagnosis     ICD-10-CM    1. Lumbar radiculopathy  M54.16       2. S/P lumbar fusion  Z98.1           Start Time: 1546  Stop Time: 1629  Total time in clinic (min): 43 minutes    Subjective: Pt reported that he has had complications with his L hand. He has had no flare ups in his back in the past month. He reports that he has been walking around more. He still reports limitations with ambulating longer distances, sit<>stand transfers, and completing. He has a scheduled appointment with a pain specialist this coming Thursday.       Objective: See treatment diary below      Assessment: Tolerated treatment well. Strength and ROM was assessed this session and he had increased pain with L/s flexion. Exercises were gone over again to assess difficulty and for pt tolerance and he was able to achieve TE with reported fatigue after completion. Due to patient's current status with L hand, some exercises were not performed this session. Patient would benefit from continued PT.      Plan: Continue per plan of care.      Precautions: L2-3,L4-5,L5-S1 fusion - 24, HTN, L/S fusion -   POC expires Unit limit Auth Expiration date PT/OT/ST + Visit Limit?   24 N/A   BOMN                                           Visit/Unit Tracking  AUTH Status:  Date                             Used                             Remaining                               Manuals 9/23 9/26 10/2 10/16 8/26 8/29 9/4 9/11 9/16  9/18 10/21                                                   Re-eval                   KK, PT                            Neuro Re-Ed                       Sup TA activation     20x5\" 20x5\" 20x5\" 20x5\" 20x5\"  np    Sup TA with marches 30x ea. 30x ea. 30x ea.  20x ea. 20x ea. 20x ea. 20x ea. 20x ea.  20x ea. 20 ea.    Sup TA with alt UE/LE 30x ea. 30x ea. 30x ea.  " "20x ea. 20x ea. 20x ea. 20x ea. 20x ea. 20x ea. 3x10   Sup TA with SLR flex 2x10 ea. 2x10 ea. 2x10 ea.       2x10 ea. 3x10   TB Multifidus press      BTB 20x3\" ea. np Decl. np np  np                          Sup TA with iso hip add  30x5\" 30x5\" 30x5\"  20x5\" 20x5\" 20x5\" 20x5\" 20x5\"  20x5\" 20x10\"   Sup TA with Pball presses  30x5\" 30x5\"  30x5\"      20x5\" 20x5\" 20x5\"  20x5\" 20x5\"   Sidestepping w/ TB resist  BTB     5 laps  BTB     5 laps  np            BTB   5 laps    BTB   5 laps    Ther Ex                      Bike TM 6' 6 min TM 6'  5 min 5 min 5 min 5 min TM 6'  TM 6' np   Mini squats                     TB rows       BTB 2x10 Decl. Decl. np np  np    TB lat pull downs       BTB 2x10 Decl. Decl. np np  np    Stand hip ext  BTB 3x10 ea. BTB 3x10 ea. BTB 3x10 ea.   2x10 ea. GTB 2x10 ea GTB 2x10 ea BTB 2x10 ea. BTB 2x10 ea.  BTB 2x10 ea. BTB 2x10 ea.   Stand hip abd  BTB 3x10 ea. BTB 3x10 ea. BTB 3x10 ea.   2x10 ea. GTB 2x10 ea GTB 2x10 ea BTB 2x10 ea. BTB 2x10 ea.  BTB 2x10 ea. BTB 2x10 ea.   Sup hip rolls   20x5\" 20x5\" 20x5\"   10x5\" 10x5\" 10x5\" 20x5\" 20x5\"  20x5\"    Sitting forward Ball roll outs   20x5\" 20x5\" 20x5\"  20x5\" 20x5\" 20x5\" 20x5\" 20x5\"  20x5\" 20x 5\"                                                                              Ther Activity                                                                          Gait Training                                                                          Modalities                                                                                         "

## 2024-10-21 ENCOUNTER — OFFICE VISIT (OUTPATIENT)
Dept: PHYSICAL THERAPY | Facility: CLINIC | Age: 69
End: 2024-10-21
Payer: MEDICARE

## 2024-10-21 DIAGNOSIS — M54.16 LUMBAR RADICULOPATHY: Primary | ICD-10-CM

## 2024-10-21 DIAGNOSIS — Z98.1 S/P LUMBAR FUSION: ICD-10-CM

## 2024-10-21 PROCEDURE — 97110 THERAPEUTIC EXERCISES: CPT

## 2024-10-21 PROCEDURE — 97112 NEUROMUSCULAR REEDUCATION: CPT

## 2024-10-23 ENCOUNTER — APPOINTMENT (OUTPATIENT)
Dept: PHYSICAL THERAPY | Facility: CLINIC | Age: 69
End: 2024-10-23
Payer: MEDICARE

## 2024-10-25 ENCOUNTER — APPOINTMENT (OUTPATIENT)
Dept: LAB | Facility: HOSPITAL | Age: 69
End: 2024-10-25
Attending: INTERNAL MEDICINE
Payer: MEDICARE

## 2024-10-25 DIAGNOSIS — R10.13 EPIGASTRIC PAIN: ICD-10-CM

## 2024-10-25 DIAGNOSIS — R10.9 ABDOMINAL PAIN, UNSPECIFIED ABDOMINAL LOCATION: ICD-10-CM

## 2024-10-25 PROCEDURE — 84120 ASSAY OF URINE PORPHYRINS: CPT

## 2024-10-25 PROCEDURE — 82653 EL-1 FECAL QUANTITATIVE: CPT

## 2024-10-25 PROCEDURE — 87338 HPYLORI STOOL AG IA: CPT

## 2024-10-28 ENCOUNTER — EVALUATION (OUTPATIENT)
Dept: OCCUPATIONAL THERAPY | Facility: CLINIC | Age: 69
End: 2024-10-28
Payer: MEDICARE

## 2024-10-28 DIAGNOSIS — M79.642 LEFT HAND PAIN: ICD-10-CM

## 2024-10-28 DIAGNOSIS — M25.542 ARTHRALGIA OF LEFT HAND: ICD-10-CM

## 2024-10-28 DIAGNOSIS — Z98.890 S/P NERVE REPAIR: Primary | ICD-10-CM

## 2024-10-28 LAB
ELASTASE PANC STL-MCNT: 393 UG/G
H PYLORI AG STL QL IA: POSITIVE

## 2024-10-28 PROCEDURE — 97110 THERAPEUTIC EXERCISES: CPT | Performed by: OCCUPATIONAL THERAPIST

## 2024-10-28 PROCEDURE — 97165 OT EVAL LOW COMPLEX 30 MIN: CPT | Performed by: OCCUPATIONAL THERAPIST

## 2024-10-28 NOTE — LETTER
2024    Armando SANFORD Alecia   [address]  [address]  [facility] [address]    Patient: Toney Motley   YOB: 1955   Date of Visit: 10/28/2024     Encounter Diagnosis     ICD-10-CM    1. S/P nerve repair  Z98.890       2. Arthralgia of left hand  M25.542       3. Left hand pain  M79.642           Dear Dr. Alston:    Thank you for your recent referral of Toney Motley. Please review the attached evaluation summary from Toney's recent visit.     Please verify that you agree with the plan of care by signing the attached order.     If you have any questions or concerns, please do not hesitate to call.     I sincerely appreciate the opportunity to share in the care of one of your patients and hope to have another opportunity to work with you in the near future.     Sincerely,    Lindsay Valles, OT      Referring Provider:     I certify that I have read the below Plan of Care and certify the need for these services furnished under this plan of treatment while under my care.                    Armando SANFORD DO Alecia  [address]  [address]  [facility] [address]  Via Fax: [phone]        OT Evaluation     Today's date: 10/28/2024  Patient name: Toney Motley  : 1955  MRN: [id number]  Referring provider: Brandi Rivera PA-C  Dx:   Encounter Diagnosis     ICD-10-CM    1. S/P nerve repair  Z98.890       2. Arthralgia of left hand  M25.542       3. Left hand pain  M79.642                      Assessment  Impairments: abnormal or restricted ROM, activity intolerance, impaired physical strength, lacks appropriate home exercise program, pain with function and activity limitations  Symptom irritability: low    Assessment details: Toney is a 68 y/o RHD male presenting s/p 2 weeks, 5 days s/p:  Left AIN to ulnar motor nerve transfer (CPT: 83435)  Left ulnar motor nerve microscopic internal neurolysis (CPT: 43513)  Left Guyon's canal release (CPT: 74993)  Left cubital tunnel release in situ  (CPT: 17716)   Left small/ring/long FDP tenodesis (CPT: 51710)  Left small PIP volar plate release (CPT: 56740)    He reports about a year history of left hand pain with decreased function.  He sought the care of an orthopedic as was instructed that it would improved.  Due to continued pain, he sought another opinion and surgery was recommended.   He underwent above procedures on 10/9/24.  He arrives today without wear of splint and has begun to use the left hand for light self care.  He has been provided with a splint for HOS which he states comes off.  During day, he has been provided with an LMB splint which he was unable to determine how to don.  He is currently retired and was employed in printing.      Examination reveals restricted active and passive motion of all digits.  Pain continues but is less than previous to surgery.  Edema is localized to surgical areas.  Surgical tissue is maturing without complication.  The ulnar nerve distribution is insensate.  Strength not assessed due to early post op healing stage. Evaluation is of low complexity, due to minimal comorbidities and stable clinical presentation.      Pt demonstrates good tolerance of therapy today and was provided with a written HEP focusing on passive stretching in flexion, active tendon gliding exercises, scar massage, and gentle use of left hand.  Night splint strapping was adjusted and pt education to don/doff LMB review with patient.   He was instructed to perform exercises 3 times daily.The patient demonstrates HEP instructions appropriately, verbalizes understanding, and is in agreement with the written HEP.  Pt will benefit from skilled OT intervention to progress motion, increase function use and strength,  and allow return to PLOF.             Barriers to intervention comments: Comprehension  Understanding of Dx/Px/POC: good     Prognosis: good    Goals  STG 2 weeks   Increase digital arc by at least 10 degrees in extension and flexion,  L V PIP   Increase flexion to DPC by at least 1 cm   Reduce edema to a minimal level   Reduce pain at rest to less than 2/10    LTG  By discharge   Achieve functional CARRANZA of digit  to allow independence in holding small items in hand   Achieve functional extension with minimal lag to allow independence in donning gloves and tucking in clothing.   Pain at rest resolved, pain less than 2/10 with light activity achieving independence in self care without increased of pain.    Achieve  strength to at least 50% of the uninvolved achieving independence in opening containers.   Achieve FOTO goals established at .          Plan  Patient would benefit from: skilled occupational therapy and OT eval  Planned modality interventions: thermotherapy: hydrocollator packs    Planned therapy interventions: IASTM, joint mobilization, kinesiology taping, manual therapy, massage, activity modification, nerve gliding, neuromuscular re-education, orthotic fitting/training, orthotic management and training, patient/caregiver education, strengthening, stretching, therapeutic activities, therapeutic exercise, home exercise program, graded exercise, graded activity and functional ROM exercises    Plan of Care beginning date: 10/28/2024  Plan of Care expiration date: 12/23/2024  Treatment plan discussed with: patient    Subjective Evaluation    History of Present Illness  Date of surgery: 10/9/2024  Mechanism of injury: surgery  Mechanism of injury: Left AIN to ulnar motor nerve transfer (CPT: 76005)  Left ulnar motor nerve microscopic internal neurolysis (CPT: 86919)  Left Guyon's canal release (CPT: 43462)  Left cubital tunnel release in situ (CPT: 49434)   Left small/ring/long FDP tenodesis (CPT: 94648)  Left small PIP volar plate release (CPT: 56604)              Recurrent probem    Quality of life: good    Patient Goals  Patient goals for therapy: decreased pain, increased motion, increased strength, independence with ADLs/IADLs and  return to sport/leisure activities  Patient goal: Less pain;  be able to use better  Pain  Current pain ratin  Quality: discomfort, radiating and tight  Relieving factors: medications  Aggravating factors: lifting  Progression: no change    Social Support  Lives with: spouse    Employment status: not working  Hand dominance: right      Diagnostic Tests  EMG: abnormal  Treatments  Previous treatment: immobilization  Current treatment: occupational therapy      Objective     Observations     Left Wrist/Hand   Positive for atrophy and deformity.     Additional Observation Details  Fifth PIP flexion contracture present;  Intrinsic atrophy present    Surgical scars well approximated and without drainage or signs of infection.  Scars present at CuT, volar FA extending into ulnar side of volar palm 18 cm in length, and Z plasty of volar fifth.     Neurological Testing     Sensation     Wrist/Hand   Left   Absent: light touch     Additional Neurological Details  Sensation absent in ulnar nerve distribution, ring and small, left hand.     Active Range of Motion     Left Wrist   Normal active range of motion    Left Digits    Flexion   Little     MCP: 90    PIP: 65    DIP: 0  Extension   Little     MCP: 0    PIP: -45    DIP: 0    Passive Range of Motion     Left Digits   Flexion   Little     MCP: 90    PIP: 75    DIP: 45  Extension   Little     MCP: 0    PIP: -45    DIP: 0    Strength/Myotome Testing     Additional Strength Details  NT NA    Tests     Left Elbow   Positive Tinel's sign (cubital tunnel).     Swelling     Left Wrist/Hand   Little     Middle: 6.7 cm    Right Wrist/Hand   Little     Middle: 5.7 cm           Precautions:  S/P AIN-UMN transfer, tenodesis of 3,4,5; CuT release DOS 10/9/24    POC expires Unit limit Auth Expiration date PT/OT/ST + Visit Limit?    4 No auth BOMN         Dx L N TF  CuT     Dr Alecia KNOWLES @          Date  10/28            Visit 1            Manuals                                                                  Neuro Re-Ed                                                                                                        Ther Ex             HEP PROM V, TGE, nerve protect, scar care.                                                                                                        Ther Activity                                       Gait Training                                       Modalities

## 2024-10-28 NOTE — PROGRESS NOTES
OT Evaluation     Today's date: 10/28/2024  Patient name: Toney Motley  : 1955  MRN: [id number]  Referring provider: Brandi Rivera PA-C  Dx:   Encounter Diagnosis     ICD-10-CM    1. S/P nerve repair  Z98.890       2. Arthralgia of left hand  M25.542       3. Left hand pain  M79.642                      Assessment  Impairments: abnormal or restricted ROM, activity intolerance, impaired physical strength, lacks appropriate home exercise program, pain with function and activity limitations  Symptom irritability: low    Assessment details: Toney is a 70 y/o RHD male presenting s/p 2 weeks, 5 days s/p:  Left AIN to ulnar motor nerve transfer (CPT: 67049)  Left ulnar motor nerve microscopic internal neurolysis (CPT: 43828)  Left Guyon's canal release (CPT: 56090)  Left cubital tunnel release in situ (CPT: 88533)   Left small/ring/long FDP tenodesis (CPT: 81813)  Left small PIP volar plate release (CPT: 91900)    He reports about a year history of left hand pain with decreased function.  He sought the care of an orthopedic as was instructed that it would improved.  Due to continued pain, he sought another opinion and surgery was recommended.   He underwent above procedures on 10/9/24.  He arrives today without wear of splint and has begun to use the left hand for light self care.  He has been provided with a splint for HOS which he states comes off.  During day, he has been provided with an LMB splint which he was unable to determine how to don.  He is currently retired and was employed in printing.      Examination reveals restricted active and passive motion of all digits.  Pain continues but is less than previous to surgery.  Edema is localized to surgical areas.  Surgical tissue is maturing without complication.  The ulnar nerve distribution is insensate.  Strength not assessed due to early post op healing stage. Evaluation is of low complexity, due to minimal comorbidities and stable clinical presentation.       Pt demonstrates good tolerance of therapy today and was provided with a written HEP focusing on passive stretching in flexion, active tendon gliding exercises, scar massage, and gentle use of left hand.  Night splint strapping was adjusted and pt education to don/doff LMB review with patient.   He was instructed to perform exercises 3 times daily.The patient demonstrates HEP instructions appropriately, verbalizes understanding, and is in agreement with the written HEP.  Pt will benefit from skilled OT intervention to progress motion, increase function use and strength,  and allow return to PLOF.             Barriers to intervention comments: Comprehension  Understanding of Dx/Px/POC: good     Prognosis: good    Goals  STG 2 weeks   Increase digital arc by at least 10 degrees in extension and flexion, L V PIP   Increase flexion to DPC by at least 1 cm   Reduce edema to a minimal level   Reduce pain at rest to less than 2/10    LTG  By discharge   Achieve functional CARRANZA of digit  to allow independence in holding small items in hand   Achieve functional extension with minimal lag to allow independence in donning gloves and tucking in clothing.   Pain at rest resolved, pain less than 2/10 with light activity achieving independence in self care without increased of pain.    Achieve  strength to at least 50% of the uninvolved achieving independence in opening containers.   Achieve FOTO goals established at .          Plan  Patient would benefit from: skilled occupational therapy and OT eval  Planned modality interventions: thermotherapy: hydrocollator packs    Planned therapy interventions: IASTM, joint mobilization, kinesiology taping, manual therapy, massage, activity modification, nerve gliding, neuromuscular re-education, orthotic fitting/training, orthotic management and training, patient/caregiver education, strengthening, stretching, therapeutic activities, therapeutic exercise, home exercise program,  graded exercise, graded activity and functional ROM exercises    Plan of Care beginning date: 10/28/2024  Plan of Care expiration date: 2024  Treatment plan discussed with: patient    Subjective Evaluation    History of Present Illness  Date of surgery: 10/9/2024  Mechanism of injury: surgery  Mechanism of injury: Left AIN to ulnar motor nerve transfer (CPT: 52794)  Left ulnar motor nerve microscopic internal neurolysis (CPT: 17692)  Left Guyon's canal release (CPT: 61390)  Left cubital tunnel release in situ (CPT: 08942)   Left small/ring/long FDP tenodesis (CPT: 48255)  Left small PIP volar plate release (CPT: 10513)              Recurrent probem    Quality of life: good    Patient Goals  Patient goals for therapy: decreased pain, increased motion, increased strength, independence with ADLs/IADLs and return to sport/leisure activities  Patient goal: Less pain;  be able to use better  Pain  Current pain ratin  Quality: discomfort, radiating and tight  Relieving factors: medications  Aggravating factors: lifting  Progression: no change    Social Support  Lives with: spouse    Employment status: not working  Hand dominance: right      Diagnostic Tests  EMG: abnormal  Treatments  Previous treatment: immobilization  Current treatment: occupational therapy      Objective     Observations     Left Wrist/Hand   Positive for atrophy and deformity.     Additional Observation Details  Fifth PIP flexion contracture present;  Intrinsic atrophy present    Surgical scars well approximated and without drainage or signs of infection.  Scars present at CuT, volar FA extending into ulnar side of volar palm 18 cm in length, and Z plasty of volar fifth.     Neurological Testing     Sensation     Wrist/Hand   Left   Absent: light touch     Additional Neurological Details  Sensation absent in ulnar nerve distribution, ring and small, left hand.     Active Range of Motion     Left Wrist   Normal active range of motion    Left  Digits    Flexion   Little     MCP: 90    PIP: 65    DIP: 0  Extension   Little     MCP: 0    PIP: -45    DIP: 0    Passive Range of Motion     Left Digits   Flexion   Little     MCP: 90    PIP: 75    DIP: 45  Extension   Little     MCP: 0    PIP: -45    DIP: 0    Strength/Myotome Testing     Additional Strength Details  NT NA    Tests     Left Elbow   Positive Tinel's sign (cubital tunnel).     Swelling     Left Wrist/Hand   Little     Middle: 6.7 cm    Right Wrist/Hand   Little     Middle: 5.7 cm           Precautions:  S/P AIN-UMN transfer, tenodesis of 3,4,5; CuT release DOS 10/9/24    POC expires Unit limit Auth Expiration date PT/OT/ST + Visit Limit?   12/23 4 No auth BOMN         Dx L N TF  CuT     Dr Alecia KNOWLES @          Date  10/28            Visit 1            Manuals                                                                 Neuro Re-Ed                                                                                                        Ther Ex             HEP PROM V, TGE, nerve protect, scar care.                                                                                                        Ther Activity                                       Gait Training                                       Modalities

## 2024-10-29 ENCOUNTER — TELEPHONE (OUTPATIENT)
Age: 69
End: 2024-10-29

## 2024-10-29 DIAGNOSIS — A04.8 BACTERIAL INFECTION DUE TO H. PYLORI: Primary | ICD-10-CM

## 2024-10-29 RX ORDER — CLARITHROMYCIN 500 MG/1
1000 TABLET ORAL EVERY 12 HOURS SCHEDULED
Qty: 56 TABLET | Refills: 0 | Status: SHIPPED | OUTPATIENT
Start: 2024-10-29 | End: 2024-11-12

## 2024-10-29 RX ORDER — AMOXICILLIN 500 MG/1
1000 CAPSULE ORAL EVERY 12 HOURS SCHEDULED
Qty: 56 CAPSULE | Refills: 0 | Status: SHIPPED | OUTPATIENT
Start: 2024-10-29 | End: 2024-11-12

## 2024-10-29 RX ORDER — PANTOPRAZOLE SODIUM 40 MG/1
40 TABLET, DELAYED RELEASE ORAL 2 TIMES DAILY
Qty: 28 TABLET | Refills: 0 | Status: SHIPPED | OUTPATIENT
Start: 2024-10-29

## 2024-10-29 NOTE — TELEPHONE ENCOUNTER
----- Message from Angella Tierney MD sent at 10/29/2024  1:40 PM EDT -----  Hi can we let pt know about HP. I already sent in triple therapy and stool test. Repeat stool test 4 weeks after completion of regimen. Thanks.

## 2024-10-31 ENCOUNTER — OFFICE VISIT (OUTPATIENT)
Dept: OCCUPATIONAL THERAPY | Facility: CLINIC | Age: 69
End: 2024-10-31
Payer: MEDICARE

## 2024-10-31 ENCOUNTER — OFFICE VISIT (OUTPATIENT)
Dept: PHYSICAL THERAPY | Facility: CLINIC | Age: 69
End: 2024-10-31
Payer: MEDICARE

## 2024-10-31 DIAGNOSIS — M54.16 LUMBAR RADICULOPATHY: Primary | ICD-10-CM

## 2024-10-31 DIAGNOSIS — Z98.1 S/P LUMBAR FUSION: ICD-10-CM

## 2024-10-31 DIAGNOSIS — Z98.890 S/P NERVE REPAIR: Primary | ICD-10-CM

## 2024-10-31 DIAGNOSIS — M25.542 ARTHRALGIA OF LEFT HAND: ICD-10-CM

## 2024-10-31 DIAGNOSIS — M79.642 LEFT HAND PAIN: ICD-10-CM

## 2024-10-31 LAB
COPRO1 24H UR-MCNC: 12 UG/L
COPRO1 24H UR-MRATE: 28 UG/24 HR (ref 0–24)
COPRO3 24H UR-MCNC: 28 UG/L
COPRO3 24H UR-MRATE: 64 UG/24 HR (ref 0–74)
HEPTA-CP 24H UR-MRATE: <2 UG/24 HR (ref 0–4)
HEPTA-CP UR-MCNC: <1 UG/L
HEXA-CP 24H UR-MRATE: <2 UG/24 HR (ref 0–1)
HEXA-CP UR-MCNC: <1 UG/L
PENTA-CP 24H UR-MRATE: <2 UG/24 HR (ref 0–4)
PENTA-CP UR-MCNC: <1 UG/L
UROPOR 24H UR-MRATE: 35 UG/24 HR (ref 0–24)
UROPOR UR-MCNC: 15 UG/L

## 2024-10-31 PROCEDURE — 97530 THERAPEUTIC ACTIVITIES: CPT | Performed by: OCCUPATIONAL THERAPIST

## 2024-10-31 PROCEDURE — 97140 MANUAL THERAPY 1/> REGIONS: CPT | Performed by: OCCUPATIONAL THERAPIST

## 2024-10-31 PROCEDURE — 97110 THERAPEUTIC EXERCISES: CPT

## 2024-10-31 PROCEDURE — 97110 THERAPEUTIC EXERCISES: CPT | Performed by: OCCUPATIONAL THERAPIST

## 2024-10-31 NOTE — PROGRESS NOTES
"Daily Note     Today's date: 10/31/2024  Patient name: Toney Motley  : 1955  MRN: [id number]  Referring provider: Brandi Rivera PA-C  Dx:   Encounter Diagnosis     ICD-10-CM    1. Lumbar radiculopathy  M54.16       2. S/P lumbar fusion  Z98.1           Start Time: 1515  Stop Time: 1558  Total time in clinic (min): 43 minutes    Subjective: Pt reports he is still having painn, but has noticed progress since starting PT.       Objective: See treatment diary below      Assessment: Tolerated treatment well. Had conversation about future PT visits as pt could benefit from continued PT to focus on more functional tasks, but gave the pt the weekend to decide if he would want to be discharged or continue PT to have a focus on TA. He was introduced to the leg press and he responded well to exercise as he denied any symptoms after completion.  Patient exhibited good technique with therapeutic exercises as he reported of fatigue after completion.       Plan:  Pt will call next week to decide if he would want to continue PT or complete HEP independently and be discharged.     Precautions: L2-3,L4-5,L5-S1 fusion - 24, HTN, L/S fusion -   POC expires Unit limit Auth Expiration date PT/OT/ST + Visit Limit?   24 N/A   BOMN                                           Visit/Unit Tracking  AUTH Status:  Date                             Used                             Remaining                               Manuals 9/23 9/26 10/2 10/31 8/26 8/29 9/4 9/11 9/16  9/18 10/21                                                   Re-eval                   KK, PT                            Neuro Re-Ed                       Sup TA activation     20x5\" 20x5\" 20x5\" 20x5\" 20x5\"  np    Sup TA with marches 30x ea. 30x ea. 30x ea.  20x ea. 20x ea. 20x ea. 20x ea. 20x ea.  20x ea. 20 ea.    Sup TA with alt UE/LE 30x ea. 30x ea. 30x ea.  20x ea. 20x ea. 20x ea. 20x ea. 20x ea. 20x ea. 3x10   Sup TA with SLR flex 2x10 ea. 2x10 ea. " "2x10 ea.       2x10 ea. 3x10   TB Multifidus press      BTB 20x3\" ea. np Decl. np np  np                          Sup TA with iso hip add  30x5\" 30x5\" 30x5\"  20x5\" 20x5\" 20x5\" 20x5\" 20x5\"  20x5\" 20x10\"   Sup TA with Pball presses  30x5\" 30x5\"  30x5\"      20x5\" 20x5\" 20x5\"  20x5\" 20x5\"   Sidestepping w/ TB resist  BTB     5 laps  BTB     5 laps  np            BTB   5 laps    BTB   5 laps    Ther Ex                      Bike TM 6' 6 min TM 6' np 5 min 5 min 5 min 5 min TM 6'  TM 6' np   Mini squats                     TB rows       BTB 2x10 Decl. Decl. np np  np    TB lat pull downs       BTB 2x10 Decl. Decl. np np  np    Stand hip ext  BTB 3x10 ea. BTB 3x10 ea. BTB 3x10 ea.   2x10 ea. GTB 2x10 ea GTB 2x10 ea BTB 2x10 ea. BTB 2x10 ea.  BTB 2x10 ea. BTB 2x10 ea.   Stand hip abd  BTB 3x10 ea. BTB 3x10 ea. BTB 3x10 ea.   2x10 ea. GTB 2x10 ea GTB 2x10 ea BTB 2x10 ea. BTB 2x10 ea.  BTB 2x10 ea. BTB 2x10 ea.   Sup hip rolls   20x5\" 20x5\" 20x5\"   10x5\" 10x5\" 10x5\" 20x5\" 20x5\"  20x5\"    Sitting forward Ball roll outs   20x5\" 20x5\" 20x5\"  20x5\" 20x5\" 20x5\" 20x5\" 20x5\"  20x5\" 20x 5\"                                                                              Ther Activity                                                                          Gait Training                                                                          Modalities                                                                                           "

## 2024-10-31 NOTE — PROGRESS NOTES
Daily Note     Today's date: 10/31/2024  Patient name: Toney Motley  : 1955  MRN: [id number]  Referring provider: Brandi Rivera PA-C  Dx:   Encounter Diagnosis     ICD-10-CM    1. S/P nerve repair  Z98.890       2. Left hand pain  M79.642       3. Arthralgia of left hand  M25.542           Start Time: 1415  Stop Time: 1453  Total time in clinic (min): 38 minutes    Subjective:  Is is getting any better?      Objective: See treatment diary below  Active Range of Motion     Left Wrist   Normal active range of motion    Left Digits    Flexion   Little     MCP: 90    PIP: 90  (+25)    DIP: 0  Extension   Little     MCP: 0    PIP: -30  (+15)    DIP: 0    Passive Range of Motion     Left Digits   Flexion   Little     MCP: 90    PIP: 90    DIP: 45  Extension   Little     MCP: 0    PIP: -30    DIP: 0    Assessment: Tolerated treatment well. Patient would benefit from continued OT      Plan: Continue per plan of care.  Progress treatment as tolerated.    Encourage gentle flexion and passive digital extension of fifth.      Precautions:  S/P AIN-UMN transfer, tenodesis of 3,4,5; CuT release DOS 10/9/24    POC expires Unit limit Auth Expiration date PT/OT/ST + Visit Limit?    4 No auth BOMN         Dx L N TF  CuT     Dr Alecia KNOWLES @          Date  10/28 10/31           Visit 1 2           Manuals  8                        Scar mass  Volar FA, volar V           Jt mobs  Fifth PIP, MP                        Neuro Re-Ed                                                                                                        Ther Ex    15           HEP PROM V, TGE, nerve protect, scar care.  Review Hep for fifth PROM program           PROM 1:1  Digital flexion, fifth PIP E  LLPS           TGE  Hook to full x10                                                                            Ther Activity   15           Manipulation  P/u hearts/  gems, lay juan           dexterity  Foam square gather           gripping   1 light RB all foams                                                  Modalities             MHP  Pre tx

## 2024-11-04 ENCOUNTER — APPOINTMENT (OUTPATIENT)
Dept: OCCUPATIONAL THERAPY | Facility: CLINIC | Age: 69
End: 2024-11-04
Payer: MEDICARE

## 2024-11-06 ENCOUNTER — OFFICE VISIT (OUTPATIENT)
Dept: OCCUPATIONAL THERAPY | Facility: CLINIC | Age: 69
End: 2024-11-06
Payer: MEDICARE

## 2024-11-06 DIAGNOSIS — M25.542 ARTHRALGIA OF LEFT HAND: ICD-10-CM

## 2024-11-06 DIAGNOSIS — M79.642 LEFT HAND PAIN: ICD-10-CM

## 2024-11-06 DIAGNOSIS — Z98.890 S/P NERVE REPAIR: Primary | ICD-10-CM

## 2024-11-06 LAB
Lab: NORMAL
MISCELLANEOUS LAB TEST RESULT: NORMAL

## 2024-11-06 PROCEDURE — 97140 MANUAL THERAPY 1/> REGIONS: CPT

## 2024-11-06 PROCEDURE — 97110 THERAPEUTIC EXERCISES: CPT

## 2024-11-06 NOTE — PROGRESS NOTES
Daily Note     Today's date: 2024  Patient name: Toney Motley  : 1955  MRN: [id number]  Referring provider: Brandi Rivera PA-C  Dx:   Encounter Diagnosis     ICD-10-CM    1. S/P nerve repair  Z98.890       2. Left hand pain  M79.642       3. Arthralgia of left hand  M25.542                      Subjective: It still hurts. My small finger gets red when I wear the splint      Objective: See treatment diary below      Assessment: Tolerated treatment well. Patient exhibited good technique with therapeutic exercises. Patient advised to wear splint for 5 minutes per session and increase 5 minutes each day so that in 4 days he is wearing it 20 minutes. Patient tolerating scar massage well      Plan: Continue per plan of care.      Precautions:  S/P AIN-UMN transfer, tenodesis of 3,4,5; CuT release DOS 10/9/24    POC expires Unit limit Auth Expiration date PT/OT/ST + Visit Limit?    4 No auth BOMN         Dx L N TF  CuT     Dr Alecia KNOWLES @          Date  10/28 10/31 11/6        Visit 1 2 3        Manuals  8 15'                   Scar mass  Volar FA, volar V 10'        Jt mobs  Fifth PIP, MP 5'                   Neuro Re-Ed                                                                                        Ther Ex    15 10'        HEP PROM V, TGE, nerve protect, scar care.  Review Hep for fifth PROM program Gradually increase time in finger extension splint        PROM 1:1  Digital flexion, fifth PIP E  LLPS 10' digit flex, LSF PIP ext        TGE  Hook to full x10 Hook to full fist x 20                                                               Ther Activity   15         Manipulation  P/u hearts/  gems, lay juan Gems p/u, sweep out        dexterity  Foam square gather Foam squares gather x 5 reps        gripping  1 light RB all foams                                          Modalities           MHP  Pre tx 5' pre TE

## 2024-11-08 ENCOUNTER — APPOINTMENT (OUTPATIENT)
Dept: PHYSICAL THERAPY | Facility: CLINIC | Age: 69
End: 2024-11-08
Payer: MEDICARE

## 2024-11-11 ENCOUNTER — EVALUATION (OUTPATIENT)
Dept: PHYSICAL THERAPY | Facility: CLINIC | Age: 69
End: 2024-11-11
Payer: MEDICARE

## 2024-11-11 DIAGNOSIS — Z98.1 S/P LUMBAR FUSION: ICD-10-CM

## 2024-11-11 DIAGNOSIS — M54.16 LUMBAR RADICULOPATHY: Primary | ICD-10-CM

## 2024-11-11 PROCEDURE — 97110 THERAPEUTIC EXERCISES: CPT

## 2024-11-11 PROCEDURE — 97112 NEUROMUSCULAR REEDUCATION: CPT

## 2024-11-11 NOTE — PROGRESS NOTES
PT Re-Evaluation     Today's date: 2024  Patient name: Toney Motley  : 1955  MRN: [id number]  Referring provider: Brandi Rivera PA-C  Dx:   Encounter Diagnosis     ICD-10-CM    1. Lumbar radiculopathy  M54.16       2. S/P lumbar fusion  Z98.1           Start Time: 1420  Stop Time: 1500  Total time in clinic (min): 40 minutes    Assessment  Impairments: abnormal or restricted ROM, impaired physical strength, lacks appropriate home exercise program, pain with function, unable to perform ADL, participation limitations and activity limitations    Assessment details: Pt has been attending PT for lumbar fusion and lumbar radiculopathy. He has had made progress with completing his HEP and ability to complete ADL's with some improvements of symptoms. He still has impairments with increased symptoms in low back with lying down, walking, and completing tasks. Discussed with patient future POC and he was in agreements with finishing PT til the end of the month and discharging with an HEP. He would benefit from skilled PT to accomplish the following goals.     Goals  Short term goals - to be achieved in 4 weeks:    Decrease pain 20-50%.-PROGRESSING   Increase strength by 1/2 grade.-PROGRESSING   Improve L/S ROM by 25%.-PROGRESSING  Long term goals - to be achieved by discharge:    Performance in related household activities is improved to maximal level of function.    IADL performance in related activities is improved to maximal level of function. -PROGRESSING   Sitting tolerance is improved to maximal level of function.  -PROGRESSING   Standing tolerance is improved to maximal level of function. -PROGRESSING    Plan  Patient would benefit from: skilled physical therapy    Planned therapy interventions: abdominal trunk stabilization, manual therapy, neuromuscular re-education, patient/caregiver education, therapeutic activities, therapeutic exercise, flexibility and functional ROM exercises    Frequency: 1x  "week  Duration in weeks: 3  Plan of Care beginning date: 2024  Plan of Care expiration date: 2024  Treatment plan discussed with: patient        Subjective Evaluation    History of Present Illness  Mechanism of injury: Pt reports that he is still experiencing pain in his back. He feels like he would benefit from PT til the end of the month to continue to work to reducing his symptoms in his low back. He describes the pain like a toothache.   Patient Goals  Patient goals for therapy: decreased pain    Pain  Current pain ratin  At best pain ratin  At worst pain rating: 10          Objective     Active Range of Motion     Lumbar   Flexion:  Restriction level: moderate  Extension:  Restriction level: maximal             Precautions: L2-3,L4-5,L5-S1 fusion - 24, HTN, L/S fusion -   POC expires Unit limit Auth Expiration date PT/OT/ST + Visit Limit?   24 N/A   BOMN                                           Visit/Unit Tracking  AUTH Status:  Date                             Used                             Remaining                               Manuals 9/23 9/26 10/2 10/31 11/11  9/4 9/11 9/16  9/18 10/21                                                   Re-eval         DP         KK, PT                             Neuro Re-Ed                       Sup TA activation           20x5\" 20x5\" 20x5\"  np     Sup TA with marches 30x ea. 30x ea. 30x ea.   30xea.  20x ea. 20x ea. 20x ea.  20x ea. 20 ea.    Sup TA with alt UE/LE 30x ea. 30x ea. 30x ea.   30x ea.  20x ea. 20x ea. 20x ea. 20x ea. 3x10   Sup TA with SLR flex 2x10 ea. 2x10 ea. 2x10 ea.   2x10 ea.        2x10 ea. 3x10   TB Multifidus press           Decl. np np  np                             Sup TA with iso hip add  30x5\" 30x5\" 30x5\"     20x5\" 20x5\" 20x5\"  20x5\" 20x10\"   Sup TA with Pball presses  30x5\" 30x5\"  30x5\"   30x5\"  20x5\" 20x5\" 20x5\"  20x5\" 20x5\"   Sidestepping w/ TB resist  BTB     5 laps  BTB     5 laps  np   BTB 5 laps      " "    BTB   5 laps    BTB   5 laps     Ther Ex                       Bike TM 6' 6 min TM 6' np TM 6'  5 min 5 min TM 6'  TM 6' np   Mini squats                       TB rows           Decl. np np  np     TB lat pull downs           Decl. np np  np     Stand hip ext  BTB 3x10 ea. BTB 3x10 ea. BTB 3x10 ea.   BTB 3x10 ea.  GTB 2x10 ea BTB 2x10 ea. BTB 2x10 ea.  BTB 2x10 ea. BTB 2x10 ea.   Stand hip abd  BTB 3x10 ea. BTB 3x10 ea. BTB 3x10 ea.   BTB 3x10 ea.  GTB 2x10 ea BTB 2x10 ea. BTB 2x10 ea.  BTB 2x10 ea. BTB 2x10 ea.   Sup hip rolls   20x5\" 20x5\" 20x5\"     10x5\" 20x5\" 20x5\"  20x5\"     Sitting forward Ball roll outs   20x5\" 20x5\" 20x5\"   20x5\"  20x5\" 20x5\" 20x5\"  20x5\" 20x 5\"                                                                           Ther Activity                                                                       Gait Training                                                                       Modalities                                                                                "

## 2024-11-12 ENCOUNTER — OFFICE VISIT (OUTPATIENT)
Dept: OCCUPATIONAL THERAPY | Facility: CLINIC | Age: 69
End: 2024-11-12
Payer: MEDICARE

## 2024-11-12 DIAGNOSIS — M79.642 LEFT HAND PAIN: ICD-10-CM

## 2024-11-12 DIAGNOSIS — Z98.890 S/P NERVE REPAIR: Primary | ICD-10-CM

## 2024-11-12 DIAGNOSIS — M25.542 ARTHRALGIA OF LEFT HAND: ICD-10-CM

## 2024-11-12 PROCEDURE — 97140 MANUAL THERAPY 1/> REGIONS: CPT | Performed by: OCCUPATIONAL THERAPIST

## 2024-11-12 PROCEDURE — 97110 THERAPEUTIC EXERCISES: CPT | Performed by: OCCUPATIONAL THERAPIST

## 2024-11-12 PROCEDURE — 97530 THERAPEUTIC ACTIVITIES: CPT | Performed by: OCCUPATIONAL THERAPIST

## 2024-11-12 NOTE — PROGRESS NOTES
Daily Note     Today's date: 2024  Patient name: Toney Motley  : 1955  MRN: [id number]  Referring provider: Brandi Rivera PA-C  Dx:   Encounter Diagnosis     ICD-10-CM    1. S/P nerve repair  Z98.890       2. Arthralgia of left hand  M25.542       3. Left hand pain  M79.642                      Subjective: It still hurts.  When will the pain go away?      Objective: See treatment diary below      Assessment: Tolerated treatment well. Patient exhibited good technique with therapeutic exercises.  Passive flexion improved in all digits.  Pain of fifth same.  Patient tolerating scar massage well.      Plan: Continue per plan of care.   Patient advised to wear splint for 5 minutes per session and increase 5 minutes each day so that in 4 days he is wearing it 20 minutes.  Scar massage and vibration encouraged on HEP to address volar palm hypersensitivity.       Precautions:  S/P AIN-UMN transfer, tenodesis of 3,4,5; CuT release DOS 10/9/24    POC expires Unit limit Auth Expiration date PT/OT/ST + Visit Limit?    4 No auth BOMN         Dx L N TF  CuT     Dr Alecia KNOWLES @ [facility]         Date  10/28 10/31 11/6 11/12 F NV      Visit 1 2 3 4       Manuals  8 15' 15                  Scar mass  Volar FA, volar V 10' Volar FA palm V       Jt mobs  Fifth PIP, MP 5' Fifth MP PIP G 1-2                  Neuro Re-Ed                                                                                        Ther Ex    15 10'   10'       HEP PROM V, TGE, nerve protect, scar care.  Review Hep for fifth PROM program Gradually increase time in finger extension splint        PROM 1:1  Digital flexion, fifth PIP E  LLPS 10' digit flex, LSF PIP ext Digital E/F all, LLPS       TGE  Hook to full x10 Hook to full fist x 20 Hook to full x20                                                              Ther Activity   15    15'       Manipulation  P/u hearts/  gems, lay juan Gems p/u, sweep out RB over TB 3x10       dexterity  Foam  square gather Foam squares gather x 5 reps        gripping  1 light RB all foams  S ,  Dowel   Marker x30 each into putty                                          Modalities           MHP  Pre tx 5' pre TE Pre tx

## 2024-11-14 ENCOUNTER — OFFICE VISIT (OUTPATIENT)
Dept: OCCUPATIONAL THERAPY | Facility: CLINIC | Age: 69
End: 2024-11-14
Payer: MEDICARE

## 2024-11-14 DIAGNOSIS — Z98.890 S/P NERVE REPAIR: Primary | ICD-10-CM

## 2024-11-14 DIAGNOSIS — M25.542 ARTHRALGIA OF LEFT HAND: ICD-10-CM

## 2024-11-14 PROCEDURE — 97140 MANUAL THERAPY 1/> REGIONS: CPT | Performed by: OCCUPATIONAL THERAPIST

## 2024-11-14 PROCEDURE — 97014 ELECTRIC STIMULATION THERAPY: CPT | Performed by: OCCUPATIONAL THERAPIST

## 2024-11-14 PROCEDURE — 97110 THERAPEUTIC EXERCISES: CPT | Performed by: OCCUPATIONAL THERAPIST

## 2024-11-14 NOTE — PROGRESS NOTES
Daily Note     Today's date: 2024  Patient name: Toney Motley  : 1955  MRN: [id number]  Referring provider: Brandi Rivera PA-C  Dx:   Encounter Diagnosis     ICD-10-CM    1. S/P nerve repair  Z98.890       2. Arthralgia of left hand  M25.542                        Subjective: It still hurts.  (V PIP joint, dorsally)  My pain doctor said he could give me an injection in my neck for the pain.      Objective: See treatment diary below      Assessment: Tolerated treatment well. Patient exhibited good technique with therapeutic exercises.  Passive flexion improved in all digits.  Pain of fifth same.  Patient tolerating scar massage well.      Plan: Continue per plan of care.    Trial TENS over DRSN, dorsal hand.  Kinesiotaping applied in dorsal webbing with one spiral wrap post tx.  Hold splinting to fifth to assess pain changes.      Precautions:  S/P AIN-UMN transfer, tenodesis of 3,4,5; CuT release DOS 10/9/24    POC expires Unit limit Auth Expiration date PT/OT/ST + Visit Limit?    4 No auth BOMN         Dx L N TF  CuT     Dr Alecia KNOWLES @ [facility]         Date  10/28 10/31 11/6 11/12 11/14 F NV     Visit 1 2 3 4 5      Manuals  8 15' 15 23'      Kinesiotaping     PIP dorsal web with spiral      Scar mass  Volar FA, volar V 10' Volar FA palm V Volar FA palm V      Jt mobs  Fifth PIP, MP 5' Fifth MP PIP G 1-2 Fifth MP PIP G 1-2      Desensitize     Ulnar side hand/volar scar      Neuro Re-Ed                                                                                        Ther Ex    15 10'   10'  10'      HEP PROM V, TGE, nerve protect, scar care.  Review Hep for fifth PROM program Gradually increase time in finger extension splint  Taping wear, desens HEP;  Tracking pain      PROM 1:1  Digital flexion, fifth PIP E  LLPS 10' digit flex, LSF PIP ext Digital E/F all, LLPS Digital E/F all, LLPS      TGE  Hook to full x10 Hook to full fist x 20 Hook to full x20 Hook to full x20      AROM/PROM      "P/H fists   10x3\"                                                  Ther Activity   15    15'       Manipulation  P/u hearts/  gems, lay juan Gems p/u, sweep out RB over TB 3x10       dexterity  Foam square gather Foam squares gather x 5 reps        gripping  1 light RB all foams  S ,  Dowel   Marker x30 each into putty                                          Modalities           MHP  Pre tx 5' pre TE Pre tx Pre tx      TENS     10' SD pre tx             "

## 2024-11-19 ENCOUNTER — OFFICE VISIT (OUTPATIENT)
Dept: OCCUPATIONAL THERAPY | Facility: CLINIC | Age: 69
End: 2024-11-19
Payer: MEDICARE

## 2024-11-19 ENCOUNTER — APPOINTMENT (OUTPATIENT)
Dept: LAB | Facility: HOSPITAL | Age: 69
End: 2024-11-19
Payer: MEDICARE

## 2024-11-19 DIAGNOSIS — M25.542 ARTHRALGIA OF LEFT HAND: ICD-10-CM

## 2024-11-19 DIAGNOSIS — G89.4 CHRONIC PAIN SYNDROME: ICD-10-CM

## 2024-11-19 DIAGNOSIS — Z98.890 S/P NERVE REPAIR: Primary | ICD-10-CM

## 2024-11-19 PROCEDURE — 80361 OPIATES 1 OR MORE: CPT

## 2024-11-19 PROCEDURE — 80365 DRUG SCREENING OXYCODONE: CPT

## 2024-11-19 PROCEDURE — 80307 DRUG TEST PRSMV CHEM ANLYZR: CPT

## 2024-11-19 PROCEDURE — 97140 MANUAL THERAPY 1/> REGIONS: CPT | Performed by: OCCUPATIONAL THERAPIST

## 2024-11-19 PROCEDURE — 80346 BENZODIAZEPINES1-12: CPT

## 2024-11-19 PROCEDURE — 97110 THERAPEUTIC EXERCISES: CPT | Performed by: OCCUPATIONAL THERAPIST

## 2024-11-19 NOTE — PROGRESS NOTES
Daily Note / Progress Note    Today's date: 2024  Patient name: Toney Motley  : 1955  MRN: [id number]  Referring provider: Brandi Rivera PA-C  Dx:   Encounter Diagnosis     ICD-10-CM    1. S/P nerve repair  Z98.890       2. Arthralgia of left hand  M25.542                        Subjective:   Why does it still hurt?  (V PIP joint, dorsally)  My pain doctor said he could give me an injection in my neck for the pain.      Objective: See treatment diary below    Pain  Current pain ratin/10      Active Range of Motion      Left Wrist   Normal active range of motion     Left Digits    Flexion   Little     MCP: 90    PIP: 82    DIP: 0  Extension   Little     MCP: 0    PIP: -40    DIP: 0     Passive Range of Motion      Left Digits   Flexion   Little     MCP: 90    PIP: 100    DIP: 45  Extension   Little     MCP: 0    PIP: -40    DIP: 0    Assessment: Tolerated treatment well. Patient exhibited good technique with therapeutic exercises.  Active and passive flexion improved in all digits.  PIP of fifth remains contracted in flexion.  Pain is unchanged since surgery.        Plan: Continue per plan of care.    Hold splinting to fifth to pain increase and sympathetic changes during wear.   NDV 24.  Progress care as prescribed by physician.     Precautions:  S/P AIN-UMN transfer, tenodesis of 3,4,5; CuT release DOS 10/9/24    POC expires Unit limit Auth Expiration date PT/OT/ST + Visit Limit?    4 No auth BOMN         Dx L N TF  CuT     Dr Alecia KNOWLES [facility]         Date  10/28 10/31 11/6 11/12 11/14 11/19     Visit 1 2 3 4 5 6     Manuals  8 15' 15 23' 23'     Kinesiotaping     PIP dorsal web with spiral Cont to address PIP     Scar mass  Volar FA, volar V 10' Volar FA palm V Volar FA palm V Volar FA palm V     Jt mobs  Fifth PIP, MP 5' Fifth MP PIP G 1-2 Fifth MP PIP G 1-2 Fifth MP PIP G 1-2     Desensitize     Ulnar side hand/volar scar Ulnar side hand/volar scar     Neuro Re-Ed                 "                                                                        Ther Ex    15 10'   10'  10'   15'     HEP PROM V, TGE, nerve protect, scar care.  Review Hep for fifth PROM program Gradually increase time in finger extension splint  Taping wear, desens HEP;  Tracking pain Splint wear; pain control.   Review HEP.      PROM 1:1  Digital flexion, fifth PIP E  LLPS 10' digit flex, LSF PIP ext Digital E/F all, LLPS Digital E/F all, LLPS Digital E/F LLPS     TGE  Hook to full x10 Hook to full fist x 20 Hook to full x20 Hook to full x20 Hook to full x20     AROM/PROM     P/H fists   10x3\" P/H fists 10x3\"                                                 Ther Activity   15    15'       Manipulation  P/u hearts/  gems, lay juan Gems p/u, sweep out RB over TB 3x10       dexterity  Foam square gather Foam squares gather x 5 reps        gripping  1 light RB all foams  S ,  Dowel   Marker x30 each into putty                                          Modalities           MHP  Pre tx 5' pre TE Pre tx Pre tx Pre tx     TENS     10' SD pre tx             "

## 2024-11-20 NOTE — PROGRESS NOTES
"Daily Note     Today's date: 2024  Patient name: Toney Motley  : 1955  MRN: [id number]  Referring provider: Brandi Rivera PA-C  Dx:   Encounter Diagnosis     ICD-10-CM    1. Lumbar radiculopathy  M54.16       2. S/P lumbar fusion  Z98.1           Start Time: 1530  Stop Time: 1602  Total time in clinic (min): 32 minutes    Subjective: He reports that he still feels some pain in his low back, but movement helps.       Objective: See treatment diary below      Assessment: Tolerated treatment well. He was able to complete exercises with reports of fatigue after completion. Discussed with pt that next visit would be last session and he was in agreements. Patient would benefit from continued PT.      Plan: Potential discharge next visit.     Precautions: L2-3,L4-5,L5-S1 fusion - 24, HTN, L/S fusion -   POC expires Unit limit Auth Expiration date PT/OT/ST + Visit Limit?   24 N/A   BOMN                                           Visit/Unit Tracking  AUTH Status:  Date                             Used                             Remaining                               Manuals 9/23 9/26 10/2 10/31 11/11 11/21 9/4 9/11 9/16  9/18 10/21                                                   Re-eval         DP         KK, PT                             Neuro Re-Ed                       Sup TA activation           20x5\" 20x5\" 20x5\"  np     Sup TA with marches 30x ea. 30x ea. 30x ea.   30xea. 30 ea. 20x ea. 20x ea. 20x ea.  20x ea. 20 ea.    Sup TA with alt UE/LE 30x ea. 30x ea. 30x ea.   30x ea. 30x ea. 20x ea. 20x ea. 20x ea. 20x ea. 3x10   Sup TA with SLR flex 2x10 ea. 2x10 ea. 2x10 ea.   2x10 ea. 2x10 ea.       2x10 ea. 3x10   TB Multifidus press           Decl. np np  np                             Sup TA with iso hip add  30x5\" 30x5\" 30x5\"     20x5\" 20x5\" 20x5\"  20x5\" 20x10\"   Sup TA with Pball presses  30x5\" 30x5\"  30x5\"   30x5\" 30x5\"  20x5\" 20x5\" 20x5\"  20x5\" 20x5\"   Sidestepping w/ TB resist  BTB   " "  5 laps  BTB     5 laps  np   BTB 5 laps          BTB   5 laps    BTB   5 laps     Ther Ex                       Bike TM 6' 6 min TM 6' np TM 6'  5 min 5 min TM 6'  TM 6' np   Mini squats                       TB rows           Decl. np np  np     TB lat pull downs           Decl. np np  np     Stand hip ext  BTB 3x10 ea. BTB 3x10 ea. BTB 3x10 ea.   BTB 3x10 ea. BTB 3x10 ea.  GTB 2x10 ea BTB 2x10 ea. BTB 2x10 ea.  BTB 2x10 ea. BTB 2x10 ea.   Stand hip abd  BTB 3x10 ea. BTB 3x10 ea. BTB 3x10 ea.   BTB 3x10 ea. BTB 3x10 ea. GTB 2x10 ea BTB 2x10 ea. BTB 2x10 ea.  BTB 2x10 ea. BTB 2x10 ea.   Sup hip rolls   20x5\" 20x5\" 20x5\"     10x5\" 20x5\" 20x5\"  20x5\"     Sitting forward Ball roll outs   20x5\" 20x5\" 20x5\"   20x5\" 20x5\" 20x5\" 20x5\" 20x5\"  20x5\" 20x 5\"                                                                           Ther Activity                                                                       Gait Training                                                                       Modalities                                                                                "

## 2024-11-21 ENCOUNTER — OFFICE VISIT (OUTPATIENT)
Dept: PHYSICAL THERAPY | Facility: CLINIC | Age: 69
End: 2024-11-21
Payer: MEDICARE

## 2024-11-21 ENCOUNTER — OFFICE VISIT (OUTPATIENT)
Dept: OCCUPATIONAL THERAPY | Facility: CLINIC | Age: 69
End: 2024-11-21
Payer: MEDICARE

## 2024-11-21 DIAGNOSIS — Z98.890 S/P NERVE REPAIR: Primary | ICD-10-CM

## 2024-11-21 DIAGNOSIS — Z98.1 S/P LUMBAR FUSION: ICD-10-CM

## 2024-11-21 DIAGNOSIS — M25.542 ARTHRALGIA OF LEFT HAND: ICD-10-CM

## 2024-11-21 DIAGNOSIS — M54.16 LUMBAR RADICULOPATHY: Primary | ICD-10-CM

## 2024-11-21 PROCEDURE — 97140 MANUAL THERAPY 1/> REGIONS: CPT | Performed by: OCCUPATIONAL THERAPIST

## 2024-11-21 PROCEDURE — 97110 THERAPEUTIC EXERCISES: CPT

## 2024-11-21 PROCEDURE — 97110 THERAPEUTIC EXERCISES: CPT | Performed by: OCCUPATIONAL THERAPIST

## 2024-11-21 PROCEDURE — 97112 NEUROMUSCULAR REEDUCATION: CPT

## 2024-11-21 NOTE — PROGRESS NOTES
OT Discharge     Today's date: 2024  Patient name: Toney Motley  : 1955  MRN: [id number]  Referring provider: Brandi Rivera PA-C  Dx:   Encounter Diagnosis     ICD-10-CM    1. S/P nerve repair  Z98.890       2. Arthralgia of left hand  M25.542                      Assessment  Impairments: abnormal or restricted ROM and pain with function  Functional limitations: Pain restricts function.  Symptom irritability: moderate    Assessment details: Toney is a 68 y/o RHD male presenting s/p 2 weeks, 5 days s/p:  Left AIN to ulnar motor nerve transfer (CPT: 57011)  Left ulnar motor nerve microscopic internal neurolysis (CPT: 77258)  Left Guyon's canal release (CPT: 60469)  Left cubital tunnel release in situ (CPT: 18333)   Left small/ring/long FDP tenodesis (CPT: 36187)  Left small PIP volar plate release (CPT: 28530)    He reports about a year history of left hand pain with decreased function.  He sought the care of an orthopedic as was instructed that it would improved.  Due to continued pain, he sought another opinion and surgery was recommended.   He underwent above procedures on 10/9/24.  He arrives today without wear of splint and has begun to use the left hand for light self care.  He has been provided with a splint for HOS which he states comes off.  During day, he has been provided with an LMB splint which he was unable to determine how to don.  He is currently retired and was employed in printing.      Examination reveals restricted active and passive motion of all digits.  Pain continues but is less than previous to surgery.  Edema is localized to surgical areas.  Surgical tissue is maturing without complication.  The ulnar nerve distribution is insensate.  Strength not assessed due to early post op healing stage. Evaluation is of low complexity, due to minimal comorbidities and stable clinical presentation.      Pt demonstrates good tolerance of therapy today and was provided with a written HEP  focusing on passive stretching in flexion, active tendon gliding exercises, scar massage, and gentle use of left hand.  Night splint strapping was adjusted and pt education to don/doff LMB review with patient.   He was instructed to perform exercises 3 times daily.The patient demonstrates HEP instructions appropriately, verbalizes understanding, and is in agreement with the written HEP.  Pt will benefit from skilled OT intervention to progress motion, increase function use and strength,  and allow return to PLOF.      Discharge Summary 11/21/24  Toney has attended 9 therapy visits and most of his therapy goals have been met, except flexion contracture of fifth PIP and pain level.  PIP flexion contracture is fixed and chronic.  Pain is constant and daily which increases with manipulation of the fifth finger.  Therapy will be discharge to [facility] and will f/u with his physician regarding continued pain of the fifth finger.            Barriers to intervention comments: Comprehension  Understanding of Dx/Px/POC: good     Prognosis: good    Goals  STG 2 weeks   Increase digital arc by at least 10 degrees in extension and flexion, L V PIP MET   Increase flexion to DPC by at least 1 cm MET   Reduce edema to a minimal level MET   Reduce pain at rest to less than 2/10 MET occasionally    LTG  By discharge   Achieve functional CARRANZA of digit  to allow independence in holding small items in hand MET   Achieve functional extension with minimal lag to allow independence in donning gloves and tucking in clothing. NOT MET- fixed flexion contracture   Pain at rest resolved, pain less than 2/10 with light activity achieving independence in self care without increased of pain. NOT MET   Achieve  strength to at least 30% of the uninvolved achieving independence in opening containers. MET   Achieve FOTO goals established at . MET         Plan  Therapy options: Discharge to [facility].    Planned therapy interventions: home exercise  program    Plan of Care beginning date: 10/28/2024  Plan of Care expiration date: 2024  Treatment plan discussed with: patient    Subjective Evaluation    History of Present Illness  Date of surgery: 10/9/2024  Mechanism of injury: surgery  Mechanism of injury: Left AIN to ulnar motor nerve transfer (CPT: 97323)  Left ulnar motor nerve microscopic internal neurolysis (CPT: 31992)  Left Guyon's canal release (CPT: 91479)  Left cubital tunnel release in situ (CPT: 14711)   Left small/ring/long FDP tenodesis (CPT: 02076)  Left small PIP volar plate release (CPT: 17797)              Recurrent probem    Quality of life: good    Patient Goals  Patient goals for therapy: decreased pain, increased motion, increased strength, independence with ADLs/IADLs and return to sport/leisure activities  Patient goal: Less pain;  be able to use better  Pain  Current pain ratin  Location: left fifth finger, PIP joint.  Quality: discomfort, radiating and tight  Relieving factors: medications  Aggravating factors: lifting (manipulating fifth finger.)  Progression: no change    Social Support  Lives with: spouse    Employment status: not working  Hand dominance: right      Diagnostic Tests  EMG: abnormal  Treatments  Previous treatment: immobilization  Current treatment: occupational therapy      Objective     Observations     Left Wrist/Hand   Positive for atrophy and deformity.     Additional Observation Details  Fifth PIP flexion contracture present;  Intrinsic atrophy present    Surgical scars well approximated and without drainage or signs of infection.  Scars present at CuT, volar FA extending into ulnar side of volar palm 18 cm in length, and Z plasty of volar fifth.     Neurological Testing     Sensation     Wrist/Hand   Left   Absent: light touch     Additional Neurological Details  Sensation absent in ulnar nerve distribution, ring and small, left hand.     Active Range of Motion     Left Wrist   Normal active range of  motion    Left Digits    Flexion   Little     MCP: 90    PIP: 75    DIP: 0  Extension   Little     MCP: 0    PIP: -45    DIP: 0    Passive Range of Motion     Left Digits   Flexion   Little     MCP: 90    PIP: 90    DIP: 50  Extension   Little     MCP: 0    PIP: -45    DIP: 0    Strength/Myotome Testing     Additional Strength Details  L 35 lnd,  zr  76 lbs.     Tests     Left Elbow   Positive Tinel's sign (cubital tunnel).     Swelling     Left Wrist/Hand   Little     Middle: 6.7 cm    Right Wrist/Hand   Little     Middle: 5.7 cm        Daily Note     Today's date: 2024  Patient name: Toney Motley  : 1955  MRN: [id number]  Referring provider: Brandi Rivera PA-C  Dx:   Encounter Diagnosis     ICD-10-CM    1. S/P nerve repair  Z98.890       2. Arthralgia of left hand  M25.542                        Subjective:   Why does it still hurt?  (V PIP joint, dorsally)  My pain doctor said he could give me an injection in my neck for the pain.      Objective: See treatment diary below      Assessment: Tolerated treatment well. Patient exhibited good technique with therapeutic exercises.  Active and passive flexion improved in all digits.  PIP of fifth remains contracted in flexion.  Pain is unchanged since surgery.        Plan:  Discharge therapy to [facility].  Per physician, hold splinting to fifth to pain increase and sympathetic changes during wear and may discharge to HEP.       Precautions:  S/P AIN-UMN transfer, tenodesis of 3,4,5; CuT release DOS 10/9/24    POC expires Unit limit Auth Expiration date PT/OT/ST + Visit Limit?    4 No auth BOMN         Dx L N TF  CuT     Dr Alecia KNOWLES MET         Date  10/28 10/31 11/6 11/12 11/14 11/19 11/21    Visit 1 2 3 4 5 6 7    Manuals  8 15' 15 23' 23' 15    Kinesiotaping     PIP dorsal web with spiral Cont to address PIP     Scar mass  Volar FA, volar V 10' Volar FA palm V Volar FA palm V Volar FA palm V Volar FA palm V    Jt mobs  Fifth PIP, MP 5' Fifth MP  "PIP G 1-2 Fifth MP PIP G 1-2 Fifth MP PIP G 1-2 Fifth MP PIP G 1-2    Desensitize     Ulnar side hand/volar scar Ulnar side hand/volar scar     Neuro Re-Ed                                                                                        Ther Ex    15 10'   10'  10'   15'    23'    HEP PROM V, TGE, nerve protect, scar care.  Review Hep for fifth PROM program Gradually increase time in finger extension splint  Taping wear, desens HEP;  Tracking pain Splint wear; pain control.   Review HEP.  HEP review for D/C; Remeasure; hold splint, add PRE     PROM 1:1  Digital flexion, fifth PIP E  LLPS 10' digit flex, LSF PIP ext Digital E/F all, LLPS Digital E/F all, LLPS Digital E/F LLPS Digital E/F LLPS    TGE  Hook to full x10 Hook to full fist x 20 Hook to full x20 Hook to full x20 Hook to full x20 Hook to full x20    AROM/PROM     P/H fists   10x3\" P/H fists 10x3\" P/H fists 10x3\"    Digital PRE       Soft putty  x20                                     Ther Activity   15    15'       Manipulation  P/u hearts/  gems, lay juan Gems p/u, sweep out RB over TB 3x10       dexterity  Foam square gather Foam squares gather x 5 reps        gripping  1 light RB all foams  S ,  Dowel   Marker x30 each into putty                                          Modalities           MHP  Pre tx 5' pre TE Pre tx Pre tx Pre tx Pre tx    TENS     10' SD pre tx             "

## 2024-11-22 DIAGNOSIS — K30 FUNCTIONAL DYSPEPSIA: ICD-10-CM

## 2024-11-22 DIAGNOSIS — R10.13 EPIGASTRIC PAIN: ICD-10-CM

## 2024-11-22 RX ORDER — AMITRIPTYLINE HYDROCHLORIDE 10 MG/1
10 TABLET ORAL
Qty: 90 TABLET | Refills: 1 | Status: SHIPPED | OUTPATIENT
Start: 2024-11-22

## 2024-11-23 LAB
1OH-MIDAZOLAM UR CFM-MCNC: NOT DETECTED NG/MG CREAT
6MAM UR QL SCN: NEGATIVE NG/ML
7AMINOCLONAZEPAM UR CFM-MCNC: NOT DETECTED NG/MG CREAT
A-OH ALPRAZ UR QL CFM: NOT DETECTED NG/MG CREAT
ACCEPTABLE CREAT UR QL: 123 MG/DL
ALPRAZ/CREAT UR CFM: NOT DETECTED NG/MG CREAT
AMPHET UR QL SCN: NEGATIVE NG/ML
BARBITURATES UR QL SCN: NEGATIVE NG/ML
BENZODIAZ UR QL SCN: NORMAL NG/ML
BENZODIAZEPINES: ABNORMAL
BUPRENORPHINE UR QL CFM: NEGATIVE NG/ML
CANNABINOIDS UR QL SCN: NEGATIVE NG/ML
CARISOPRODOL UR QL: NEGATIVE NG/ML
CLONAZEPAM/CREAT UR CFM: NOT DETECTED NG/MG CREAT
COCAINE+BZE UR QL SCN: NEGATIVE NG/ML
CODEINE UR QL CFM: NOT DETECTED NG/MG CREAT
DHC UR CFM-MCNC: NOT DETECTED NG/MG CREAT
DIAZEPAM/CREAT UR: NOT DETECTED NG/MG CREAT
ETHYL GLUCURONIDE UR QL SCN: NEGATIVE NG/ML
FENTANYL UR QL SCN: NEGATIVE NG/ML
FLUNITRAZEPAM UR-MCNC: NOT DETECTED NG/MG CREAT
GABAPENTIN SERPLBLD QL SCN: NEGATIVE UG/ML
HYDROCODONE UR QL CFM: NOT DETECTED NG/MG CREAT
HYDROMORPHONE UR QL CFM: NOT DETECTED NG/MG CREAT
LORAZEPAM UR CFM-MCNC: NOT DETECTED NG/MG CREAT
METHADONE UR QL SCN: NEGATIVE NG/ML
MIDAZOLAM/CREAT UR CFM: NOT DETECTED NG/MG CREAT
MORPHINE UR QL CFM: NOT DETECTED NG/MG CREAT
NITRITE UR QL STRIP: NEGATIVE UG/ML
NORCODEINE/CREAT UR CFM: NOT DETECTED NG/MG CREAT
NORDIAZEPAM UR CFM-MCNC: NOT DETECTED NG/MG CREAT
NORFLUNITRAZEPAM UR-MCNC: NOT DETECTED NG/MG CREAT
NORHYDROCODONE UR CFM-MCNC: NOT DETECTED NG/MG CREAT
NORMORPHINE: NOT DETECTED NG/MG CREAT
NOROXYCODONE UR CFM-MCNC: 5375 NG/MG CREAT
NOROXYMORPHONE/CREAT UR CFM: 1298 NG/MG CREAT
OH-ETHYLFLURAZ UR CFM-MCNC: NOT DETECTED NG/MG CREAT
OH-TRIAZOLAM UR CFM-MCNC: NOT DETECTED NG/MG CREAT
OPIATE CLASS: NEGATIVE
OPIATES UR QL SCN: NORMAL NG/ML
OXAZEPAM CTO UR CFM-MCNC: >1626 NG/MG CREAT
OXYCODONE CLASS: ABNORMAL
OXYCODONE UR QL CFM: 1533 NG/MG CREAT
OXYCODONE+OXYMORPHONE UR QL SCN: NORMAL NG/ML
OXYMORPHONE UR QL CFM: 1991 NG/MG CREAT
PCP UR QL SCN: NEGATIVE NG/ML
PROPOXYPH UR QL SCN: NEGATIVE NG/ML
SPECIMEN PH ACCEPTABLE UR: 5.5 (ref 4.5–8.9)
TAPENTADOL UR QL SCN: NEGATIVE NG/ML
TEMAZEPAM UR CFM-MCNC: >1626 NG/MG CREAT
TRAMADOL UR QL SCN: NEGATIVE NG/ML

## 2024-11-25 ENCOUNTER — APPOINTMENT (OUTPATIENT)
Dept: OCCUPATIONAL THERAPY | Facility: CLINIC | Age: 69
End: 2024-11-25
Payer: MEDICARE

## 2024-11-26 ENCOUNTER — APPOINTMENT (OUTPATIENT)
Dept: OCCUPATIONAL THERAPY | Facility: CLINIC | Age: 69
End: 2024-11-26
Payer: MEDICARE

## 2024-11-27 ENCOUNTER — APPOINTMENT (OUTPATIENT)
Dept: OCCUPATIONAL THERAPY | Facility: CLINIC | Age: 69
End: 2024-11-27
Payer: MEDICARE

## 2024-11-27 ENCOUNTER — APPOINTMENT (OUTPATIENT)
Dept: PHYSICAL THERAPY | Facility: CLINIC | Age: 69
End: 2024-11-27
Payer: MEDICARE

## 2024-11-27 DIAGNOSIS — M54.16 LUMBAR RADICULOPATHY: Primary | ICD-10-CM

## 2024-11-27 DIAGNOSIS — Z98.1 S/P LUMBAR FUSION: ICD-10-CM

## 2024-11-27 NOTE — PROGRESS NOTES
PT Discharge    Today's date: 2024  Patient name: Toney Motley  : 1955  MRN: [id number]  Referring provider: Brandi Rivera PA-C  Dx:   Encounter Diagnosis     ICD-10-CM    1. Lumbar radiculopathy  M54.16       2. S/P lumbar fusion  Z98.1                      Assessment  Impairments: abnormal or restricted ROM, impaired physical strength, lacks appropriate home exercise program, pain with function, unable to perform ADL, participation limitations and activity limitations    Assessment details: Pt has been attending PT for lumbar fusion and lumbar radiculopathy. He has had made progress with completing his HEP and ability to complete ADL's with some improvements of symptoms. He still has impairments with increased symptoms in low back with lying down, walking, and completing tasks. Discussed with patient future POC and he was in agreements with finishing PT til the end of the month and discharging with an HEP. He would benefit from skilled PT to accomplish the following goals.     Goals  Short term goals - to be achieved in 4 weeks:    Decrease pain 20-50%.-PROGRESSING   Increase strength by 1/2 grade.-PROGRESSING   Improve L/S ROM by 25%.-PROGRESSING  Long term goals - to be achieved by discharge:    Performance in related household activities is improved to maximal level of function.    IADL performance in related activities is improved to maximal level of function. -PROGRESSING   Sitting tolerance is improved to maximal level of function.  -PROGRESSING   Standing tolerance is improved to maximal level of function. -PROGRESSING    Plan  Patient would benefit from: skilled physical therapy    Planned therapy interventions: abdominal trunk stabilization, manual therapy, neuromuscular re-education, patient/caregiver education, therapeutic activities, therapeutic exercise, flexibility and functional ROM exercises    Frequency: 1x week  Duration in weeks: 3  Plan of Care beginning date: 2024  Plan  "of Care expiration date: 2024  Treatment plan discussed with: patient        Subjective Evaluation    History of Present Illness  Mechanism of injury: Pt reports that he is still experiencing pain in his back. He feels like he would benefit from PT til the end of the month to continue to work to reducing his symptoms in his low back. He describes the pain like a toothache.   Patient Goals  Patient goals for therapy: decreased pain    Pain  Current pain ratin  At best pain ratin  At worst pain rating: 10          Objective     Active Range of Motion     Lumbar   Flexion:  Restriction level: moderate  Extension:  Restriction level: maximal             Precautions: L2-3,L4-5,L5-S1 fusion - 24, HTN, L/S fusion -   POC expires Unit limit Auth Expiration date PT/OT/ST + Visit Limit?   24 N/A   BOMN                                           Visit/Unit Tracking  AUTH Status:  Date                             Used                             Remaining                               Manuals 9/23 9/26 10/2 10/31 11/11 11/27 9/4 9/11 9/16  9/18 10/21                                                   Re-eval         DP         KK, PT                             Neuro Re-Ed                       Sup TA activation           20x5\" 20x5\" 20x5\"  np     Sup TA with marches 30x ea. 30x ea. 30x ea.   30xea.  20x ea. 20x ea. 20x ea.  20x ea. 20 ea.    Sup TA with alt UE/LE 30x ea. 30x ea. 30x ea.   30x ea.  20x ea. 20x ea. 20x ea. 20x ea. 3x10   Sup TA with SLR flex 2x10 ea. 2x10 ea. 2x10 ea.   2x10 ea.        2x10 ea. 3x10   TB Multifidus press           Decl. np np  np                             Sup TA with iso hip add  30x5\" 30x5\" 30x5\"     20x5\" 20x5\" 20x5\"  20x5\" 20x10\"   Sup TA with Pball presses  30x5\" 30x5\"  30x5\"   30x5\"  20x5\" 20x5\" 20x5\"  20x5\" 20x5\"   Sidestepping w/ TB resist  BTB     5 laps  BTB     5 laps  np   BTB 5 laps          BTB   5 laps    BTB   5 laps     Ther Ex                     " "  Bike TM 6' 6 min TM 6' np TM 6'  5 min 5 min TM 6'  TM 6' np   Mini squats                       TB rows           Decl. np np  np     TB lat pull downs           Decl. np np  np     Stand hip ext  BTB 3x10 ea. BTB 3x10 ea. BTB 3x10 ea.   BTB 3x10 ea.  GTB 2x10 ea BTB 2x10 ea. BTB 2x10 ea.  BTB 2x10 ea. BTB 2x10 ea.   Stand hip abd  BTB 3x10 ea. BTB 3x10 ea. BTB 3x10 ea.   BTB 3x10 ea.  GTB 2x10 ea BTB 2x10 ea. BTB 2x10 ea.  BTB 2x10 ea. BTB 2x10 ea.   Sup hip rolls   20x5\" 20x5\" 20x5\"     10x5\" 20x5\" 20x5\"  20x5\"     Sitting forward Ball roll outs   20x5\" 20x5\" 20x5\"   20x5\"  20x5\" 20x5\" 20x5\"  20x5\" 20x 5\"                                                                           Ther Activity                                                                       Gait Training                                                                       Modalities                                                                                "

## 2024-12-02 ENCOUNTER — OFFICE VISIT (OUTPATIENT)
Dept: GASTROENTEROLOGY | Facility: CLINIC | Age: 69
End: 2024-12-02
Payer: MEDICARE

## 2024-12-02 VITALS
WEIGHT: 220 LBS | BODY MASS INDEX: 29.8 KG/M2 | HEIGHT: 72 IN | SYSTOLIC BLOOD PRESSURE: 122 MMHG | HEART RATE: 74 BPM | DIASTOLIC BLOOD PRESSURE: 82 MMHG | RESPIRATION RATE: 18 BRPM | OXYGEN SATURATION: 98 % | TEMPERATURE: 98 F

## 2024-12-02 DIAGNOSIS — Z91.018 MULTIPLE FOOD ALLERGIES: Primary | ICD-10-CM

## 2024-12-02 DIAGNOSIS — A04.8 BACTERIAL INFECTION DUE TO H. PYLORI: ICD-10-CM

## 2024-12-02 PROCEDURE — 99213 OFFICE O/P EST LOW 20 MIN: CPT | Performed by: INTERNAL MEDICINE

## 2024-12-02 PROCEDURE — G2211 COMPLEX E/M VISIT ADD ON: HCPCS | Performed by: INTERNAL MEDICINE

## 2024-12-02 RX ORDER — METOPROLOL SUCCINATE 25 MG/1
25 TABLET, EXTENDED RELEASE ORAL DAILY
COMMUNITY
Start: 2024-10-14

## 2024-12-02 RX ORDER — RIVAROXABAN 20 MG/1
20 TABLET, FILM COATED ORAL
COMMUNITY

## 2024-12-02 RX ORDER — PANTOPRAZOLE SODIUM 40 MG/1
40 TABLET, DELAYED RELEASE ORAL 2 TIMES DAILY
Qty: 60 TABLET | Refills: 2 | Status: SHIPPED | OUTPATIENT
Start: 2024-12-02

## 2024-12-02 RX ORDER — TAMSULOSIN HYDROCHLORIDE 0.4 MG/1
CAPSULE ORAL
COMMUNITY
Start: 2024-11-27

## 2024-12-02 RX ORDER — HYDROCODONE BITARTRATE AND ACETAMINOPHEN 7.5; 325 MG/1; MG/1
1-2 TABLET ORAL EVERY 6 HOURS PRN
COMMUNITY
Start: 2024-10-09

## 2024-12-02 NOTE — PROGRESS NOTES
[facility] - Outpatient Note  Toney Motley 69 y.o. male MRN: [id number]  Encounter: [id number]      ASSESSMENT AND PLAN:    Toney Motley is a 69 y.o. old pleasant male with PMH of ulcerative colitis, chronic opioids, fatty liver, lumbar radiculopathy who presents for followup     Chronic nausea -overall pain does not appear to be a significant component to his symptoms as it is more nausea.  I suspect patient has functional dyspepsia and/or symptoms from chronic narcotics (narcotic bowel syndrome caused from central mediated opioid induced hyperalgesia and/or chronic narcotic side effect chronic nausea). Patient has went underwent extensive and impressive workup including EGD, colonoscopy, CTA, MRI/MRCP, gastric emptying study without cause for abdominal pain.  Other differentials include biliary dyskinesia as he did have HIDA scan which shows hyperkinetic gallbladder with ejection fraction of 95% and previous ultrasound with gallbladder sludge.  Interestingly he has had very rapid gastric emptying (roughly 70% at 1 hour!?) confirmed twice which can be seen with cyclical vomiting syndrome (which he does not have) but could point to idiopathic dumping syndrome not associated with surgery though his symptoms don't seem quite consistent with this diagnosis. Rapid gastric emptying can also be seen with EPI though he doesn't have classic diarrhea typically seen with EPI. He tells me he has a BM every 3 days so there may be a component of constipation but then again he is not eating very much. Other less likely causes include atypical presentations of SIBO, lactose intolerance, Crohns, acute intermittent prophyria. Can consider checking MRE as he does have UC to ensure there is no small bowel crohns.  Fortunately he has gained 10 pounds since last visit (by eating candy).  I sent him for general surgery eval for biliary dyskinesia however given that pain is not a predominant symptom no surgical  options offered which is reasonable  It does not appear that he tried the Elavil I previously prescribed.  I asked him to take this medication before bedtime.  Refilled Reglan which seems to be helping somewhat  Avoid dairy 2-4 weeks which she seems to have daily  He was unable to perform the SIBO test as he was confused about the instructions.  We will message him about I will have the office call him to give him directions on how to complete this test.  Increase miralax to 2 caps daily with goal BM every day  Finish antibiotic course for H. pylori.  He was advised to get his stool H. pylori test/off of PPI for 2 weeks.  Depending on these results we will decide if we need retreatment or not.  Porphyria mildly elevated however overall his symptoms do not seem consistent with this  Will send him to food allergist given positive panel  Consider checking MR enterography given known history of ulcerative colitis and to rule out any small bowel etiology causing abdominal pain including Crohn's/strictures  Consider tumeric, Iberogast or possibly even dronabinol in future for nausea  Can consider Movantik in the future not necesarily for its laxative effect but to see if blocking the opioid receptors in the gut helps will help with symptoms  Follow-up with me in 1 to 2 months.           1. Multiple food allergies (Primary)    - Ambulatory Referral to Allergy; Future    2. Bacterial infection due to H. pylori    - pantoprazole (PROTONIX) 40 mg tablet; Take 1 tablet (40 mg total) by mouth 2 (two) times a day  Dispense: 60 tablet; Refill: 2    ______________________________________________________________________    SUBJECTIVE: Patient here to discuss ongoing symptoms.  He reports some improvement symptoms.  He still has nausea.  No significant abdominal pain.  Saw surgery who recommended holding off on cholecystectomy given his symptoms are predominantly not related to pain.  He is unsure if he took his antibiotics for H.  pylori but he still says he has 1 or 2 more weeks of amoxicillin and fortunately not take all his antibiotics at the same time.  It sounds like he took clarithromycin first and is now on amoxicillin.  He is not on PPI.  He is unsure about all his medications.      I reviewed prior external notes    I reviewed previous lab results and images      REVIEW OF SYSTEMS:     REVIEW OF ALL OTHER SYSTEMS IS OTHERWISE NEGATIVE.      Historical Information   Past Medical History:   Diagnosis Date    Back pain     Colon polyp     Hyperlipidemia     Lumbar fracture with cord injury (HCC)     Neck fracture (HCC)     Previous back surgery     rods in the back    Ulcerative colitis (HCC)      Past Surgical History:   Procedure Laterality Date    BACK SURGERY  05/20/2024    LUMBAR FUSION Right 01/20/2016    Procedure: FUSION LUMBAR  POSTERIOR, TLIF L3-4  Right L3-4 Fascet;  Surgeon: Narayan Castro MD;  Location:  MAIN OR;  Service:     WY COLONOSCOPY FLX DX W/COLLJ SPEC WHEN PFRMD N/A 01/24/2019    Procedure: COLONOSCOPY;  Surgeon: Sanjeev Orellana III, MD;  Location: [facility];  Service: Gastroenterology    WY SURGICAL ARTHROSCOPY SHOULDER W/ROTATOR CUFF RPR Right 07/26/2023    Procedure: ARTHROSCOPY SHOULDER- Right shoulder Arthroscopy, supraspinatus and subscapularis repair, biceps tenotomy, extensive debridement;  Surgeon: Oscar Corbett DO;  Location: [facility];  Service: Orthopedics    TONSILLECTOMY       Social History   Social History     Substance and Sexual Activity   Alcohol Use Not Currently    Comment: rarely     Social History     Substance and Sexual Activity   Drug Use No     Social History     Tobacco Use   Smoking Status Never   Smokeless Tobacco Never     Family History   Problem Relation Age of Onset    Parkinsonism Mother     Lung cancer Father        Meds/Allergies       Current Outpatient Medications:     acetaminophen (TYLENOL) 325 mg tablet    ALPRAZolam (XANAX) 0.25 mg tablet    amitriptyline  (ELAVIL) 10 mg tablet    Eliquis 5 MG    HYDROcodone-acetaminophen (NORCO) 7.5-325 mg per tablet    methocarbamol (ROBAXIN) 750 mg tablet    metoclopramide (REGLAN) 10 mg tablet    metoprolol succinate (TOPROL-XL) 25 mg 24 hr tablet    ondansetron (ZOFRAN) 4 mg tablet    oxyCODONE (ROXICODONE) 5 immediate release tablet    oxyCODONE-acetaminophen (PERCOCET) 5-325 mg per tablet    pantoprazole (PROTONIX) 40 mg tablet    pregabalin (LYRICA) 50 mg capsule    QUEtiapine (SEROquel) 100 mg tablet    tamsulosin (FLOMAX) 0.4 mg    temazepam (RESTORIL) 30 mg capsule    Xarelto 20 MG tablet    zolpidem (AMBIEN) 10 mg tablet    ezetimibe (ZETIA) 10 mg tablet    Allergies   Allergen Reactions    No Active Allergies            Objective     Blood pressure 122/82, pulse 74, temperature 98 °F (36.7 °C), temperature source Tympanic, resp. rate 18, height 6' (1.829 m), weight 99.8 kg (220 lb), SpO2 98%. Body mass index is 29.84 kg/m².      PHYSICAL EXAM:      General Appearance:   Alert, cooperative, no distress   HEENT:   Normocephalic, atraumatic, anicteric.     Neck:  Supple, symmetrical, trachea midline   Lungs:   Clear to auscultation bilaterally; no rales, rhonchi or wheezing; respirations unlabored    Heart::   Regular rate and rhythm; no murmur, rub, or gallop.   Abdomen:   Soft, non-tender, non-distended; normal bowel sounds; no masses, no organomegaly    Genitalia:   Deferred    Rectal:   Deferred    Extremities:  No cyanosis, clubbing or edema    Pulses:  2+ and symmetric    Skin:  No jaundice, rashes, or lesions    Lymph nodes:  No palpable cervical lymphadenopathy        Lab Results:   No visits with results within 1 Day(s) from this visit.   Latest known visit with results is:   Appointment on 11/19/2024   Component Date Value    Buprenorphine 11/19/2024 Negative     ETHYL GLUCURONIDE 11/19/2024 Negative     Amphetamine Screen, Urine 11/19/2024 Negative     Barbiturate Screen, Ur 11/19/2024 Negative     Benzodiazepine  Screen, U* 11/19/2024 Comment     Cocaine Metabolite 11/19/2024 Negative     PCP Scrn, Ur 11/19/2024 Negative     Cannabinoid Scrn, Ur 11/19/2024 Negative     6-Acetylmorphine, Urine 11/19/2024 Negative     Opiates Screen, Urine 11/19/2024 Comment     OXYCODONE/OXYMORPHONE 11/19/2024 Comment     Tapentadol 11/19/2024 Negative     FENTANYL 11/19/2024 Negative     Methadone Screen, Urine 11/19/2024 Negative     Propoxyphene Screen, Uri* 11/19/2024 Negative     Tramadol Scrn, Ur 11/19/2024 Negative     CARISOPRODOL 11/19/2024 Negative     GABAPENTIN, IA 11/19/2024 Negative     CREATININE 11/19/2024 123     pH 11/19/2024 5.5     Nitrites Urine 11/19/2024 Negative     DESMETHYLDIAZEPAM 11/19/2024 Not Detected     OXAZEPAM 11/19/2024 >1626     Temazepam Quantification* 11/19/2024 >1626     alpha-Hydroxyalprazolam * 11/19/2024 Not Detected     HYDROXYETHYLFLURAZEPAM 11/19/2024 Not Detected     Lorazepam Quantification* 11/19/2024 Not Detected     ALPHA-HYDROXYTRIAZOLAM 11/19/2024 Not Detected     ALPHA-HYDROXYMIDAZOLAM 11/19/2024 Not Detected     7-Amino-Clonazepam Quant* 11/19/2024 Not Detected     BENZODIAZEPINES 11/19/2024 ++POSITIVE++ (A)     DIAZEPAM 11/19/2024 Not Detected     ALPRAZOLAM 11/19/2024 Not Detected     CLONAZEPAM 11/19/2024 Not Detected     MIDAZOLAM 11/19/2024 Not Detected     FLUNITRAZEPAM 11/19/2024 Not Detected     DESMETHYLFLUNITRAZEPAM 11/19/2024 Not Detected     Codeine Quantification 11/19/2024 Not Detected     Morphine Quantification 11/19/2024 Not Detected     Hydrocodone Quantificati* 11/19/2024 Not Detected     Hydromorphone Quantifica* 11/19/2024 Not Detected     OPIATE CLASS 11/19/2024 Negative     NORMORPHINE 11/19/2024 Not Detected     NORCODEINE 11/19/2024 Not Detected     DIHYDROCODEINE 11/19/2024 Not Detected     NORHYDROCODONE 11/19/2024 Not Detected     Oxycodone Quantification 11/19/2024 1533     Oxymorphone Quantificati* 11/19/2024 1991     OXYCODONE CLASS 11/19/2024 ++POSITIVE++  (A)     NOROXYCODONE 11/19/2024 5375     NOROXYMORPHONE 11/19/2024 1298          Radiology Results:   No results found.

## 2024-12-03 NOTE — TELEPHONE ENCOUNTER
Patient called requesting refill for pantoprazole (PROTONIX) 40 mg tablet. Patient made aware medication was refilled on 12/2/2024 for 60 tablets with 2 refills to [facility]. Patient instructed to contact the pharmacy to obtain refills of medication. Patient verbalized understanding.

## 2024-12-25 DIAGNOSIS — A04.8 BACTERIAL INFECTION DUE TO H. PYLORI: ICD-10-CM

## 2024-12-26 RX ORDER — PANTOPRAZOLE SODIUM 40 MG/1
40 TABLET, DELAYED RELEASE ORAL 2 TIMES DAILY
Qty: 180 TABLET | Refills: 1 | Status: SHIPPED | OUTPATIENT
Start: 2024-12-26

## 2025-01-28 DIAGNOSIS — E78.5 HYPERLIPIDEMIA, UNSPECIFIED HYPERLIPIDEMIA TYPE: ICD-10-CM

## 2025-01-28 RX ORDER — EZETIMIBE 10 MG/1
10 TABLET ORAL DAILY
Qty: 90 TABLET | Refills: 1 | Status: SHIPPED | OUTPATIENT
Start: 2025-01-28

## 2025-05-21 DIAGNOSIS — K30 FUNCTIONAL DYSPEPSIA: ICD-10-CM

## 2025-05-21 DIAGNOSIS — R10.13 EPIGASTRIC PAIN: ICD-10-CM

## 2025-05-21 RX ORDER — AMITRIPTYLINE HYDROCHLORIDE 10 MG/1
10 TABLET ORAL
Qty: 90 TABLET | Refills: 1 | Status: SHIPPED | OUTPATIENT
Start: 2025-05-21

## 2025-07-26 DIAGNOSIS — E78.5 HYPERLIPIDEMIA, UNSPECIFIED HYPERLIPIDEMIA TYPE: ICD-10-CM

## 2025-07-29 RX ORDER — EZETIMIBE 10 MG/1
10 TABLET ORAL DAILY
Qty: 90 TABLET | Refills: 1 | Status: SHIPPED | OUTPATIENT
Start: 2025-07-29

## (undated) DEVICE — CHLORAPREP HI-LITE 26ML ORANGE

## (undated) DEVICE — 3M™ MICROFOAM™ SURGICAL TAPE 4 ROLLS/CARTON 6 CARTONS/CASE 1528-3: Brand: 3M™ MICROFOAM™

## (undated) DEVICE — BLADE SHAVER DISSECTOR 4MM 13CM COOLCUT

## (undated) DEVICE — SCD SEQUENTIAL COMPRESSION COMFORT SLEEVE MEDIUM KNEE LENGTH: Brand: KENDALL SCD

## (undated) DEVICE — SHOULDER SUSPENSION KIT 6 PER BOX

## (undated) DEVICE — DRAPE SHEET THREE QUARTER

## (undated) DEVICE — U-DRAPE: Brand: CONVERTORS

## (undated) DEVICE — SUT PDS II 0 CT-1 27 IN Z340H

## (undated) DEVICE — GLOVE INDICATOR PI UNDERGLOVE SZ 7 BLUE

## (undated) DEVICE — MEDI-VAC TUBING CONNECTOR 6-IN-1 STRAIGHT: Brand: CARDINAL HEALTH

## (undated) DEVICE — GLOVE INDICATOR PI UNDERGLOVE SZ 7.5 BLUE

## (undated) DEVICE — Device

## (undated) DEVICE — TUBING ARTHROSCOPIC WAVE  MAIN PUMP

## (undated) DEVICE — ABDOMINAL PAD: Brand: DERMACEA

## (undated) DEVICE — NEEDLE SUT SCORPION MULTIFIRE

## (undated) DEVICE — THREADED CLEAR CANNULA WITH OBTURATOR 7MM X 75MM

## (undated) DEVICE — BETHLEHEM UNIVERSAL  ARTHRO PK: Brand: CARDINAL HEALTH

## (undated) DEVICE — 4-PORT MANIFOLD: Brand: NEPTUNE 2

## (undated) DEVICE — INTENDED FOR TISSUE SEPARATION, AND OTHER PROCEDURES THAT REQUIRE A SHARP SURGICAL BLADE TO PUNCTURE OR CUT.: Brand: BARD-PARKER ® CARBON RIB-BACK BLADES

## (undated) DEVICE — CANNULA ARTHRO LOPRFL 5MM X 7CM

## (undated) DEVICE — GLOVE SRG BIOGEL ECLIPSE 7

## (undated) DEVICE — SUT ETHILON 3-0 PS-1 18 IN 1663H

## (undated) DEVICE — OCCLUSIVE GAUZE STRIP,3% BISMUTH TRIBROMOPHENATE IN PETROLATUM BLEND: Brand: XEROFORM

## (undated) DEVICE — GAUZE SPONGES,16 PLY: Brand: CURITY

## (undated) DEVICE — BURR  OVAL 4MM 13CM 8 FLUTE COOLCUT

## (undated) DEVICE — PROBE ABLATION  APOLLO RF 90 DEG MULTI PORT

## (undated) DEVICE — TUBING SUCTION 5MM X 12 FT